# Patient Record
Sex: MALE | Race: OTHER | HISPANIC OR LATINO | Employment: FULL TIME | URBAN - METROPOLITAN AREA
[De-identification: names, ages, dates, MRNs, and addresses within clinical notes are randomized per-mention and may not be internally consistent; named-entity substitution may affect disease eponyms.]

---

## 2019-07-07 ENCOUNTER — HOSPITAL ENCOUNTER (EMERGENCY)
Facility: HOSPITAL | Age: 24
Discharge: HOME | End: 2019-07-07
Attending: EMERGENCY MEDICINE

## 2019-07-07 VITALS
WEIGHT: 154 LBS | DIASTOLIC BLOOD PRESSURE: 62 MMHG | BODY MASS INDEX: 24.75 KG/M2 | HEIGHT: 66 IN | OXYGEN SATURATION: 97 % | SYSTOLIC BLOOD PRESSURE: 118 MMHG | RESPIRATION RATE: 18 BRPM | TEMPERATURE: 99 F | HEART RATE: 60 BPM

## 2019-07-07 DIAGNOSIS — S81.812A: Primary | ICD-10-CM

## 2019-07-07 PROCEDURE — 25000003 PHARM REV CODE 250: Performed by: EMERGENCY MEDICINE

## 2019-07-07 PROCEDURE — 12002 RPR S/N/AX/GEN/TRNK2.6-7.5CM: CPT

## 2019-07-07 PROCEDURE — 90715 TDAP VACCINE 7 YRS/> IM: CPT | Performed by: EMERGENCY MEDICINE

## 2019-07-07 PROCEDURE — 90471 IMMUNIZATION ADMIN: CPT | Performed by: EMERGENCY MEDICINE

## 2019-07-07 PROCEDURE — 63600175 PHARM REV CODE 636 W HCPCS: Performed by: EMERGENCY MEDICINE

## 2019-07-07 PROCEDURE — 99284 EMERGENCY DEPT VISIT MOD MDM: CPT | Mod: 25

## 2019-07-07 RX ORDER — LIDOCAINE HYDROCHLORIDE 10 MG/ML
10 INJECTION INFILTRATION; PERINEURAL
Status: COMPLETED | OUTPATIENT
Start: 2019-07-07 | End: 2019-07-07

## 2019-07-07 RX ADMIN — CLOSTRIDIUM TETANI TOXOID ANTIGEN (FORMALDEHYDE INACTIVATED), CORYNEBACTERIUM DIPHTHERIAE TOXOID ANTIGEN (FORMALDEHYDE INACTIVATED), BORDETELLA PERTUSSIS TOXOID ANTIGEN (GLUTARALDEHYDE INACTIVATED), BORDETELLA PERTUSSIS FILAMENTOUS HEMAGGLUTININ ANTIGEN (FORMALDEHYDE INACTIVATED), BORDETELLA PERTUSSIS PERTACTIN ANTIGEN, AND BORDETELLA PERTUSSIS FIMBRIAE 2/3 ANTIGEN 0.5 ML: 5; 2; 2.5; 5; 3; 5 INJECTION, SUSPENSION INTRAMUSCULAR at 10:07

## 2019-07-07 RX ADMIN — LIDOCAINE HYDROCHLORIDE 10 ML: 10 INJECTION, SOLUTION INFILTRATION; PERINEURAL at 10:07

## 2019-07-08 NOTE — ED NOTES
Pt presents to ED from home with Laceration on L lower leg. Patient states that he hit his leg on a metal bed frame at home around 5 pm.     Patient identifiers for Tucker Roberto verified by spelling and stated name on armband along with .     APPEARANCE: Alert, oriented and in no acute distress.  CARDIAC: Normal rate and rhythm, no murmur heard.   PERIPHERAL VASCULAR: peripheral pulses present. Normal cap refill. No edema. Warm to touch.    RESPIRATORY:Normal rate and effort, breath sounds clear bilaterally throughout chest. Respirations are equal and unlabored no obvious signs of distress.  GASTRO: soft, bowel sounds normal, no tenderness, no abdominal distention.  MUSC: Full ROM. No bony tenderness or soft tissue tenderness. No obvious deformity.  SKIN: + Laceration to LLE  MENTAL STATUS: awake, alert and aware of environment.

## 2019-07-08 NOTE — ED PROVIDER NOTES
Encounter Date: 7/7/2019    SCRIBE #1 NOTE: I, Mini Marin, am scribing for, and in the presence of,  Dr. Colon. I have scribed the entire note.       History     Chief Complaint   Patient presents with    Laceration     laceration to left lower leg. States he hit leg on bed around 5 p.m.     24 y.o male who presents to the ED with complaint of left leg laceration. He reports onset of symptoms was just prior to arrival in the ED. The patient was walking in his bedroom when he struck his leg on his bed post. There was immediate bleeding from lower leg that was controlled with a bandage. He denies any other injury. The patient does not have any history of bleeding disorder or currently taking blood thinners. He is uncertain of his last Tetanus vaccination. Pt denies any numbness, tingling or other complaints at this time.     The history is provided by the patient.     Review of patient's allergies indicates:   Allergen Reactions    Aspirin      Rash and hives     Past Medical History:   Diagnosis Date    Asthma      No past surgical history on file.  No family history on file.  Social History     Tobacco Use    Smoking status: Current Some Day Smoker   Substance Use Topics    Alcohol use: No    Drug use: No     Review of Systems   Constitutional: Negative for chills and fever.   HENT: Negative for congestion, ear pain, rhinorrhea and sore throat.    Respiratory: Negative for cough, shortness of breath and wheezing.    Cardiovascular: Negative for chest pain and palpitations.   Gastrointestinal: Negative for abdominal pain, diarrhea, nausea and vomiting.   Genitourinary: Negative for dysuria and hematuria.   Musculoskeletal: Negative for back pain, myalgias and neck pain.   Skin: Positive for wound (left lower leg). Negative for rash.   Neurological: Negative for dizziness, weakness, light-headedness and headaches.   Psychiatric/Behavioral: Negative for confusion.       Physical Exam     Initial Vitals  [07/07/19 2148]   BP Pulse Resp Temp SpO2   (!) 114/57 67 18 98.9 °F (37.2 °C) 97 %      MAP       --         Physical Exam    Nursing note and vitals reviewed.  Constitutional: He appears well-developed and well-nourished. He is not diaphoretic. No distress.   HENT:   Head: Normocephalic and atraumatic.   Right Ear: External ear normal.   Left Ear: External ear normal.   Mouth/Throat: Oropharynx is clear and moist.   Eyes: Conjunctivae and EOM are normal. Pupils are equal, round, and reactive to light.   Neck: Normal range of motion. Neck supple.   Cardiovascular: Normal rate, regular rhythm, normal heart sounds and intact distal pulses.   Pulmonary/Chest: Breath sounds normal. No respiratory distress.   Abdominal: Soft. Bowel sounds are normal.   Musculoskeletal: Normal range of motion. He exhibits no edema or tenderness.   LLE neurovascularly in tact  Strength WNL to LLE   Pulse is 2+ distally    Lymphadenopathy:     He has no cervical adenopathy.   Neurological: He is alert and oriented to person, place, and time. He has normal strength.   Skin: Skin is warm and dry. No rash noted.   Approximately 3 cm obliquely-oriented laceration to left lower ankle, nicely approximated; no active bleeding         ED Course   Lac Repair  Date/Time: 7/7/2019 10:36 PM  Performed by: Sean Colon MD  Authorized by: Sean Colon MD   Consent Done: Emergent Situation  Body area: lower extremity  Location details: left ankle  Laceration length: 3 cm  Foreign bodies: no foreign bodies  Tendon involvement: none  Nerve involvement: none  Vascular damage: no  Anesthesia: local infiltration    Anesthesia:  Local Anesthetic: lidocaine 1% without epinephrine  Anesthetic total: 5 mL  Patient sedated: no  Preparation: Patient was prepped and draped in the usual sterile fashion.  Irrigation solution: saline  Irrigation method: syringe  Amount of cleaning: extensive  Debridement: none  Degree of undermining: none  Skin closure:  Ethilon (4-0)  Number of sutures: 3  Technique: simple (interrupted)  Approximation: close  Approximation difficulty: simple  Dressing: antibiotic ointment and non-stick sterile dressing  Patient tolerance: Patient tolerated the procedure well with no immediate complications        Labs Reviewed - No data to display       Imaging Results    None          Medical Decision Making:   ED Management:  - wound copiously irrigated in ED; no FB noted; small, approx 3cm linear laceration to LLE; bleeding controlled; LLE neurovascularly in tact; 2+ distal pulse  - wound closed in ED without untoward event (see laceration repair note for further details)  - pt administered tetanus as he is unsure of last tetanus vaccination  - will have pt have sutures removed in approximately 10-14 days  - pt given strict return precautions for any new or worsening symptoms  - No further intervention is indicated at this time after having taken into account the patient's history, physical exam findings, and empirical and objective data obtained during the patient's emergency department workup.   - The patient is at low risk for an emergent medical condition at this time, and I am of the belief that that it is safe to discharge the patient from the emergency department.   - The patient is instructed to follow up as outpatient as indicated on the discharge paperwork.    - I have discussed the specifics of the workup with the patient and the patient has verbalized understanding of the details of the workup, the diagnosis, the treatment plan, and the need for outpatient follow-up.    - Although the patient has no emergent etiology today this does not preclude the development of an emergent condition so, in addition, I have advised the patient that they can return to the ED and/or activate EMS at any time with worsening of their symptoms, change of their symptoms, or with any other medical complaint.    - The patient remained comfortable and  stable during their visit in the ED.    - Discharge and follow-up instructions discussed with the patient who expressed understanding and willingness to comply with my recommendations.  - Results of all emergency department tests  discussed thoroughly with patient; all patient questions answered; pt in agreement with plan  - Pt instructed to follow up with PCP in 2-3 days for recheck of today's complaints  - Pt given strict emergency department return precautions for any new or worsening of symptoms  - Pt discharged from the emergency department in stable condition, in no acute distress                        Clinical Impression:     1. Laceration of lower extremity, left, initial encounter        Disposition:   Disposition: Discharged  Condition: Stable    I, Sean Colon,  personally performed the services described in this documentation. All medical record entries made by the scribe were at my direction and in my presence.  I have reviewed the chart and agree that the record reflects my personal performance and is accurate and complete. Sean Colon M.D. 2:39 AM07/08/2019                   Sean Colon MD  07/08/19 0239

## 2019-08-26 ENCOUNTER — HOSPITAL ENCOUNTER (EMERGENCY)
Facility: HOSPITAL | Age: 24
Discharge: HOME | End: 2019-08-26
Attending: EMERGENCY MEDICINE

## 2019-08-26 VITALS
BODY MASS INDEX: 22.99 KG/M2 | RESPIRATION RATE: 18 BRPM | HEIGHT: 65 IN | WEIGHT: 138 LBS | TEMPERATURE: 98 F | DIASTOLIC BLOOD PRESSURE: 66 MMHG | OXYGEN SATURATION: 98 % | SYSTOLIC BLOOD PRESSURE: 115 MMHG | HEART RATE: 72 BPM

## 2019-08-26 DIAGNOSIS — S39.012A LUMBAR STRAIN, INITIAL ENCOUNTER: Primary | ICD-10-CM

## 2019-08-26 PROCEDURE — 96372 THER/PROPH/DIAG INJ SC/IM: CPT

## 2019-08-26 PROCEDURE — 63600175 PHARM REV CODE 636 W HCPCS: Performed by: EMERGENCY MEDICINE

## 2019-08-26 PROCEDURE — 99284 EMERGENCY DEPT VISIT MOD MDM: CPT | Mod: 25

## 2019-08-26 RX ORDER — MELOXICAM 15 MG/1
15 TABLET ORAL DAILY
Qty: 12 TABLET | Refills: 0 | Status: ON HOLD | OUTPATIENT
Start: 2019-08-26 | End: 2021-11-08 | Stop reason: HOSPADM

## 2019-08-26 RX ORDER — KETOROLAC TROMETHAMINE 30 MG/ML
30 INJECTION, SOLUTION INTRAMUSCULAR; INTRAVENOUS
Status: COMPLETED | OUTPATIENT
Start: 2019-08-26 | End: 2019-08-26

## 2019-08-26 RX ORDER — CYCLOBENZAPRINE HCL 10 MG
10 TABLET ORAL 3 TIMES DAILY PRN
Qty: 15 TABLET | Refills: 0 | Status: SHIPPED | OUTPATIENT
Start: 2019-08-26 | End: 2019-08-31

## 2019-08-26 RX ADMIN — KETOROLAC TROMETHAMINE 30 MG: 30 INJECTION, SOLUTION INTRAMUSCULAR at 07:08

## 2019-08-26 NOTE — ED PROVIDER NOTES
Encounter Date: 8/26/2019    SCRIBE #1 NOTE: I, Mini Marin, am scribing for, and in the presence of,  Dr. Horvath. I have scribed the entire note.       History     Chief Complaint   Patient presents with    Back Pain     24y M ambulatory to ED with c/o lower back pain after skating injury yesterday     Time seen by provider: 6:56 AM    This is a 24 y.o. male who presents with complaint of low back pain. He reports onset of symptoms was yesterday evening. The patient was skate boarding down a hill when he suddenly felt like his back gave out. He denies actually falling but had difficulty moving. The patient reports the pain worsens with movement. He denies any associated numbness, tingling or weakness in the legs, loss of bowel/bladder or genital numbness. The patient tried OTC medication yesterday with minimal relief. He admits to injuring his back 4 years ago but not being evaluated for the pain at that time.     The history is provided by the patient.     Review of patient's allergies indicates:   Allergen Reactions    Aspirin      Rash and hives     Past Medical History:   Diagnosis Date    Asthma      History reviewed. No pertinent surgical history.  History reviewed. No pertinent family history.  Social History     Tobacco Use    Smoking status: Current Some Day Smoker   Substance Use Topics    Alcohol use: No    Drug use: No     Review of Systems   Constitutional: Negative for fever.   HENT: Negative for sore throat.    Respiratory: Negative for shortness of breath.    Cardiovascular: Negative for chest pain.   Gastrointestinal: Negative for nausea.   Genitourinary: Negative for dysuria.   Musculoskeletal: Positive for back pain (low).   Skin: Negative for rash.   Neurological: Negative for weakness.   Hematological: Does not bruise/bleed easily.       Physical Exam     Initial Vitals [08/26/19 0634]   BP Pulse Resp Temp SpO2   121/64 61 16 98.2 °F (36.8 °C) 98 %      MAP       --         Physical  Exam    Nursing note and vitals reviewed.  Constitutional: He appears well-developed and well-nourished. He is not diaphoretic. No distress.   HENT:   Head: Normocephalic and atraumatic.   Eyes: Conjunctivae and EOM are normal.   Neck: Normal range of motion. Neck supple.   Cardiovascular: Intact distal pulses.   Pulmonary/Chest: No respiratory distress.   Musculoskeletal: Normal range of motion. He exhibits tenderness. He exhibits no edema.   Bilateral paraspinal muscle tenderness in lumbar region. Full ROM with pain with flexion and extension.    Neurological: He is alert and oriented to person, place, and time. He has normal strength.   Skin: Skin is warm and dry. Capillary refill takes less than 2 seconds. No rash noted.         ED Course   Procedures  Labs Reviewed - No data to display       Imaging Results    None                               Clinical Impression:     1. Lumbar strain, initial encounter         I, Tala Horvath, personally performed the services described in this documentation. All medical record entries made by the scribe were at my direction and in my presence.  I have reviewed the chart and agree that the record reflects my personal performance and is accurate and complete. Tala Horvath M.D. 7:05 AM08/26/2019                   Tala Horvath MD  08/26/19 0705

## 2019-08-26 NOTE — ED NOTES
"Pt presents to ED with c/o lower back pain. Pt states, "I was skating and I guess I turned wrong and hurt my back". Describes lower back pain as "real bad ache". Rates back pain 8 on 1/10 pain scale. Pt denies falling, numbness or tingling to lower extremities or dysuria. Yung pedal pulses +2 to BLE. Aaox4. Resp even and unlabored.  "

## 2021-01-08 ENCOUNTER — HOSPITAL ENCOUNTER (EMERGENCY)
Facility: HOSPITAL | Age: 26
Discharge: HOME | End: 2021-01-08
Attending: EMERGENCY MEDICINE
Payer: MEDICAID

## 2021-01-08 VITALS
HEART RATE: 78 BPM | BODY MASS INDEX: 25.61 KG/M2 | DIASTOLIC BLOOD PRESSURE: 65 MMHG | RESPIRATION RATE: 17 BRPM | OXYGEN SATURATION: 100 % | HEIGHT: 64 IN | TEMPERATURE: 98 F | WEIGHT: 150 LBS | SYSTOLIC BLOOD PRESSURE: 141 MMHG

## 2021-01-08 DIAGNOSIS — M54.50 ACUTE LOW BACK PAIN WITHOUT SCIATICA, UNSPECIFIED BACK PAIN LATERALITY: Primary | ICD-10-CM

## 2021-01-08 PROCEDURE — 63600175 PHARM REV CODE 636 W HCPCS: Performed by: EMERGENCY MEDICINE

## 2021-01-08 PROCEDURE — 99284 EMERGENCY DEPT VISIT MOD MDM: CPT | Mod: 25

## 2021-01-08 PROCEDURE — 96372 THER/PROPH/DIAG INJ SC/IM: CPT

## 2021-01-08 RX ORDER — IBUPROFEN 600 MG/1
600 TABLET ORAL EVERY 6 HOURS PRN
Qty: 28 TABLET | Refills: 0 | Status: ON HOLD | OUTPATIENT
Start: 2021-01-08 | End: 2021-11-08 | Stop reason: HOSPADM

## 2021-01-08 RX ORDER — KETOROLAC TROMETHAMINE 30 MG/ML
30 INJECTION, SOLUTION INTRAMUSCULAR; INTRAVENOUS
Status: COMPLETED | OUTPATIENT
Start: 2021-01-08 | End: 2021-01-08

## 2021-01-08 RX ORDER — CYCLOBENZAPRINE HCL 10 MG
10 TABLET ORAL 3 TIMES DAILY PRN
Qty: 9 TABLET | Refills: 0 | Status: SHIPPED | OUTPATIENT
Start: 2021-01-08 | End: 2021-01-13

## 2021-01-08 RX ORDER — ACETAMINOPHEN 500 MG
500 TABLET ORAL EVERY 6 HOURS PRN
Qty: 28 TABLET | Refills: 0 | Status: ON HOLD | OUTPATIENT
Start: 2021-01-08 | End: 2021-11-08 | Stop reason: HOSPADM

## 2021-01-08 RX ADMIN — KETOROLAC TROMETHAMINE 30 MG: 30 INJECTION, SOLUTION INTRAMUSCULAR at 05:01

## 2021-06-04 ENCOUNTER — HOSPITAL ENCOUNTER (EMERGENCY)
Facility: HOSPITAL | Age: 26
Discharge: HOME | End: 2021-06-04
Attending: EMERGENCY MEDICINE
Payer: MEDICAID

## 2021-06-04 VITALS
HEIGHT: 64 IN | TEMPERATURE: 98 F | BODY MASS INDEX: 24.75 KG/M2 | OXYGEN SATURATION: 100 % | RESPIRATION RATE: 18 BRPM | SYSTOLIC BLOOD PRESSURE: 132 MMHG | HEART RATE: 74 BPM | WEIGHT: 145 LBS | DIASTOLIC BLOOD PRESSURE: 80 MMHG

## 2021-06-04 DIAGNOSIS — J45.901 EXACERBATION OF ASTHMA, UNSPECIFIED ASTHMA SEVERITY, UNSPECIFIED WHETHER PERSISTENT: Primary | ICD-10-CM

## 2021-06-04 PROCEDURE — 99284 EMERGENCY DEPT VISIT MOD MDM: CPT | Mod: ,,, | Performed by: PHYSICIAN ASSISTANT

## 2021-06-04 PROCEDURE — 99284 PR EMERGENCY DEPT VISIT,LEVEL IV: ICD-10-PCS | Mod: ,,, | Performed by: PHYSICIAN ASSISTANT

## 2021-06-04 PROCEDURE — 99284 EMERGENCY DEPT VISIT MOD MDM: CPT

## 2021-06-04 PROCEDURE — 63600175 PHARM REV CODE 636 W HCPCS: Performed by: PHYSICIAN ASSISTANT

## 2021-06-04 RX ORDER — PREDNISONE 20 MG/1
60 TABLET ORAL
Status: COMPLETED | OUTPATIENT
Start: 2021-06-04 | End: 2021-06-04

## 2021-06-04 RX ORDER — ALBUTEROL SULFATE 90 UG/1
1-2 AEROSOL, METERED RESPIRATORY (INHALATION) EVERY 6 HOURS PRN
Qty: 8 G | Refills: 0 | OUTPATIENT
Start: 2021-06-04 | End: 2021-09-06

## 2021-06-04 RX ORDER — CETIRIZINE HYDROCHLORIDE 10 MG/1
10 TABLET ORAL DAILY
Qty: 30 TABLET | Refills: 0 | Status: CANCELLED | OUTPATIENT
Start: 2021-06-04 | End: 2022-06-04

## 2021-06-04 RX ORDER — PREDNISONE 20 MG/1
40 TABLET ORAL DAILY
Qty: 8 TABLET | Refills: 0 | Status: SHIPPED | OUTPATIENT
Start: 2021-06-04 | End: 2021-06-08

## 2021-06-04 RX ADMIN — PREDNISONE 60 MG: 20 TABLET ORAL at 07:06

## 2021-09-05 ENCOUNTER — HOSPITAL ENCOUNTER (EMERGENCY)
Facility: HOSPITAL | Age: 26
Discharge: HOME | End: 2021-09-06
Attending: EMERGENCY MEDICINE
Payer: MEDICAID

## 2021-09-05 DIAGNOSIS — J45.901 MILD ASTHMA WITH EXACERBATION, UNSPECIFIED WHETHER PERSISTENT: Primary | ICD-10-CM

## 2021-09-05 DIAGNOSIS — R06.02 SOB (SHORTNESS OF BREATH): ICD-10-CM

## 2021-09-05 DIAGNOSIS — R06.02 SHORTNESS OF BREATH: ICD-10-CM

## 2021-09-05 PROCEDURE — 99285 EMERGENCY DEPT VISIT HI MDM: CPT | Mod: 25

## 2021-09-05 PROCEDURE — 99284 PR EMERGENCY DEPT VISIT,LEVEL IV: ICD-10-PCS | Mod: CS,,, | Performed by: PHYSICIAN ASSISTANT

## 2021-09-05 PROCEDURE — 99284 EMERGENCY DEPT VISIT MOD MDM: CPT | Mod: CS,,, | Performed by: PHYSICIAN ASSISTANT

## 2021-09-06 VITALS
WEIGHT: 146 LBS | DIASTOLIC BLOOD PRESSURE: 71 MMHG | HEART RATE: 85 BPM | OXYGEN SATURATION: 97 % | BODY MASS INDEX: 25.06 KG/M2 | TEMPERATURE: 98 F | RESPIRATION RATE: 20 BRPM | SYSTOLIC BLOOD PRESSURE: 122 MMHG

## 2021-09-06 LAB
CTP QC/QA: YES
SARS-COV-2 RDRP RESP QL NAA+PROBE: NEGATIVE

## 2021-09-06 PROCEDURE — 86803 HEPATITIS C AB TEST: CPT | Performed by: EMERGENCY MEDICINE

## 2021-09-06 PROCEDURE — U0002 COVID-19 LAB TEST NON-CDC: HCPCS | Performed by: PHYSICIAN ASSISTANT

## 2021-09-06 PROCEDURE — 93010 EKG 12-LEAD: ICD-10-PCS | Mod: ,,, | Performed by: INTERNAL MEDICINE

## 2021-09-06 PROCEDURE — 87389 HIV-1 AG W/HIV-1&-2 AB AG IA: CPT | Performed by: EMERGENCY MEDICINE

## 2021-09-06 PROCEDURE — 93005 ELECTROCARDIOGRAM TRACING: CPT

## 2021-09-06 PROCEDURE — 63600175 PHARM REV CODE 636 W HCPCS: Performed by: PHYSICIAN ASSISTANT

## 2021-09-06 PROCEDURE — 25000242 PHARM REV CODE 250 ALT 637 W/ HCPCS: Performed by: PHYSICIAN ASSISTANT

## 2021-09-06 PROCEDURE — 93010 ELECTROCARDIOGRAM REPORT: CPT | Mod: ,,, | Performed by: INTERNAL MEDICINE

## 2021-09-06 PROCEDURE — 94640 AIRWAY INHALATION TREATMENT: CPT

## 2021-09-06 RX ORDER — IPRATROPIUM BROMIDE AND ALBUTEROL SULFATE 2.5; .5 MG/3ML; MG/3ML
3 SOLUTION RESPIRATORY (INHALATION)
Status: COMPLETED | OUTPATIENT
Start: 2021-09-06 | End: 2021-09-06

## 2021-09-06 RX ORDER — ALBUTEROL SULFATE 2.5 MG/.5ML
2.5 SOLUTION RESPIRATORY (INHALATION) EVERY 4 HOURS PRN
Qty: 1 EACH | Refills: 0 | Status: ON HOLD | OUTPATIENT
Start: 2021-09-06 | End: 2021-11-08 | Stop reason: HOSPADM

## 2021-09-06 RX ORDER — PREDNISONE 20 MG/1
40 TABLET ORAL DAILY
Qty: 10 TABLET | Refills: 0 | Status: SHIPPED | OUTPATIENT
Start: 2021-09-06 | End: 2021-09-11

## 2021-09-06 RX ORDER — ALBUTEROL SULFATE 90 UG/1
1-2 AEROSOL, METERED RESPIRATORY (INHALATION) EVERY 6 HOURS PRN
Qty: 8 G | Refills: 0 | Status: ON HOLD | OUTPATIENT
Start: 2021-09-06 | End: 2021-11-08 | Stop reason: HOSPADM

## 2021-09-06 RX ORDER — PREDNISONE 20 MG/1
40 TABLET ORAL
Status: COMPLETED | OUTPATIENT
Start: 2021-09-06 | End: 2021-09-06

## 2021-09-06 RX ADMIN — IPRATROPIUM BROMIDE AND ALBUTEROL SULFATE 3 ML: .5; 2.5 SOLUTION RESPIRATORY (INHALATION) at 12:09

## 2021-09-06 RX ADMIN — PREDNISONE 40 MG: 20 TABLET ORAL at 12:09

## 2021-09-07 LAB
HCV AB SERPL QL IA: NEGATIVE
HIV 1+2 AB+HIV1 P24 AG SERPL QL IA: NEGATIVE

## 2021-10-26 ENCOUNTER — HOSPITAL ENCOUNTER (INPATIENT)
Facility: HOSPITAL | Age: 26
LOS: 13 days | Discharge: HOME | DRG: 207 | End: 2021-11-08
Attending: EMERGENCY MEDICINE | Admitting: STUDENT IN AN ORGANIZED HEALTH CARE EDUCATION/TRAINING PROGRAM
Payer: MEDICAID

## 2021-10-26 DIAGNOSIS — J20.2: ICD-10-CM

## 2021-10-26 DIAGNOSIS — Z99.11 ON MECHANICALLY ASSISTED VENTILATION: ICD-10-CM

## 2021-10-26 DIAGNOSIS — F11.90 HEROIN USE: ICD-10-CM

## 2021-10-26 DIAGNOSIS — R00.0 TACHYCARDIA: ICD-10-CM

## 2021-10-26 DIAGNOSIS — R09.02 HYPOXIA: ICD-10-CM

## 2021-10-26 DIAGNOSIS — Z87.898 HISTORY OF SUBSTANCE USE: ICD-10-CM

## 2021-10-26 DIAGNOSIS — F11.20: ICD-10-CM

## 2021-10-26 DIAGNOSIS — J45.51 SEVERE PERSISTENT ASTHMA WITH ACUTE EXACERBATION: Primary | ICD-10-CM

## 2021-10-26 DIAGNOSIS — R06.02 SHORTNESS OF BREATH: ICD-10-CM

## 2021-10-26 DIAGNOSIS — J45.909 ASTHMA: ICD-10-CM

## 2021-10-26 DIAGNOSIS — E44.0 MODERATE MALNUTRITION: ICD-10-CM

## 2021-10-26 DIAGNOSIS — J45.52 SEVERE PERSISTENT ASTHMA WITH STATUS ASTHMATICUS: ICD-10-CM

## 2021-10-26 PROBLEM — R41.89 SEDATED: Status: ACTIVE | Noted: 2021-10-26

## 2021-10-26 PROBLEM — J95.859 PATIENT VENTILATOR DYSSYNCHRONY: Status: ACTIVE | Noted: 2021-10-26

## 2021-10-26 LAB
ALBUMIN SERPL BCP-MCNC: 3.9 G/DL (ref 3.5–5.2)
ALLENS TEST: ABNORMAL
ALP SERPL-CCNC: 65 U/L (ref 55–135)
ALT SERPL W/O P-5'-P-CCNC: 23 U/L (ref 10–44)
ANION GAP SERPL CALC-SCNC: 10 MMOL/L (ref 8–16)
AST SERPL-CCNC: 22 U/L (ref 10–40)
BASOPHILS # BLD AUTO: 0.03 K/UL (ref 0–0.2)
BASOPHILS NFR BLD: 0.3 % (ref 0–1.9)
BILIRUB SERPL-MCNC: 0.3 MG/DL (ref 0.1–1)
BUN SERPL-MCNC: 13 MG/DL (ref 6–20)
CALCIUM SERPL-MCNC: 10 MG/DL (ref 8.7–10.5)
CHLORIDE SERPL-SCNC: 102 MMOL/L (ref 95–110)
CO2 SERPL-SCNC: 27 MMOL/L (ref 23–29)
CREAT SERPL-MCNC: 0.9 MG/DL (ref 0.5–1.4)
CTP QC/QA: YES
CTP QC/QA: YES
D DIMER PPP IA.FEU-MCNC: 0.23 MG/L FEU
DELSYS: ABNORMAL
DIFFERENTIAL METHOD: ABNORMAL
EOSINOPHIL # BLD AUTO: 1.4 K/UL (ref 0–0.5)
EOSINOPHIL NFR BLD: 14.5 % (ref 0–8)
ERYTHROCYTE [DISTWIDTH] IN BLOOD BY AUTOMATED COUNT: 12.4 % (ref 11.5–14.5)
ERYTHROCYTE [SEDIMENTATION RATE] IN BLOOD BY WESTERGREN METHOD: 20 MM/H
ERYTHROCYTE [SEDIMENTATION RATE] IN BLOOD BY WESTERGREN METHOD: 22 MM/H
EST. GFR  (AFRICAN AMERICAN): >60 ML/MIN/1.73 M^2
EST. GFR  (NON AFRICAN AMERICAN): >60 ML/MIN/1.73 M^2
FIO2: 40
FIO2: 50
FLOW: 4
GLUCOSE SERPL-MCNC: 131 MG/DL (ref 70–110)
HCO3 UR-SCNC: 26.6 MMOL/L (ref 24–28)
HCO3 UR-SCNC: 29.2 MMOL/L (ref 24–28)
HCO3 UR-SCNC: 31 MMOL/L (ref 24–28)
HCT VFR BLD AUTO: 43 % (ref 40–54)
HGB BLD-MCNC: 14.4 G/DL (ref 14–18)
IMM GRANULOCYTES # BLD AUTO: 0.02 K/UL (ref 0–0.04)
IMM GRANULOCYTES NFR BLD AUTO: 0.2 % (ref 0–0.5)
LYMPHOCYTES # BLD AUTO: 2.3 K/UL (ref 1–4.8)
LYMPHOCYTES NFR BLD: 23.6 % (ref 18–48)
MCH RBC QN AUTO: 28 PG (ref 27–31)
MCHC RBC AUTO-ENTMCNC: 33.5 G/DL (ref 32–36)
MCV RBC AUTO: 84 FL (ref 82–98)
MODE: ABNORMAL
MONOCYTES # BLD AUTO: 0.7 K/UL (ref 0.3–1)
MONOCYTES NFR BLD: 7.3 % (ref 4–15)
NEUTROPHILS # BLD AUTO: 5.4 K/UL (ref 1.8–7.7)
NEUTROPHILS NFR BLD: 54.1 % (ref 38–73)
NRBC BLD-RTO: 0 /100 WBC
PCO2 BLDA: 54.6 MMHG (ref 35–45)
PCO2 BLDA: 56.2 MMHG (ref 35–45)
PCO2 BLDA: 83.3 MMHG (ref 35–45)
PEEP: 0
PEEP: 5
PH SMN: 7.15 [PH] (ref 7.35–7.45)
PH SMN: 7.29 [PH] (ref 7.35–7.45)
PH SMN: 7.35 [PH] (ref 7.35–7.45)
PLATELET # BLD AUTO: 315 K/UL (ref 150–450)
PMV BLD AUTO: 10.4 FL (ref 9.2–12.9)
PO2 BLDA: 111 MMHG (ref 80–100)
PO2 BLDA: 55 MMHG (ref 40–60)
PO2 BLDA: 81 MMHG (ref 80–100)
POC BE: 0 MMOL/L
POC BE: 0 MMOL/L
POC BE: 5 MMOL/L
POC MOLECULAR INFLUENZA A AGN: NEGATIVE
POC MOLECULAR INFLUENZA B AGN: NEGATIVE
POC SATURATED O2: 86 % (ref 95–100)
POC SATURATED O2: 94 % (ref 95–100)
POC SATURATED O2: 96 % (ref 95–100)
POC TCO2: 28 MMOL/L (ref 23–27)
POC TCO2: 32 MMOL/L (ref 23–27)
POC TCO2: 33 MMOL/L (ref 24–29)
POTASSIUM SERPL-SCNC: 3.7 MMOL/L (ref 3.5–5.1)
PROT SERPL-MCNC: 7.2 G/DL (ref 6–8.4)
RBC # BLD AUTO: 5.14 M/UL (ref 4.6–6.2)
SAMPLE: ABNORMAL
SARS-COV-2 RDRP RESP QL NAA+PROBE: NEGATIVE
SITE: ABNORMAL
SODIUM SERPL-SCNC: 139 MMOL/L (ref 136–145)
SP02: 95
VT: 400
VT: 400
WBC # BLD AUTO: 9.91 K/UL (ref 3.9–12.7)

## 2021-10-26 PROCEDURE — 25000242 PHARM REV CODE 250 ALT 637 W/ HCPCS: Performed by: STUDENT IN AN ORGANIZED HEALTH CARE EDUCATION/TRAINING PROGRAM

## 2021-10-26 PROCEDURE — 99291 PR CRITICAL CARE, E/M 30-74 MINUTES: ICD-10-PCS | Mod: ,,, | Performed by: STUDENT IN AN ORGANIZED HEALTH CARE EDUCATION/TRAINING PROGRAM

## 2021-10-26 PROCEDURE — 93010 EKG 12-LEAD: ICD-10-PCS | Mod: ,,, | Performed by: INTERNAL MEDICINE

## 2021-10-26 PROCEDURE — 93010 ELECTROCARDIOGRAM REPORT: CPT | Mod: ,,, | Performed by: INTERNAL MEDICINE

## 2021-10-26 PROCEDURE — 93010 ELECTROCARDIOGRAM REPORT: CPT | Mod: 59,,, | Performed by: INTERNAL MEDICINE

## 2021-10-26 PROCEDURE — 25000003 PHARM REV CODE 250

## 2021-10-26 PROCEDURE — 87205 SMEAR GRAM STAIN: CPT | Performed by: STUDENT IN AN ORGANIZED HEALTH CARE EDUCATION/TRAINING PROGRAM

## 2021-10-26 PROCEDURE — 80053 COMPREHEN METABOLIC PANEL: CPT | Performed by: EMERGENCY MEDICINE

## 2021-10-26 PROCEDURE — 87070 CULTURE OTHR SPECIMN AEROBIC: CPT | Performed by: STUDENT IN AN ORGANIZED HEALTH CARE EDUCATION/TRAINING PROGRAM

## 2021-10-26 PROCEDURE — 31500 INSERT EMERGENCY AIRWAY: CPT | Mod: ,,, | Performed by: EMERGENCY MEDICINE

## 2021-10-26 PROCEDURE — 25000003 PHARM REV CODE 250: Performed by: STUDENT IN AN ORGANIZED HEALTH CARE EDUCATION/TRAINING PROGRAM

## 2021-10-26 PROCEDURE — 94761 N-INVAS EAR/PLS OXIMETRY MLT: CPT

## 2021-10-26 PROCEDURE — 63600175 PHARM REV CODE 636 W HCPCS: Performed by: EMERGENCY MEDICINE

## 2021-10-26 PROCEDURE — 94640 AIRWAY INHALATION TREATMENT: CPT

## 2021-10-26 PROCEDURE — 99291 CRITICAL CARE FIRST HOUR: CPT | Mod: 25

## 2021-10-26 PROCEDURE — 93010 EKG 12-LEAD: ICD-10-PCS | Mod: 59,,, | Performed by: INTERNAL MEDICINE

## 2021-10-26 PROCEDURE — 99900035 HC TECH TIME PER 15 MIN (STAT)

## 2021-10-26 PROCEDURE — U0002 COVID-19 LAB TEST NON-CDC: HCPCS | Performed by: EMERGENCY MEDICINE

## 2021-10-26 PROCEDURE — 96375 TX/PRO/DX INJ NEW DRUG ADDON: CPT

## 2021-10-26 PROCEDURE — 25000242 PHARM REV CODE 250 ALT 637 W/ HCPCS: Performed by: EMERGENCY MEDICINE

## 2021-10-26 PROCEDURE — 96372 THER/PROPH/DIAG INJ SC/IM: CPT

## 2021-10-26 PROCEDURE — 63600175 PHARM REV CODE 636 W HCPCS: Performed by: STUDENT IN AN ORGANIZED HEALTH CARE EDUCATION/TRAINING PROGRAM

## 2021-10-26 PROCEDURE — 99900026 HC AIRWAY MAINTENANCE (STAT)

## 2021-10-26 PROCEDURE — 36600 WITHDRAWAL OF ARTERIAL BLOOD: CPT

## 2021-10-26 PROCEDURE — 93005 ELECTROCARDIOGRAM TRACING: CPT

## 2021-10-26 PROCEDURE — 99292 PR CRITICAL CARE, ADDL 30 MIN: ICD-10-PCS | Mod: ,,, | Performed by: INTERNAL MEDICINE

## 2021-10-26 PROCEDURE — 25000003 PHARM REV CODE 250: Performed by: INTERNAL MEDICINE

## 2021-10-26 PROCEDURE — 94003 VENT MGMT INPAT SUBQ DAY: CPT

## 2021-10-26 PROCEDURE — 31500 PR INSERT, EMERGENCY ENDOTRACH AIRWAY: ICD-10-PCS | Mod: ,,, | Performed by: EMERGENCY MEDICINE

## 2021-10-26 PROCEDURE — 25000003 PHARM REV CODE 250: Performed by: EMERGENCY MEDICINE

## 2021-10-26 PROCEDURE — 85025 COMPLETE CBC W/AUTO DIFF WBC: CPT | Performed by: EMERGENCY MEDICINE

## 2021-10-26 PROCEDURE — 99291 CRITICAL CARE FIRST HOUR: CPT | Mod: ,,, | Performed by: STUDENT IN AN ORGANIZED HEALTH CARE EDUCATION/TRAINING PROGRAM

## 2021-10-26 PROCEDURE — 99291 CRITICAL CARE FIRST HOUR: CPT | Mod: 25,CS,, | Performed by: EMERGENCY MEDICINE

## 2021-10-26 PROCEDURE — 96374 THER/PROPH/DIAG INJ IV PUSH: CPT | Mod: 59

## 2021-10-26 PROCEDURE — 27200966 HC CLOSED SUCTION SYSTEM

## 2021-10-26 PROCEDURE — 94002 VENT MGMT INPAT INIT DAY: CPT

## 2021-10-26 PROCEDURE — 63600175 PHARM REV CODE 636 W HCPCS

## 2021-10-26 PROCEDURE — 85379 FIBRIN DEGRADATION QUANT: CPT | Performed by: EMERGENCY MEDICINE

## 2021-10-26 PROCEDURE — 87186 SC STD MICRODIL/AGAR DIL: CPT | Performed by: STUDENT IN AN ORGANIZED HEALTH CARE EDUCATION/TRAINING PROGRAM

## 2021-10-26 PROCEDURE — 31500 INSERT EMERGENCY AIRWAY: CPT

## 2021-10-26 PROCEDURE — 20000000 HC ICU ROOM

## 2021-10-26 PROCEDURE — 99292 CRITICAL CARE ADDL 30 MIN: CPT | Mod: ,,, | Performed by: INTERNAL MEDICINE

## 2021-10-26 PROCEDURE — 87502 INFLUENZA DNA AMP PROBE: CPT

## 2021-10-26 PROCEDURE — 82803 BLOOD GASES ANY COMBINATION: CPT

## 2021-10-26 PROCEDURE — 27000221 HC OXYGEN, UP TO 24 HOURS

## 2021-10-26 PROCEDURE — 99291 PR CRITICAL CARE, E/M 30-74 MINUTES: ICD-10-PCS | Mod: 25,CS,, | Performed by: EMERGENCY MEDICINE

## 2021-10-26 RX ORDER — KETAMINE HCL IN 0.9 % NACL 50 MG/5 ML
SYRINGE (ML) INTRAVENOUS
Status: COMPLETED
Start: 2021-10-26 | End: 2021-10-26

## 2021-10-26 RX ORDER — METHYLPREDNISOLONE SOD SUCC 125 MG
125 VIAL (EA) INJECTION
Status: COMPLETED | OUTPATIENT
Start: 2021-10-26 | End: 2021-10-26

## 2021-10-26 RX ORDER — ETOMIDATE 2 MG/ML
INJECTION INTRAVENOUS
Status: COMPLETED
Start: 2021-10-26 | End: 2021-10-26

## 2021-10-26 RX ORDER — KETAMINE HCL IN 0.9 % NACL 50 MG/5 ML
SYRINGE (ML) INTRAVENOUS
Status: DISPENSED
Start: 2021-10-26 | End: 2021-10-26

## 2021-10-26 RX ORDER — SUCCINYLCHOLINE CHLORIDE 20 MG/ML
1.5 INJECTION INTRAMUSCULAR; INTRAVENOUS
Status: COMPLETED | OUTPATIENT
Start: 2021-10-26 | End: 2021-10-26

## 2021-10-26 RX ORDER — EPINEPHRINE 0.3 MG/.3ML
INJECTION SUBCUTANEOUS
Status: COMPLETED
Start: 2021-10-26 | End: 2021-10-26

## 2021-10-26 RX ORDER — ALBUTEROL SULFATE 2.5 MG/.5ML
5 SOLUTION RESPIRATORY (INHALATION) EVERY 4 HOURS PRN
Status: DISCONTINUED | OUTPATIENT
Start: 2021-10-26 | End: 2021-10-26

## 2021-10-26 RX ORDER — EPINEPHRINE 0.3 MG/.3ML
0.3 INJECTION SUBCUTANEOUS
Status: COMPLETED | OUTPATIENT
Start: 2021-10-26 | End: 2021-10-26

## 2021-10-26 RX ORDER — ALBUTEROL SULFATE 2.5 MG/.5ML
15 SOLUTION RESPIRATORY (INHALATION) EVERY 4 HOURS PRN
Status: DISCONTINUED | OUTPATIENT
Start: 2021-10-26 | End: 2021-11-02

## 2021-10-26 RX ORDER — MIDAZOLAM HYDROCHLORIDE 1 MG/ML
2 INJECTION INTRAMUSCULAR; INTRAVENOUS
Status: COMPLETED | OUTPATIENT
Start: 2021-10-26 | End: 2021-10-26

## 2021-10-26 RX ORDER — FENTANYL CITRATE-0.9 % NACL/PF 10 MCG/ML
0-250 PLASTIC BAG, INJECTION (ML) INTRAVENOUS CONTINUOUS
Status: DISCONTINUED | OUTPATIENT
Start: 2021-10-26 | End: 2021-10-26

## 2021-10-26 RX ORDER — FENTANYL CITRATE 50 UG/ML
50 INJECTION, SOLUTION INTRAMUSCULAR; INTRAVENOUS
Status: COMPLETED | OUTPATIENT
Start: 2021-10-26 | End: 2021-10-26

## 2021-10-26 RX ORDER — KETAMINE HYDROCHLORIDE 50 MG/ML
60 INJECTION, SOLUTION INTRAMUSCULAR; INTRAVENOUS
Status: COMPLETED | OUTPATIENT
Start: 2021-10-26 | End: 2021-10-26

## 2021-10-26 RX ORDER — SODIUM CHLORIDE 0.9 % (FLUSH) 0.9 %
10 SYRINGE (ML) INJECTION
Status: DISCONTINUED | OUTPATIENT
Start: 2021-10-26 | End: 2021-11-08 | Stop reason: HOSPADM

## 2021-10-26 RX ORDER — FENTANYL CITRATE 50 UG/ML
INJECTION, SOLUTION INTRAMUSCULAR; INTRAVENOUS
Status: COMPLETED
Start: 2021-10-26 | End: 2021-10-26

## 2021-10-26 RX ORDER — MAGNESIUM SULFATE HEPTAHYDRATE 40 MG/ML
2 INJECTION, SOLUTION INTRAVENOUS
Status: COMPLETED | OUTPATIENT
Start: 2021-10-26 | End: 2021-10-26

## 2021-10-26 RX ORDER — KETAMINE HYDROCHLORIDE 50 MG/ML
50 INJECTION, SOLUTION INTRAMUSCULAR; INTRAVENOUS
Status: COMPLETED | OUTPATIENT
Start: 2021-10-26 | End: 2021-10-26

## 2021-10-26 RX ORDER — PROPOFOL 10 MG/ML
INJECTION, EMULSION INTRAVENOUS
Status: COMPLETED
Start: 2021-10-26 | End: 2021-10-26

## 2021-10-26 RX ORDER — ROCURONIUM BROMIDE 10 MG/ML
INJECTION, SOLUTION INTRAVENOUS
Status: COMPLETED
Start: 2021-10-26 | End: 2021-10-26

## 2021-10-26 RX ORDER — IPRATROPIUM BROMIDE AND ALBUTEROL SULFATE 2.5; .5 MG/3ML; MG/3ML
3 SOLUTION RESPIRATORY (INHALATION)
Status: DISCONTINUED | OUTPATIENT
Start: 2021-10-26 | End: 2021-11-01

## 2021-10-26 RX ORDER — MUPIROCIN 20 MG/G
OINTMENT TOPICAL 2 TIMES DAILY
Status: COMPLETED | OUTPATIENT
Start: 2021-10-26 | End: 2021-10-30

## 2021-10-26 RX ORDER — FAMOTIDINE 10 MG/ML
20 INJECTION INTRAVENOUS 2 TIMES DAILY
Status: DISCONTINUED | OUTPATIENT
Start: 2021-10-26 | End: 2021-11-02

## 2021-10-26 RX ORDER — FENTANYL CITRATE-0.9 % NACL/PF 10 MCG/ML
0-300 PLASTIC BAG, INJECTION (ML) INTRAVENOUS CONTINUOUS
Status: DISCONTINUED | OUTPATIENT
Start: 2021-10-26 | End: 2021-11-02

## 2021-10-26 RX ORDER — ALBUTEROL SULFATE 2.5 MG/.5ML
10 SOLUTION RESPIRATORY (INHALATION) EVERY 4 HOURS PRN
Status: DISCONTINUED | OUTPATIENT
Start: 2021-10-26 | End: 2021-10-26

## 2021-10-26 RX ORDER — IPRATROPIUM BROMIDE AND ALBUTEROL SULFATE 2.5; .5 MG/3ML; MG/3ML
3 SOLUTION RESPIRATORY (INHALATION)
Status: COMPLETED | OUTPATIENT
Start: 2021-10-26 | End: 2021-10-26

## 2021-10-26 RX ORDER — ENOXAPARIN SODIUM 100 MG/ML
40 INJECTION SUBCUTANEOUS EVERY 24 HOURS
Status: DISCONTINUED | OUTPATIENT
Start: 2021-10-26 | End: 2021-11-08 | Stop reason: HOSPADM

## 2021-10-26 RX ORDER — SUCCINYLCHOLINE CHLORIDE 20 MG/ML
INJECTION INTRAMUSCULAR; INTRAVENOUS
Status: COMPLETED
Start: 2021-10-26 | End: 2021-10-26

## 2021-10-26 RX ORDER — PROPOFOL 10 MG/ML
0-50 INJECTION, EMULSION INTRAVENOUS CONTINUOUS
Status: DISCONTINUED | OUTPATIENT
Start: 2021-10-26 | End: 2021-11-02

## 2021-10-26 RX ORDER — KETAMINE HCL IN 0.9 % NACL 50 MG/5 ML
0.5 SYRINGE (ML) INTRAVENOUS ONCE
Status: DISCONTINUED | OUTPATIENT
Start: 2021-10-26 | End: 2021-10-26

## 2021-10-26 RX ORDER — MAGNESIUM SULFATE HEPTAHYDRATE 40 MG/ML
2 INJECTION, SOLUTION INTRAVENOUS ONCE
Status: COMPLETED | OUTPATIENT
Start: 2021-10-26 | End: 2021-10-26

## 2021-10-26 RX ORDER — PROPOFOL 10 MG/ML
0-50 INJECTION, EMULSION INTRAVENOUS CONTINUOUS
Status: DISCONTINUED | OUTPATIENT
Start: 2021-10-26 | End: 2021-10-26

## 2021-10-26 RX ADMIN — KETAMINE HYDROCHLORIDE 60 MG: 50 INJECTION, SOLUTION INTRAMUSCULAR; INTRAVENOUS at 05:10

## 2021-10-26 RX ADMIN — MIDAZOLAM 2 MG: 1 INJECTION INTRAMUSCULAR; INTRAVENOUS at 05:10

## 2021-10-26 RX ADMIN — MAGNESIUM SULFATE 2 G: 2 INJECTION INTRAVENOUS at 05:10

## 2021-10-26 RX ADMIN — SUCCINYLCHOLINE CHLORIDE 96 MG: 20 INJECTION INTRAMUSCULAR; INTRAVENOUS at 05:10

## 2021-10-26 RX ADMIN — FAMOTIDINE 20 MG: 10 INJECTION INTRAVENOUS at 09:10

## 2021-10-26 RX ADMIN — FENTANYL CITRATE 50 MCG: 50 INJECTION INTRAMUSCULAR; INTRAVENOUS at 05:10

## 2021-10-26 RX ADMIN — PROPOFOL 50 MCG/KG/MIN: 10 INJECTION, EMULSION INTRAVENOUS at 02:10

## 2021-10-26 RX ADMIN — IPRATROPIUM BROMIDE AND ALBUTEROL SULFATE 3 ML: 2.5; .5 SOLUTION RESPIRATORY (INHALATION) at 08:10

## 2021-10-26 RX ADMIN — Medication 300 MCG/HR: at 02:10

## 2021-10-26 RX ADMIN — KETAMINE HYDROCHLORIDE 2.5 MCG/KG/MIN: 100 INJECTION INTRAMUSCULAR; INTRAVENOUS at 09:10

## 2021-10-26 RX ADMIN — IPRATROPIUM BROMIDE AND ALBUTEROL SULFATE 3 ML: 2.5; .5 SOLUTION RESPIRATORY (INHALATION) at 09:10

## 2021-10-26 RX ADMIN — ALBUTEROL SULFATE 10 MG: 2.5 SOLUTION RESPIRATORY (INHALATION) at 06:10

## 2021-10-26 RX ADMIN — PROPOFOL 30 MCG/KG/MIN: 10 INJECTION, EMULSION INTRAVENOUS at 05:10

## 2021-10-26 RX ADMIN — Medication 50 MG: at 07:10

## 2021-10-26 RX ADMIN — EPINEPHRINE 0.3 MG: 0.3 INJECTION SUBCUTANEOUS at 05:10

## 2021-10-26 RX ADMIN — PROPOFOL 50 MCG/KG/MIN: 10 INJECTION, EMULSION INTRAVENOUS at 07:10

## 2021-10-26 RX ADMIN — IPRATROPIUM BROMIDE AND ALBUTEROL SULFATE 3 ML: 2.5; .5 SOLUTION RESPIRATORY (INHALATION) at 06:10

## 2021-10-26 RX ADMIN — EPINEPHRINE 0.3 MG: 0.3 INJECTION INTRAMUSCULAR at 05:10

## 2021-10-26 RX ADMIN — Medication 300 MCG/HR: at 10:10

## 2021-10-26 RX ADMIN — METHYLPREDNISOLONE SODIUM SUCCINATE 125 MG: 125 INJECTION, POWDER, LYOPHILIZED, FOR SOLUTION INTRAMUSCULAR; INTRAVENOUS at 05:10

## 2021-10-26 RX ADMIN — MUPIROCIN: 20 OINTMENT TOPICAL at 09:10

## 2021-10-26 RX ADMIN — KETAMINE HYDROCHLORIDE 20 MCG/KG/MIN: 100 INJECTION INTRAMUSCULAR; INTRAVENOUS at 08:10

## 2021-10-26 RX ADMIN — IPRATROPIUM BROMIDE AND ALBUTEROL SULFATE 3 ML: 2.5; .5 SOLUTION RESPIRATORY (INHALATION) at 02:10

## 2021-10-26 RX ADMIN — IPRATROPIUM BROMIDE AND ALBUTEROL SULFATE 3 ML: 2.5; .5 SOLUTION RESPIRATORY (INHALATION) at 05:10

## 2021-10-26 RX ADMIN — AZITHROMYCIN MONOHYDRATE 500 MG: 500 INJECTION, POWDER, LYOPHILIZED, FOR SOLUTION INTRAVENOUS at 09:10

## 2021-10-26 RX ADMIN — Medication 150 MCG/HR: at 06:10

## 2021-10-26 RX ADMIN — Medication 150 MCG/HR: at 08:10

## 2021-10-26 RX ADMIN — IPRATROPIUM BROMIDE AND ALBUTEROL SULFATE 3 ML: 2.5; .5 SOLUTION RESPIRATORY (INHALATION) at 11:10

## 2021-10-26 RX ADMIN — IPRATROPIUM BROMIDE AND ALBUTEROL SULFATE 3 ML: 2.5; .5 SOLUTION RESPIRATORY (INHALATION) at 04:10

## 2021-10-26 RX ADMIN — KETAMINE HYDROCHLORIDE 50 MG: 50 INJECTION, SOLUTION INTRAMUSCULAR; INTRAVENOUS at 05:10

## 2021-10-26 RX ADMIN — IPRATROPIUM BROMIDE AND ALBUTEROL SULFATE 3 ML: 2.5; .5 SOLUTION RESPIRATORY (INHALATION) at 07:10

## 2021-10-26 RX ADMIN — SUCCINYLCHOLINE CHLORIDE 96 MG: 20 INJECTION, SOLUTION INTRAMUSCULAR; INTRAVENOUS; PARENTERAL at 05:10

## 2021-10-26 RX ADMIN — MAGNESIUM SULFATE 2 G: 2 INJECTION INTRAVENOUS at 07:10

## 2021-10-26 RX ADMIN — ENOXAPARIN SODIUM 40 MG: 40 INJECTION SUBCUTANEOUS at 05:10

## 2021-10-26 RX ADMIN — CEFTRIAXONE 2 G: 2 INJECTION, SOLUTION INTRAVENOUS at 10:10

## 2021-10-26 RX ADMIN — FENTANYL CITRATE 100 MCG: 50 INJECTION INTRAMUSCULAR; INTRAVENOUS at 08:10

## 2021-10-26 RX ADMIN — Medication 25 MCG/HR: at 06:10

## 2021-10-26 RX ADMIN — PROPOFOL 50 MCG/KG/MIN: 10 INJECTION, EMULSION INTRAVENOUS at 09:10

## 2021-10-27 PROBLEM — J45.52 SEVERE PERSISTENT ASTHMA WITH STATUS ASTHMATICUS: Status: ACTIVE | Noted: 2021-10-26

## 2021-10-27 PROBLEM — Z87.898 HISTORY OF SUBSTANCE USE: Status: ACTIVE | Noted: 2021-10-27

## 2021-10-27 LAB
ALBUMIN SERPL BCP-MCNC: 3.5 G/DL (ref 3.5–5.2)
ALLENS TEST: ABNORMAL
ALP SERPL-CCNC: 53 U/L (ref 55–135)
ALT SERPL W/O P-5'-P-CCNC: 22 U/L (ref 10–44)
ANION GAP SERPL CALC-SCNC: 11 MMOL/L (ref 8–16)
AST SERPL-CCNC: 23 U/L (ref 10–40)
BASOPHILS # BLD AUTO: 0.01 K/UL (ref 0–0.2)
BASOPHILS NFR BLD: 0.1 % (ref 0–1.9)
BILIRUB SERPL-MCNC: 0.2 MG/DL (ref 0.1–1)
BUN SERPL-MCNC: 15 MG/DL (ref 6–20)
CALCIUM SERPL-MCNC: 9.5 MG/DL (ref 8.7–10.5)
CHLORIDE SERPL-SCNC: 104 MMOL/L (ref 95–110)
CO2 SERPL-SCNC: 23 MMOL/L (ref 23–29)
CREAT SERPL-MCNC: 1 MG/DL (ref 0.5–1.4)
DELSYS: ABNORMAL
DIFFERENTIAL METHOD: ABNORMAL
EOSINOPHIL # BLD AUTO: 0 K/UL (ref 0–0.5)
EOSINOPHIL NFR BLD: 0.2 % (ref 0–8)
ERYTHROCYTE [DISTWIDTH] IN BLOOD BY AUTOMATED COUNT: 12.5 % (ref 11.5–14.5)
ERYTHROCYTE [SEDIMENTATION RATE] IN BLOOD BY WESTERGREN METHOD: 18 MM/H
EST. GFR  (AFRICAN AMERICAN): >60 ML/MIN/1.73 M^2
EST. GFR  (NON AFRICAN AMERICAN): >60 ML/MIN/1.73 M^2
FIO2: 400
GLUCOSE SERPL-MCNC: 119 MG/DL (ref 70–110)
HCO3 UR-SCNC: 30.6 MMOL/L (ref 24–28)
HCT VFR BLD AUTO: 39 % (ref 40–54)
HGB BLD-MCNC: 12.6 G/DL (ref 14–18)
IMM GRANULOCYTES # BLD AUTO: 0.05 K/UL (ref 0–0.04)
IMM GRANULOCYTES NFR BLD AUTO: 0.4 % (ref 0–0.5)
LYMPHOCYTES # BLD AUTO: 1.1 K/UL (ref 1–4.8)
LYMPHOCYTES NFR BLD: 9.1 % (ref 18–48)
MAGNESIUM SERPL-MCNC: 2.4 MG/DL (ref 1.6–2.6)
MCH RBC QN AUTO: 27.9 PG (ref 27–31)
MCHC RBC AUTO-ENTMCNC: 32.3 G/DL (ref 32–36)
MCV RBC AUTO: 87 FL (ref 82–98)
MODE: ABNORMAL
MONOCYTES # BLD AUTO: 1.4 K/UL (ref 0.3–1)
MONOCYTES NFR BLD: 11.4 % (ref 4–15)
NEUTROPHILS # BLD AUTO: 9.3 K/UL (ref 1.8–7.7)
NEUTROPHILS NFR BLD: 78.8 % (ref 38–73)
NRBC BLD-RTO: 0 /100 WBC
PCO2 BLDA: 62.4 MMHG (ref 35–45)
PEEP: 5
PH SMN: 7.3 [PH] (ref 7.35–7.45)
PHOSPHATE SERPL-MCNC: 4.8 MG/DL (ref 2.7–4.5)
PLATELET # BLD AUTO: 269 K/UL (ref 150–450)
PMV BLD AUTO: 10.5 FL (ref 9.2–12.9)
PO2 BLDA: 113 MMHG (ref 80–100)
POC BE: 4 MMOL/L
POC SATURATED O2: 98 % (ref 95–100)
POC TCO2: 32 MMOL/L (ref 23–27)
POTASSIUM SERPL-SCNC: 5.2 MMOL/L (ref 3.5–5.1)
PROT SERPL-MCNC: 6.6 G/DL (ref 6–8.4)
RBC # BLD AUTO: 4.51 M/UL (ref 4.6–6.2)
SAMPLE: ABNORMAL
SITE: ABNORMAL
SODIUM SERPL-SCNC: 138 MMOL/L (ref 136–145)
SP02: 100
VT: 400
WBC # BLD AUTO: 11.84 K/UL (ref 3.9–12.7)

## 2021-10-27 PROCEDURE — 63600175 PHARM REV CODE 636 W HCPCS: Performed by: STUDENT IN AN ORGANIZED HEALTH CARE EDUCATION/TRAINING PROGRAM

## 2021-10-27 PROCEDURE — 25000003 PHARM REV CODE 250: Performed by: INTERNAL MEDICINE

## 2021-10-27 PROCEDURE — 25000242 PHARM REV CODE 250 ALT 637 W/ HCPCS: Performed by: STUDENT IN AN ORGANIZED HEALTH CARE EDUCATION/TRAINING PROGRAM

## 2021-10-27 PROCEDURE — 84100 ASSAY OF PHOSPHORUS: CPT | Performed by: STUDENT IN AN ORGANIZED HEALTH CARE EDUCATION/TRAINING PROGRAM

## 2021-10-27 PROCEDURE — 25000003 PHARM REV CODE 250: Performed by: STUDENT IN AN ORGANIZED HEALTH CARE EDUCATION/TRAINING PROGRAM

## 2021-10-27 PROCEDURE — 80053 COMPREHEN METABOLIC PANEL: CPT | Performed by: STUDENT IN AN ORGANIZED HEALTH CARE EDUCATION/TRAINING PROGRAM

## 2021-10-27 PROCEDURE — 94003 VENT MGMT INPAT SUBQ DAY: CPT

## 2021-10-27 PROCEDURE — 82803 BLOOD GASES ANY COMBINATION: CPT

## 2021-10-27 PROCEDURE — 94640 AIRWAY INHALATION TREATMENT: CPT

## 2021-10-27 PROCEDURE — 99291 CRITICAL CARE FIRST HOUR: CPT | Mod: ,,, | Performed by: INTERNAL MEDICINE

## 2021-10-27 PROCEDURE — 85025 COMPLETE CBC W/AUTO DIFF WBC: CPT | Performed by: STUDENT IN AN ORGANIZED HEALTH CARE EDUCATION/TRAINING PROGRAM

## 2021-10-27 PROCEDURE — 99900026 HC AIRWAY MAINTENANCE (STAT)

## 2021-10-27 PROCEDURE — 99900035 HC TECH TIME PER 15 MIN (STAT)

## 2021-10-27 PROCEDURE — 27000221 HC OXYGEN, UP TO 24 HOURS

## 2021-10-27 PROCEDURE — 36600 WITHDRAWAL OF ARTERIAL BLOOD: CPT

## 2021-10-27 PROCEDURE — 63600175 PHARM REV CODE 636 W HCPCS: Performed by: EMERGENCY MEDICINE

## 2021-10-27 PROCEDURE — 83735 ASSAY OF MAGNESIUM: CPT | Performed by: STUDENT IN AN ORGANIZED HEALTH CARE EDUCATION/TRAINING PROGRAM

## 2021-10-27 PROCEDURE — 20000000 HC ICU ROOM

## 2021-10-27 PROCEDURE — 94761 N-INVAS EAR/PLS OXIMETRY MLT: CPT

## 2021-10-27 PROCEDURE — 63600175 PHARM REV CODE 636 W HCPCS

## 2021-10-27 PROCEDURE — 99291 PR CRITICAL CARE, E/M 30-74 MINUTES: ICD-10-PCS | Mod: ,,, | Performed by: INTERNAL MEDICINE

## 2021-10-27 RX ORDER — ALBUTEROL SULFATE 2.5 MG/.5ML
15 SOLUTION RESPIRATORY (INHALATION) EVERY 4 HOURS PRN
Status: CANCELLED | OUTPATIENT
Start: 2021-10-27

## 2021-10-27 RX ORDER — MAGNESIUM SULFATE HEPTAHYDRATE 40 MG/ML
INJECTION, SOLUTION INTRAVENOUS
Status: DISPENSED
Start: 2021-10-27 | End: 2021-10-27

## 2021-10-27 RX ORDER — ROCURONIUM BROMIDE 10 MG/ML
INJECTION, SOLUTION INTRAVENOUS
Status: DISPENSED
Start: 2021-10-27 | End: 2021-10-27

## 2021-10-27 RX ORDER — ROCURONIUM BROMIDE 10 MG/ML
60 INJECTION, SOLUTION INTRAVENOUS ONCE
Status: DISCONTINUED | OUTPATIENT
Start: 2021-10-27 | End: 2021-10-27

## 2021-10-27 RX ORDER — MIDAZOLAM HYDROCHLORIDE 1 MG/ML
INJECTION INTRAMUSCULAR; INTRAVENOUS
Status: COMPLETED
Start: 2021-10-27 | End: 2021-10-27

## 2021-10-27 RX ORDER — ROCURONIUM BROMIDE 10 MG/ML
100 INJECTION, SOLUTION INTRAVENOUS ONCE
Status: COMPLETED | OUTPATIENT
Start: 2021-10-27 | End: 2021-10-27

## 2021-10-27 RX ORDER — MAGNESIUM SULFATE HEPTAHYDRATE 40 MG/ML
4 INJECTION, SOLUTION INTRAVENOUS ONCE
Status: DISCONTINUED | OUTPATIENT
Start: 2021-10-27 | End: 2021-10-27

## 2021-10-27 RX ORDER — MIDAZOLAM HYDROCHLORIDE 1 MG/ML
2 INJECTION INTRAMUSCULAR; INTRAVENOUS ONCE
Status: COMPLETED | OUTPATIENT
Start: 2021-10-27 | End: 2021-10-27

## 2021-10-27 RX ORDER — MAGNESIUM SULFATE HEPTAHYDRATE 40 MG/ML
4 INJECTION, SOLUTION INTRAVENOUS ONCE
Status: CANCELLED | OUTPATIENT
Start: 2021-10-27 | End: 2021-10-27

## 2021-10-27 RX ORDER — FENTANYL CITRATE 50 UG/ML
50 INJECTION, SOLUTION INTRAMUSCULAR; INTRAVENOUS ONCE
Status: COMPLETED | OUTPATIENT
Start: 2021-10-27 | End: 2021-10-27

## 2021-10-27 RX ADMIN — MUPIROCIN: 20 OINTMENT TOPICAL at 08:10

## 2021-10-27 RX ADMIN — KETAMINE HYDROCHLORIDE 20 MCG/KG/MIN: 100 INJECTION INTRAMUSCULAR; INTRAVENOUS at 02:10

## 2021-10-27 RX ADMIN — IPRATROPIUM BROMIDE AND ALBUTEROL SULFATE 3 ML: 2.5; .5 SOLUTION RESPIRATORY (INHALATION) at 05:10

## 2021-10-27 RX ADMIN — ROCURONIUM BROMIDE 100 MG: 10 INJECTION, SOLUTION INTRAVENOUS at 08:10

## 2021-10-27 RX ADMIN — KETAMINE HYDROCHLORIDE 20 MCG/KG/MIN: 100 INJECTION INTRAMUSCULAR; INTRAVENOUS at 05:10

## 2021-10-27 RX ADMIN — CISATRACURIUM BESYLATE 1 MCG/KG/MIN: 10 INJECTION, SOLUTION INTRAVENOUS at 09:10

## 2021-10-27 RX ADMIN — MINERAL OIL AND WHITE PETROLATUM: 30; 940 OINTMENT OPHTHALMIC at 11:10

## 2021-10-27 RX ADMIN — CEFTRIAXONE 2 G: 2 INJECTION, SOLUTION INTRAVENOUS at 08:10

## 2021-10-27 RX ADMIN — METHYLPREDNISOLONE SODIUM SUCCINATE 60 MG: 40 INJECTION, POWDER, FOR SOLUTION INTRAMUSCULAR; INTRAVENOUS at 05:10

## 2021-10-27 RX ADMIN — MIDAZOLAM 2 MG: 1 INJECTION INTRAMUSCULAR; INTRAVENOUS at 11:10

## 2021-10-27 RX ADMIN — FAMOTIDINE 20 MG: 10 INJECTION INTRAVENOUS at 08:10

## 2021-10-27 RX ADMIN — METHYLPREDNISOLONE SODIUM SUCCINATE 60 MG: 40 INJECTION, POWDER, FOR SOLUTION INTRAMUSCULAR; INTRAVENOUS at 08:10

## 2021-10-27 RX ADMIN — IPRATROPIUM BROMIDE AND ALBUTEROL SULFATE 3 ML: 2.5; .5 SOLUTION RESPIRATORY (INHALATION) at 06:10

## 2021-10-27 RX ADMIN — IPRATROPIUM BROMIDE AND ALBUTEROL SULFATE 3 ML: 2.5; .5 SOLUTION RESPIRATORY (INHALATION) at 04:10

## 2021-10-27 RX ADMIN — MIDAZOLAM HYDROCHLORIDE 2 MG: 1 INJECTION INTRAMUSCULAR; INTRAVENOUS at 11:10

## 2021-10-27 RX ADMIN — IPRATROPIUM BROMIDE AND ALBUTEROL SULFATE 3 ML: 2.5; .5 SOLUTION RESPIRATORY (INHALATION) at 12:10

## 2021-10-27 RX ADMIN — PROPOFOL 50 MCG/KG/MIN: 10 INJECTION, EMULSION INTRAVENOUS at 12:10

## 2021-10-27 RX ADMIN — KETAMINE HYDROCHLORIDE 20 MCG/KG/MIN: 100 INJECTION INTRAMUSCULAR; INTRAVENOUS at 09:10

## 2021-10-27 RX ADMIN — ENOXAPARIN SODIUM 40 MG: 40 INJECTION SUBCUTANEOUS at 04:10

## 2021-10-27 RX ADMIN — PROPOFOL 50 MCG/KG/MIN: 10 INJECTION, EMULSION INTRAVENOUS at 10:10

## 2021-10-27 RX ADMIN — IPRATROPIUM BROMIDE AND ALBUTEROL SULFATE 3 ML: 2.5; .5 SOLUTION RESPIRATORY (INHALATION) at 09:10

## 2021-10-27 RX ADMIN — MIDAZOLAM 1 MG/HR: 5 INJECTION INTRAMUSCULAR; INTRAVENOUS at 11:10

## 2021-10-27 RX ADMIN — FENTANYL CITRATE 50 MCG: 50 INJECTION INTRAMUSCULAR; INTRAVENOUS at 06:10

## 2021-10-27 RX ADMIN — IPRATROPIUM BROMIDE AND ALBUTEROL SULFATE 3 ML: 2.5; .5 SOLUTION RESPIRATORY (INHALATION) at 08:10

## 2021-10-27 RX ADMIN — PROPOFOL 50 MCG/KG/MIN: 10 INJECTION, EMULSION INTRAVENOUS at 07:10

## 2021-10-27 RX ADMIN — IPRATROPIUM BROMIDE AND ALBUTEROL SULFATE 3 ML: 2.5; .5 SOLUTION RESPIRATORY (INHALATION) at 10:10

## 2021-10-27 RX ADMIN — Medication 300 MCG/HR: at 04:10

## 2021-10-27 RX ADMIN — IPRATROPIUM BROMIDE AND ALBUTEROL SULFATE 3 ML: 2.5; .5 SOLUTION RESPIRATORY (INHALATION) at 03:10

## 2021-10-27 RX ADMIN — IPRATROPIUM BROMIDE AND ALBUTEROL SULFATE 3 ML: 2.5; .5 SOLUTION RESPIRATORY (INHALATION) at 01:10

## 2021-10-27 RX ADMIN — IPRATROPIUM BROMIDE AND ALBUTEROL SULFATE 3 ML: 2.5; .5 SOLUTION RESPIRATORY (INHALATION) at 02:10

## 2021-10-27 RX ADMIN — IPRATROPIUM BROMIDE AND ALBUTEROL SULFATE 3 ML: 2.5; .5 SOLUTION RESPIRATORY (INHALATION) at 11:10

## 2021-10-27 RX ADMIN — MINERAL OIL AND WHITE PETROLATUM: 30; 940 OINTMENT OPHTHALMIC at 03:10

## 2021-10-27 RX ADMIN — PROPOFOL 50 MCG/KG/MIN: 10 INJECTION, EMULSION INTRAVENOUS at 04:10

## 2021-10-27 RX ADMIN — Medication 300 MCG/HR: at 08:10

## 2021-10-27 RX ADMIN — PROPOFOL 50 MCG/KG/MIN: 10 INJECTION, EMULSION INTRAVENOUS at 03:10

## 2021-10-27 RX ADMIN — IPRATROPIUM BROMIDE AND ALBUTEROL SULFATE 3 ML: 2.5; .5 SOLUTION RESPIRATORY (INHALATION) at 07:10

## 2021-10-27 RX ADMIN — FENTANYL CITRATE 50 MCG: 50 INJECTION INTRAMUSCULAR; INTRAVENOUS at 04:10

## 2021-10-27 RX ADMIN — MINERAL OIL AND WHITE PETROLATUM: 30; 940 OINTMENT OPHTHALMIC at 08:10

## 2021-10-28 LAB
ALBUMIN SERPL BCP-MCNC: 3.2 G/DL (ref 3.5–5.2)
ALLENS TEST: ABNORMAL
ALP SERPL-CCNC: 50 U/L (ref 55–135)
ALT SERPL W/O P-5'-P-CCNC: 19 U/L (ref 10–44)
ANION GAP SERPL CALC-SCNC: 9 MMOL/L (ref 8–16)
AST SERPL-CCNC: 18 U/L (ref 10–40)
BASOPHILS # BLD AUTO: 0.01 K/UL (ref 0–0.2)
BASOPHILS NFR BLD: 0.1 % (ref 0–1.9)
BILIRUB SERPL-MCNC: 0.2 MG/DL (ref 0.1–1)
BUN SERPL-MCNC: 12 MG/DL (ref 6–20)
CALCIUM SERPL-MCNC: 9.2 MG/DL (ref 8.7–10.5)
CHLORIDE SERPL-SCNC: 107 MMOL/L (ref 95–110)
CO2 SERPL-SCNC: 26 MMOL/L (ref 23–29)
CREAT SERPL-MCNC: 0.9 MG/DL (ref 0.5–1.4)
DELSYS: ABNORMAL
DIFFERENTIAL METHOD: ABNORMAL
EOSINOPHIL # BLD AUTO: 0 K/UL (ref 0–0.5)
EOSINOPHIL NFR BLD: 0 % (ref 0–8)
ERYTHROCYTE [DISTWIDTH] IN BLOOD BY AUTOMATED COUNT: 13.2 % (ref 11.5–14.5)
ERYTHROCYTE [SEDIMENTATION RATE] IN BLOOD BY WESTERGREN METHOD: 18 MM/H
EST. GFR  (AFRICAN AMERICAN): >60 ML/MIN/1.73 M^2
EST. GFR  (NON AFRICAN AMERICAN): >60 ML/MIN/1.73 M^2
FIO2: 35
GLUCOSE SERPL-MCNC: 134 MG/DL (ref 70–110)
HCO3 UR-SCNC: 32.5 MMOL/L (ref 24–28)
HCT VFR BLD AUTO: 35.9 % (ref 40–54)
HGB BLD-MCNC: 11.3 G/DL (ref 14–18)
IMM GRANULOCYTES # BLD AUTO: 0.05 K/UL (ref 0–0.04)
IMM GRANULOCYTES NFR BLD AUTO: 0.4 % (ref 0–0.5)
LYMPHOCYTES # BLD AUTO: 0.4 K/UL (ref 1–4.8)
LYMPHOCYTES NFR BLD: 3 % (ref 18–48)
MAGNESIUM SERPL-MCNC: 2.1 MG/DL (ref 1.6–2.6)
MCH RBC QN AUTO: 27.6 PG (ref 27–31)
MCHC RBC AUTO-ENTMCNC: 31.5 G/DL (ref 32–36)
MCV RBC AUTO: 88 FL (ref 82–98)
MIN VOL: 7.57
MODE: ABNORMAL
MONOCYTES # BLD AUTO: 0.5 K/UL (ref 0.3–1)
MONOCYTES NFR BLD: 4.2 % (ref 4–15)
NEUTROPHILS # BLD AUTO: 10.9 K/UL (ref 1.8–7.7)
NEUTROPHILS NFR BLD: 92.3 % (ref 38–73)
NRBC BLD-RTO: 0 /100 WBC
PCO2 BLDA: 62.9 MMHG (ref 35–45)
PEEP: 5
PH SMN: 7.32 [PH] (ref 7.35–7.45)
PHOSPHATE SERPL-MCNC: 3.9 MG/DL (ref 2.7–4.5)
PLATELET # BLD AUTO: 247 K/UL (ref 150–450)
PMV BLD AUTO: 10.5 FL (ref 9.2–12.9)
PO2 BLDA: 87 MMHG (ref 80–100)
POC BE: 6 MMOL/L
POC SATURATED O2: 95 % (ref 95–100)
POC TCO2: 34 MMOL/L (ref 23–27)
POTASSIUM SERPL-SCNC: 5.1 MMOL/L (ref 3.5–5.1)
PROT SERPL-MCNC: 6.2 G/DL (ref 6–8.4)
RBC # BLD AUTO: 4.1 M/UL (ref 4.6–6.2)
SAMPLE: ABNORMAL
SITE: ABNORMAL
SODIUM SERPL-SCNC: 142 MMOL/L (ref 136–145)
SP02: 98
VT: 400
WBC # BLD AUTO: 11.82 K/UL (ref 3.9–12.7)

## 2021-10-28 PROCEDURE — 27000221 HC OXYGEN, UP TO 24 HOURS

## 2021-10-28 PROCEDURE — 94003 VENT MGMT INPAT SUBQ DAY: CPT

## 2021-10-28 PROCEDURE — 27100171 HC OXYGEN HIGH FLOW UP TO 24 HOURS

## 2021-10-28 PROCEDURE — 63600175 PHARM REV CODE 636 W HCPCS: Performed by: STUDENT IN AN ORGANIZED HEALTH CARE EDUCATION/TRAINING PROGRAM

## 2021-10-28 PROCEDURE — 99291 PR CRITICAL CARE, E/M 30-74 MINUTES: ICD-10-PCS | Mod: ,,, | Performed by: INTERNAL MEDICINE

## 2021-10-28 PROCEDURE — 99900035 HC TECH TIME PER 15 MIN (STAT)

## 2021-10-28 PROCEDURE — 94640 AIRWAY INHALATION TREATMENT: CPT

## 2021-10-28 PROCEDURE — 63600175 PHARM REV CODE 636 W HCPCS: Performed by: INTERNAL MEDICINE

## 2021-10-28 PROCEDURE — 94761 N-INVAS EAR/PLS OXIMETRY MLT: CPT

## 2021-10-28 PROCEDURE — 25000003 PHARM REV CODE 250: Performed by: STUDENT IN AN ORGANIZED HEALTH CARE EDUCATION/TRAINING PROGRAM

## 2021-10-28 PROCEDURE — 99900026 HC AIRWAY MAINTENANCE (STAT)

## 2021-10-28 PROCEDURE — 80053 COMPREHEN METABOLIC PANEL: CPT | Performed by: STUDENT IN AN ORGANIZED HEALTH CARE EDUCATION/TRAINING PROGRAM

## 2021-10-28 PROCEDURE — 99291 CRITICAL CARE FIRST HOUR: CPT | Mod: ,,, | Performed by: INTERNAL MEDICINE

## 2021-10-28 PROCEDURE — 85025 COMPLETE CBC W/AUTO DIFF WBC: CPT | Performed by: STUDENT IN AN ORGANIZED HEALTH CARE EDUCATION/TRAINING PROGRAM

## 2021-10-28 PROCEDURE — 63600175 PHARM REV CODE 636 W HCPCS: Performed by: EMERGENCY MEDICINE

## 2021-10-28 PROCEDURE — 84100 ASSAY OF PHOSPHORUS: CPT | Performed by: STUDENT IN AN ORGANIZED HEALTH CARE EDUCATION/TRAINING PROGRAM

## 2021-10-28 PROCEDURE — 20000000 HC ICU ROOM

## 2021-10-28 PROCEDURE — 83735 ASSAY OF MAGNESIUM: CPT | Performed by: STUDENT IN AN ORGANIZED HEALTH CARE EDUCATION/TRAINING PROGRAM

## 2021-10-28 PROCEDURE — 36600 WITHDRAWAL OF ARTERIAL BLOOD: CPT

## 2021-10-28 PROCEDURE — 25000242 PHARM REV CODE 250 ALT 637 W/ HCPCS: Performed by: STUDENT IN AN ORGANIZED HEALTH CARE EDUCATION/TRAINING PROGRAM

## 2021-10-28 RX ADMIN — MUPIROCIN: 20 OINTMENT TOPICAL at 09:10

## 2021-10-28 RX ADMIN — IPRATROPIUM BROMIDE AND ALBUTEROL SULFATE 3 ML: 2.5; .5 SOLUTION RESPIRATORY (INHALATION) at 03:10

## 2021-10-28 RX ADMIN — METHYLPREDNISOLONE SODIUM SUCCINATE 60 MG: 40 INJECTION, POWDER, FOR SOLUTION INTRAMUSCULAR; INTRAVENOUS at 12:10

## 2021-10-28 RX ADMIN — Medication 300 MCG/HR: at 01:10

## 2021-10-28 RX ADMIN — IPRATROPIUM BROMIDE AND ALBUTEROL SULFATE 3 ML: 2.5; .5 SOLUTION RESPIRATORY (INHALATION) at 12:10

## 2021-10-28 RX ADMIN — MUPIROCIN: 20 OINTMENT TOPICAL at 08:10

## 2021-10-28 RX ADMIN — PROPOFOL 50 MCG/KG/MIN: 10 INJECTION, EMULSION INTRAVENOUS at 01:10

## 2021-10-28 RX ADMIN — METHYLPREDNISOLONE SODIUM SUCCINATE 60 MG: 40 INJECTION, POWDER, FOR SOLUTION INTRAMUSCULAR; INTRAVENOUS at 11:10

## 2021-10-28 RX ADMIN — MINERAL OIL AND WHITE PETROLATUM: 30; 940 OINTMENT OPHTHALMIC at 09:10

## 2021-10-28 RX ADMIN — PROPOFOL 50 MCG/KG/MIN: 10 INJECTION, EMULSION INTRAVENOUS at 10:10

## 2021-10-28 RX ADMIN — MINERAL OIL AND WHITE PETROLATUM: 30; 940 OINTMENT OPHTHALMIC at 02:10

## 2021-10-28 RX ADMIN — Medication 300 MCG/HR: at 06:10

## 2021-10-28 RX ADMIN — KETAMINE HYDROCHLORIDE 20 MCG/KG/MIN: 100 INJECTION INTRAMUSCULAR; INTRAVENOUS at 01:10

## 2021-10-28 RX ADMIN — CISATRACURIUM BESYLATE 3 MCG/KG/MIN: 10 INJECTION, SOLUTION INTRAVENOUS at 06:10

## 2021-10-28 RX ADMIN — KETAMINE HYDROCHLORIDE 20 MCG/KG/MIN: 100 INJECTION INTRAMUSCULAR; INTRAVENOUS at 12:10

## 2021-10-28 RX ADMIN — METHYLPREDNISOLONE SODIUM SUCCINATE 60 MG: 40 INJECTION, POWDER, FOR SOLUTION INTRAMUSCULAR; INTRAVENOUS at 05:10

## 2021-10-28 RX ADMIN — ENOXAPARIN SODIUM 40 MG: 40 INJECTION SUBCUTANEOUS at 05:10

## 2021-10-28 RX ADMIN — KETAMINE HYDROCHLORIDE 20 MCG/KG/MIN: 100 INJECTION INTRAMUSCULAR; INTRAVENOUS at 06:10

## 2021-10-28 RX ADMIN — IPRATROPIUM BROMIDE AND ALBUTEROL SULFATE 3 ML: 2.5; .5 SOLUTION RESPIRATORY (INHALATION) at 09:10

## 2021-10-28 RX ADMIN — IPRATROPIUM BROMIDE AND ALBUTEROL SULFATE 3 ML: 2.5; .5 SOLUTION RESPIRATORY (INHALATION) at 10:10

## 2021-10-28 RX ADMIN — IPRATROPIUM BROMIDE AND ALBUTEROL SULFATE 3 ML: 2.5; .5 SOLUTION RESPIRATORY (INHALATION) at 01:10

## 2021-10-28 RX ADMIN — PROPOFOL 50 MCG/KG/MIN: 10 INJECTION, EMULSION INTRAVENOUS at 07:10

## 2021-10-28 RX ADMIN — IPRATROPIUM BROMIDE AND ALBUTEROL SULFATE 3 ML: 2.5; .5 SOLUTION RESPIRATORY (INHALATION) at 07:10

## 2021-10-28 RX ADMIN — PROPOFOL 50 MCG/KG/MIN: 10 INJECTION, EMULSION INTRAVENOUS at 05:10

## 2021-10-28 RX ADMIN — MINERAL OIL AND WHITE PETROLATUM: 30; 940 OINTMENT OPHTHALMIC at 08:10

## 2021-10-28 RX ADMIN — FAMOTIDINE 20 MG: 10 INJECTION INTRAVENOUS at 09:10

## 2021-10-28 RX ADMIN — SODIUM CHLORIDE, SODIUM LACTATE, POTASSIUM CHLORIDE, AND CALCIUM CHLORIDE 500 ML: .6; .31; .03; .02 INJECTION, SOLUTION INTRAVENOUS at 07:10

## 2021-10-28 RX ADMIN — Medication 300 MCG/HR: at 09:10

## 2021-10-28 RX ADMIN — PROPOFOL 50 MCG/KG/MIN: 10 INJECTION, EMULSION INTRAVENOUS at 04:10

## 2021-10-28 RX ADMIN — KETAMINE HYDROCHLORIDE 20 MCG/KG/MIN: 100 INJECTION INTRAMUSCULAR; INTRAVENOUS at 09:10

## 2021-10-28 RX ADMIN — IPRATROPIUM BROMIDE AND ALBUTEROL SULFATE 3 ML: 2.5; .5 SOLUTION RESPIRATORY (INHALATION) at 05:10

## 2021-10-28 RX ADMIN — FAMOTIDINE 20 MG: 10 INJECTION INTRAVENOUS at 08:10

## 2021-10-28 RX ADMIN — CISATRACURIUM BESYLATE 3 MCG/KG/MIN: 10 INJECTION, SOLUTION INTRAVENOUS at 12:10

## 2021-10-29 LAB
ALBUMIN SERPL BCP-MCNC: 3.1 G/DL (ref 3.5–5.2)
ALLENS TEST: ABNORMAL
ALLENS TEST: ABNORMAL
ALP SERPL-CCNC: 48 U/L (ref 55–135)
ALT SERPL W/O P-5'-P-CCNC: 17 U/L (ref 10–44)
ANION GAP SERPL CALC-SCNC: 7 MMOL/L (ref 8–16)
AST SERPL-CCNC: 19 U/L (ref 10–40)
BASOPHILS # BLD AUTO: 0.01 K/UL (ref 0–0.2)
BASOPHILS NFR BLD: 0.1 % (ref 0–1.9)
BILIRUB SERPL-MCNC: 0.1 MG/DL (ref 0.1–1)
BUN SERPL-MCNC: 16 MG/DL (ref 6–20)
CALCIUM SERPL-MCNC: 9.2 MG/DL (ref 8.7–10.5)
CHLORIDE SERPL-SCNC: 104 MMOL/L (ref 95–110)
CO2 SERPL-SCNC: 30 MMOL/L (ref 23–29)
CREAT SERPL-MCNC: 0.8 MG/DL (ref 0.5–1.4)
DELSYS: ABNORMAL
DELSYS: ABNORMAL
DIFFERENTIAL METHOD: ABNORMAL
EOSINOPHIL # BLD AUTO: 0 K/UL (ref 0–0.5)
EOSINOPHIL NFR BLD: 0 % (ref 0–8)
ERYTHROCYTE [DISTWIDTH] IN BLOOD BY AUTOMATED COUNT: 13.2 % (ref 11.5–14.5)
ERYTHROCYTE [SEDIMENTATION RATE] IN BLOOD BY WESTERGREN METHOD: 16 MM/H
ERYTHROCYTE [SEDIMENTATION RATE] IN BLOOD BY WESTERGREN METHOD: 18 MM/H
EST. GFR  (AFRICAN AMERICAN): >60 ML/MIN/1.73 M^2
EST. GFR  (NON AFRICAN AMERICAN): >60 ML/MIN/1.73 M^2
FIO2: 28
FIO2: 35
GLUCOSE SERPL-MCNC: 123 MG/DL (ref 70–110)
HCO3 UR-SCNC: 36 MMOL/L (ref 24–28)
HCO3 UR-SCNC: 36.1 MMOL/L (ref 24–28)
HCT VFR BLD AUTO: 34.4 % (ref 40–54)
HCT VFR BLD CALC: 34 %PCV (ref 36–54)
HGB BLD-MCNC: 11.1 G/DL (ref 14–18)
IGE SERPL-ACNC: 1709 IU/ML (ref 0–100)
IMM GRANULOCYTES # BLD AUTO: 0.09 K/UL (ref 0–0.04)
IMM GRANULOCYTES NFR BLD AUTO: 0.6 % (ref 0–0.5)
LYMPHOCYTES # BLD AUTO: 0.4 K/UL (ref 1–4.8)
LYMPHOCYTES NFR BLD: 2.4 % (ref 18–48)
MAGNESIUM SERPL-MCNC: 2 MG/DL (ref 1.6–2.6)
MCH RBC QN AUTO: 28.2 PG (ref 27–31)
MCHC RBC AUTO-ENTMCNC: 32.3 G/DL (ref 32–36)
MCV RBC AUTO: 87 FL (ref 82–98)
MODE: ABNORMAL
MODE: ABNORMAL
MONOCYTES # BLD AUTO: 0.7 K/UL (ref 0.3–1)
MONOCYTES NFR BLD: 4.9 % (ref 4–15)
NEUTROPHILS # BLD AUTO: 13.4 K/UL (ref 1.8–7.7)
NEUTROPHILS NFR BLD: 92 % (ref 38–73)
NRBC BLD-RTO: 0 /100 WBC
PCO2 BLDA: 58.5 MMHG (ref 35–45)
PCO2 BLDA: 63.5 MMHG (ref 35–45)
PEEP: 0
PEEP: 0
PH SMN: 7.36 [PH] (ref 7.35–7.45)
PH SMN: 7.4 [PH] (ref 7.35–7.45)
PHOSPHATE SERPL-MCNC: 3.4 MG/DL (ref 2.7–4.5)
PLATELET # BLD AUTO: 260 K/UL (ref 150–450)
PMV BLD AUTO: 10.4 FL (ref 9.2–12.9)
PO2 BLDA: 100 MMHG (ref 80–100)
PO2 BLDA: 77 MMHG (ref 80–100)
POC BE: 11 MMOL/L
POC BE: 11 MMOL/L
POC IONIZED CALCIUM: 1.25 MMOL/L (ref 1.06–1.42)
POC SATURATED O2: 94 % (ref 95–100)
POC SATURATED O2: 98 % (ref 95–100)
POC TCO2: 38 MMOL/L (ref 23–27)
POC TCO2: 38 MMOL/L (ref 23–27)
POTASSIUM BLD-SCNC: 4.4 MMOL/L (ref 3.5–5.1)
POTASSIUM SERPL-SCNC: 4.5 MMOL/L (ref 3.5–5.1)
PROT SERPL-MCNC: 6.1 G/DL (ref 6–8.4)
RBC # BLD AUTO: 3.94 M/UL (ref 4.6–6.2)
SAMPLE: ABNORMAL
SAMPLE: ABNORMAL
SITE: ABNORMAL
SITE: ABNORMAL
SODIUM BLD-SCNC: 142 MMOL/L (ref 136–145)
SODIUM SERPL-SCNC: 141 MMOL/L (ref 136–145)
SP02: 97
TRIGL SERPL-MCNC: 264 MG/DL (ref 30–150)
VT: 400
VT: 400
WBC # BLD AUTO: 14.56 K/UL (ref 3.9–12.7)

## 2021-10-29 PROCEDURE — 99223 PR INITIAL HOSPITAL CARE,LEVL III: ICD-10-PCS | Mod: ,,, | Performed by: ALLERGY & IMMUNOLOGY

## 2021-10-29 PROCEDURE — 85025 COMPLETE CBC W/AUTO DIFF WBC: CPT | Performed by: STUDENT IN AN ORGANIZED HEALTH CARE EDUCATION/TRAINING PROGRAM

## 2021-10-29 PROCEDURE — 25000003 PHARM REV CODE 250: Performed by: INTERNAL MEDICINE

## 2021-10-29 PROCEDURE — 63600175 PHARM REV CODE 636 W HCPCS

## 2021-10-29 PROCEDURE — 25000003 PHARM REV CODE 250: Performed by: STUDENT IN AN ORGANIZED HEALTH CARE EDUCATION/TRAINING PROGRAM

## 2021-10-29 PROCEDURE — 99291 PR CRITICAL CARE, E/M 30-74 MINUTES: ICD-10-PCS | Mod: ,,, | Performed by: INTERNAL MEDICINE

## 2021-10-29 PROCEDURE — 84478 ASSAY OF TRIGLYCERIDES: CPT | Performed by: INTERNAL MEDICINE

## 2021-10-29 PROCEDURE — 94640 AIRWAY INHALATION TREATMENT: CPT

## 2021-10-29 PROCEDURE — 83735 ASSAY OF MAGNESIUM: CPT | Performed by: STUDENT IN AN ORGANIZED HEALTH CARE EDUCATION/TRAINING PROGRAM

## 2021-10-29 PROCEDURE — 94761 N-INVAS EAR/PLS OXIMETRY MLT: CPT

## 2021-10-29 PROCEDURE — 25000242 PHARM REV CODE 250 ALT 637 W/ HCPCS: Performed by: STUDENT IN AN ORGANIZED HEALTH CARE EDUCATION/TRAINING PROGRAM

## 2021-10-29 PROCEDURE — 63600175 PHARM REV CODE 636 W HCPCS: Performed by: STUDENT IN AN ORGANIZED HEALTH CARE EDUCATION/TRAINING PROGRAM

## 2021-10-29 PROCEDURE — 27000221 HC OXYGEN, UP TO 24 HOURS

## 2021-10-29 PROCEDURE — 99291 CRITICAL CARE FIRST HOUR: CPT | Mod: ,,, | Performed by: INTERNAL MEDICINE

## 2021-10-29 PROCEDURE — 36600 WITHDRAWAL OF ARTERIAL BLOOD: CPT

## 2021-10-29 PROCEDURE — 25000003 PHARM REV CODE 250

## 2021-10-29 PROCEDURE — 80053 COMPREHEN METABOLIC PANEL: CPT | Performed by: STUDENT IN AN ORGANIZED HEALTH CARE EDUCATION/TRAINING PROGRAM

## 2021-10-29 PROCEDURE — 94003 VENT MGMT INPAT SUBQ DAY: CPT

## 2021-10-29 PROCEDURE — 99223 1ST HOSP IP/OBS HIGH 75: CPT | Mod: ,,, | Performed by: ALLERGY & IMMUNOLOGY

## 2021-10-29 PROCEDURE — 84100 ASSAY OF PHOSPHORUS: CPT | Performed by: STUDENT IN AN ORGANIZED HEALTH CARE EDUCATION/TRAINING PROGRAM

## 2021-10-29 PROCEDURE — 99900035 HC TECH TIME PER 15 MIN (STAT)

## 2021-10-29 PROCEDURE — 82800 BLOOD PH: CPT

## 2021-10-29 PROCEDURE — 82803 BLOOD GASES ANY COMBINATION: CPT

## 2021-10-29 PROCEDURE — 20000000 HC ICU ROOM

## 2021-10-29 PROCEDURE — 99900026 HC AIRWAY MAINTENANCE (STAT)

## 2021-10-29 PROCEDURE — 82785 ASSAY OF IGE: CPT

## 2021-10-29 PROCEDURE — 63600175 PHARM REV CODE 636 W HCPCS: Performed by: EMERGENCY MEDICINE

## 2021-10-29 RX ORDER — POLYETHYLENE GLYCOL 3350 17 G/17G
17 POWDER, FOR SOLUTION ORAL 2 TIMES DAILY
Status: DISCONTINUED | OUTPATIENT
Start: 2021-10-29 | End: 2021-11-04

## 2021-10-29 RX ADMIN — Medication 300 MCG/HR: at 06:10

## 2021-10-29 RX ADMIN — POLYETHYLENE GLYCOL 3350 17 G: 17 POWDER, FOR SOLUTION ORAL at 08:10

## 2021-10-29 RX ADMIN — IPRATROPIUM BROMIDE AND ALBUTEROL SULFATE 3 ML: 2.5; .5 SOLUTION RESPIRATORY (INHALATION) at 08:10

## 2021-10-29 RX ADMIN — IPRATROPIUM BROMIDE AND ALBUTEROL SULFATE 3 ML: 2.5; .5 SOLUTION RESPIRATORY (INHALATION) at 07:10

## 2021-10-29 RX ADMIN — PROPOFOL 50 MCG/KG/MIN: 10 INJECTION, EMULSION INTRAVENOUS at 07:10

## 2021-10-29 RX ADMIN — FAMOTIDINE 20 MG: 10 INJECTION INTRAVENOUS at 09:10

## 2021-10-29 RX ADMIN — METHYLPREDNISOLONE SODIUM SUCCINATE 60 MG: 40 INJECTION, POWDER, FOR SOLUTION INTRAMUSCULAR; INTRAVENOUS at 05:10

## 2021-10-29 RX ADMIN — IPRATROPIUM BROMIDE AND ALBUTEROL SULFATE 3 ML: 2.5; .5 SOLUTION RESPIRATORY (INHALATION) at 02:10

## 2021-10-29 RX ADMIN — MINERAL OIL AND WHITE PETROLATUM: 30; 940 OINTMENT OPHTHALMIC at 08:10

## 2021-10-29 RX ADMIN — IPRATROPIUM BROMIDE AND ALBUTEROL SULFATE 3 ML: 2.5; .5 SOLUTION RESPIRATORY (INHALATION) at 10:10

## 2021-10-29 RX ADMIN — CISATRACURIUM BESYLATE 3 MCG/KG/MIN: 10 INJECTION, SOLUTION INTRAVENOUS at 11:10

## 2021-10-29 RX ADMIN — IPRATROPIUM BROMIDE AND ALBUTEROL SULFATE 3 ML: 2.5; .5 SOLUTION RESPIRATORY (INHALATION) at 06:10

## 2021-10-29 RX ADMIN — MUPIROCIN: 20 OINTMENT TOPICAL at 08:10

## 2021-10-29 RX ADMIN — KETAMINE HYDROCHLORIDE 20 MCG/KG/MIN: 100 INJECTION INTRAMUSCULAR; INTRAVENOUS at 05:10

## 2021-10-29 RX ADMIN — ENOXAPARIN SODIUM 40 MG: 40 INJECTION SUBCUTANEOUS at 05:10

## 2021-10-29 RX ADMIN — PROPOFOL 50 MCG/KG/MIN: 10 INJECTION, EMULSION INTRAVENOUS at 09:10

## 2021-10-29 RX ADMIN — MINERAL OIL AND WHITE PETROLATUM: 30; 940 OINTMENT OPHTHALMIC at 09:10

## 2021-10-29 RX ADMIN — PROPOFOL 50 MCG/KG/MIN: 10 INJECTION, EMULSION INTRAVENOUS at 04:10

## 2021-10-29 RX ADMIN — CEFTRIAXONE 2 G: 2 INJECTION, SOLUTION INTRAVENOUS at 02:10

## 2021-10-29 RX ADMIN — IPRATROPIUM BROMIDE AND ALBUTEROL SULFATE 3 ML: 2.5; .5 SOLUTION RESPIRATORY (INHALATION) at 12:10

## 2021-10-29 RX ADMIN — PROPOFOL 50 MCG/KG/MIN: 10 INJECTION, EMULSION INTRAVENOUS at 12:10

## 2021-10-29 RX ADMIN — IPRATROPIUM BROMIDE AND ALBUTEROL SULFATE 3 ML: 2.5; .5 SOLUTION RESPIRATORY (INHALATION) at 04:10

## 2021-10-29 RX ADMIN — ALBUTEROL SULFATE 15 MG: 2.5 SOLUTION RESPIRATORY (INHALATION) at 09:10

## 2021-10-29 RX ADMIN — MUPIROCIN: 20 OINTMENT TOPICAL at 09:10

## 2021-10-29 RX ADMIN — Medication 300 MCG/HR: at 11:10

## 2021-10-29 RX ADMIN — METHYLPREDNISOLONE SODIUM SUCCINATE 60 MG: 40 INJECTION, POWDER, FOR SOLUTION INTRAMUSCULAR; INTRAVENOUS at 12:10

## 2021-10-29 RX ADMIN — MIDAZOLAM 3 MG/HR: 5 INJECTION INTRAMUSCULAR; INTRAVENOUS at 02:10

## 2021-10-29 RX ADMIN — KETAMINE HYDROCHLORIDE 20 MCG/KG/MIN: 100 INJECTION INTRAMUSCULAR; INTRAVENOUS at 10:10

## 2021-10-29 RX ADMIN — PROPOFOL 50 MCG/KG/MIN: 10 INJECTION, EMULSION INTRAVENOUS at 03:10

## 2021-10-29 RX ADMIN — MINERAL OIL AND WHITE PETROLATUM: 30; 940 OINTMENT OPHTHALMIC at 03:10

## 2021-10-29 RX ADMIN — FAMOTIDINE 20 MG: 10 INJECTION INTRAVENOUS at 08:10

## 2021-10-29 RX ADMIN — POLYETHYLENE GLYCOL 3350 17 G: 17 POWDER, FOR SOLUTION ORAL at 09:10

## 2021-10-29 RX ADMIN — Medication 300 MCG/HR: at 02:10

## 2021-10-30 LAB
ALBUMIN SERPL BCP-MCNC: 3 G/DL (ref 3.5–5.2)
ALLENS TEST: ABNORMAL
ALP SERPL-CCNC: 51 U/L (ref 55–135)
ALT SERPL W/O P-5'-P-CCNC: 43 U/L (ref 10–44)
ANION GAP SERPL CALC-SCNC: 7 MMOL/L (ref 8–16)
AST SERPL-CCNC: 28 U/L (ref 10–40)
BACTERIA SPEC AEROBE CULT: ABNORMAL
BACTERIA SPEC AEROBE CULT: ABNORMAL
BASOPHILS # BLD AUTO: 0.01 K/UL (ref 0–0.2)
BASOPHILS NFR BLD: 0.1 % (ref 0–1.9)
BILIRUB SERPL-MCNC: 0.1 MG/DL (ref 0.1–1)
BUN SERPL-MCNC: 19 MG/DL (ref 6–20)
CALCIUM SERPL-MCNC: 9 MG/DL (ref 8.7–10.5)
CHLORIDE SERPL-SCNC: 103 MMOL/L (ref 95–110)
CO2 SERPL-SCNC: 30 MMOL/L (ref 23–29)
CREAT SERPL-MCNC: 0.8 MG/DL (ref 0.5–1.4)
DIFFERENTIAL METHOD: ABNORMAL
EOSINOPHIL # BLD AUTO: 0 K/UL (ref 0–0.5)
EOSINOPHIL NFR BLD: 0 % (ref 0–8)
ERYTHROCYTE [DISTWIDTH] IN BLOOD BY AUTOMATED COUNT: 13.5 % (ref 11.5–14.5)
EST. GFR  (AFRICAN AMERICAN): >60 ML/MIN/1.73 M^2
EST. GFR  (NON AFRICAN AMERICAN): >60 ML/MIN/1.73 M^2
GLUCOSE SERPL-MCNC: 115 MG/DL (ref 70–110)
GRAM STN SPEC: ABNORMAL
HCO3 UR-SCNC: 37.3 MMOL/L (ref 24–28)
HCT VFR BLD AUTO: 35.7 % (ref 40–54)
HCT VFR BLD CALC: 33 %PCV (ref 36–54)
HGB BLD-MCNC: 11.4 G/DL (ref 14–18)
IMM GRANULOCYTES # BLD AUTO: 0.12 K/UL (ref 0–0.04)
IMM GRANULOCYTES NFR BLD AUTO: 1 % (ref 0–0.5)
LYMPHOCYTES # BLD AUTO: 0.4 K/UL (ref 1–4.8)
LYMPHOCYTES NFR BLD: 3.6 % (ref 18–48)
MAGNESIUM SERPL-MCNC: 2.3 MG/DL (ref 1.6–2.6)
MCH RBC QN AUTO: 28.1 PG (ref 27–31)
MCHC RBC AUTO-ENTMCNC: 31.9 G/DL (ref 32–36)
MCV RBC AUTO: 88 FL (ref 82–98)
MONOCYTES # BLD AUTO: 0.7 K/UL (ref 0.3–1)
MONOCYTES NFR BLD: 6.2 % (ref 4–15)
NEUTROPHILS # BLD AUTO: 10.5 K/UL (ref 1.8–7.7)
NEUTROPHILS NFR BLD: 89.1 % (ref 38–73)
NRBC BLD-RTO: 0 /100 WBC
PCO2 BLDA: 64.9 MMHG (ref 35–45)
PH SMN: 7.37 [PH] (ref 7.35–7.45)
PHOSPHATE SERPL-MCNC: 4 MG/DL (ref 2.7–4.5)
PLATELET # BLD AUTO: 260 K/UL (ref 150–450)
PMV BLD AUTO: 10.3 FL (ref 9.2–12.9)
PO2 BLDA: 84 MMHG (ref 80–100)
POC BE: 12 MMOL/L
POC IONIZED CALCIUM: 1.25 MMOL/L (ref 1.06–1.42)
POC SATURATED O2: 95 % (ref 95–100)
POC TCO2: 39 MMOL/L (ref 23–27)
POTASSIUM BLD-SCNC: 4.7 MMOL/L (ref 3.5–5.1)
POTASSIUM SERPL-SCNC: 4.7 MMOL/L (ref 3.5–5.1)
PROT SERPL-MCNC: 6 G/DL (ref 6–8.4)
RBC # BLD AUTO: 4.05 M/UL (ref 4.6–6.2)
SAMPLE: ABNORMAL
SITE: ABNORMAL
SODIUM BLD-SCNC: 141 MMOL/L (ref 136–145)
SODIUM SERPL-SCNC: 140 MMOL/L (ref 136–145)
WBC # BLD AUTO: 11.79 K/UL (ref 3.9–12.7)

## 2021-10-30 PROCEDURE — 99291 CRITICAL CARE FIRST HOUR: CPT | Mod: ,,, | Performed by: INTERNAL MEDICINE

## 2021-10-30 PROCEDURE — 25000003 PHARM REV CODE 250: Performed by: STUDENT IN AN ORGANIZED HEALTH CARE EDUCATION/TRAINING PROGRAM

## 2021-10-30 PROCEDURE — 80053 COMPREHEN METABOLIC PANEL: CPT | Performed by: STUDENT IN AN ORGANIZED HEALTH CARE EDUCATION/TRAINING PROGRAM

## 2021-10-30 PROCEDURE — 51702 INSERT TEMP BLADDER CATH: CPT

## 2021-10-30 PROCEDURE — 94003 VENT MGMT INPAT SUBQ DAY: CPT

## 2021-10-30 PROCEDURE — 83735 ASSAY OF MAGNESIUM: CPT | Performed by: STUDENT IN AN ORGANIZED HEALTH CARE EDUCATION/TRAINING PROGRAM

## 2021-10-30 PROCEDURE — 94761 N-INVAS EAR/PLS OXIMETRY MLT: CPT

## 2021-10-30 PROCEDURE — 25000242 PHARM REV CODE 250 ALT 637 W/ HCPCS: Performed by: STUDENT IN AN ORGANIZED HEALTH CARE EDUCATION/TRAINING PROGRAM

## 2021-10-30 PROCEDURE — 63600175 PHARM REV CODE 636 W HCPCS: Performed by: STUDENT IN AN ORGANIZED HEALTH CARE EDUCATION/TRAINING PROGRAM

## 2021-10-30 PROCEDURE — 25000003 PHARM REV CODE 250: Performed by: INTERNAL MEDICINE

## 2021-10-30 PROCEDURE — 99291 PR CRITICAL CARE, E/M 30-74 MINUTES: ICD-10-PCS | Mod: ,,, | Performed by: INTERNAL MEDICINE

## 2021-10-30 PROCEDURE — 20000000 HC ICU ROOM

## 2021-10-30 PROCEDURE — 84100 ASSAY OF PHOSPHORUS: CPT | Performed by: STUDENT IN AN ORGANIZED HEALTH CARE EDUCATION/TRAINING PROGRAM

## 2021-10-30 PROCEDURE — 63600175 PHARM REV CODE 636 W HCPCS: Performed by: EMERGENCY MEDICINE

## 2021-10-30 PROCEDURE — 27000221 HC OXYGEN, UP TO 24 HOURS

## 2021-10-30 PROCEDURE — 63600175 PHARM REV CODE 636 W HCPCS

## 2021-10-30 PROCEDURE — 36600 WITHDRAWAL OF ARTERIAL BLOOD: CPT

## 2021-10-30 PROCEDURE — 99900026 HC AIRWAY MAINTENANCE (STAT)

## 2021-10-30 PROCEDURE — 85025 COMPLETE CBC W/AUTO DIFF WBC: CPT | Performed by: STUDENT IN AN ORGANIZED HEALTH CARE EDUCATION/TRAINING PROGRAM

## 2021-10-30 PROCEDURE — 25000003 PHARM REV CODE 250

## 2021-10-30 PROCEDURE — 99900035 HC TECH TIME PER 15 MIN (STAT)

## 2021-10-30 PROCEDURE — 51701 INSERT BLADDER CATHETER: CPT

## 2021-10-30 PROCEDURE — 43752 NASAL/OROGASTRIC W/TUBE PLMT: CPT

## 2021-10-30 PROCEDURE — 94640 AIRWAY INHALATION TREATMENT: CPT

## 2021-10-30 RX ORDER — SODIUM CHLORIDE 9 MG/ML
INJECTION, SOLUTION INTRAVENOUS CONTINUOUS
Status: DISCONTINUED | OUTPATIENT
Start: 2021-10-30 | End: 2021-11-08 | Stop reason: HOSPADM

## 2021-10-30 RX ORDER — AMOXICILLIN 250 MG
1 CAPSULE ORAL DAILY
Status: DISCONTINUED | OUTPATIENT
Start: 2021-10-30 | End: 2021-11-04

## 2021-10-30 RX ADMIN — PROPOFOL 50 MCG/KG/MIN: 10 INJECTION, EMULSION INTRAVENOUS at 05:10

## 2021-10-30 RX ADMIN — PROPOFOL 50 MCG/KG/MIN: 10 INJECTION, EMULSION INTRAVENOUS at 08:10

## 2021-10-30 RX ADMIN — METHYLPREDNISOLONE SODIUM SUCCINATE 60 MG: 40 INJECTION, POWDER, FOR SOLUTION INTRAMUSCULAR; INTRAVENOUS at 12:10

## 2021-10-30 RX ADMIN — MUPIROCIN: 20 OINTMENT TOPICAL at 08:10

## 2021-10-30 RX ADMIN — Medication 300 MCG/HR: at 07:10

## 2021-10-30 RX ADMIN — IPRATROPIUM BROMIDE AND ALBUTEROL SULFATE 3 ML: 2.5; .5 SOLUTION RESPIRATORY (INHALATION) at 11:10

## 2021-10-30 RX ADMIN — CEFTRIAXONE 2 G: 2 INJECTION, SOLUTION INTRAVENOUS at 01:10

## 2021-10-30 RX ADMIN — PROPOFOL 50 MCG/KG/MIN: 10 INJECTION, EMULSION INTRAVENOUS at 12:10

## 2021-10-30 RX ADMIN — IPRATROPIUM BROMIDE AND ALBUTEROL SULFATE 3 ML: 2.5; .5 SOLUTION RESPIRATORY (INHALATION) at 10:10

## 2021-10-30 RX ADMIN — FAMOTIDINE 20 MG: 10 INJECTION INTRAVENOUS at 08:10

## 2021-10-30 RX ADMIN — POLYETHYLENE GLYCOL 3350 17 G: 17 POWDER, FOR SOLUTION ORAL at 08:10

## 2021-10-30 RX ADMIN — IPRATROPIUM BROMIDE AND ALBUTEROL SULFATE 3 ML: 2.5; .5 SOLUTION RESPIRATORY (INHALATION) at 12:10

## 2021-10-30 RX ADMIN — PROPOFOL 50 MCG/KG/MIN: 10 INJECTION, EMULSION INTRAVENOUS at 11:10

## 2021-10-30 RX ADMIN — MINERAL OIL AND WHITE PETROLATUM: 30; 940 OINTMENT OPHTHALMIC at 08:10

## 2021-10-30 RX ADMIN — IPRATROPIUM BROMIDE AND ALBUTEROL SULFATE 3 ML: 2.5; .5 SOLUTION RESPIRATORY (INHALATION) at 07:10

## 2021-10-30 RX ADMIN — CISATRACURIUM BESYLATE 3 MCG/KG/MIN: 10 INJECTION, SOLUTION INTRAVENOUS at 04:10

## 2021-10-30 RX ADMIN — Medication 300 MCG/HR: at 11:10

## 2021-10-30 RX ADMIN — MIDAZOLAM 4 MG/HR: 5 INJECTION INTRAMUSCULAR; INTRAVENOUS at 04:10

## 2021-10-30 RX ADMIN — IPRATROPIUM BROMIDE AND ALBUTEROL SULFATE 3 ML: 2.5; .5 SOLUTION RESPIRATORY (INHALATION) at 01:10

## 2021-10-30 RX ADMIN — Medication 300 MCG/HR: at 03:10

## 2021-10-30 RX ADMIN — ENOXAPARIN SODIUM 40 MG: 40 INJECTION SUBCUTANEOUS at 05:10

## 2021-10-30 RX ADMIN — METHYLPREDNISOLONE SODIUM SUCCINATE 60 MG: 40 INJECTION, POWDER, FOR SOLUTION INTRAMUSCULAR; INTRAVENOUS at 01:10

## 2021-10-30 RX ADMIN — METHYLPREDNISOLONE SODIUM SUCCINATE 60 MG: 40 INJECTION, POWDER, FOR SOLUTION INTRAMUSCULAR; INTRAVENOUS at 05:10

## 2021-10-30 RX ADMIN — DOCUSATE SODIUM 50MG AND SENNOSIDES 8.6MG 1 TABLET: 8.6; 5 TABLET, FILM COATED ORAL at 08:10

## 2021-10-30 RX ADMIN — CISATRACURIUM BESYLATE 3 MCG/KG/MIN: 10 INJECTION, SOLUTION INTRAVENOUS at 10:10

## 2021-10-30 RX ADMIN — PROPOFOL 50 MCG/KG/MIN: 10 INJECTION, EMULSION INTRAVENOUS at 03:10

## 2021-10-30 RX ADMIN — SODIUM CHLORIDE 5 ML/HR: 0.9 INJECTION, SOLUTION INTRAVENOUS at 04:10

## 2021-10-30 RX ADMIN — MINERAL OIL AND WHITE PETROLATUM: 30; 940 OINTMENT OPHTHALMIC at 03:10

## 2021-10-30 RX ADMIN — DOXYCYCLINE 100 MG: 100 INJECTION, POWDER, LYOPHILIZED, FOR SOLUTION INTRAVENOUS at 05:10

## 2021-10-30 RX ADMIN — IPRATROPIUM BROMIDE AND ALBUTEROL SULFATE 3 ML: 2.5; .5 SOLUTION RESPIRATORY (INHALATION) at 05:10

## 2021-10-30 RX ADMIN — KETAMINE HYDROCHLORIDE 20 MCG/KG/MIN: 100 INJECTION INTRAMUSCULAR; INTRAVENOUS at 11:10

## 2021-10-30 RX ADMIN — IPRATROPIUM BROMIDE AND ALBUTEROL SULFATE 3 ML: 2.5; .5 SOLUTION RESPIRATORY (INHALATION) at 09:10

## 2021-10-30 RX ADMIN — ALBUTEROL SULFATE 15 MG: 2.5 SOLUTION RESPIRATORY (INHALATION) at 07:10

## 2021-10-30 RX ADMIN — IPRATROPIUM BROMIDE AND ALBUTEROL SULFATE 3 ML: 2.5; .5 SOLUTION RESPIRATORY (INHALATION) at 04:10

## 2021-10-30 RX ADMIN — IPRATROPIUM BROMIDE AND ALBUTEROL SULFATE 3 ML: 2.5; .5 SOLUTION RESPIRATORY (INHALATION) at 02:10

## 2021-10-31 PROBLEM — J20.2: Status: ACTIVE | Noted: 2021-10-31

## 2021-10-31 LAB
ALBUMIN SERPL BCP-MCNC: 3 G/DL (ref 3.5–5.2)
ALP SERPL-CCNC: 52 U/L (ref 55–135)
ALT SERPL W/O P-5'-P-CCNC: 40 U/L (ref 10–44)
ANION GAP SERPL CALC-SCNC: 3 MMOL/L (ref 8–16)
AST SERPL-CCNC: 21 U/L (ref 10–40)
BASOPHILS # BLD AUTO: 0.02 K/UL (ref 0–0.2)
BASOPHILS NFR BLD: 0.2 % (ref 0–1.9)
BILIRUB SERPL-MCNC: 0.2 MG/DL (ref 0.1–1)
BUN SERPL-MCNC: 21 MG/DL (ref 6–20)
CALCIUM SERPL-MCNC: 9.6 MG/DL (ref 8.7–10.5)
CHLORIDE SERPL-SCNC: 100 MMOL/L (ref 95–110)
CO2 SERPL-SCNC: 34 MMOL/L (ref 23–29)
CREAT SERPL-MCNC: 0.8 MG/DL (ref 0.5–1.4)
DIFFERENTIAL METHOD: ABNORMAL
EOSINOPHIL # BLD AUTO: 0 K/UL (ref 0–0.5)
EOSINOPHIL NFR BLD: 0 % (ref 0–8)
ERYTHROCYTE [DISTWIDTH] IN BLOOD BY AUTOMATED COUNT: 13 % (ref 11.5–14.5)
EST. GFR  (AFRICAN AMERICAN): >60 ML/MIN/1.73 M^2
EST. GFR  (NON AFRICAN AMERICAN): >60 ML/MIN/1.73 M^2
GLUCOSE SERPL-MCNC: 130 MG/DL (ref 70–110)
HCT VFR BLD AUTO: 37.3 % (ref 40–54)
HGB BLD-MCNC: 11.7 G/DL (ref 14–18)
IMM GRANULOCYTES # BLD AUTO: 0.11 K/UL (ref 0–0.04)
IMM GRANULOCYTES NFR BLD AUTO: 1.1 % (ref 0–0.5)
LYMPHOCYTES # BLD AUTO: 0.6 K/UL (ref 1–4.8)
LYMPHOCYTES NFR BLD: 5.5 % (ref 18–48)
MAGNESIUM SERPL-MCNC: 2.1 MG/DL (ref 1.6–2.6)
MCH RBC QN AUTO: 28 PG (ref 27–31)
MCHC RBC AUTO-ENTMCNC: 31.4 G/DL (ref 32–36)
MCV RBC AUTO: 89 FL (ref 82–98)
MONOCYTES # BLD AUTO: 0.8 K/UL (ref 0.3–1)
MONOCYTES NFR BLD: 7.9 % (ref 4–15)
NEUTROPHILS # BLD AUTO: 8.8 K/UL (ref 1.8–7.7)
NEUTROPHILS NFR BLD: 85.3 % (ref 38–73)
NRBC BLD-RTO: 0 /100 WBC
PHOSPHATE SERPL-MCNC: 3.8 MG/DL (ref 2.7–4.5)
PLATELET # BLD AUTO: 266 K/UL (ref 150–450)
PMV BLD AUTO: 10.2 FL (ref 9.2–12.9)
POTASSIUM SERPL-SCNC: 4.6 MMOL/L (ref 3.5–5.1)
PROT SERPL-MCNC: 6.2 G/DL (ref 6–8.4)
RBC # BLD AUTO: 4.18 M/UL (ref 4.6–6.2)
SODIUM SERPL-SCNC: 137 MMOL/L (ref 136–145)
WBC # BLD AUTO: 10.26 K/UL (ref 3.9–12.7)

## 2021-10-31 PROCEDURE — 25000003 PHARM REV CODE 250: Performed by: STUDENT IN AN ORGANIZED HEALTH CARE EDUCATION/TRAINING PROGRAM

## 2021-10-31 PROCEDURE — 25000003 PHARM REV CODE 250: Performed by: INTERNAL MEDICINE

## 2021-10-31 PROCEDURE — 27000221 HC OXYGEN, UP TO 24 HOURS

## 2021-10-31 PROCEDURE — 80053 COMPREHEN METABOLIC PANEL: CPT | Performed by: STUDENT IN AN ORGANIZED HEALTH CARE EDUCATION/TRAINING PROGRAM

## 2021-10-31 PROCEDURE — 87070 CULTURE OTHR SPECIMN AEROBIC: CPT | Performed by: STUDENT IN AN ORGANIZED HEALTH CARE EDUCATION/TRAINING PROGRAM

## 2021-10-31 PROCEDURE — 25000242 PHARM REV CODE 250 ALT 637 W/ HCPCS: Performed by: STUDENT IN AN ORGANIZED HEALTH CARE EDUCATION/TRAINING PROGRAM

## 2021-10-31 PROCEDURE — 83735 ASSAY OF MAGNESIUM: CPT | Performed by: STUDENT IN AN ORGANIZED HEALTH CARE EDUCATION/TRAINING PROGRAM

## 2021-10-31 PROCEDURE — 94640 AIRWAY INHALATION TREATMENT: CPT

## 2021-10-31 PROCEDURE — 63600175 PHARM REV CODE 636 W HCPCS: Performed by: STUDENT IN AN ORGANIZED HEALTH CARE EDUCATION/TRAINING PROGRAM

## 2021-10-31 PROCEDURE — 87205 SMEAR GRAM STAIN: CPT | Performed by: STUDENT IN AN ORGANIZED HEALTH CARE EDUCATION/TRAINING PROGRAM

## 2021-10-31 PROCEDURE — 94761 N-INVAS EAR/PLS OXIMETRY MLT: CPT

## 2021-10-31 PROCEDURE — 99900035 HC TECH TIME PER 15 MIN (STAT)

## 2021-10-31 PROCEDURE — 99291 PR CRITICAL CARE, E/M 30-74 MINUTES: ICD-10-PCS | Mod: ,,, | Performed by: INTERNAL MEDICINE

## 2021-10-31 PROCEDURE — 99900026 HC AIRWAY MAINTENANCE (STAT)

## 2021-10-31 PROCEDURE — 20000000 HC ICU ROOM

## 2021-10-31 PROCEDURE — 63600175 PHARM REV CODE 636 W HCPCS: Performed by: EMERGENCY MEDICINE

## 2021-10-31 PROCEDURE — 25000003 PHARM REV CODE 250

## 2021-10-31 PROCEDURE — 94003 VENT MGMT INPAT SUBQ DAY: CPT

## 2021-10-31 PROCEDURE — 99291 CRITICAL CARE FIRST HOUR: CPT | Mod: ,,, | Performed by: INTERNAL MEDICINE

## 2021-10-31 PROCEDURE — 85025 COMPLETE CBC W/AUTO DIFF WBC: CPT | Performed by: STUDENT IN AN ORGANIZED HEALTH CARE EDUCATION/TRAINING PROGRAM

## 2021-10-31 PROCEDURE — 84100 ASSAY OF PHOSPHORUS: CPT | Performed by: STUDENT IN AN ORGANIZED HEALTH CARE EDUCATION/TRAINING PROGRAM

## 2021-10-31 RX ADMIN — MINERAL OIL AND WHITE PETROLATUM: 30; 940 OINTMENT OPHTHALMIC at 03:10

## 2021-10-31 RX ADMIN — METHYLPREDNISOLONE SODIUM SUCCINATE 60 MG: 40 INJECTION, POWDER, FOR SOLUTION INTRAMUSCULAR; INTRAVENOUS at 12:10

## 2021-10-31 RX ADMIN — IPRATROPIUM BROMIDE AND ALBUTEROL SULFATE 3 ML: 2.5; .5 SOLUTION RESPIRATORY (INHALATION) at 10:10

## 2021-10-31 RX ADMIN — METHYLPREDNISOLONE SODIUM SUCCINATE 60 MG: 40 INJECTION, POWDER, FOR SOLUTION INTRAMUSCULAR; INTRAVENOUS at 05:10

## 2021-10-31 RX ADMIN — KETAMINE HYDROCHLORIDE 20 MCG/KG/MIN: 100 INJECTION INTRAMUSCULAR; INTRAVENOUS at 05:10

## 2021-10-31 RX ADMIN — KETAMINE HYDROCHLORIDE 20 MCG/KG/MIN: 100 INJECTION INTRAMUSCULAR; INTRAVENOUS at 09:10

## 2021-10-31 RX ADMIN — FAMOTIDINE 20 MG: 10 INJECTION INTRAVENOUS at 09:10

## 2021-10-31 RX ADMIN — PROPOFOL 50 MCG/KG/MIN: 10 INJECTION, EMULSION INTRAVENOUS at 05:10

## 2021-10-31 RX ADMIN — Medication 300 MCG/HR: at 12:10

## 2021-10-31 RX ADMIN — ENOXAPARIN SODIUM 40 MG: 40 INJECTION SUBCUTANEOUS at 05:10

## 2021-10-31 RX ADMIN — IPRATROPIUM BROMIDE AND ALBUTEROL SULFATE 3 ML: 2.5; .5 SOLUTION RESPIRATORY (INHALATION) at 11:10

## 2021-10-31 RX ADMIN — MIDAZOLAM 10 MG/HR: 5 INJECTION INTRAMUSCULAR; INTRAVENOUS at 03:10

## 2021-10-31 RX ADMIN — MIDAZOLAM 5 MG/HR: 5 INJECTION INTRAMUSCULAR; INTRAVENOUS at 03:10

## 2021-10-31 RX ADMIN — IPRATROPIUM BROMIDE AND ALBUTEROL SULFATE 3 ML: 2.5; .5 SOLUTION RESPIRATORY (INHALATION) at 09:10

## 2021-10-31 RX ADMIN — IPRATROPIUM BROMIDE AND ALBUTEROL SULFATE 3 ML: 2.5; .5 SOLUTION RESPIRATORY (INHALATION) at 03:10

## 2021-10-31 RX ADMIN — PROPOFOL 50 MCG/KG/MIN: 10 INJECTION, EMULSION INTRAVENOUS at 10:10

## 2021-10-31 RX ADMIN — IPRATROPIUM BROMIDE AND ALBUTEROL SULFATE 3 ML: 2.5; .5 SOLUTION RESPIRATORY (INHALATION) at 05:10

## 2021-10-31 RX ADMIN — Medication 300 MCG/HR: at 04:10

## 2021-10-31 RX ADMIN — POLYETHYLENE GLYCOL 3350 17 G: 17 POWDER, FOR SOLUTION ORAL at 08:10

## 2021-10-31 RX ADMIN — IPRATROPIUM BROMIDE AND ALBUTEROL SULFATE 3 ML: 2.5; .5 SOLUTION RESPIRATORY (INHALATION) at 12:10

## 2021-10-31 RX ADMIN — IPRATROPIUM BROMIDE AND ALBUTEROL SULFATE 3 ML: 2.5; .5 SOLUTION RESPIRATORY (INHALATION) at 07:10

## 2021-10-31 RX ADMIN — FAMOTIDINE 20 MG: 10 INJECTION INTRAVENOUS at 08:10

## 2021-10-31 RX ADMIN — POLYETHYLENE GLYCOL 3350 17 G: 17 POWDER, FOR SOLUTION ORAL at 09:10

## 2021-10-31 RX ADMIN — PROPOFOL 50 MCG/KG/MIN: 10 INJECTION, EMULSION INTRAVENOUS at 04:10

## 2021-10-31 RX ADMIN — MINERAL OIL AND WHITE PETROLATUM: 30; 940 OINTMENT OPHTHALMIC at 09:10

## 2021-10-31 RX ADMIN — DOXYCYCLINE 100 MG: 100 INJECTION, POWDER, LYOPHILIZED, FOR SOLUTION INTRAVENOUS at 05:10

## 2021-10-31 RX ADMIN — PROPOFOL 50 MCG/KG/MIN: 10 INJECTION, EMULSION INTRAVENOUS at 07:10

## 2021-10-31 RX ADMIN — PROPOFOL 50 MCG/KG/MIN: 10 INJECTION, EMULSION INTRAVENOUS at 12:10

## 2021-10-31 RX ADMIN — IPRATROPIUM BROMIDE AND ALBUTEROL SULFATE 3 ML: 2.5; .5 SOLUTION RESPIRATORY (INHALATION) at 01:10

## 2021-10-31 RX ADMIN — DOXYCYCLINE 100 MG: 100 INJECTION, POWDER, LYOPHILIZED, FOR SOLUTION INTRAVENOUS at 03:10

## 2021-10-31 RX ADMIN — Medication 300 MCG/HR: at 08:10

## 2021-10-31 RX ADMIN — DOCUSATE SODIUM 50MG AND SENNOSIDES 8.6MG 1 TABLET: 8.6; 5 TABLET, FILM COATED ORAL at 08:10

## 2021-11-01 LAB
ALBUMIN SERPL BCP-MCNC: 3.1 G/DL (ref 3.5–5.2)
ALLENS TEST: ABNORMAL
ALP SERPL-CCNC: 48 U/L (ref 55–135)
ALT SERPL W/O P-5'-P-CCNC: 62 U/L (ref 10–44)
ANION GAP SERPL CALC-SCNC: 9 MMOL/L (ref 8–16)
AST SERPL-CCNC: 96 U/L (ref 10–40)
BACTERIA #/AREA URNS AUTO: ABNORMAL /HPF
BASOPHILS # BLD AUTO: 0.03 K/UL (ref 0–0.2)
BASOPHILS NFR BLD: 0.2 % (ref 0–1.9)
BILIRUB SERPL-MCNC: 0.2 MG/DL (ref 0.1–1)
BILIRUB UR QL STRIP: NEGATIVE
BUN SERPL-MCNC: 22 MG/DL (ref 6–20)
CALCIUM SERPL-MCNC: 9.1 MG/DL (ref 8.7–10.5)
CHLORIDE SERPL-SCNC: 101 MMOL/L (ref 95–110)
CLARITY UR REFRACT.AUTO: ABNORMAL
CO2 SERPL-SCNC: 29 MMOL/L (ref 23–29)
COLOR UR AUTO: YELLOW
CREAT SERPL-MCNC: 0.7 MG/DL (ref 0.5–1.4)
DELSYS: ABNORMAL
DIFFERENTIAL METHOD: ABNORMAL
EOSINOPHIL # BLD AUTO: 0 K/UL (ref 0–0.5)
EOSINOPHIL NFR BLD: 0 % (ref 0–8)
ERYTHROCYTE [DISTWIDTH] IN BLOOD BY AUTOMATED COUNT: 12.8 % (ref 11.5–14.5)
ERYTHROCYTE [SEDIMENTATION RATE] IN BLOOD BY WESTERGREN METHOD: 18 MM/H
EST. GFR  (AFRICAN AMERICAN): >60 ML/MIN/1.73 M^2
EST. GFR  (NON AFRICAN AMERICAN): >60 ML/MIN/1.73 M^2
FIO2: 40
GLUCOSE SERPL-MCNC: 117 MG/DL (ref 70–110)
GLUCOSE UR QL STRIP: NEGATIVE
HCO3 UR-SCNC: 33.4 MMOL/L (ref 24–28)
HCT VFR BLD AUTO: 40.3 % (ref 40–54)
HCT VFR BLD CALC: 40 %PCV (ref 36–54)
HGB BLD-MCNC: 12.9 G/DL (ref 14–18)
HGB UR QL STRIP: ABNORMAL
IMM GRANULOCYTES # BLD AUTO: 0.19 K/UL (ref 0–0.04)
IMM GRANULOCYTES NFR BLD AUTO: 1.5 % (ref 0–0.5)
KETONES UR QL STRIP: NEGATIVE
LEUKOCYTE ESTERASE UR QL STRIP: NEGATIVE
LYMPHOCYTES # BLD AUTO: 0.7 K/UL (ref 1–4.8)
LYMPHOCYTES NFR BLD: 5.1 % (ref 18–48)
MAGNESIUM SERPL-MCNC: 2 MG/DL (ref 1.6–2.6)
MCH RBC QN AUTO: 27.9 PG (ref 27–31)
MCHC RBC AUTO-ENTMCNC: 32 G/DL (ref 32–36)
MCV RBC AUTO: 87 FL (ref 82–98)
MICROSCOPIC COMMENT: ABNORMAL
MODE: ABNORMAL
MONOCYTES # BLD AUTO: 1.2 K/UL (ref 0.3–1)
MONOCYTES NFR BLD: 9.2 % (ref 4–15)
NEUTROPHILS # BLD AUTO: 10.6 K/UL (ref 1.8–7.7)
NEUTROPHILS NFR BLD: 84 % (ref 38–73)
NITRITE UR QL STRIP: NEGATIVE
NRBC BLD-RTO: 0 /100 WBC
PCO2 BLDA: 43.7 MMHG (ref 35–45)
PEEP: 5
PH SMN: 7.49 [PH] (ref 7.35–7.45)
PH UR STRIP: 7 [PH] (ref 5–8)
PHOSPHATE SERPL-MCNC: 3.4 MG/DL (ref 2.7–4.5)
PLATELET # BLD AUTO: 280 K/UL (ref 150–450)
PMV BLD AUTO: 10 FL (ref 9.2–12.9)
PO2 BLDA: 58 MMHG (ref 80–100)
POC BE: 10 MMOL/L
POC SATURATED O2: 92 % (ref 95–100)
POC TCO2: 35 MMOL/L (ref 23–27)
POTASSIUM SERPL-SCNC: 4.3 MMOL/L (ref 3.5–5.1)
PROT SERPL-MCNC: 6.7 G/DL (ref 6–8.4)
PROT UR QL STRIP: NEGATIVE
RBC # BLD AUTO: 4.62 M/UL (ref 4.6–6.2)
RBC #/AREA URNS AUTO: 54 /HPF (ref 0–4)
SAMPLE: ABNORMAL
SITE: ABNORMAL
SODIUM SERPL-SCNC: 139 MMOL/L (ref 136–145)
SP GR UR STRIP: 1.01 (ref 1–1.03)
URN SPEC COLLECT METH UR: ABNORMAL
VT: 450
WBC # BLD AUTO: 12.67 K/UL (ref 3.9–12.7)
WBC #/AREA URNS AUTO: 0 /HPF (ref 0–5)

## 2021-11-01 PROCEDURE — 82330 ASSAY OF CALCIUM: CPT

## 2021-11-01 PROCEDURE — 81001 URINALYSIS AUTO W/SCOPE: CPT | Performed by: STUDENT IN AN ORGANIZED HEALTH CARE EDUCATION/TRAINING PROGRAM

## 2021-11-01 PROCEDURE — 63600175 PHARM REV CODE 636 W HCPCS: Performed by: STUDENT IN AN ORGANIZED HEALTH CARE EDUCATION/TRAINING PROGRAM

## 2021-11-01 PROCEDURE — 99900035 HC TECH TIME PER 15 MIN (STAT)

## 2021-11-01 PROCEDURE — 25000003 PHARM REV CODE 250

## 2021-11-01 PROCEDURE — 94761 N-INVAS EAR/PLS OXIMETRY MLT: CPT

## 2021-11-01 PROCEDURE — 25000003 PHARM REV CODE 250: Performed by: STUDENT IN AN ORGANIZED HEALTH CARE EDUCATION/TRAINING PROGRAM

## 2021-11-01 PROCEDURE — 83735 ASSAY OF MAGNESIUM: CPT | Performed by: STUDENT IN AN ORGANIZED HEALTH CARE EDUCATION/TRAINING PROGRAM

## 2021-11-01 PROCEDURE — 25000242 PHARM REV CODE 250 ALT 637 W/ HCPCS: Performed by: INTERNAL MEDICINE

## 2021-11-01 PROCEDURE — 25000003 PHARM REV CODE 250: Performed by: INTERNAL MEDICINE

## 2021-11-01 PROCEDURE — 27000221 HC OXYGEN, UP TO 24 HOURS

## 2021-11-01 PROCEDURE — 85025 COMPLETE CBC W/AUTO DIFF WBC: CPT | Performed by: STUDENT IN AN ORGANIZED HEALTH CARE EDUCATION/TRAINING PROGRAM

## 2021-11-01 PROCEDURE — 27200966 HC CLOSED SUCTION SYSTEM

## 2021-11-01 PROCEDURE — 63600175 PHARM REV CODE 636 W HCPCS: Performed by: EMERGENCY MEDICINE

## 2021-11-01 PROCEDURE — 84100 ASSAY OF PHOSPHORUS: CPT | Performed by: STUDENT IN AN ORGANIZED HEALTH CARE EDUCATION/TRAINING PROGRAM

## 2021-11-01 PROCEDURE — 99291 PR CRITICAL CARE, E/M 30-74 MINUTES: ICD-10-PCS | Mod: ,,, | Performed by: INTERNAL MEDICINE

## 2021-11-01 PROCEDURE — 36600 WITHDRAWAL OF ARTERIAL BLOOD: CPT

## 2021-11-01 PROCEDURE — 25000242 PHARM REV CODE 250 ALT 637 W/ HCPCS: Performed by: STUDENT IN AN ORGANIZED HEALTH CARE EDUCATION/TRAINING PROGRAM

## 2021-11-01 PROCEDURE — 80053 COMPREHEN METABOLIC PANEL: CPT | Performed by: STUDENT IN AN ORGANIZED HEALTH CARE EDUCATION/TRAINING PROGRAM

## 2021-11-01 PROCEDURE — 99900026 HC AIRWAY MAINTENANCE (STAT)

## 2021-11-01 PROCEDURE — 99291 CRITICAL CARE FIRST HOUR: CPT | Mod: ,,, | Performed by: INTERNAL MEDICINE

## 2021-11-01 PROCEDURE — 63600175 PHARM REV CODE 636 W HCPCS: Performed by: INTERNAL MEDICINE

## 2021-11-01 PROCEDURE — 20000000 HC ICU ROOM

## 2021-11-01 PROCEDURE — 94640 AIRWAY INHALATION TREATMENT: CPT

## 2021-11-01 PROCEDURE — 94003 VENT MGMT INPAT SUBQ DAY: CPT

## 2021-11-01 RX ORDER — ACETAMINOPHEN 325 MG/1
650 TABLET ORAL EVERY 6 HOURS PRN
Status: DISCONTINUED | OUTPATIENT
Start: 2021-11-01 | End: 2021-11-08 | Stop reason: HOSPADM

## 2021-11-01 RX ORDER — IPRATROPIUM BROMIDE AND ALBUTEROL SULFATE 2.5; .5 MG/3ML; MG/3ML
3 SOLUTION RESPIRATORY (INHALATION) EVERY 4 HOURS
Status: DISCONTINUED | OUTPATIENT
Start: 2021-11-01 | End: 2021-11-02

## 2021-11-01 RX ADMIN — PROPOFOL 50 MCG/KG/MIN: 10 INJECTION, EMULSION INTRAVENOUS at 07:11

## 2021-11-01 RX ADMIN — DOXYCYCLINE 100 MG: 100 INJECTION, POWDER, LYOPHILIZED, FOR SOLUTION INTRAVENOUS at 03:11

## 2021-11-01 RX ADMIN — IPRATROPIUM BROMIDE AND ALBUTEROL SULFATE 3 ML: 2.5; .5 SOLUTION RESPIRATORY (INHALATION) at 07:11

## 2021-11-01 RX ADMIN — PROPOFOL 50 MCG/KG/MIN: 10 INJECTION, EMULSION INTRAVENOUS at 12:11

## 2021-11-01 RX ADMIN — PROPOFOL 50 MCG/KG/MIN: 10 INJECTION, EMULSION INTRAVENOUS at 05:11

## 2021-11-01 RX ADMIN — IPRATROPIUM BROMIDE AND ALBUTEROL SULFATE 3 ML: 2.5; .5 SOLUTION RESPIRATORY (INHALATION) at 11:11

## 2021-11-01 RX ADMIN — FAMOTIDINE 20 MG: 10 INJECTION INTRAVENOUS at 09:11

## 2021-11-01 RX ADMIN — PROPOFOL 50 MCG/KG/MIN: 10 INJECTION, EMULSION INTRAVENOUS at 04:11

## 2021-11-01 RX ADMIN — KETAMINE HYDROCHLORIDE 10 MCG/KG/MIN: 100 INJECTION INTRAMUSCULAR; INTRAVENOUS at 05:11

## 2021-11-01 RX ADMIN — IPRATROPIUM BROMIDE AND ALBUTEROL SULFATE 3 ML: 2.5; .5 SOLUTION RESPIRATORY (INHALATION) at 03:11

## 2021-11-01 RX ADMIN — MIDAZOLAM 10 MG/HR: 5 INJECTION INTRAMUSCULAR; INTRAVENOUS at 02:11

## 2021-11-01 RX ADMIN — METHYLPREDNISOLONE SODIUM SUCCINATE 60 MG: 40 INJECTION, POWDER, FOR SOLUTION INTRAMUSCULAR; INTRAVENOUS at 12:11

## 2021-11-01 RX ADMIN — METHYLPREDNISOLONE SODIUM SUCCINATE 60 MG: 40 INJECTION, POWDER, FOR SOLUTION INTRAMUSCULAR; INTRAVENOUS at 09:11

## 2021-11-01 RX ADMIN — IPRATROPIUM BROMIDE AND ALBUTEROL SULFATE 3 ML: 2.5; .5 SOLUTION RESPIRATORY (INHALATION) at 12:11

## 2021-11-01 RX ADMIN — ENOXAPARIN SODIUM 40 MG: 40 INJECTION SUBCUTANEOUS at 07:11

## 2021-11-01 RX ADMIN — DOXYCYCLINE 100 MG: 100 INJECTION, POWDER, LYOPHILIZED, FOR SOLUTION INTRAVENOUS at 05:11

## 2021-11-01 RX ADMIN — METHYLPREDNISOLONE SODIUM SUCCINATE 60 MG: 40 INJECTION, POWDER, FOR SOLUTION INTRAMUSCULAR; INTRAVENOUS at 06:11

## 2021-11-01 RX ADMIN — DOCUSATE SODIUM 50MG AND SENNOSIDES 8.6MG 1 TABLET: 8.6; 5 TABLET, FILM COATED ORAL at 09:11

## 2021-11-01 RX ADMIN — IPRATROPIUM BROMIDE AND ALBUTEROL SULFATE 3 ML: 2.5; .5 SOLUTION RESPIRATORY (INHALATION) at 09:11

## 2021-11-01 RX ADMIN — Medication 300 MCG/HR: at 11:11

## 2021-11-01 RX ADMIN — MIDAZOLAM 5 MG/HR: 5 INJECTION INTRAMUSCULAR; INTRAVENOUS at 05:11

## 2021-11-01 RX ADMIN — IPRATROPIUM BROMIDE AND ALBUTEROL SULFATE 3 ML: 2.5; .5 SOLUTION RESPIRATORY (INHALATION) at 05:11

## 2021-11-01 RX ADMIN — IPRATROPIUM BROMIDE AND ALBUTEROL SULFATE 3 ML: 2.5; .5 SOLUTION RESPIRATORY (INHALATION) at 01:11

## 2021-11-01 RX ADMIN — MINERAL OIL AND WHITE PETROLATUM: 30; 940 OINTMENT OPHTHALMIC at 09:11

## 2021-11-01 RX ADMIN — Medication 300 MCG/HR: at 04:11

## 2021-11-01 RX ADMIN — POLYETHYLENE GLYCOL 3350 17 G: 17 POWDER, FOR SOLUTION ORAL at 09:11

## 2021-11-02 PROBLEM — R41.89 SEDATED: Status: RESOLVED | Noted: 2021-10-26 | Resolved: 2021-11-02

## 2021-11-02 PROBLEM — J95.859 PATIENT VENTILATOR DYSSYNCHRONY: Status: RESOLVED | Noted: 2021-10-26 | Resolved: 2021-11-02

## 2021-11-02 PROBLEM — F11.20: Status: ACTIVE | Noted: 2021-11-02

## 2021-11-02 LAB
ALBUMIN SERPL BCP-MCNC: 3 G/DL (ref 3.5–5.2)
ALP SERPL-CCNC: 47 U/L (ref 55–135)
ALT SERPL W/O P-5'-P-CCNC: 86 U/L (ref 10–44)
ANION GAP SERPL CALC-SCNC: 11 MMOL/L (ref 8–16)
AST SERPL-CCNC: 97 U/L (ref 10–40)
BACTERIA SPEC AEROBE CULT: NO GROWTH
BASOPHILS # BLD AUTO: 0.03 K/UL (ref 0–0.2)
BASOPHILS NFR BLD: 0.2 % (ref 0–1.9)
BILIRUB SERPL-MCNC: 0.3 MG/DL (ref 0.1–1)
BUN SERPL-MCNC: 24 MG/DL (ref 6–20)
CALCIUM SERPL-MCNC: 8.1 MG/DL (ref 8.7–10.5)
CHLORIDE SERPL-SCNC: 99 MMOL/L (ref 95–110)
CO2 SERPL-SCNC: 21 MMOL/L (ref 23–29)
CREAT SERPL-MCNC: 0.7 MG/DL (ref 0.5–1.4)
DIFFERENTIAL METHOD: ABNORMAL
EOSINOPHIL # BLD AUTO: 0 K/UL (ref 0–0.5)
EOSINOPHIL NFR BLD: 0.1 % (ref 0–8)
ERYTHROCYTE [DISTWIDTH] IN BLOOD BY AUTOMATED COUNT: 13 % (ref 11.5–14.5)
EST. GFR  (AFRICAN AMERICAN): >60 ML/MIN/1.73 M^2
EST. GFR  (NON AFRICAN AMERICAN): >60 ML/MIN/1.73 M^2
GLUCOSE SERPL-MCNC: 115 MG/DL (ref 70–110)
GRAM STN SPEC: NORMAL
GRAM STN SPEC: NORMAL
HCT VFR BLD AUTO: 35.8 % (ref 40–54)
HGB BLD-MCNC: 12.7 G/DL (ref 14–18)
IMM GRANULOCYTES # BLD AUTO: 0.29 K/UL (ref 0–0.04)
IMM GRANULOCYTES NFR BLD AUTO: 2.1 % (ref 0–0.5)
LYMPHOCYTES # BLD AUTO: 0.9 K/UL (ref 1–4.8)
LYMPHOCYTES NFR BLD: 6.4 % (ref 18–48)
MAGNESIUM SERPL-MCNC: 1.9 MG/DL (ref 1.6–2.6)
MCH RBC QN AUTO: 29.3 PG (ref 27–31)
MCHC RBC AUTO-ENTMCNC: 35.5 G/DL (ref 32–36)
MCV RBC AUTO: 83 FL (ref 82–98)
MONOCYTES # BLD AUTO: 1.2 K/UL (ref 0.3–1)
MONOCYTES NFR BLD: 9 % (ref 4–15)
NEUTROPHILS # BLD AUTO: 11.2 K/UL (ref 1.8–7.7)
NEUTROPHILS NFR BLD: 82.2 % (ref 38–73)
NRBC BLD-RTO: 0 /100 WBC
PHOSPHATE SERPL-MCNC: 3 MG/DL (ref 2.7–4.5)
PLATELET # BLD AUTO: 258 K/UL (ref 150–450)
PLATELET BLD QL SMEAR: ABNORMAL
PMV BLD AUTO: 10.6 FL (ref 9.2–12.9)
POCT GLUCOSE: 120 MG/DL (ref 70–110)
POTASSIUM SERPL-SCNC: 4.2 MMOL/L (ref 3.5–5.1)
PROT SERPL-MCNC: 7.1 G/DL (ref 6–8.4)
RBC # BLD AUTO: 4.33 M/UL (ref 4.6–6.2)
SMUDGE CELLS BLD QL SMEAR: PRESENT
SODIUM SERPL-SCNC: 131 MMOL/L (ref 136–145)
WBC # BLD AUTO: 13.59 K/UL (ref 3.9–12.7)

## 2021-11-02 PROCEDURE — 99223 1ST HOSP IP/OBS HIGH 75: CPT | Mod: AF,HB,, | Performed by: PSYCHIATRY & NEUROLOGY

## 2021-11-02 PROCEDURE — 94003 VENT MGMT INPAT SUBQ DAY: CPT

## 2021-11-02 PROCEDURE — 99291 CRITICAL CARE FIRST HOUR: CPT | Mod: ,,, | Performed by: INTERNAL MEDICINE

## 2021-11-02 PROCEDURE — 94761 N-INVAS EAR/PLS OXIMETRY MLT: CPT

## 2021-11-02 PROCEDURE — 25000003 PHARM REV CODE 250: Performed by: STUDENT IN AN ORGANIZED HEALTH CARE EDUCATION/TRAINING PROGRAM

## 2021-11-02 PROCEDURE — 83735 ASSAY OF MAGNESIUM: CPT | Performed by: STUDENT IN AN ORGANIZED HEALTH CARE EDUCATION/TRAINING PROGRAM

## 2021-11-02 PROCEDURE — 94640 AIRWAY INHALATION TREATMENT: CPT

## 2021-11-02 PROCEDURE — 20000000 HC ICU ROOM

## 2021-11-02 PROCEDURE — 84100 ASSAY OF PHOSPHORUS: CPT | Performed by: STUDENT IN AN ORGANIZED HEALTH CARE EDUCATION/TRAINING PROGRAM

## 2021-11-02 PROCEDURE — 25000242 PHARM REV CODE 250 ALT 637 W/ HCPCS: Performed by: INTERNAL MEDICINE

## 2021-11-02 PROCEDURE — 99291 PR CRITICAL CARE, E/M 30-74 MINUTES: ICD-10-PCS | Mod: ,,, | Performed by: INTERNAL MEDICINE

## 2021-11-02 PROCEDURE — 27200966 HC CLOSED SUCTION SYSTEM

## 2021-11-02 PROCEDURE — 99900026 HC AIRWAY MAINTENANCE (STAT)

## 2021-11-02 PROCEDURE — 63600175 PHARM REV CODE 636 W HCPCS: Performed by: EMERGENCY MEDICINE

## 2021-11-02 PROCEDURE — 31720 CLEARANCE OF AIRWAYS: CPT

## 2021-11-02 PROCEDURE — 99223 PR INITIAL HOSPITAL CARE,LEVL III: ICD-10-PCS | Mod: AF,HB,, | Performed by: PSYCHIATRY & NEUROLOGY

## 2021-11-02 PROCEDURE — 85025 COMPLETE CBC W/AUTO DIFF WBC: CPT | Performed by: STUDENT IN AN ORGANIZED HEALTH CARE EDUCATION/TRAINING PROGRAM

## 2021-11-02 PROCEDURE — 27000221 HC OXYGEN, UP TO 24 HOURS

## 2021-11-02 PROCEDURE — 63600175 PHARM REV CODE 636 W HCPCS: Performed by: STUDENT IN AN ORGANIZED HEALTH CARE EDUCATION/TRAINING PROGRAM

## 2021-11-02 PROCEDURE — 25000003 PHARM REV CODE 250

## 2021-11-02 PROCEDURE — 99900035 HC TECH TIME PER 15 MIN (STAT)

## 2021-11-02 PROCEDURE — 80053 COMPREHEN METABOLIC PANEL: CPT | Performed by: STUDENT IN AN ORGANIZED HEALTH CARE EDUCATION/TRAINING PROGRAM

## 2021-11-02 RX ORDER — METHOCARBAMOL 500 MG/1
500 TABLET, FILM COATED ORAL EVERY 8 HOURS PRN
Status: DISCONTINUED | OUTPATIENT
Start: 2021-11-02 | End: 2021-11-08 | Stop reason: HOSPADM

## 2021-11-02 RX ORDER — NALOXONE HCL 0.4 MG/ML
0.4 VIAL (ML) INJECTION
Status: DISCONTINUED | OUTPATIENT
Start: 2021-11-02 | End: 2021-11-08 | Stop reason: HOSPADM

## 2021-11-02 RX ORDER — HYDROXYZINE PAMOATE 25 MG/1
50 CAPSULE ORAL EVERY 6 HOURS PRN
Status: DISCONTINUED | OUTPATIENT
Start: 2021-11-02 | End: 2021-11-08 | Stop reason: HOSPADM

## 2021-11-02 RX ORDER — CLONIDINE HYDROCHLORIDE 0.1 MG/1
0.1 TABLET ORAL EVERY 4 HOURS PRN
Status: DISCONTINUED | OUTPATIENT
Start: 2021-11-02 | End: 2021-11-08 | Stop reason: HOSPADM

## 2021-11-02 RX ORDER — IBUPROFEN 400 MG/1
400 TABLET ORAL EVERY 6 HOURS PRN
Status: DISCONTINUED | OUTPATIENT
Start: 2021-11-02 | End: 2021-11-08 | Stop reason: HOSPADM

## 2021-11-02 RX ORDER — ONDANSETRON 4 MG/1
4 TABLET, FILM COATED ORAL EVERY 6 HOURS PRN
Status: DISCONTINUED | OUTPATIENT
Start: 2021-11-02 | End: 2021-11-08 | Stop reason: HOSPADM

## 2021-11-02 RX ORDER — IPRATROPIUM BROMIDE AND ALBUTEROL SULFATE 2.5; .5 MG/3ML; MG/3ML
3 SOLUTION RESPIRATORY (INHALATION) EVERY 4 HOURS PRN
Status: DISCONTINUED | OUTPATIENT
Start: 2021-11-02 | End: 2021-11-08 | Stop reason: HOSPADM

## 2021-11-02 RX ORDER — DICYCLOMINE HYDROCHLORIDE 10 MG/1
10 CAPSULE ORAL EVERY 6 HOURS PRN
Status: DISCONTINUED | OUTPATIENT
Start: 2021-11-02 | End: 2021-11-08 | Stop reason: HOSPADM

## 2021-11-02 RX ORDER — LOPERAMIDE HYDROCHLORIDE 2 MG/1
2 CAPSULE ORAL 4 TIMES DAILY PRN
Status: DISCONTINUED | OUTPATIENT
Start: 2021-11-02 | End: 2021-11-08 | Stop reason: HOSPADM

## 2021-11-02 RX ADMIN — DOXYCYCLINE 100 MG: 100 INJECTION, POWDER, LYOPHILIZED, FOR SOLUTION INTRAVENOUS at 02:11

## 2021-11-02 RX ADMIN — METHYLPREDNISOLONE SODIUM SUCCINATE 60 MG: 40 INJECTION, POWDER, FOR SOLUTION INTRAMUSCULAR; INTRAVENOUS at 10:11

## 2021-11-02 RX ADMIN — METHYLPREDNISOLONE SODIUM SUCCINATE 60 MG: 40 INJECTION, POWDER, FOR SOLUTION INTRAMUSCULAR; INTRAVENOUS at 09:11

## 2021-11-02 RX ADMIN — PROPOFOL 50 MCG/KG/MIN: 10 INJECTION, EMULSION INTRAVENOUS at 05:11

## 2021-11-02 RX ADMIN — DOXYCYCLINE 100 MG: 100 INJECTION, POWDER, LYOPHILIZED, FOR SOLUTION INTRAVENOUS at 03:11

## 2021-11-02 RX ADMIN — IPRATROPIUM BROMIDE AND ALBUTEROL SULFATE 3 ML: 2.5; .5 SOLUTION RESPIRATORY (INHALATION) at 07:11

## 2021-11-02 RX ADMIN — Medication 300 MCG/HR: at 06:11

## 2021-11-02 RX ADMIN — ENOXAPARIN SODIUM 40 MG: 40 INJECTION SUBCUTANEOUS at 05:11

## 2021-11-02 RX ADMIN — IPRATROPIUM BROMIDE AND ALBUTEROL SULFATE 3 ML: 2.5; .5 SOLUTION RESPIRATORY (INHALATION) at 03:11

## 2021-11-02 RX ADMIN — FAMOTIDINE 20 MG: 10 INJECTION INTRAVENOUS at 10:11

## 2021-11-03 PROBLEM — E44.0 MODERATE MALNUTRITION: Status: ACTIVE | Noted: 2021-11-03

## 2021-11-03 LAB
ALBUMIN SERPL BCP-MCNC: 3.5 G/DL (ref 3.5–5.2)
ALP SERPL-CCNC: 62 U/L (ref 55–135)
ALT SERPL W/O P-5'-P-CCNC: 117 U/L (ref 10–44)
ANION GAP SERPL CALC-SCNC: 12 MMOL/L (ref 8–16)
AST SERPL-CCNC: 108 U/L (ref 10–40)
BASOPHILS # BLD AUTO: 0.05 K/UL (ref 0–0.2)
BASOPHILS NFR BLD: 0.4 % (ref 0–1.9)
BILIRUB SERPL-MCNC: 0.9 MG/DL (ref 0.1–1)
BUN SERPL-MCNC: 24 MG/DL (ref 6–20)
CALCIUM SERPL-MCNC: 9.8 MG/DL (ref 8.7–10.5)
CHLORIDE SERPL-SCNC: 105 MMOL/L (ref 95–110)
CO2 SERPL-SCNC: 25 MMOL/L (ref 23–29)
CREAT SERPL-MCNC: 0.8 MG/DL (ref 0.5–1.4)
DIFFERENTIAL METHOD: ABNORMAL
EOSINOPHIL # BLD AUTO: 0 K/UL (ref 0–0.5)
EOSINOPHIL NFR BLD: 0 % (ref 0–8)
ERYTHROCYTE [DISTWIDTH] IN BLOOD BY AUTOMATED COUNT: 12.5 % (ref 11.5–14.5)
EST. GFR  (AFRICAN AMERICAN): >60 ML/MIN/1.73 M^2
EST. GFR  (NON AFRICAN AMERICAN): >60 ML/MIN/1.73 M^2
GLUCOSE SERPL-MCNC: 102 MG/DL (ref 70–110)
HCT VFR BLD AUTO: 44.4 % (ref 40–54)
HGB BLD-MCNC: 14.8 G/DL (ref 14–18)
IMM GRANULOCYTES # BLD AUTO: 0.22 K/UL (ref 0–0.04)
IMM GRANULOCYTES NFR BLD AUTO: 1.9 % (ref 0–0.5)
LYMPHOCYTES # BLD AUTO: 1 K/UL (ref 1–4.8)
LYMPHOCYTES NFR BLD: 8.2 % (ref 18–48)
MAGNESIUM SERPL-MCNC: 2.3 MG/DL (ref 1.6–2.6)
MCH RBC QN AUTO: 27.3 PG (ref 27–31)
MCHC RBC AUTO-ENTMCNC: 33.3 G/DL (ref 32–36)
MCV RBC AUTO: 82 FL (ref 82–98)
MONOCYTES # BLD AUTO: 1.2 K/UL (ref 0.3–1)
MONOCYTES NFR BLD: 10.5 % (ref 4–15)
NEUTROPHILS # BLD AUTO: 9.1 K/UL (ref 1.8–7.7)
NEUTROPHILS NFR BLD: 79 % (ref 38–73)
NRBC BLD-RTO: 0 /100 WBC
PHOSPHATE SERPL-MCNC: 3.7 MG/DL (ref 2.7–4.5)
PLATELET # BLD AUTO: 331 K/UL (ref 150–450)
PMV BLD AUTO: 10.2 FL (ref 9.2–12.9)
POTASSIUM SERPL-SCNC: 3.6 MMOL/L (ref 3.5–5.1)
PROT SERPL-MCNC: 7.4 G/DL (ref 6–8.4)
RBC # BLD AUTO: 5.43 M/UL (ref 4.6–6.2)
SODIUM SERPL-SCNC: 142 MMOL/L (ref 136–145)
WBC # BLD AUTO: 11.58 K/UL (ref 3.9–12.7)

## 2021-11-03 PROCEDURE — 63600175 PHARM REV CODE 636 W HCPCS: Performed by: STUDENT IN AN ORGANIZED HEALTH CARE EDUCATION/TRAINING PROGRAM

## 2021-11-03 PROCEDURE — 99900035 HC TECH TIME PER 15 MIN (STAT)

## 2021-11-03 PROCEDURE — 99233 PR SUBSEQUENT HOSPITAL CARE,LEVL III: ICD-10-PCS | Mod: ,,, | Performed by: INTERNAL MEDICINE

## 2021-11-03 PROCEDURE — 97161 PT EVAL LOW COMPLEX 20 MIN: CPT

## 2021-11-03 PROCEDURE — 97535 SELF CARE MNGMENT TRAINING: CPT

## 2021-11-03 PROCEDURE — 80053 COMPREHEN METABOLIC PANEL: CPT | Performed by: STUDENT IN AN ORGANIZED HEALTH CARE EDUCATION/TRAINING PROGRAM

## 2021-11-03 PROCEDURE — 92610 EVALUATE SWALLOWING FUNCTION: CPT

## 2021-11-03 PROCEDURE — 25000003 PHARM REV CODE 250

## 2021-11-03 PROCEDURE — 25000003 PHARM REV CODE 250: Performed by: INTERNAL MEDICINE

## 2021-11-03 PROCEDURE — 25000003 PHARM REV CODE 250: Performed by: STUDENT IN AN ORGANIZED HEALTH CARE EDUCATION/TRAINING PROGRAM

## 2021-11-03 PROCEDURE — 85025 COMPLETE CBC W/AUTO DIFF WBC: CPT | Performed by: STUDENT IN AN ORGANIZED HEALTH CARE EDUCATION/TRAINING PROGRAM

## 2021-11-03 PROCEDURE — 99232 PR SUBSEQUENT HOSPITAL CARE,LEVL II: ICD-10-PCS | Mod: AF,HB,, | Performed by: PSYCHIATRY & NEUROLOGY

## 2021-11-03 PROCEDURE — 27000221 HC OXYGEN, UP TO 24 HOURS

## 2021-11-03 PROCEDURE — 99233 SBSQ HOSP IP/OBS HIGH 50: CPT | Mod: ,,, | Performed by: INTERNAL MEDICINE

## 2021-11-03 PROCEDURE — 99232 SBSQ HOSP IP/OBS MODERATE 35: CPT | Mod: AF,HB,, | Performed by: PSYCHIATRY & NEUROLOGY

## 2021-11-03 PROCEDURE — 94761 N-INVAS EAR/PLS OXIMETRY MLT: CPT

## 2021-11-03 PROCEDURE — 25000242 PHARM REV CODE 250 ALT 637 W/ HCPCS: Performed by: STUDENT IN AN ORGANIZED HEALTH CARE EDUCATION/TRAINING PROGRAM

## 2021-11-03 PROCEDURE — 94640 AIRWAY INHALATION TREATMENT: CPT

## 2021-11-03 PROCEDURE — 83735 ASSAY OF MAGNESIUM: CPT | Performed by: STUDENT IN AN ORGANIZED HEALTH CARE EDUCATION/TRAINING PROGRAM

## 2021-11-03 PROCEDURE — 97116 GAIT TRAINING THERAPY: CPT

## 2021-11-03 PROCEDURE — 84100 ASSAY OF PHOSPHORUS: CPT | Performed by: STUDENT IN AN ORGANIZED HEALTH CARE EDUCATION/TRAINING PROGRAM

## 2021-11-03 PROCEDURE — 20000000 HC ICU ROOM

## 2021-11-03 PROCEDURE — 97165 OT EVAL LOW COMPLEX 30 MIN: CPT

## 2021-11-03 RX ORDER — POTASSIUM CHLORIDE 7.45 MG/ML
10 INJECTION INTRAVENOUS
Status: COMPLETED | OUTPATIENT
Start: 2021-11-03 | End: 2021-11-03

## 2021-11-03 RX ADMIN — POTASSIUM CHLORIDE 10 MEQ: 7.46 INJECTION, SOLUTION INTRAVENOUS at 06:11

## 2021-11-03 RX ADMIN — DOXYCYCLINE 100 MG: 100 INJECTION, POWDER, LYOPHILIZED, FOR SOLUTION INTRAVENOUS at 03:11

## 2021-11-03 RX ADMIN — POTASSIUM CHLORIDE 10 MEQ: 7.46 INJECTION, SOLUTION INTRAVENOUS at 09:11

## 2021-11-03 RX ADMIN — METHYLPREDNISOLONE SODIUM SUCCINATE 60 MG: 40 INJECTION, POWDER, FOR SOLUTION INTRAMUSCULAR; INTRAVENOUS at 09:11

## 2021-11-03 RX ADMIN — LOPERAMIDE HYDROCHLORIDE 2 MG: 2 CAPSULE ORAL at 08:11

## 2021-11-03 RX ADMIN — HYDROXYZINE PAMOATE 50 MG: 25 CAPSULE ORAL at 09:11

## 2021-11-03 RX ADMIN — IPRATROPIUM BROMIDE AND ALBUTEROL SULFATE 3 ML: 2.5; .5 SOLUTION RESPIRATORY (INHALATION) at 03:11

## 2021-11-03 RX ADMIN — POTASSIUM CHLORIDE 10 MEQ: 7.46 INJECTION, SOLUTION INTRAVENOUS at 07:11

## 2021-11-03 RX ADMIN — POTASSIUM CHLORIDE 10 MEQ: 7.46 INJECTION, SOLUTION INTRAVENOUS at 11:11

## 2021-11-03 RX ADMIN — ENOXAPARIN SODIUM 40 MG: 40 INJECTION SUBCUTANEOUS at 05:11

## 2021-11-03 RX ADMIN — SODIUM CHLORIDE 5 ML/HR: 0.9 INJECTION, SOLUTION INTRAVENOUS at 03:11

## 2021-11-04 LAB
ALBUMIN SERPL BCP-MCNC: 3.4 G/DL (ref 3.5–5.2)
ALP SERPL-CCNC: 56 U/L (ref 55–135)
ALT SERPL W/O P-5'-P-CCNC: 113 U/L (ref 10–44)
ANION GAP SERPL CALC-SCNC: 11 MMOL/L (ref 8–16)
AST SERPL-CCNC: 57 U/L (ref 10–40)
BASOPHILS # BLD AUTO: 0.04 K/UL (ref 0–0.2)
BASOPHILS NFR BLD: 0.3 % (ref 0–1.9)
BILIRUB SERPL-MCNC: 0.9 MG/DL (ref 0.1–1)
BUN SERPL-MCNC: 22 MG/DL (ref 6–20)
C DIFF GDH STL QL: NEGATIVE
C DIFF TOX A+B STL QL IA: NEGATIVE
CALCIUM SERPL-MCNC: 9 MG/DL (ref 8.7–10.5)
CHLORIDE SERPL-SCNC: 110 MMOL/L (ref 95–110)
CO2 SERPL-SCNC: 19 MMOL/L (ref 23–29)
CREAT SERPL-MCNC: 0.7 MG/DL (ref 0.5–1.4)
DIFFERENTIAL METHOD: ABNORMAL
EOSINOPHIL # BLD AUTO: 0 K/UL (ref 0–0.5)
EOSINOPHIL NFR BLD: 0.1 % (ref 0–8)
ERYTHROCYTE [DISTWIDTH] IN BLOOD BY AUTOMATED COUNT: 12.8 % (ref 11.5–14.5)
EST. GFR  (AFRICAN AMERICAN): >60 ML/MIN/1.73 M^2
EST. GFR  (NON AFRICAN AMERICAN): >60 ML/MIN/1.73 M^2
GLUCOSE SERPL-MCNC: 99 MG/DL (ref 70–110)
HCT VFR BLD AUTO: 48.3 % (ref 40–54)
HGB BLD-MCNC: 16.4 G/DL (ref 14–18)
IMM GRANULOCYTES # BLD AUTO: 0.24 K/UL (ref 0–0.04)
IMM GRANULOCYTES NFR BLD AUTO: 1.8 % (ref 0–0.5)
LYMPHOCYTES # BLD AUTO: 1.2 K/UL (ref 1–4.8)
LYMPHOCYTES NFR BLD: 9 % (ref 18–48)
MAGNESIUM SERPL-MCNC: 2.1 MG/DL (ref 1.6–2.6)
MCH RBC QN AUTO: 27.3 PG (ref 27–31)
MCHC RBC AUTO-ENTMCNC: 34 G/DL (ref 32–36)
MCV RBC AUTO: 81 FL (ref 82–98)
MONOCYTES # BLD AUTO: 1.4 K/UL (ref 0.3–1)
MONOCYTES NFR BLD: 10.2 % (ref 4–15)
NEUTROPHILS # BLD AUTO: 10.8 K/UL (ref 1.8–7.7)
NEUTROPHILS NFR BLD: 78.6 % (ref 38–73)
NRBC BLD-RTO: 0 /100 WBC
PHOSPHATE SERPL-MCNC: 4.1 MG/DL (ref 2.7–4.5)
PLATELET # BLD AUTO: 293 K/UL (ref 150–450)
PLATELET BLD QL SMEAR: ABNORMAL
PMV BLD AUTO: 10.7 FL (ref 9.2–12.9)
POTASSIUM SERPL-SCNC: 3.9 MMOL/L (ref 3.5–5.1)
PROT SERPL-MCNC: 7.1 G/DL (ref 6–8.4)
RBC # BLD AUTO: 6 M/UL (ref 4.6–6.2)
SODIUM SERPL-SCNC: 140 MMOL/L (ref 136–145)
WBC # BLD AUTO: 13.69 K/UL (ref 3.9–12.7)

## 2021-11-04 PROCEDURE — 25000242 PHARM REV CODE 250 ALT 637 W/ HCPCS: Performed by: STUDENT IN AN ORGANIZED HEALTH CARE EDUCATION/TRAINING PROGRAM

## 2021-11-04 PROCEDURE — 99900035 HC TECH TIME PER 15 MIN (STAT)

## 2021-11-04 PROCEDURE — 85025 COMPLETE CBC W/AUTO DIFF WBC: CPT | Performed by: STUDENT IN AN ORGANIZED HEALTH CARE EDUCATION/TRAINING PROGRAM

## 2021-11-04 PROCEDURE — 80053 COMPREHEN METABOLIC PANEL: CPT | Performed by: STUDENT IN AN ORGANIZED HEALTH CARE EDUCATION/TRAINING PROGRAM

## 2021-11-04 PROCEDURE — 94761 N-INVAS EAR/PLS OXIMETRY MLT: CPT

## 2021-11-04 PROCEDURE — 99233 PR SUBSEQUENT HOSPITAL CARE,LEVL III: ICD-10-PCS | Mod: ,,, | Performed by: INTERNAL MEDICINE

## 2021-11-04 PROCEDURE — 99232 PR SUBSEQUENT HOSPITAL CARE,LEVL II: ICD-10-PCS | Mod: AF,HB,, | Performed by: PSYCHIATRY & NEUROLOGY

## 2021-11-04 PROCEDURE — 87324 CLOSTRIDIUM AG IA: CPT | Performed by: STUDENT IN AN ORGANIZED HEALTH CARE EDUCATION/TRAINING PROGRAM

## 2021-11-04 PROCEDURE — 25000003 PHARM REV CODE 250: Performed by: STUDENT IN AN ORGANIZED HEALTH CARE EDUCATION/TRAINING PROGRAM

## 2021-11-04 PROCEDURE — 63600175 PHARM REV CODE 636 W HCPCS: Performed by: STUDENT IN AN ORGANIZED HEALTH CARE EDUCATION/TRAINING PROGRAM

## 2021-11-04 PROCEDURE — 94640 AIRWAY INHALATION TREATMENT: CPT

## 2021-11-04 PROCEDURE — 99233 SBSQ HOSP IP/OBS HIGH 50: CPT | Mod: ,,, | Performed by: INTERNAL MEDICINE

## 2021-11-04 PROCEDURE — 20600001 HC STEP DOWN PRIVATE ROOM

## 2021-11-04 PROCEDURE — 25000003 PHARM REV CODE 250: Performed by: INTERNAL MEDICINE

## 2021-11-04 PROCEDURE — 92526 ORAL FUNCTION THERAPY: CPT

## 2021-11-04 PROCEDURE — 87449 NOS EACH ORGANISM AG IA: CPT | Performed by: STUDENT IN AN ORGANIZED HEALTH CARE EDUCATION/TRAINING PROGRAM

## 2021-11-04 PROCEDURE — 84100 ASSAY OF PHOSPHORUS: CPT | Performed by: STUDENT IN AN ORGANIZED HEALTH CARE EDUCATION/TRAINING PROGRAM

## 2021-11-04 PROCEDURE — 97535 SELF CARE MNGMENT TRAINING: CPT

## 2021-11-04 PROCEDURE — 99232 SBSQ HOSP IP/OBS MODERATE 35: CPT | Mod: AF,HB,, | Performed by: PSYCHIATRY & NEUROLOGY

## 2021-11-04 PROCEDURE — 27000207 HC ISOLATION

## 2021-11-04 PROCEDURE — 83735 ASSAY OF MAGNESIUM: CPT | Performed by: STUDENT IN AN ORGANIZED HEALTH CARE EDUCATION/TRAINING PROGRAM

## 2021-11-04 RX ORDER — DOXYCYCLINE HYCLATE 100 MG
100 TABLET ORAL EVERY 12 HOURS
Status: COMPLETED | OUTPATIENT
Start: 2021-11-04 | End: 2021-11-05

## 2021-11-04 RX ADMIN — HYDROXYZINE PAMOATE 50 MG: 25 CAPSULE ORAL at 10:11

## 2021-11-04 RX ADMIN — IPRATROPIUM BROMIDE AND ALBUTEROL SULFATE 3 ML: 2.5; .5 SOLUTION RESPIRATORY (INHALATION) at 03:11

## 2021-11-04 RX ADMIN — DOXYCYCLINE HYCLATE 100 MG: 100 TABLET, COATED ORAL at 01:11

## 2021-11-04 RX ADMIN — IPRATROPIUM BROMIDE AND ALBUTEROL SULFATE 3 ML: 2.5; .5 SOLUTION RESPIRATORY (INHALATION) at 11:11

## 2021-11-04 RX ADMIN — ACETAMINOPHEN 650 MG: 325 TABLET ORAL at 01:11

## 2021-11-04 RX ADMIN — DOXYCYCLINE 100 MG: 100 INJECTION, POWDER, LYOPHILIZED, FOR SOLUTION INTRAVENOUS at 03:11

## 2021-11-04 RX ADMIN — METHYLPREDNISOLONE SODIUM SUCCINATE 60 MG: 40 INJECTION, POWDER, FOR SOLUTION INTRAMUSCULAR; INTRAVENOUS at 09:11

## 2021-11-04 RX ADMIN — LOPERAMIDE HYDROCHLORIDE 2 MG: 2 CAPSULE ORAL at 09:11

## 2021-11-04 RX ADMIN — DOXYCYCLINE HYCLATE 100 MG: 100 TABLET, COATED ORAL at 09:11

## 2021-11-04 RX ADMIN — ENOXAPARIN SODIUM 40 MG: 40 INJECTION SUBCUTANEOUS at 05:11

## 2021-11-05 PROBLEM — J45.52 SEVERE PERSISTENT ASTHMA WITH STATUS ASTHMATICUS: Status: RESOLVED | Noted: 2021-10-26 | Resolved: 2021-11-05

## 2021-11-05 PROBLEM — Z99.11 ON MECHANICALLY ASSISTED VENTILATION: Status: RESOLVED | Noted: 2021-10-26 | Resolved: 2021-11-05

## 2021-11-05 LAB
ALBUMIN SERPL BCP-MCNC: 3.5 G/DL (ref 3.5–5.2)
ALP SERPL-CCNC: 62 U/L (ref 55–135)
ALT SERPL W/O P-5'-P-CCNC: 112 U/L (ref 10–44)
ANION GAP SERPL CALC-SCNC: 12 MMOL/L (ref 8–16)
AST SERPL-CCNC: 36 U/L (ref 10–40)
BASOPHILS # BLD AUTO: 0.02 K/UL (ref 0–0.2)
BASOPHILS NFR BLD: 0.2 % (ref 0–1.9)
BILIRUB SERPL-MCNC: 1 MG/DL (ref 0.1–1)
BUN SERPL-MCNC: 20 MG/DL (ref 6–20)
CALCIUM SERPL-MCNC: 9.6 MG/DL (ref 8.7–10.5)
CHLORIDE SERPL-SCNC: 104 MMOL/L (ref 95–110)
CO2 SERPL-SCNC: 23 MMOL/L (ref 23–29)
CREAT SERPL-MCNC: 0.8 MG/DL (ref 0.5–1.4)
DIFFERENTIAL METHOD: ABNORMAL
EOSINOPHIL # BLD AUTO: 0 K/UL (ref 0–0.5)
EOSINOPHIL NFR BLD: 0.1 % (ref 0–8)
ERYTHROCYTE [DISTWIDTH] IN BLOOD BY AUTOMATED COUNT: 12.6 % (ref 11.5–14.5)
EST. GFR  (AFRICAN AMERICAN): >60 ML/MIN/1.73 M^2
EST. GFR  (NON AFRICAN AMERICAN): >60 ML/MIN/1.73 M^2
GLUCOSE SERPL-MCNC: 100 MG/DL (ref 70–110)
HCT VFR BLD AUTO: 50.1 % (ref 40–54)
HGB BLD-MCNC: 16.6 G/DL (ref 14–18)
IMM GRANULOCYTES # BLD AUTO: 0.12 K/UL (ref 0–0.04)
IMM GRANULOCYTES NFR BLD AUTO: 1 % (ref 0–0.5)
LYMPHOCYTES # BLD AUTO: 2 K/UL (ref 1–4.8)
LYMPHOCYTES NFR BLD: 16.5 % (ref 18–48)
MAGNESIUM SERPL-MCNC: 2.1 MG/DL (ref 1.6–2.6)
MCH RBC QN AUTO: 27.4 PG (ref 27–31)
MCHC RBC AUTO-ENTMCNC: 33.1 G/DL (ref 32–36)
MCV RBC AUTO: 83 FL (ref 82–98)
MONOCYTES # BLD AUTO: 1.3 K/UL (ref 0.3–1)
MONOCYTES NFR BLD: 10.4 % (ref 4–15)
NEUTROPHILS # BLD AUTO: 8.9 K/UL (ref 1.8–7.7)
NEUTROPHILS NFR BLD: 71.8 % (ref 38–73)
NRBC BLD-RTO: 0 /100 WBC
PHOSPHATE SERPL-MCNC: 4.8 MG/DL (ref 2.7–4.5)
PLATELET # BLD AUTO: 309 K/UL (ref 150–450)
PMV BLD AUTO: 11.4 FL (ref 9.2–12.9)
POTASSIUM SERPL-SCNC: 4.7 MMOL/L (ref 3.5–5.1)
PROT SERPL-MCNC: 7 G/DL (ref 6–8.4)
RBC # BLD AUTO: 6.06 M/UL (ref 4.6–6.2)
SODIUM SERPL-SCNC: 139 MMOL/L (ref 136–145)
WBC # BLD AUTO: 12.33 K/UL (ref 3.9–12.7)

## 2021-11-05 PROCEDURE — 25000003 PHARM REV CODE 250: Performed by: STUDENT IN AN ORGANIZED HEALTH CARE EDUCATION/TRAINING PROGRAM

## 2021-11-05 PROCEDURE — 36415 COLL VENOUS BLD VENIPUNCTURE: CPT | Performed by: STUDENT IN AN ORGANIZED HEALTH CARE EDUCATION/TRAINING PROGRAM

## 2021-11-05 PROCEDURE — 20600001 HC STEP DOWN PRIVATE ROOM

## 2021-11-05 PROCEDURE — 83735 ASSAY OF MAGNESIUM: CPT | Performed by: STUDENT IN AN ORGANIZED HEALTH CARE EDUCATION/TRAINING PROGRAM

## 2021-11-05 PROCEDURE — 92526 ORAL FUNCTION THERAPY: CPT

## 2021-11-05 PROCEDURE — 97535 SELF CARE MNGMENT TRAINING: CPT

## 2021-11-05 PROCEDURE — 63600175 PHARM REV CODE 636 W HCPCS: Performed by: STUDENT IN AN ORGANIZED HEALTH CARE EDUCATION/TRAINING PROGRAM

## 2021-11-05 PROCEDURE — 99233 PR SUBSEQUENT HOSPITAL CARE,LEVL III: ICD-10-PCS | Mod: ,,, | Performed by: HOSPITALIST

## 2021-11-05 PROCEDURE — 80053 COMPREHEN METABOLIC PANEL: CPT | Performed by: STUDENT IN AN ORGANIZED HEALTH CARE EDUCATION/TRAINING PROGRAM

## 2021-11-05 PROCEDURE — 85025 COMPLETE CBC W/AUTO DIFF WBC: CPT | Performed by: STUDENT IN AN ORGANIZED HEALTH CARE EDUCATION/TRAINING PROGRAM

## 2021-11-05 PROCEDURE — 84100 ASSAY OF PHOSPHORUS: CPT | Performed by: STUDENT IN AN ORGANIZED HEALTH CARE EDUCATION/TRAINING PROGRAM

## 2021-11-05 PROCEDURE — 99233 SBSQ HOSP IP/OBS HIGH 50: CPT | Mod: ,,, | Performed by: HOSPITALIST

## 2021-11-05 RX ORDER — PREDNISONE 5 MG/1
10 TABLET ORAL DAILY
Status: DISCONTINUED | OUTPATIENT
Start: 2021-11-15 | End: 2021-11-08 | Stop reason: HOSPADM

## 2021-11-05 RX ORDER — PREDNISONE 5 MG/1
5 TABLET ORAL DAILY
Status: DISCONTINUED | OUTPATIENT
Start: 2021-11-18 | End: 2021-11-08 | Stop reason: HOSPADM

## 2021-11-05 RX ORDER — PREDNISONE 20 MG/1
60 TABLET ORAL DAILY
Status: DISCONTINUED | OUTPATIENT
Start: 2021-11-06 | End: 2021-11-08 | Stop reason: HOSPADM

## 2021-11-05 RX ORDER — PREDNISONE 20 MG/1
40 TABLET ORAL DAILY
Status: DISCONTINUED | OUTPATIENT
Start: 2021-11-09 | End: 2021-11-08 | Stop reason: HOSPADM

## 2021-11-05 RX ORDER — PREDNISONE 20 MG/1
20 TABLET ORAL DAILY
Status: DISCONTINUED | OUTPATIENT
Start: 2021-11-12 | End: 2021-11-08 | Stop reason: HOSPADM

## 2021-11-05 RX ORDER — PREDNISONE 20 MG/1
60 TABLET ORAL DAILY
Status: DISCONTINUED | OUTPATIENT
Start: 2021-11-06 | End: 2021-11-05

## 2021-11-05 RX ADMIN — HYDROXYZINE PAMOATE 50 MG: 25 CAPSULE ORAL at 08:11

## 2021-11-05 RX ADMIN — HYDROXYZINE PAMOATE 50 MG: 25 CAPSULE ORAL at 06:11

## 2021-11-05 RX ADMIN — METHYLPREDNISOLONE SODIUM SUCCINATE 60 MG: 40 INJECTION, POWDER, FOR SOLUTION INTRAMUSCULAR; INTRAVENOUS at 09:11

## 2021-11-05 RX ADMIN — DOXYCYCLINE HYCLATE 100 MG: 100 TABLET, COATED ORAL at 08:11

## 2021-11-05 RX ADMIN — DOXYCYCLINE HYCLATE 100 MG: 100 TABLET, COATED ORAL at 09:11

## 2021-11-05 RX ADMIN — ENOXAPARIN SODIUM 40 MG: 40 INJECTION SUBCUTANEOUS at 06:11

## 2021-11-06 LAB
ALBUMIN SERPL BCP-MCNC: 3.7 G/DL (ref 3.5–5.2)
ALP SERPL-CCNC: 64 U/L (ref 55–135)
ALT SERPL W/O P-5'-P-CCNC: 142 U/L (ref 10–44)
ANION GAP SERPL CALC-SCNC: 15 MMOL/L (ref 8–16)
AST SERPL-CCNC: 40 U/L (ref 10–40)
BASOPHILS # BLD AUTO: 0.05 K/UL (ref 0–0.2)
BASOPHILS NFR BLD: 0.4 % (ref 0–1.9)
BILIRUB SERPL-MCNC: 0.8 MG/DL (ref 0.1–1)
BUN SERPL-MCNC: 26 MG/DL (ref 6–20)
CALCIUM SERPL-MCNC: 9.8 MG/DL (ref 8.7–10.5)
CHLORIDE SERPL-SCNC: 105 MMOL/L (ref 95–110)
CO2 SERPL-SCNC: 23 MMOL/L (ref 23–29)
CREAT SERPL-MCNC: 0.8 MG/DL (ref 0.5–1.4)
DIFFERENTIAL METHOD: ABNORMAL
EOSINOPHIL # BLD AUTO: 0.1 K/UL (ref 0–0.5)
EOSINOPHIL NFR BLD: 0.7 % (ref 0–8)
ERYTHROCYTE [DISTWIDTH] IN BLOOD BY AUTOMATED COUNT: 12.7 % (ref 11.5–14.5)
EST. GFR  (AFRICAN AMERICAN): >60 ML/MIN/1.73 M^2
EST. GFR  (NON AFRICAN AMERICAN): >60 ML/MIN/1.73 M^2
GLUCOSE SERPL-MCNC: 73 MG/DL (ref 70–110)
HCT VFR BLD AUTO: 52.8 % (ref 40–54)
HGB BLD-MCNC: 17.3 G/DL (ref 14–18)
IMM GRANULOCYTES # BLD AUTO: 0.15 K/UL (ref 0–0.04)
IMM GRANULOCYTES NFR BLD AUTO: 1.2 % (ref 0–0.5)
LYMPHOCYTES # BLD AUTO: 3.2 K/UL (ref 1–4.8)
LYMPHOCYTES NFR BLD: 24.3 % (ref 18–48)
MAGNESIUM SERPL-MCNC: 2.3 MG/DL (ref 1.6–2.6)
MCH RBC QN AUTO: 27.2 PG (ref 27–31)
MCHC RBC AUTO-ENTMCNC: 32.8 G/DL (ref 32–36)
MCV RBC AUTO: 83 FL (ref 82–98)
MONOCYTES # BLD AUTO: 1.7 K/UL (ref 0.3–1)
MONOCYTES NFR BLD: 13.4 % (ref 4–15)
NEUTROPHILS # BLD AUTO: 7.8 K/UL (ref 1.8–7.7)
NEUTROPHILS NFR BLD: 60 % (ref 38–73)
NRBC BLD-RTO: 0 /100 WBC
PHOSPHATE SERPL-MCNC: 4.5 MG/DL (ref 2.7–4.5)
PLATELET # BLD AUTO: 223 K/UL (ref 150–450)
PMV BLD AUTO: 11.2 FL (ref 9.2–12.9)
POTASSIUM SERPL-SCNC: 3.9 MMOL/L (ref 3.5–5.1)
PROT SERPL-MCNC: 7.4 G/DL (ref 6–8.4)
RBC # BLD AUTO: 6.37 M/UL (ref 4.6–6.2)
SODIUM SERPL-SCNC: 143 MMOL/L (ref 136–145)
WBC # BLD AUTO: 12.99 K/UL (ref 3.9–12.7)

## 2021-11-06 PROCEDURE — 85025 COMPLETE CBC W/AUTO DIFF WBC: CPT | Performed by: STUDENT IN AN ORGANIZED HEALTH CARE EDUCATION/TRAINING PROGRAM

## 2021-11-06 PROCEDURE — 99232 PR SUBSEQUENT HOSPITAL CARE,LEVL II: ICD-10-PCS | Mod: ,,, | Performed by: HOSPITALIST

## 2021-11-06 PROCEDURE — 36415 COLL VENOUS BLD VENIPUNCTURE: CPT | Performed by: STUDENT IN AN ORGANIZED HEALTH CARE EDUCATION/TRAINING PROGRAM

## 2021-11-06 PROCEDURE — 83735 ASSAY OF MAGNESIUM: CPT | Performed by: STUDENT IN AN ORGANIZED HEALTH CARE EDUCATION/TRAINING PROGRAM

## 2021-11-06 PROCEDURE — 99232 SBSQ HOSP IP/OBS MODERATE 35: CPT | Mod: ,,, | Performed by: HOSPITALIST

## 2021-11-06 PROCEDURE — 25000242 PHARM REV CODE 250 ALT 637 W/ HCPCS: Performed by: STUDENT IN AN ORGANIZED HEALTH CARE EDUCATION/TRAINING PROGRAM

## 2021-11-06 PROCEDURE — 20600001 HC STEP DOWN PRIVATE ROOM

## 2021-11-06 PROCEDURE — 94640 AIRWAY INHALATION TREATMENT: CPT

## 2021-11-06 PROCEDURE — 84100 ASSAY OF PHOSPHORUS: CPT | Performed by: STUDENT IN AN ORGANIZED HEALTH CARE EDUCATION/TRAINING PROGRAM

## 2021-11-06 PROCEDURE — 63600175 PHARM REV CODE 636 W HCPCS: Performed by: HOSPITALIST

## 2021-11-06 PROCEDURE — 63600175 PHARM REV CODE 636 W HCPCS: Performed by: STUDENT IN AN ORGANIZED HEALTH CARE EDUCATION/TRAINING PROGRAM

## 2021-11-06 PROCEDURE — 80053 COMPREHEN METABOLIC PANEL: CPT | Performed by: STUDENT IN AN ORGANIZED HEALTH CARE EDUCATION/TRAINING PROGRAM

## 2021-11-06 RX ADMIN — PREDNISONE 60 MG: 20 TABLET ORAL at 09:11

## 2021-11-06 RX ADMIN — IPRATROPIUM BROMIDE AND ALBUTEROL SULFATE 3 ML: 2.5; .5 SOLUTION RESPIRATORY (INHALATION) at 02:11

## 2021-11-06 RX ADMIN — ENOXAPARIN SODIUM 40 MG: 40 INJECTION SUBCUTANEOUS at 05:11

## 2021-11-07 LAB
ALBUMIN SERPL BCP-MCNC: 3.2 G/DL (ref 3.5–5.2)
ALP SERPL-CCNC: 55 U/L (ref 55–135)
ALT SERPL W/O P-5'-P-CCNC: 118 U/L (ref 10–44)
ANION GAP SERPL CALC-SCNC: 11 MMOL/L (ref 8–16)
AST SERPL-CCNC: 32 U/L (ref 10–40)
BASOPHILS # BLD AUTO: 0.02 K/UL (ref 0–0.2)
BASOPHILS NFR BLD: 0.1 % (ref 0–1.9)
BILIRUB SERPL-MCNC: 1 MG/DL (ref 0.1–1)
BUN SERPL-MCNC: 21 MG/DL (ref 6–20)
CALCIUM SERPL-MCNC: 8.9 MG/DL (ref 8.7–10.5)
CHLORIDE SERPL-SCNC: 104 MMOL/L (ref 95–110)
CO2 SERPL-SCNC: 25 MMOL/L (ref 23–29)
CREAT SERPL-MCNC: 0.7 MG/DL (ref 0.5–1.4)
DIFFERENTIAL METHOD: ABNORMAL
EOSINOPHIL # BLD AUTO: 0.1 K/UL (ref 0–0.5)
EOSINOPHIL NFR BLD: 0.9 % (ref 0–8)
ERYTHROCYTE [DISTWIDTH] IN BLOOD BY AUTOMATED COUNT: 12.6 % (ref 11.5–14.5)
EST. GFR  (AFRICAN AMERICAN): >60 ML/MIN/1.73 M^2
EST. GFR  (NON AFRICAN AMERICAN): >60 ML/MIN/1.73 M^2
GLUCOSE SERPL-MCNC: 101 MG/DL (ref 70–110)
HCT VFR BLD AUTO: 43.1 % (ref 40–54)
HGB BLD-MCNC: 14.5 G/DL (ref 14–18)
IMM GRANULOCYTES # BLD AUTO: 0.11 K/UL (ref 0–0.04)
IMM GRANULOCYTES NFR BLD AUTO: 0.8 % (ref 0–0.5)
LYMPHOCYTES # BLD AUTO: 3.4 K/UL (ref 1–4.8)
LYMPHOCYTES NFR BLD: 24.7 % (ref 18–48)
MAGNESIUM SERPL-MCNC: 2.1 MG/DL (ref 1.6–2.6)
MCH RBC QN AUTO: 27.9 PG (ref 27–31)
MCHC RBC AUTO-ENTMCNC: 33.6 G/DL (ref 32–36)
MCV RBC AUTO: 83 FL (ref 82–98)
MONOCYTES # BLD AUTO: 1.4 K/UL (ref 0.3–1)
MONOCYTES NFR BLD: 10.3 % (ref 4–15)
NEUTROPHILS # BLD AUTO: 8.7 K/UL (ref 1.8–7.7)
NEUTROPHILS NFR BLD: 63.2 % (ref 38–73)
NRBC BLD-RTO: 0 /100 WBC
PHOSPHATE SERPL-MCNC: 4.5 MG/DL (ref 2.7–4.5)
PLATELET # BLD AUTO: 324 K/UL (ref 150–450)
PMV BLD AUTO: 10.8 FL (ref 9.2–12.9)
POTASSIUM SERPL-SCNC: 3.5 MMOL/L (ref 3.5–5.1)
PROT SERPL-MCNC: 6.3 G/DL (ref 6–8.4)
RBC # BLD AUTO: 5.2 M/UL (ref 4.6–6.2)
SODIUM SERPL-SCNC: 140 MMOL/L (ref 136–145)
WBC # BLD AUTO: 13.73 K/UL (ref 3.9–12.7)

## 2021-11-07 PROCEDURE — 83735 ASSAY OF MAGNESIUM: CPT | Performed by: STUDENT IN AN ORGANIZED HEALTH CARE EDUCATION/TRAINING PROGRAM

## 2021-11-07 PROCEDURE — 20600001 HC STEP DOWN PRIVATE ROOM

## 2021-11-07 PROCEDURE — 80053 COMPREHEN METABOLIC PANEL: CPT | Performed by: STUDENT IN AN ORGANIZED HEALTH CARE EDUCATION/TRAINING PROGRAM

## 2021-11-07 PROCEDURE — 84100 ASSAY OF PHOSPHORUS: CPT | Performed by: STUDENT IN AN ORGANIZED HEALTH CARE EDUCATION/TRAINING PROGRAM

## 2021-11-07 PROCEDURE — 85025 COMPLETE CBC W/AUTO DIFF WBC: CPT | Performed by: STUDENT IN AN ORGANIZED HEALTH CARE EDUCATION/TRAINING PROGRAM

## 2021-11-07 PROCEDURE — 25000003 PHARM REV CODE 250: Performed by: HOSPITALIST

## 2021-11-07 PROCEDURE — 63600175 PHARM REV CODE 636 W HCPCS: Performed by: STUDENT IN AN ORGANIZED HEALTH CARE EDUCATION/TRAINING PROGRAM

## 2021-11-07 PROCEDURE — 99232 PR SUBSEQUENT HOSPITAL CARE,LEVL II: ICD-10-PCS | Mod: ,,, | Performed by: HOSPITALIST

## 2021-11-07 PROCEDURE — 63600175 PHARM REV CODE 636 W HCPCS: Performed by: HOSPITALIST

## 2021-11-07 PROCEDURE — 36415 COLL VENOUS BLD VENIPUNCTURE: CPT | Performed by: STUDENT IN AN ORGANIZED HEALTH CARE EDUCATION/TRAINING PROGRAM

## 2021-11-07 PROCEDURE — 99232 SBSQ HOSP IP/OBS MODERATE 35: CPT | Mod: ,,, | Performed by: HOSPITALIST

## 2021-11-07 RX ORDER — POTASSIUM CHLORIDE 20 MEQ/1
20 TABLET, EXTENDED RELEASE ORAL ONCE
Status: COMPLETED | OUTPATIENT
Start: 2021-11-07 | End: 2021-11-07

## 2021-11-07 RX ADMIN — POTASSIUM CHLORIDE 20 MEQ: 1500 TABLET, EXTENDED RELEASE ORAL at 11:11

## 2021-11-07 RX ADMIN — ENOXAPARIN SODIUM 40 MG: 40 INJECTION SUBCUTANEOUS at 06:11

## 2021-11-07 RX ADMIN — PREDNISONE 60 MG: 20 TABLET ORAL at 08:11

## 2021-11-08 VITALS
DIASTOLIC BLOOD PRESSURE: 68 MMHG | RESPIRATION RATE: 20 BRPM | HEART RATE: 78 BPM | TEMPERATURE: 98 F | BODY MASS INDEX: 24.24 KG/M2 | WEIGHT: 142 LBS | SYSTOLIC BLOOD PRESSURE: 130 MMHG | HEIGHT: 64 IN | OXYGEN SATURATION: 97 %

## 2021-11-08 LAB
ALBUMIN SERPL BCP-MCNC: 3.2 G/DL (ref 3.5–5.2)
ALP SERPL-CCNC: 55 U/L (ref 55–135)
ALT SERPL W/O P-5'-P-CCNC: 101 U/L (ref 10–44)
ANION GAP SERPL CALC-SCNC: 9 MMOL/L (ref 8–16)
AST SERPL-CCNC: 25 U/L (ref 10–40)
BASOPHILS # BLD AUTO: 0.02 K/UL (ref 0–0.2)
BASOPHILS NFR BLD: 0.1 % (ref 0–1.9)
BILIRUB SERPL-MCNC: 0.3 MG/DL (ref 0.1–1)
BUN SERPL-MCNC: 20 MG/DL (ref 6–20)
CALCIUM SERPL-MCNC: 8.6 MG/DL (ref 8.7–10.5)
CHLORIDE SERPL-SCNC: 102 MMOL/L (ref 95–110)
CO2 SERPL-SCNC: 25 MMOL/L (ref 23–29)
CREAT SERPL-MCNC: 0.8 MG/DL (ref 0.5–1.4)
DIFFERENTIAL METHOD: ABNORMAL
EOSINOPHIL # BLD AUTO: 0.1 K/UL (ref 0–0.5)
EOSINOPHIL NFR BLD: 1 % (ref 0–8)
ERYTHROCYTE [DISTWIDTH] IN BLOOD BY AUTOMATED COUNT: 12.3 % (ref 11.5–14.5)
EST. GFR  (AFRICAN AMERICAN): >60 ML/MIN/1.73 M^2
EST. GFR  (NON AFRICAN AMERICAN): >60 ML/MIN/1.73 M^2
GLUCOSE SERPL-MCNC: 86 MG/DL (ref 70–110)
HCT VFR BLD AUTO: 42.1 % (ref 40–54)
HGB BLD-MCNC: 13.7 G/DL (ref 14–18)
IMM GRANULOCYTES # BLD AUTO: 0.08 K/UL (ref 0–0.04)
IMM GRANULOCYTES NFR BLD AUTO: 0.6 % (ref 0–0.5)
LYMPHOCYTES # BLD AUTO: 3.5 K/UL (ref 1–4.8)
LYMPHOCYTES NFR BLD: 26.3 % (ref 18–48)
MAGNESIUM SERPL-MCNC: 2.1 MG/DL (ref 1.6–2.6)
MCH RBC QN AUTO: 27.8 PG (ref 27–31)
MCHC RBC AUTO-ENTMCNC: 32.5 G/DL (ref 32–36)
MCV RBC AUTO: 85 FL (ref 82–98)
MONOCYTES # BLD AUTO: 1.4 K/UL (ref 0.3–1)
MONOCYTES NFR BLD: 10.4 % (ref 4–15)
NEUTROPHILS # BLD AUTO: 8.2 K/UL (ref 1.8–7.7)
NEUTROPHILS NFR BLD: 61.6 % (ref 38–73)
NRBC BLD-RTO: 0 /100 WBC
PHOSPHATE SERPL-MCNC: 4 MG/DL (ref 2.7–4.5)
PLATELET # BLD AUTO: 348 K/UL (ref 150–450)
PMV BLD AUTO: 11.4 FL (ref 9.2–12.9)
POTASSIUM SERPL-SCNC: 4.1 MMOL/L (ref 3.5–5.1)
PROT SERPL-MCNC: 5.7 G/DL (ref 6–8.4)
RBC # BLD AUTO: 4.93 M/UL (ref 4.6–6.2)
SODIUM SERPL-SCNC: 136 MMOL/L (ref 136–145)
WBC # BLD AUTO: 13.36 K/UL (ref 3.9–12.7)

## 2021-11-08 PROCEDURE — 92526 ORAL FUNCTION THERAPY: CPT

## 2021-11-08 PROCEDURE — 36415 COLL VENOUS BLD VENIPUNCTURE: CPT | Performed by: STUDENT IN AN ORGANIZED HEALTH CARE EDUCATION/TRAINING PROGRAM

## 2021-11-08 PROCEDURE — 99232 SBSQ HOSP IP/OBS MODERATE 35: CPT | Mod: AF,HB,, | Performed by: PSYCHIATRY & NEUROLOGY

## 2021-11-08 PROCEDURE — 83735 ASSAY OF MAGNESIUM: CPT | Performed by: STUDENT IN AN ORGANIZED HEALTH CARE EDUCATION/TRAINING PROGRAM

## 2021-11-08 PROCEDURE — 85025 COMPLETE CBC W/AUTO DIFF WBC: CPT | Performed by: STUDENT IN AN ORGANIZED HEALTH CARE EDUCATION/TRAINING PROGRAM

## 2021-11-08 PROCEDURE — 84100 ASSAY OF PHOSPHORUS: CPT | Performed by: STUDENT IN AN ORGANIZED HEALTH CARE EDUCATION/TRAINING PROGRAM

## 2021-11-08 PROCEDURE — 99232 PR SUBSEQUENT HOSPITAL CARE,LEVL II: ICD-10-PCS | Mod: AF,HB,, | Performed by: PSYCHIATRY & NEUROLOGY

## 2021-11-08 PROCEDURE — 99239 PR HOSPITAL DISCHARGE DAY,>30 MIN: ICD-10-PCS | Mod: ,,, | Performed by: HOSPITALIST

## 2021-11-08 PROCEDURE — 80053 COMPREHEN METABOLIC PANEL: CPT | Performed by: STUDENT IN AN ORGANIZED HEALTH CARE EDUCATION/TRAINING PROGRAM

## 2021-11-08 PROCEDURE — 99239 HOSP IP/OBS DSCHRG MGMT >30: CPT | Mod: ,,, | Performed by: HOSPITALIST

## 2021-11-08 RX ORDER — ALBUTEROL SULFATE 90 UG/1
2 AEROSOL, METERED RESPIRATORY (INHALATION) EVERY 6 HOURS PRN
Qty: 18 G | Refills: 3 | Status: SHIPPED | OUTPATIENT
Start: 2021-11-08

## 2021-11-08 RX ORDER — PREDNISONE 10 MG/1
10 TABLET ORAL DAILY
Qty: 23 TABLET | Refills: 0 | Status: SHIPPED | OUTPATIENT
Start: 2021-11-15 | End: 2021-11-20

## 2021-11-08 RX ORDER — PREDNISONE 5 MG/1
5 TABLET ORAL DAILY
Qty: 3 TABLET | Refills: 0 | Status: SHIPPED | OUTPATIENT
Start: 2021-11-18 | End: 2021-11-08 | Stop reason: SDUPTHER

## 2021-11-08 RX ORDER — PREDNISONE 20 MG/1
20 TABLET ORAL DAILY
Qty: 3 TABLET | Refills: 0 | Status: SHIPPED | OUTPATIENT
Start: 2021-11-12 | End: 2021-11-08 | Stop reason: SDUPTHER

## 2021-11-08 RX ORDER — IPRATROPIUM BROMIDE AND ALBUTEROL SULFATE 2.5; .5 MG/3ML; MG/3ML
3 SOLUTION RESPIRATORY (INHALATION) EVERY 4 HOURS PRN
Qty: 180 ML | Refills: 0 | Status: ON HOLD | OUTPATIENT
Start: 2021-11-08 | End: 2022-08-03

## 2021-11-08 RX ORDER — PREDNISONE 20 MG/1
40 TABLET ORAL DAILY
Qty: 9 TABLET | Refills: 0 | Status: SHIPPED | OUTPATIENT
Start: 2021-11-09 | End: 2021-11-08 | Stop reason: SDUPTHER

## 2021-11-08 RX ORDER — FLUTICASONE PROPIONATE AND SALMETEROL XINAFOATE 230; 21 UG/1; UG/1
2 AEROSOL, METERED RESPIRATORY (INHALATION) 2 TIMES DAILY
Qty: 12 G | Refills: 3 | Status: ON HOLD | OUTPATIENT
Start: 2021-11-08 | End: 2022-02-20 | Stop reason: HOSPADM

## 2022-02-19 ENCOUNTER — HOSPITAL ENCOUNTER (OUTPATIENT)
Facility: HOSPITAL | Age: 27
Discharge: HOME | End: 2022-02-20
Attending: HOSPITALIST | Admitting: HOSPITALIST
Payer: MEDICAID

## 2022-02-19 DIAGNOSIS — R07.9 CHEST PAIN: ICD-10-CM

## 2022-02-19 DIAGNOSIS — J45.52 SEVERE PERSISTENT ASTHMA WITH STATUS ASTHMATICUS: ICD-10-CM

## 2022-02-19 DIAGNOSIS — T50.901D ACCIDENTAL DRUG OVERDOSE, SUBSEQUENT ENCOUNTER: Primary | ICD-10-CM

## 2022-02-19 DIAGNOSIS — R06.02 SHORTNESS OF BREATH: ICD-10-CM

## 2022-02-19 PROBLEM — T40.1X1A ACCIDENTAL OVERDOSE OF HEROIN: Status: ACTIVE | Noted: 2022-02-19

## 2022-02-19 PROBLEM — J96.01 ACUTE RESPIRATORY FAILURE WITH HYPOXIA AND HYPERCARBIA: Status: ACTIVE | Noted: 2022-02-19

## 2022-02-19 PROBLEM — J45.51 SEVERE PERSISTENT ASTHMA WITH ACUTE EXACERBATION: Status: ACTIVE | Noted: 2021-10-26

## 2022-02-19 PROBLEM — N17.9 AKI (ACUTE KIDNEY INJURY): Status: ACTIVE | Noted: 2022-02-19

## 2022-02-19 PROBLEM — F19.90 SUBSTANCE USE DISORDER: Chronic | Status: ACTIVE | Noted: 2021-10-27

## 2022-02-19 PROBLEM — E44.0 MODERATE MALNUTRITION: Status: RESOLVED | Noted: 2021-11-03 | Resolved: 2022-02-19

## 2022-02-19 PROBLEM — J45.909 ASTHMA: Status: RESOLVED | Noted: 2021-10-26 | Resolved: 2022-02-19

## 2022-02-19 PROBLEM — J96.02 ACUTE RESPIRATORY FAILURE WITH HYPOXIA AND HYPERCARBIA: Status: ACTIVE | Noted: 2022-02-19

## 2022-02-19 PROBLEM — J20.2: Status: RESOLVED | Noted: 2021-10-31 | Resolved: 2022-02-19

## 2022-02-19 LAB
ALBUMIN SERPL BCP-MCNC: 3.8 G/DL (ref 3.5–5.2)
ALLENS TEST: ABNORMAL
ALLENS TEST: ABNORMAL
ALP SERPL-CCNC: 77 U/L (ref 55–135)
ALT SERPL W/O P-5'-P-CCNC: 28 U/L (ref 10–44)
AMPHET+METHAMPHET UR QL: NEGATIVE
ANION GAP SERPL CALC-SCNC: 12 MMOL/L (ref 8–16)
ANION GAP SERPL CALC-SCNC: 17 MMOL/L (ref 8–16)
AST SERPL-CCNC: 29 U/L (ref 10–40)
B-OH-BUTYR BLD STRIP-SCNC: 0.1 MMOL/L (ref 0–0.5)
BACTERIA #/AREA URNS AUTO: ABNORMAL /HPF
BARBITURATES UR QL SCN>200 NG/ML: NEGATIVE
BASOPHILS # BLD AUTO: 0.09 K/UL (ref 0–0.2)
BASOPHILS NFR BLD: 0.4 % (ref 0–1.9)
BENZODIAZ UR QL SCN>200 NG/ML: NEGATIVE
BILIRUB SERPL-MCNC: 0.4 MG/DL (ref 0.1–1)
BILIRUB UR QL STRIP: NEGATIVE
BNP SERPL-MCNC: <10 PG/ML (ref 0–99)
BUN SERPL-MCNC: 12 MG/DL (ref 6–20)
BUN SERPL-MCNC: 12 MG/DL (ref 6–20)
BZE UR QL SCN: NEGATIVE
CALCIUM SERPL-MCNC: 8.8 MG/DL (ref 8.7–10.5)
CALCIUM SERPL-MCNC: 9 MG/DL (ref 8.7–10.5)
CANNABINOIDS UR QL SCN: NEGATIVE
CHLORIDE SERPL-SCNC: 102 MMOL/L (ref 95–110)
CHLORIDE SERPL-SCNC: 99 MMOL/L (ref 95–110)
CLARITY UR REFRACT.AUTO: CLEAR
CO2 SERPL-SCNC: 20 MMOL/L (ref 23–29)
CO2 SERPL-SCNC: 25 MMOL/L (ref 23–29)
COLOR UR AUTO: YELLOW
CREAT SERPL-MCNC: 1.3 MG/DL (ref 0.5–1.4)
CREAT SERPL-MCNC: 1.7 MG/DL (ref 0.5–1.4)
CREAT UR-MCNC: 148 MG/DL (ref 23–375)
CTP QC/QA: YES
DELSYS: ABNORMAL
DELSYS: ABNORMAL
DIFFERENTIAL METHOD: ABNORMAL
EOSINOPHIL # BLD AUTO: 0.5 K/UL (ref 0–0.5)
EOSINOPHIL NFR BLD: 2 % (ref 0–8)
EP: 5
EP: 8
ERYTHROCYTE [DISTWIDTH] IN BLOOD BY AUTOMATED COUNT: 11.9 % (ref 11.5–14.5)
ERYTHROCYTE [SEDIMENTATION RATE] IN BLOOD BY WESTERGREN METHOD: 24 MM/H
ERYTHROCYTE [SEDIMENTATION RATE] IN BLOOD BY WESTERGREN METHOD: 24 MM/H
EST. GFR  (AFRICAN AMERICAN): >60 ML/MIN/1.73 M^2
EST. GFR  (AFRICAN AMERICAN): >60 ML/MIN/1.73 M^2
EST. GFR  (NON AFRICAN AMERICAN): 54.8 ML/MIN/1.73 M^2
EST. GFR  (NON AFRICAN AMERICAN): >60 ML/MIN/1.73 M^2
ESTIMATED AVG GLUCOSE: 103 MG/DL (ref 68–131)
ETHANOL UR-MCNC: <10 MG/DL
FIO2: 30
FIO2: 40
GLUCOSE SERPL-MCNC: 128 MG/DL (ref 70–110)
GLUCOSE SERPL-MCNC: 379 MG/DL (ref 70–110)
GLUCOSE UR QL STRIP: ABNORMAL
HBA1C MFR BLD: 5.2 % (ref 4–5.6)
HCO3 UR-SCNC: 26.2 MMOL/L (ref 24–28)
HCO3 UR-SCNC: 27.7 MMOL/L (ref 24–28)
HCT VFR BLD AUTO: 49.2 % (ref 40–54)
HCT VFR BLD CALC: 45 %PCV (ref 36–54)
HCT VFR BLD CALC: 46 %PCV (ref 36–54)
HGB BLD-MCNC: 15 G/DL (ref 14–18)
HGB UR QL STRIP: NEGATIVE
HYALINE CASTS UR QL AUTO: 5 /LPF
IMM GRANULOCYTES # BLD AUTO: 0.3 K/UL (ref 0–0.04)
IMM GRANULOCYTES NFR BLD AUTO: 1.3 % (ref 0–0.5)
IP: 10
IP: 15
KETONES UR QL STRIP: ABNORMAL
LACTATE SERPL-SCNC: 2.1 MMOL/L (ref 0.5–2.2)
LEUKOCYTE ESTERASE UR QL STRIP: NEGATIVE
LYMPHOCYTES # BLD AUTO: 1.3 K/UL (ref 1–4.8)
LYMPHOCYTES NFR BLD: 5.7 % (ref 18–48)
MAGNESIUM SERPL-MCNC: 2.3 MG/DL (ref 1.6–2.6)
MAGNESIUM SERPL-MCNC: 2.9 MG/DL (ref 1.6–2.6)
MCH RBC QN AUTO: 28.5 PG (ref 27–31)
MCHC RBC AUTO-ENTMCNC: 30.5 G/DL (ref 32–36)
MCV RBC AUTO: 93 FL (ref 82–98)
METHADONE UR QL SCN>300 NG/ML: NEGATIVE
MICROSCOPIC COMMENT: ABNORMAL
MODE: ABNORMAL
MODE: ABNORMAL
MONOCYTES # BLD AUTO: 0.6 K/UL (ref 0.3–1)
MONOCYTES NFR BLD: 2.5 % (ref 4–15)
NEUTROPHILS # BLD AUTO: 19.7 K/UL (ref 1.8–7.7)
NEUTROPHILS NFR BLD: 88.1 % (ref 38–73)
NITRITE UR QL STRIP: NEGATIVE
NRBC BLD-RTO: 0 /100 WBC
OPIATES UR QL SCN: NEGATIVE
PCO2 BLDA: 53.6 MMHG (ref 35–45)
PCO2 BLDA: 61.1 MMHG (ref 35–45)
PCP UR QL SCN>25 NG/ML: NEGATIVE
PH SMN: 7.24 [PH] (ref 7.35–7.45)
PH SMN: 7.32 [PH] (ref 7.35–7.45)
PH UR STRIP: 7 [PH] (ref 5–8)
PHOSPHATE SERPL-MCNC: 2.5 MG/DL (ref 2.7–4.5)
PLATELET # BLD AUTO: 265 K/UL (ref 150–450)
PMV BLD AUTO: 10.4 FL (ref 9.2–12.9)
PO2 BLDA: 71 MMHG (ref 80–100)
PO2 BLDA: 81 MMHG (ref 80–100)
POC BE: -1 MMOL/L
POC BE: 2 MMOL/L
POC IONIZED CALCIUM: 1.16 MMOL/L (ref 1.06–1.42)
POC IONIZED CALCIUM: 1.19 MMOL/L (ref 1.06–1.42)
POC SATURATED O2: 92 % (ref 95–100)
POC SATURATED O2: 93 % (ref 95–100)
POC TCO2: 28 MMOL/L (ref 23–27)
POC TCO2: 29 MMOL/L (ref 23–27)
POCT GLUCOSE: 121 MG/DL (ref 70–110)
POCT GLUCOSE: 150 MG/DL (ref 70–110)
POCT GLUCOSE: 98 MG/DL (ref 70–110)
POTASSIUM BLD-SCNC: 4.3 MMOL/L (ref 3.5–5.1)
POTASSIUM BLD-SCNC: 4.5 MMOL/L (ref 3.5–5.1)
POTASSIUM SERPL-SCNC: 4.2 MMOL/L (ref 3.5–5.1)
POTASSIUM SERPL-SCNC: 5 MMOL/L (ref 3.5–5.1)
PROCALCITONIN SERPL IA-MCNC: 1.38 NG/ML
PROT SERPL-MCNC: 6.9 G/DL (ref 6–8.4)
PROT UR QL STRIP: ABNORMAL
RBC # BLD AUTO: 5.27 M/UL (ref 4.6–6.2)
RBC #/AREA URNS AUTO: 0 /HPF (ref 0–4)
SAMPLE: ABNORMAL
SAMPLE: ABNORMAL
SARS-COV-2 RDRP RESP QL NAA+PROBE: NEGATIVE
SITE: ABNORMAL
SITE: ABNORMAL
SODIUM BLD-SCNC: 132 MMOL/L (ref 136–145)
SODIUM BLD-SCNC: 136 MMOL/L (ref 136–145)
SODIUM SERPL-SCNC: 136 MMOL/L (ref 136–145)
SODIUM SERPL-SCNC: 139 MMOL/L (ref 136–145)
SP GR UR STRIP: 1.02 (ref 1–1.03)
SQUAMOUS #/AREA URNS AUTO: 0 /HPF
TOXICOLOGY INFORMATION: NORMAL
TROPONIN I SERPL DL<=0.01 NG/ML-MCNC: 0.02 NG/ML (ref 0–0.03)
URN SPEC COLLECT METH UR: ABNORMAL
WBC # BLD AUTO: 22.39 K/UL (ref 3.9–12.7)
WBC #/AREA URNS AUTO: 2 /HPF (ref 0–5)
YEAST UR QL AUTO: ABNORMAL

## 2022-02-19 PROCEDURE — 83735 ASSAY OF MAGNESIUM: CPT | Performed by: STUDENT IN AN ORGANIZED HEALTH CARE EDUCATION/TRAINING PROGRAM

## 2022-02-19 PROCEDURE — 94760 N-INVAS EAR/PLS OXIMETRY 1: CPT

## 2022-02-19 PROCEDURE — 99900035 HC TECH TIME PER 15 MIN (STAT)

## 2022-02-19 PROCEDURE — 25000242 PHARM REV CODE 250 ALT 637 W/ HCPCS

## 2022-02-19 PROCEDURE — 80307 DRUG TEST PRSMV CHEM ANLYZR: CPT

## 2022-02-19 PROCEDURE — 25000003 PHARM REV CODE 250: Performed by: STUDENT IN AN ORGANIZED HEALTH CARE EDUCATION/TRAINING PROGRAM

## 2022-02-19 PROCEDURE — 87040 BLOOD CULTURE FOR BACTERIA: CPT | Mod: 59 | Performed by: STUDENT IN AN ORGANIZED HEALTH CARE EDUCATION/TRAINING PROGRAM

## 2022-02-19 PROCEDURE — 83735 ASSAY OF MAGNESIUM: CPT | Mod: 91

## 2022-02-19 PROCEDURE — 25000003 PHARM REV CODE 250

## 2022-02-19 PROCEDURE — 63600175 PHARM REV CODE 636 W HCPCS: Performed by: STUDENT IN AN ORGANIZED HEALTH CARE EDUCATION/TRAINING PROGRAM

## 2022-02-19 PROCEDURE — U0002 COVID-19 LAB TEST NON-CDC: HCPCS

## 2022-02-19 PROCEDURE — 80048 BASIC METABOLIC PNL TOTAL CA: CPT | Performed by: STUDENT IN AN ORGANIZED HEALTH CARE EDUCATION/TRAINING PROGRAM

## 2022-02-19 PROCEDURE — 90792 PSYCH DIAG EVAL W/MED SRVCS: CPT | Mod: AF,HB,, | Performed by: PSYCHIATRY & NEUROLOGY

## 2022-02-19 PROCEDURE — 99291 CRITICAL CARE FIRST HOUR: CPT | Mod: 25

## 2022-02-19 PROCEDURE — 82803 BLOOD GASES ANY COMBINATION: CPT

## 2022-02-19 PROCEDURE — 36600 WITHDRAWAL OF ARTERIAL BLOOD: CPT

## 2022-02-19 PROCEDURE — 94640 AIRWAY INHALATION TREATMENT: CPT

## 2022-02-19 PROCEDURE — 81001 URINALYSIS AUTO W/SCOPE: CPT

## 2022-02-19 PROCEDURE — 90792 PR PSYCHIATRIC DIAGNOSTIC EVALUATION W/MEDICAL SERVICES: ICD-10-PCS | Mod: AF,HB,, | Performed by: PSYCHIATRY & NEUROLOGY

## 2022-02-19 PROCEDURE — 99291 CRITICAL CARE FIRST HOUR: CPT | Mod: CS,,, | Performed by: EMERGENCY MEDICINE

## 2022-02-19 PROCEDURE — G0378 HOSPITAL OBSERVATION PER HR: HCPCS

## 2022-02-19 PROCEDURE — 93010 ELECTROCARDIOGRAM REPORT: CPT | Mod: ,,, | Performed by: INTERNAL MEDICINE

## 2022-02-19 PROCEDURE — 93010 EKG 12-LEAD: ICD-10-PCS | Mod: ,,, | Performed by: INTERNAL MEDICINE

## 2022-02-19 PROCEDURE — 80053 COMPREHEN METABOLIC PANEL: CPT

## 2022-02-19 PROCEDURE — 63700000 PHARM REV CODE 250 ALT 637 W/O HCPCS: Performed by: STUDENT IN AN ORGANIZED HEALTH CARE EDUCATION/TRAINING PROGRAM

## 2022-02-19 PROCEDURE — 27000190 HC CPAP FULL FACE MASK W/VALVE

## 2022-02-19 PROCEDURE — 85025 COMPLETE CBC W/AUTO DIFF WBC: CPT

## 2022-02-19 PROCEDURE — 96365 THER/PROPH/DIAG IV INF INIT: CPT

## 2022-02-19 PROCEDURE — 83605 ASSAY OF LACTIC ACID: CPT | Performed by: HOSPITALIST

## 2022-02-19 PROCEDURE — 83036 HEMOGLOBIN GLYCOSYLATED A1C: CPT | Performed by: STUDENT IN AN ORGANIZED HEALTH CARE EDUCATION/TRAINING PROGRAM

## 2022-02-19 PROCEDURE — 94660 CPAP INITIATION&MGMT: CPT

## 2022-02-19 PROCEDURE — 25000242 PHARM REV CODE 250 ALT 637 W/ HCPCS: Performed by: EMERGENCY MEDICINE

## 2022-02-19 PROCEDURE — 99285 EMERGENCY DEPT VISIT HI MDM: CPT | Mod: 25

## 2022-02-19 PROCEDURE — 94761 N-INVAS EAR/PLS OXIMETRY MLT: CPT

## 2022-02-19 PROCEDURE — 93005 ELECTROCARDIOGRAM TRACING: CPT

## 2022-02-19 PROCEDURE — 84145 PROCALCITONIN (PCT): CPT | Performed by: STUDENT IN AN ORGANIZED HEALTH CARE EDUCATION/TRAINING PROGRAM

## 2022-02-19 PROCEDURE — 99220 PR INITIAL OBSERVATION CARE,LEVL III: ICD-10-PCS | Mod: ,,, | Performed by: HOSPITALIST

## 2022-02-19 PROCEDURE — 99291 PR CRITICAL CARE, E/M 30-74 MINUTES: ICD-10-PCS | Mod: CS,,, | Performed by: EMERGENCY MEDICINE

## 2022-02-19 PROCEDURE — 83880 ASSAY OF NATRIURETIC PEPTIDE: CPT

## 2022-02-19 PROCEDURE — 84484 ASSAY OF TROPONIN QUANT: CPT

## 2022-02-19 PROCEDURE — 27000221 HC OXYGEN, UP TO 24 HOURS

## 2022-02-19 PROCEDURE — 99220 PR INITIAL OBSERVATION CARE,LEVL III: CPT | Mod: ,,, | Performed by: HOSPITALIST

## 2022-02-19 PROCEDURE — 96372 THER/PROPH/DIAG INJ SC/IM: CPT | Mod: 59 | Performed by: STUDENT IN AN ORGANIZED HEALTH CARE EDUCATION/TRAINING PROGRAM

## 2022-02-19 PROCEDURE — 84100 ASSAY OF PHOSPHORUS: CPT | Performed by: STUDENT IN AN ORGANIZED HEALTH CARE EDUCATION/TRAINING PROGRAM

## 2022-02-19 PROCEDURE — 82962 GLUCOSE BLOOD TEST: CPT

## 2022-02-19 PROCEDURE — 82010 KETONE BODYS QUAN: CPT

## 2022-02-19 RX ORDER — ALBUTEROL SULFATE 2.5 MG/.5ML
5 SOLUTION RESPIRATORY (INHALATION)
Status: DISCONTINUED | OUTPATIENT
Start: 2022-02-19 | End: 2022-02-20 | Stop reason: HOSPADM

## 2022-02-19 RX ORDER — SODIUM,POTASSIUM PHOSPHATES 280-250MG
2 POWDER IN PACKET (EA) ORAL
Status: COMPLETED | OUTPATIENT
Start: 2022-02-19 | End: 2022-02-20

## 2022-02-19 RX ORDER — IPRATROPIUM BROMIDE AND ALBUTEROL SULFATE 2.5; .5 MG/3ML; MG/3ML
3 SOLUTION RESPIRATORY (INHALATION)
Status: DISCONTINUED | OUTPATIENT
Start: 2022-02-19 | End: 2022-02-20 | Stop reason: HOSPADM

## 2022-02-19 RX ORDER — PROMETHAZINE HYDROCHLORIDE 25 MG/1
25 TABLET ORAL EVERY 6 HOURS PRN
Status: DISCONTINUED | OUTPATIENT
Start: 2022-02-19 | End: 2022-02-20 | Stop reason: HOSPADM

## 2022-02-19 RX ORDER — INSULIN ASPART 100 [IU]/ML
0-5 INJECTION, SOLUTION INTRAVENOUS; SUBCUTANEOUS
Status: DISCONTINUED | OUTPATIENT
Start: 2022-02-19 | End: 2022-02-20 | Stop reason: HOSPADM

## 2022-02-19 RX ORDER — GLUCAGON 1 MG
1 KIT INJECTION
Status: DISCONTINUED | OUTPATIENT
Start: 2022-02-19 | End: 2022-02-20 | Stop reason: HOSPADM

## 2022-02-19 RX ORDER — IBUPROFEN 200 MG
24 TABLET ORAL
Status: DISCONTINUED | OUTPATIENT
Start: 2022-02-19 | End: 2022-02-20 | Stop reason: HOSPADM

## 2022-02-19 RX ORDER — ALBUTEROL SULFATE 2.5 MG/.5ML
2.5 SOLUTION RESPIRATORY (INHALATION) EVERY 4 HOURS PRN
Status: DISCONTINUED | OUTPATIENT
Start: 2022-02-19 | End: 2022-02-20 | Stop reason: HOSPADM

## 2022-02-19 RX ORDER — MONTELUKAST SODIUM 10 MG/1
10 TABLET ORAL NIGHTLY
Status: DISCONTINUED | OUTPATIENT
Start: 2022-02-19 | End: 2022-02-20 | Stop reason: HOSPADM

## 2022-02-19 RX ORDER — FLUTICASONE FUROATE AND VILANTEROL 200; 25 UG/1; UG/1
1 POWDER RESPIRATORY (INHALATION) DAILY
Status: DISCONTINUED | OUTPATIENT
Start: 2022-02-19 | End: 2022-02-20 | Stop reason: HOSPADM

## 2022-02-19 RX ORDER — TALC
6 POWDER (GRAM) TOPICAL NIGHTLY PRN
Status: DISCONTINUED | OUTPATIENT
Start: 2022-02-19 | End: 2022-02-20 | Stop reason: HOSPADM

## 2022-02-19 RX ORDER — FLUTICASONE FUROATE AND VILANTEROL 200; 25 UG/1; UG/1
1 POWDER RESPIRATORY (INHALATION) 2 TIMES DAILY
Status: DISCONTINUED | OUTPATIENT
Start: 2022-02-19 | End: 2022-02-19

## 2022-02-19 RX ORDER — CYCLOBENZAPRINE HCL 5 MG
10 TABLET ORAL 3 TIMES DAILY PRN
Status: DISCONTINUED | OUTPATIENT
Start: 2022-02-19 | End: 2022-02-20 | Stop reason: HOSPADM

## 2022-02-19 RX ORDER — SODIUM,POTASSIUM PHOSPHATES 280-250MG
2 POWDER IN PACKET (EA) ORAL
Status: CANCELLED | OUTPATIENT
Start: 2022-02-19 | End: 2022-02-20

## 2022-02-19 RX ORDER — HYDROXYZINE HYDROCHLORIDE 25 MG/1
50 TABLET, FILM COATED ORAL NIGHTLY PRN
Status: DISCONTINUED | OUTPATIENT
Start: 2022-02-19 | End: 2022-02-20 | Stop reason: HOSPADM

## 2022-02-19 RX ORDER — IPRATROPIUM BROMIDE AND ALBUTEROL SULFATE 2.5; .5 MG/3ML; MG/3ML
3 SOLUTION RESPIRATORY (INHALATION)
Status: DISCONTINUED | OUTPATIENT
Start: 2022-02-19 | End: 2022-02-19

## 2022-02-19 RX ORDER — SODIUM,POTASSIUM PHOSPHATES 280-250MG
2 POWDER IN PACKET (EA) ORAL
Status: DISCONTINUED | OUTPATIENT
Start: 2022-02-19 | End: 2022-02-19

## 2022-02-19 RX ORDER — PREDNISONE 20 MG/1
40 TABLET ORAL DAILY
Status: DISCONTINUED | OUTPATIENT
Start: 2022-02-19 | End: 2022-02-20 | Stop reason: HOSPADM

## 2022-02-19 RX ORDER — DICYCLOMINE HYDROCHLORIDE 10 MG/1
10 CAPSULE ORAL EVERY 6 HOURS PRN
Status: DISCONTINUED | OUTPATIENT
Start: 2022-02-19 | End: 2022-02-20 | Stop reason: HOSPADM

## 2022-02-19 RX ORDER — SODIUM CHLORIDE 0.9 % (FLUSH) 0.9 %
10 SYRINGE (ML) INJECTION EVERY 12 HOURS PRN
Status: DISCONTINUED | OUTPATIENT
Start: 2022-02-19 | End: 2022-02-20 | Stop reason: HOSPADM

## 2022-02-19 RX ORDER — ENOXAPARIN SODIUM 100 MG/ML
40 INJECTION SUBCUTANEOUS EVERY 24 HOURS
Status: DISCONTINUED | OUTPATIENT
Start: 2022-02-19 | End: 2022-02-20 | Stop reason: HOSPADM

## 2022-02-19 RX ORDER — ONDANSETRON 2 MG/ML
4 INJECTION INTRAMUSCULAR; INTRAVENOUS EVERY 8 HOURS PRN
Status: DISCONTINUED | OUTPATIENT
Start: 2022-02-19 | End: 2022-02-20 | Stop reason: HOSPADM

## 2022-02-19 RX ORDER — SODIUM CHLORIDE 0.9 % (FLUSH) 0.9 %
10 SYRINGE (ML) INJECTION
Status: DISCONTINUED | OUTPATIENT
Start: 2022-02-19 | End: 2022-02-20 | Stop reason: HOSPADM

## 2022-02-19 RX ORDER — ACETAMINOPHEN 325 MG/1
650 TABLET ORAL EVERY 4 HOURS PRN
Status: DISCONTINUED | OUTPATIENT
Start: 2022-02-19 | End: 2022-02-20 | Stop reason: HOSPADM

## 2022-02-19 RX ORDER — AZITHROMYCIN 250 MG/1
500 TABLET, FILM COATED ORAL DAILY
Status: DISCONTINUED | OUTPATIENT
Start: 2022-02-19 | End: 2022-02-20 | Stop reason: HOSPADM

## 2022-02-19 RX ORDER — LOPERAMIDE HYDROCHLORIDE 2 MG/1
2 CAPSULE ORAL EVERY 6 HOURS PRN
Status: DISCONTINUED | OUTPATIENT
Start: 2022-02-19 | End: 2022-02-20 | Stop reason: HOSPADM

## 2022-02-19 RX ORDER — NALOXONE HCL 0.4 MG/ML
0.02 VIAL (ML) INJECTION
Status: DISCONTINUED | OUTPATIENT
Start: 2022-02-19 | End: 2022-02-20 | Stop reason: HOSPADM

## 2022-02-19 RX ORDER — ACETAMINOPHEN 500 MG
1000 TABLET ORAL EVERY 8 HOURS PRN
Status: DISCONTINUED | OUTPATIENT
Start: 2022-02-19 | End: 2022-02-20 | Stop reason: HOSPADM

## 2022-02-19 RX ORDER — IBUPROFEN 200 MG
16 TABLET ORAL
Status: DISCONTINUED | OUTPATIENT
Start: 2022-02-19 | End: 2022-02-20 | Stop reason: HOSPADM

## 2022-02-19 RX ADMIN — CEFTRIAXONE 1 G: 1 INJECTION, SOLUTION INTRAVENOUS at 04:02

## 2022-02-19 RX ADMIN — ONDANSETRON 4 MG: 2 INJECTION INTRAMUSCULAR; INTRAVENOUS at 08:02

## 2022-02-19 RX ADMIN — POTASSIUM & SODIUM PHOSPHATES POWDER PACK 280-160-250 MG 2 PACKET: 280-160-250 PACK at 11:02

## 2022-02-19 RX ADMIN — ALBUTEROL SULFATE 5 MG: 2.5 SOLUTION RESPIRATORY (INHALATION) at 12:02

## 2022-02-19 RX ADMIN — PREDNISONE 40 MG: 20 TABLET ORAL at 08:02

## 2022-02-19 RX ADMIN — IPRATROPIUM BROMIDE AND ALBUTEROL SULFATE 3 ML: 2.5; .5 SOLUTION RESPIRATORY (INHALATION) at 08:02

## 2022-02-19 RX ADMIN — MONTELUKAST 10 MG: 10 TABLET, FILM COATED ORAL at 08:02

## 2022-02-19 RX ADMIN — IPRATROPIUM BROMIDE AND ALBUTEROL SULFATE 3 ML: 2.5; .5 SOLUTION RESPIRATORY (INHALATION) at 01:02

## 2022-02-19 RX ADMIN — ENOXAPARIN SODIUM 40 MG: 100 INJECTION SUBCUTANEOUS at 06:02

## 2022-02-19 RX ADMIN — POTASSIUM & SODIUM PHOSPHATES POWDER PACK 280-160-250 MG 2 PACKET: 280-160-250 PACK at 06:02

## 2022-02-19 RX ADMIN — AZITHROMYCIN MONOHYDRATE 500 MG: 250 TABLET ORAL at 08:02

## 2022-02-19 NOTE — HPI
Mr. Roberto is a 25 yo M with GITA (heroin, denies IVDU) and asthma that presents with acute encephalopathy + SOB.    Patient was brought in by EMS after being found down. Upon EMS arrival, patient was apneic so he received naloxone with positive response. He was actively wheezing when he started breathing again so he received Mg, solumedrol and duo-nebs before being brought to [facility] [place] evaluation. Patient states that he smokes heroin. Denies IVDU. He last used yesterday evening and doesn't remember anything after that. He denies suicide ideation and that his presumed overdose was unintentional. He notes that his asthma has been acting up recently and he noticed wheezing yesterday. He also reports nausea/vomiting yesterday without abdo pain. He is unable to tell me his usual inhalers or frequency of use.    Tobacco use: vapes daily  EtOH use: denies  Illicit drug use: heroin, denies IVDU  Living situation: lives at home with mother    ED Course:  Afebrile, tachycardic (130s), tachypneic (RR 40s), requiring NIV on arrival. Initial labs notable for leukocytosis, JV. Trop and BNP wnl. ABG showed pH 7.239, pCO2 61. EKG showed sinus tachycardia, negative for ischemic changes. CXR negative for focal consolidation. Received albuterol nebs.

## 2022-02-19 NOTE — ASSESSMENT & PLAN NOTE
Presented with unintentional heroin OD  Previous includes Current heroin. Denies IVDU  UTox pending   reviewed, showed suboxone fill in 12/2021    Opioid Risk Score       Value Time User    Opioid Risk Score  5 2/19/2022  3:34 AM Steven Villalobos MD          Plan:  - Addiction Psych consulted, resc apprecaited  - UTox  - Monitor for signs of withdrawal  - PRN hydroxyzine for insomnia, itching, anxiety  - PRN dicyclomine for abdo cramping

## 2022-02-19 NOTE — ED NOTES
Telemetry Verification   Patient placed on Telemetry Box  Verified on ED monitor  Box [id number]   Monitor Tech War room     Rate 69   Rhythm NSR

## 2022-02-19 NOTE — CONSULTS
[facility] - Telemetry Stepdown  Psychiatry  Progress Note    Patient Name: Tucker Roberto  MRN: [id number]   Code Status: Full Code  Admission Date: 2/19/2022  Hospital Length of Stay: 0 days  Expected Discharge Date: 2/20/2022  Attending Physician: Homa Quiles*  Primary Care Provider: Karen Braxton MD    Current Legal Status: Uncontested    Patient information was obtained from patient, past medical records and ER records.       Subjective:     Patient is a 26 y.o., male, presents with:    Principal Problem:Acute respiratory failure with hypoxia and hypercarbia    Chief Complaint: accidental heroin overdose    HPI:   2/19/2022 12:40 PM  Tucker Roberto  1995  [id number]      ADDICTION CONSULT INITIAL EVALUATION     DEPARTMENT:  Psychiatry  SITE: [facility], [address]    DATE OF ADMISSION: 2/19/2022 12:15 AM  LENGTH OF STAY: 0 days    EXAMINING PRACTITIONER: Bee Gr    CONSULT REQUESTED BY: Homa Quiles*      SUBJECTIVE     CHIEF COMPLAINT  Tucker Roberto is a 26 y.o. male who is seen today for an initial psychiatric evaluation by the addiction psychiatry consult service.  Tucker Roberto presents with the chief complaint of: accidental heroin overdose      HISTORY OF PRESENT ILLNESS    Per Primary MD:   Mr. Roberto is a 25 yo M with GITA (heroin, denies IVDU) and asthma that presents with acute encephalopathy + SOB.     Patient was brought in by EMS after being found down. Upon EMS arrival, patient was apneic so he received naloxone with positive response. He was actively wheezing when he started breathing again so he received Mg, solumedrol and duo-nebs before being brought to [facility] for further evaluation. Patient states that he smokes heroin. Denies IVDU. He last used yesterday evening and doesn't remember anything after that. He denies suicide ideation and that his presumed overdose was unintentional. He notes that his asthma has been acting up  recently and he noticed wheezing yesterday. He also reports nausea/vomiting yesterday without abdo pain. He is unable to tell me his usual inhalers or frequency of use.       Per Addiction Psych MD:  Mr. Roberto is a 26 year old male with past psychiatric history of heroin use, presenting for accidental overdose. He states that he obtain heroin from a new person and had an accidental overdose. He denies suicidal ideation or attempts, current or past. Denies homicidal ideations, denies auditory or visual hallucinations, past of present. Denies prior psychiatric history. Denies feeling anxious or depressed. Denies change in sleep, appetite, energy levels, guilt, or hopelessness. He states that he started using heroin again about one month ago, denies triggering events at that time. States that he was sober for a brief period of time after participating in court ordered rehab. After completion or program, he starting using heroin, via smoking 1-2 times per week, using approx. 0.5 grams per use.  He states that he lives at home with his parents and is close with his sister. He states that he has a prescription for suboxone, that he is currently not using and was previously being seen at the [facility] in [place]. He declines rehab placement at this time and states that he plans to reestablish care at [facility]. He denies current withdrawal symptoms.    SUBSTANCE ABUSE HISTORY  Substance(s) of Choice: heroin, smoking 1-2 times per week, 0.5 grams at a time  Substances Used: heroin  History of IVDU?: denies  Use of Alcohol: denies  Average Consumption: 1-2 x week  Last Drink: denies  Use of Medications for Alcohol/Opioid Use Disorder: suboxone in past  History of Complicated Withdrawal: denies  History of Detox: yes  Rehab History: yes- court ordered  AA/NA involvement: not currently  Tobacco: denies; vapes nicotine   Spouse/Partner Consumption: n/a  Patient Aware of Biomedical Complications: yes    DSM-5  SUBSTANCE USE DISORDER CRITERIA   Mild (1-3), Moderate (4-5), Severe (=6)  Often take in larger amounts or over a longer period of time than was intended.  Persistent desire or unsuccessful efforts to cut down or control use.  Great deal of time spent in activities necessary to obtain substance, use, or recover from effects.  Craving/strong desire for substance or urge to use.  Use resulting in failure to fulfill major role obligations at home, work or school.  Social, occupational, recreational activities decreased because of use.  Continued use despite having persistent or recurrent social or interpersonal problems cause or exaserbated by the substance.  Recurrent use in situations in which it is physically hazardous.  Use despite physical or psychological problems that are likely to have been caused or exacerbated by the substance.  Tolerance, as defined by either of the following.   A. A need for markedly increased amounts of substance to achieve intoxication or desired effect. -OR-    B. A markedly diminished effect with continued use of the same amount of substance.  Withdrawal, as manifested by the following.   A. The characteristic withdrawal syndrome for substance. -AND-   B. Substance is taken to relieve or avoid withdrawal symptoms.    ARE THE CRITERIA MET FOR DSM-5 SUBSTANCE USE DISORDER: 5      PSYCHIATRIC HISTORY  Reported Diagnose(s): denies  Previous Medication Trials: suboxone  Previous Psychiatric Hospitalizations: denies  Outpatient Psychiatrist?: denies      SUICIDE/HOMICIDE RISK ASSESSMENT  Current/active suicidal ideation/plan/intent: denies  Previous suicide attempts: denies  Current/active homicidal ideation/plan/intent: denies      HISTORY OF TRAUMA, ABUSE & VIOLENCE  Trauma: denies  Physical Abuse: denies  Sexual Abuse: denies  Violent Conduct: not assessed    Access to Gun: denies       FAMILY PSYCHIATRIC HISTORY   denies       SOCIAL HISTORY  Recently laid off from job at plant, currently  looking for work  Lives with parented  No significant other  No children      PAST MEDICAL HISTORY   denies      PSYCHOSOCIAL FACTORS  Stressors (Psychosocial and Environmental): financial, occupational, and drug and alcohol.       PSYCHIATRIC ROS  Denies SI/HI/AVH    MEDICAL ROS    Complete review of systems performed covering Constitutional, Eyes, ENT/Mouth, Cardiovascular, Respiratory, Gastrointestinal, Genitourinary, Musculoskeletal, Skin, Neurologic, Endocrine, Heme/Lymph, and Allergy/Immune.     Complete review of systems was negative with the exception of the following positive symptoms: headache      ALLERGIES  Patient has no known allergies.      MEDICATIONS    Psychotropics:  none    Infusions:  none      Scheduled:   albuterol-ipratropium  3 mL Nebulization Q6H WAKE    azithromycin  500 mg Oral Daily    cefTRIAXone (ROCEPHIN) IVPB  1 g Intravenous Q24H    enoxaparin  40 mg Subcutaneous Daily    fluticasone furoate-vilanteroL  1 puff Inhalation Daily    montelukast  10 mg Oral QHS    potassium, sodium phosphates  2 packet Oral TID WM    predniSONE  40 mg Oral Daily       PRN:  acetaminophen, acetaminophen, albuterol sulfate, albuterol sulfate, dextrose 10%, dextrose 10%, dicyclomine, glucagon (human recombinant), glucose, glucose, hydrOXYzine HCL, insulin aspart U-100, melatonin, naloxone, ondansetron, promethazine, sodium chloride 0.9%, sodium chloride 0.9%    Home Medications:  Prior to Admission medications    Medication Sig Start Date End Date Taking? Authorizing Provider   albuterol (PROVENTIL/VENTOLIN HFA) 90 mcg/actuation inhaler Inhale 2 puffs into the lungs every 6 (six) hours as needed (for wheezing). 11/8/21  Yes Guru Robbins MD   albuterol-ipratropium (DUO-NEB) 2.5 mg-0.5 mg/3 mL nebulizer solution Take 3 mLs by nebulization every 4 (four) hours as needed for Wheezing. Rescue 11/8/21 11/8/22 Yes Guru Robbins MD   nicotine (NICODERM CQ) 14 mg/24 hr Place 1 patch onto the skin  "once daily. 12/12/16  Yes Margy Patterson MD   fluticasone-salmeterol 230-21 mcg/dose (ADVAIR HFA) 230-21 mcg/actuation HFAA inhaler Inhale 2 puffs into the lungs 2 (two) times daily. 11/8/21 11/8/22  Guru Robbins MD   montelukast (SINGULAIR) 10 mg tablet Take 1 tablet (10 mg total) by mouth every evening. 7/19/15   Robert Vanegas MD   albuterol, refill, 90 mcg/actuation Aero Inhale into the lungs.  11/8/21  Historical Provider         OBJECTIVE:     EXAMINATION    Vitals:    02/19/22 1009 02/19/22 1023 02/19/22 1124 02/19/22 1210   BP: 129/64  (!) 106/53    BP Location: Right arm      Patient Position: Lying  Lying    Pulse: 75  85 92   Resp: 20  18    Temp: 98.9 °F (37.2 °C)  98.2 °F (36.8 °C)    TempSrc: Oral  Oral    SpO2: (!) 92%  (!) 93%    Weight:  64 kg (141 lb)     Height:  5' 4" (1.626 m)         PAIN   0/10    CONSTITUTIONAL  General Appearance and Manner: awake, alert, oriented to person, place, time situation    MUSCULOSKELETAL  Abnormal Involuntary Movements: none  Muscle Strength and Tone:  normal  Gait and Station: not assessed    PSYCHIATRIC   Orientation: awake, alert oriented to person, place, time, situation  Speech: linear, organized  Language: fluet  Mood: "ok"  Affect: mood congruent  Thought Process: linear, organized, goal directed  Associations: intact in conversation  Thought Content: denies si/hi/avh  Memory: intact in conversation  Attention and Concentration: intact in conversation  Fund of Knowledge: appropriate  Insight: fair  Judgment: fair      ASSESSMENT:     DIAGNOSES & PROBLEMS    Heroin use disorder, moderate-severe      STRENGTHS AND LIABILITIES   Strength: Patient accepts guidance/feedback, Strength: Patient is motivated for change.      MOTIVATION TO PURSUE RECOVERY: moderate    ACCEPTANCE OF ADDICTION: fair    ABILITY TO ADHERE TO TREATMENT PLAN: moderate            LABS/IMAGING/STUDIES   Recent Results (from the past 24 hour(s))   CBC auto differential    " Collection Time: 02/19/22 12:33 AM   Result Value Ref Range    WBC 22.39 (H) 3.90 - 12.70 K/uL    RBC 5.27 4.60 - 6.20 M/uL    Hemoglobin 15.0 14.0 - 18.0 g/dL    Hematocrit 49.2 40.0 - 54.0 %    MCV 93 82 - 98 fL    MCH 28.5 27.0 - 31.0 pg    MCHC 30.5 (L) 32.0 - 36.0 g/dL    RDW 11.9 11.5 - 14.5 %    Platelets 265 150 - 450 K/uL    MPV 10.4 9.2 - 12.9 fL    Immature Granulocytes 1.3 (H) 0.0 - 0.5 %    Gran # (ANC) 19.7 (H) 1.8 - 7.7 K/uL    Immature Grans (Abs) 0.30 (H) 0.00 - 0.04 K/uL    Lymph # 1.3 1.0 - 4.8 K/uL    Mono # 0.6 0.3 - 1.0 K/uL    Eos # 0.5 0.0 - 0.5 K/uL    Baso # 0.09 0.00 - 0.20 K/uL    nRBC 0 0 /100 WBC    Gran % 88.1 (H) 38.0 - 73.0 %    Lymph % 5.7 (L) 18.0 - 48.0 %    Mono % 2.5 (L) 4.0 - 15.0 %    Eosinophil % 2.0 0.0 - 8.0 %    Basophil % 0.4 0.0 - 1.9 %    Differential Method Automated    Comprehensive metabolic panel    Collection Time: 02/19/22 12:33 AM   Result Value Ref Range    Sodium 136 136 - 145 mmol/L    Potassium 5.0 3.5 - 5.1 mmol/L    Chloride 99 95 - 110 mmol/L    CO2 20 (L) 23 - 29 mmol/L    Glucose 379 (H) 70 - 110 mg/dL    BUN 12 6 - 20 mg/dL    Creatinine 1.7 (H) 0.5 - 1.4 mg/dL    Calcium 8.8 8.7 - 10.5 mg/dL    Total Protein 6.9 6.0 - 8.4 g/dL    Albumin 3.8 3.5 - 5.2 g/dL    Total Bilirubin 0.4 0.1 - 1.0 mg/dL    Alkaline Phosphatase 77 55 - 135 U/L    AST 29 10 - 40 U/L    ALT 28 10 - 44 U/L    Anion Gap 17 (H) 8 - 16 mmol/L    eGFR if African American >60.0 >60 mL/min/1.73 m^2    eGFR if non  54.8 (A) >60 mL/min/1.73 m^2   Brain natriuretic peptide    Collection Time: 02/19/22 12:33 AM   Result Value Ref Range    BNP <10 0 - 99 pg/mL   Troponin I    Collection Time: 02/19/22 12:33 AM   Result Value Ref Range    Troponin I 0.024 0.000 - 0.026 ng/mL   Magnesium    Collection Time: 02/19/22 12:33 AM   Result Value Ref Range    Magnesium 2.9 (H) 1.6 - 2.6 mg/dL   ISTAT PROCEDURE    Collection Time: 02/19/22 12:38 AM   Result Value Ref Range    POC PH  7.239 (LL) 7.35 - 7.45    POC PCO2 61.1 (HH) 35 - 45 mmHg    POC PO2 81 80 - 100 mmHg    POC HCO3 26.2 24 - 28 mmol/L    POC BE -1 -2 to 2 mmol/L    POC SATURATED O2 93 (L) 95 - 100 %    POC Sodium 132 (L) 136 - 145 mmol/L    POC Potassium 4.5 3.5 - 5.1 mmol/L    POC TCO2 28 (H) 23 - 27 mmol/L    POC Ionized Calcium 1.16 1.06 - 1.42 mmol/L    POC Hematocrit 45 36 - 54 %PCV    Rate 24     Sample ARTERIAL     Site LR     Allens Test Pass     DelSys CPAP/BiPAP     Mode BiPAP     FiO2 30     IP 10     EP 5    ISTAT PROCEDURE    Collection Time: 02/19/22  1:28 AM   Result Value Ref Range    POC PH 7.322 (L) 7.35 - 7.45    POC PCO2 53.6 (H) 35 - 45 mmHg    POC PO2 71 (L) 80 - 100 mmHg    POC HCO3 27.7 24 - 28 mmol/L    POC BE 2 -2 to 2 mmol/L    POC SATURATED O2 92 (L) 95 - 100 %    POC Sodium 136 136 - 145 mmol/L    POC Potassium 4.3 3.5 - 5.1 mmol/L    POC TCO2 29 (H) 23 - 27 mmol/L    POC Ionized Calcium 1.19 1.06 - 1.42 mmol/L    POC Hematocrit 46 36 - 54 %PCV    Rate 24     Sample ARTERIAL     Site LR     Allens Test Pass     DelSys CPAP/BiPAP     Mode BiPAP     FiO2 40     IP 15     EP 8    POCT COVID-19 Rapid Screening    Collection Time: 02/19/22  2:27 AM   Result Value Ref Range    POC Rapid COVID Negative Negative     Acceptable Yes    Basic Metabolic Panel (BMP)    Collection Time: 02/19/22  3:05 AM   Result Value Ref Range    Sodium 139 136 - 145 mmol/L    Potassium 4.2 3.5 - 5.1 mmol/L    Chloride 102 95 - 110 mmol/L    CO2 25 23 - 29 mmol/L    Glucose 128 (H) 70 - 110 mg/dL    BUN 12 6 - 20 mg/dL    Creatinine 1.3 0.5 - 1.4 mg/dL    Calcium 9.0 8.7 - 10.5 mg/dL    Anion Gap 12 8 - 16 mmol/L    eGFR if African American >60.0 >60 mL/min/1.73 m^2    eGFR if non African American >60.0 >60 mL/min/1.73 m^2   Magnesium    Collection Time: 02/19/22  3:05 AM   Result Value Ref Range    Magnesium 2.3 1.6 - 2.6 mg/dL   Phosphorus    Collection Time: 02/19/22  3:05 AM   Result Value Ref Range     Phosphorus 2.5 (L) 2.7 - 4.5 mg/dL   Procalcitonin    Collection Time: 02/19/22  3:05 AM   Result Value Ref Range    Procalcitonin 1.38 (H) <0.25 ng/mL   Hemoglobin A1c    Collection Time: 02/19/22  3:05 AM   Result Value Ref Range    Hemoglobin A1C 5.2 4.0 - 5.6 %    Estimated Avg Glucose 103 68 - 131 mg/dL   Toxicology screen, urine    Collection Time: 02/19/22  9:04 AM   Result Value Ref Range    Alcohol, Urine <10 <10 mg/dL    Benzodiazepines Negative Negative    Methadone metabolites Negative Negative    Cocaine (Metab.) Negative Negative    Opiate Scrn, Ur Negative Negative    Barbiturate Screen, Ur Negative Negative    Amphetamine Screen, Ur Negative Negative    THC Negative Negative    Phencyclidine Negative Negative    Creatinine, Urine 148.0 23.0 - 375.0 mg/dL    Toxicology Information SEE COMMENT    Lactic acid, plasma    Collection Time: 02/19/22  9:10 AM   Result Value Ref Range    Lactate (Lactic Acid) 2.1 0.5 - 2.2 mmol/L   Beta - Hydroxybutyrate, Serum    Collection Time: 02/19/22  9:10 AM   Result Value Ref Range    Beta-Hydroxybutyrate 0.1 0.0 - 0.5 mmol/L   Urinalysis, Reflex to Urine Culture Urine, Clean Catch    Collection Time: 02/19/22  9:14 AM    Specimen: Urine   Result Value Ref Range    Specimen UA Urine, Clean Catch     Color, UA Yellow Yellow, Straw, Ariane    Appearance, UA Clear Clear    pH, UA 7.0 5.0 - 8.0    Specific Gravity, UA 1.020 1.005 - 1.030    Protein, UA 1+ (A) Negative    Glucose, UA 3+ (A) Negative    Ketones, UA Trace (A) Negative    Bilirubin (UA) Negative Negative    Occult Blood UA Negative Negative    Nitrite, UA Negative Negative    Leukocytes, UA Negative Negative   Urinalysis Microscopic    Collection Time: 02/19/22  9:14 AM   Result Value Ref Range    RBC, UA 0 0 - 4 /hpf    WBC, UA 2 0 - 5 /hpf    Bacteria Rare None-Occ /hpf    Yeast, UA None None    Squam Epithel, UA 0 /hpf    Hyaline Casts, UA 5 (A) 0-1/lpf /lpf    Microscopic Comment SEE COMMENT    POCT  glucose    Collection Time: 02/19/22  9:17 AM   Result Value Ref Range    POCT Glucose 121 (H) 70 - 110 mg/dL   POCT glucose    Collection Time: 02/19/22 11:25 AM   Result Value Ref Range    POCT Glucose 98 70 - 110 mg/dL      Imaging Results              X-Ray Chest AP Portable (Final result)  Result time 02/19/22 00:58:02      Final result by Robert Khan MD (02/19/22 00:58:02)                   Impression:      No obvious evidence of an acute pulmonary process.      Electronically signed by: Robert Khan  Date:    02/19/2022  Time:    00:58               Narrative:    EXAMINATION:  XR CHEST AP PORTABLE    CLINICAL HISTORY:  CHF;    TECHNIQUE:  Single frontal view of the chest was performed.    COMPARISON:  November 1, 2021    FINDINGS:  Single view of the chest reveals the lungs are well aerated.  No indication of a consolidative process or pleural effusion.  No pulmonary nodule.  The heart is normal in size and contour.  No evidence of free air under the diaphragm.                                          Family History    None       Tobacco Use    Smoking status: Current Some Day Smoker     Types: Vaping with nicotine    Smokeless tobacco: Not on file   Substance and Sexual Activity    Alcohol use: No    Drug use: Yes     Types: Marijuana, Heroin    Sexual activity: Not on file     Psychotherapeutics (From admission, onward)                None             Review of Systems   Constitutional:  Negative for activity change, chills and fever.   HENT:  Negative for congestion, sneezing and sore throat.    Eyes:  Negative for visual disturbance.   Respiratory:  Negative for chest tightness, shortness of breath and wheezing.    Cardiovascular:  Negative for chest pain.   Gastrointestinal:  Negative for abdominal pain.   Genitourinary:  Negative for dysuria.   Musculoskeletal:  Negative for neck pain.   Skin:  Negative for color change, pallor and rash.   Neurological:  Positive for headaches. Negative  "for dizziness, tremors, facial asymmetry and light-headedness.   Psychiatric/Behavioral:  Negative for agitation, behavioral problems, confusion, decreased concentration, hallucinations and suicidal ideas. The patient is not hyperactive.    Objective:     Vital Signs (Most Recent):  Temp: 98.2 °F (36.8 °C) (02/19/22 1124)  Pulse: 92 (02/19/22 1210)  Resp: 18 (02/19/22 1124)  BP: (!) 106/53 (02/19/22 1124)  SpO2: (!) 93 % (02/19/22 1124)   Vital Signs (24h Range):  Temp:  [97.5 °F (36.4 °C)-98.9 °F (37.2 °C)] 98.2 °F (36.8 °C)  Pulse:  [] 92  Resp:  [15-42] 18  SpO2:  [88 %-100 %] 93 %  BP: (106-145)/(53-90) 106/53     Height: 5' 4" (162.6 cm)  Weight: 64 kg (141 lb)  Body mass index is 24.2 kg/m².      Intake/Output Summary (Last 24 hours) at 2/19/2022 1258  Last data filed at 2/19/2022 0556  Gross per 24 hour   Intake 50 ml   Output --   Net 50 ml       Physical Exam  Constitutional:       Appearance: Normal appearance.   HENT:      Head: Normocephalic and atraumatic.      Nose: Nose normal.   Eyes:      Extraocular Movements: Extraocular movements intact.      Conjunctiva/sclera: Conjunctivae normal.   Pulmonary:      Effort: Pulmonary effort is normal.   Musculoskeletal:      Cervical back: Normal range of motion.   Skin:     General: Skin is warm and dry.   Neurological:      Mental Status: He is alert and oriented to person, place, and time.     NEUROLOGICAL EXAMINATION:     MENTAL STATUS   Oriented to person, place, and time.   Significant Labs: Last 24 Hours:   Recent Lab Results         02/19/22  1125   02/19/22  0917   02/19/22  0914   02/19/22  0910   02/19/22  0904        Alcohol, Urine         <10       Benzodiazepines         Negative       Methadone metabolites         Negative       Phencyclidine         Negative       Procalcitonin               Albumin               Alkaline Phosphatase               Allens Test               ALT               Amphetamine Screen, Ur         Negative       " Anion Gap               Appearance, UA     Clear           AST               Bacteria, UA     Rare           Barbiturate Screen, Ur         Negative       Baso #               Basophil %               Beta-Hydroxybutyrate       0.1         Bilirubin (UA)     Negative           BILIRUBIN TOTAL               BNP               Site               BUN               Calcium               Chloride               CO2               Cocaine (Metab.)         Negative       Color, UA     Yellow           Creatinine               Creatinine, Urine         148.0       DelSys               Differential Method               eGFR if                eGFR if non                Eos #               Eosinophil %               EP               Estimated Avg Glucose               FiO2               Glucose               Glucose, UA     3+           Gran # (ANC)               Gran %               Hematocrit               Hemoglobin               Hemoglobin A1C External               Hyaline Casts, UA     5           Immature Grans (Abs)               Immature Granulocytes               IP               Ketones, UA     Trace           Lactate, Sulaiman       2.1  Comment: Falsely low lactic acid results can be found in samples   containing >=13.0 mg/dL total bilirubin and/or >=3.5 mg/dL   direct bilirubin.           Leukocytes, UA     Negative           Lymph #               Lymph %               Magnesium               MCH               MCHC               MCV               Microscopic Comment     SEE COMMENT  Comment: Other formed elements not mentioned in the report are not   present in the microscopic examination.              Mode               Mono #               Mono %               MPV               NITRITE UA     Negative           nRBC               Occult Blood UA     Negative           Opiate Scrn, Ur         Negative       pH, UA     7.0           Phosphorus               Platelets               POC BE                POC HCO3               POC Hematocrit               POC Ionized Calcium               POC PCO2               POC PH               POC PO2               POC Potassium               POC SATURATED O2               POC Sodium               POC TCO2               POCT Glucose 98   121             Potassium               PROTEIN TOTAL               Protein, UA     1+  Comment: Recommend a 24 hour urine protein or a urine   protein/creatinine ratio if globulin induced proteinuria is  clinically suspected.              Acceptable               Rate               RBC               RBC, UA     0           RDW               Sample               SARS-CoV-2 RNA, Amplification, Qual               Sodium               Specific Gravity, UA     1.020           Specimen UA     Urine, Clean Catch           Squam Epithel, UA     0           Marijuana (THC) Metabolite         Negative       Toxicology Information         SEE COMMENT  Comment: This screen includes the following classes of drugs at the listed   cut-off:    Benzodiazepines 200 ng/ml  Methadone 300 ng/ml  Cocaine metabolite 300 ng/ml  Opiates 300 ng/ml  Barbiturates 200 ng/ml  Amphetamines 1000 ng/ml  Marijuana metabs (THC) 50 ng/ml  Phencyclidine (PCP) 25 ng/ml    This is a screening test. If results do not correlate with clinical   presentation, then a confirmatory send out test is advised.     This report is intended for use in clinical monitoring and management   of   patients. It is not intended for use in employment related drug   testing.         Troponin I               WBC, UA     2           WBC               Yeast, UA     None                              02/19/22  0305   02/19/22  0227   02/19/22  0128   02/19/22  0038   02/19/22  0033        Alcohol, Urine               Benzodiazepines               Methadone metabolites               Phencyclidine               Procalcitonin 1.38  Comment: A concentration < 0.25 ng/mL represents a  low risk of bacterial   infection.  Procalcitonin may not be accurate among patients with localized   infection, recent trauma or major surgery, immunosuppressed state,   invasive fungal infection, renal dysfunction. Decisions regarding   initiation or continuation of antibiotic therapy should not be based   solely on procalcitonin levels.                 Albumin         3.8       Alkaline Phosphatase         77       Allens Test     Pass   Pass         ALT         28       Amphetamine Screen, Ur               Anion Gap 12         17       Appearance, UA               AST         29       Bacteria, UA               Barbiturate Screen, Ur               Baso #         0.09       Basophil %         0.4       Beta-Hydroxybutyrate               Bilirubin (UA)               BILIRUBIN TOTAL         0.4  Comment: For infants and newborns, interpretation of results should be based  on gestational age, weight and in agreement with clinical  observations.    Premature Infant recommended reference ranges:  Up to 24 hours.............<8.0 mg/dL  Up to 48 hours............<12.0 mg/dL  3-5 days..................<15.0 mg/dL  6-29 days.................<15.0 mg/dL         BNP         <10  Comment: Values of less than 100 pg/ml are consistent with non-CHF populations.       Site     LR   LR         BUN 12         12       Calcium 9.0         8.8       Chloride 102         99       CO2 25         20       Cocaine (Metab.)               Color, UA               Creatinine 1.3         1.7       Creatinine, Urine               DelSys     CPAP/BiPAP   CPAP/BiPAP         Differential Method         Automated       eGFR if  >60.0         >60.0       eGFR if non  >60.0  Comment: Calculation used to obtain the estimated glomerular filtration  rate (eGFR) is the CKD-EPI equation.            54.8  Comment: Calculation used to obtain the estimated glomerular filtration  rate (eGFR) is the CKD-EPI equation.           Eos #         0.5       Eosinophil %         2.0       EP     8   5         Estimated Avg Glucose 103               FiO2     40   30         Glucose 128         379       Glucose, UA               Gran # (ANC)         19.7       Gran %         88.1       Hematocrit         49.2       Hemoglobin         15.0       Hemoglobin A1C External 5.2  Comment: ADA Screening Guidelines:  5.7-6.4%  Consistent with prediabetes  >or=6.5%  Consistent with diabetes    High levels of fetal hemoglobin interfere with the HbA1C  assay. Heterozygous hemoglobin variants (HbS, HgC, etc)do  not significantly interfere with this assay.   However, presence of multiple variants may affect accuracy.                 Hyaline Casts, UA               Immature Grans (Abs)         0.30  Comment: Mild elevation in immature granulocytes is non specific and   can be seen in a variety of conditions including stress response,   acute inflammation, trauma and pregnancy. Correlation with other   laboratory and clinical findings is essential.         Immature Granulocytes         1.3       IP     15   10         Ketones, UA               Lactate, [place]               Leukocytes, UA               Lymph #         1.3       Lymph %         5.7       Magnesium 2.3         2.9       MCH         28.5       MCHC         30.5       MCV         93       Microscopic Comment               Mode     BiPAP   BiPAP         Mono #         0.6       Mono %         2.5       MPV         10.4       NITRITE UA               nRBC         0       Occult Blood UA               Opiate Scrn, Ur               pH, UA               Phosphorus 2.5               Platelets         265       POC BE     2   -1         POC HCO3     27.7   26.2         POC Hematocrit     46   45         POC Ionized Calcium     1.19   1.16         POC PCO2     53.6   61.1         POC PH     7.322   7.239         POC PO2     71   81         POC Potassium     4.3   4.5         POC SATURATED O2     92   93          POC Sodium     136   132         POC TCO2     29   28         POCT Glucose               Potassium 4.2         5.0       PROTEIN TOTAL         6.9       Protein, UA                Acceptable   Yes             Rate     24   24         RBC         5.27       RBC, UA               RDW         11.9       Sample     ARTERIAL   ARTERIAL         SARS-CoV-2 RNA, Amplification, Qual   Negative             Sodium 139         136       Specific [place], UA               Specimen UA               Squam Epithel, UA               Marijuana (THC) Metabolite               Toxicology Information               Troponin I         0.024  Comment: The reference interval for Troponin I represents the 99th percentile   cutoff   for our facility and is consistent with 3rd generation assay   performance.         WBC, UA               WBC         22.39       Yeast, UA                                      Significant Imaging: I have reviewed all pertinent imaging results/findings within the past 24 hours.       Scheduled Medications:   albuterol-ipratropium  3 mL Nebulization Q6H WAKE    azithromycin  500 mg Oral Daily    cefTRIAXone (ROCEPHIN) IVPB  1 g Intravenous Q24H    enoxaparin  40 mg Subcutaneous Daily    fluticasone furoate-vilanteroL  1 puff Inhalation Daily    montelukast  10 mg Oral QHS    potassium, sodium phosphates  2 packet Oral TID WM    predniSONE  40 mg Oral Daily       PRN Medications:  acetaminophen, acetaminophen, albuterol sulfate, albuterol sulfate, dextrose 10%, dextrose 10%, dicyclomine, glucagon (human recombinant), glucose, glucose, hydrOXYzine HCL, insulin aspart U-100, melatonin, naloxone, ondansetron, promethazine, sodium chloride 0.9%, sodium chloride 0.9%    Review of patient's allergies indicates:  No Known Allergies    Assessment/Plan:     Accidental overdose of heroin  PLAN:       MANAGEMENT PLAN, TREATMENT GOALS, THERAPEUTIC TECHNIQUES/APPROACHES & CLINICAL REASONING    PRN  medications for opiate withdrawal symptoms:  -tylenol for headache/fever  -vistaril 25-50 mg for anxiety/sleep  -bentyl 20 mg q6hr for stomach cramps  -flexeril 10 mg q6 for muscle spasms  -Imodium 2 mg q6 hrs for diarrhea   -zofran 4 mg for nausea    Patient states that he plans to return to prior clinic: [facility]; additional resources placed under discharge instructions.    Patient counseled on abstinence from alcohol and substances of abuse (illicit and prescription).  Additional workup planned to address substance use disorder, in order to guide and refine ongoing management options, includes serial alcohol and drug laboratory testing (e.g. PETH, urine toxicology).  Relapse prevention and motivational interviewing provided.  Education provided on 12 step recovery programs.      PRESCRIPTION DRUG MANAGEMENT  - The risks and benefits of medication were discussed with this patient.  - Possible expectable adverse effects of any current or proposed individual psychotropic agents were discussed with this patient.  - Counseling was provided on the importance of full compliance with medication regimens.      In cases of emergency, daily coverage provided by Acute/ER Psych MD, NP, or SW, with contact numbers located in [facility] On Call Schedule    Case discussed with staff addiction psychiatrist: Dr. Steve MD         Need for Continued Hospitalization:  No need for inpatient psychiatric hospitalization. Continue medical care as per the primary team.    Anticipated Disposition:  Per primary team    Total time:  60 with greater than 50% of this time spent in counseling and/or coordination of care.       Bee Gr MD   Psychiatry  [facility] - Telemetry Stepdown

## 2022-02-19 NOTE — ED PROVIDER NOTES
Encounter Date: 2/19/2022       History     Chief Complaint   Patient presents with    Drug Overdose     26-year-old male with history of substance abuse, asthma requiring intubation (Nov. 2021) presents to the emergency department after being found down, apneic just prior to arrival.  He was at home with family who were aware of pt opiate abuse history, but thought he was sober. EMS gave 4 mg narcan and he began breathing again. They also treated for asthma with Mg, solumedrol and duonebs. He was started on CPAP. Pt unable to provide any history at this time.     The history is provided by the EMS personnel. The history is limited by the condition of the patient. No  was used.     Review of patient's allergies indicates:  No Known Allergies  Past Medical History:   Diagnosis Date    Asthma     Substance use disorder 10/27/2021     No past surgical history on file.  No family history on file.  Social History     Tobacco Use    Smoking status: Current Some Day Smoker     Types: Vaping with nicotine   Substance Use Topics    Alcohol use: No    Drug use: Yes     Types: Marijuana     Review of Systems   Unable to perform ROS: Acuity of condition       Physical Exam     Initial Vitals   BP Pulse Resp Temp SpO2   02/19/22 0024 02/19/22 0024 02/19/22 0024 02/19/22 0230 02/19/22 0024   (!) 145/90 (!) 130 (!) 42 97.5 °F (36.4 °C) (!) 94 %      MAP       --                Physical Exam    Nursing note and vitals reviewed.  Constitutional: He appears well-developed and well-nourished. He is not diaphoretic. No distress.   HENT:   Head: Normocephalic and atraumatic.   Eyes: Conjunctivae and EOM are normal.   Pupils dilated and reactive   Neck: Neck supple.   C-spine nontender.    Normal range of motion.  Cardiovascular: Regular rhythm, normal heart sounds and intact distal pulses.   Tachycardic   Pulmonary/Chest: No respiratory distress. He has wheezes.   In respiratory distress, diffuse retractions,  tachypnea, on BiPAP.  Diffuse wheezing in all lung fields.   Abdominal: Abdomen is soft. Bowel sounds are normal. He exhibits no distension. There is no abdominal tenderness. There is no rebound and no guarding.   Musculoskeletal:         General: No tenderness or edema. Normal range of motion.      Cervical back: Normal range of motion and neck supple.     Neurological: He is alert. He has normal strength.   Skin: Skin is warm and dry.   No track marks on arms.    Psychiatric: He has a normal mood and affect. Thought content normal.         ED Course   Procedures  Labs Reviewed   CBC W/ AUTO DIFFERENTIAL - Abnormal; Notable for the following components:       Result Value    WBC 22.39 (*)     MCHC 30.5 (*)     Immature Granulocytes 1.3 (*)     Gran # (ANC) 19.7 (*)     Immature Grans (Abs) 0.30 (*)     Gran % 88.1 (*)     Lymph % 5.7 (*)     Mono % 2.5 (*)     All other components within normal limits   COMPREHENSIVE METABOLIC PANEL - Abnormal; Notable for the following components:    CO2 20 (*)     Glucose 379 (*)     Creatinine 1.7 (*)     Anion Gap 17 (*)     eGFR if non  54.8 (*)     All other components within normal limits   MAGNESIUM - Abnormal; Notable for the following components:    Magnesium 2.9 (*)     All other components within normal limits   ISTAT PROCEDURE - Abnormal; Notable for the following components:    POC PH 7.239 (*)     POC PCO2 61.1 (*)     POC SATURATED O2 93 (*)     POC Sodium 132 (*)     POC TCO2 28 (*)     All other components within normal limits   ISTAT PROCEDURE - Abnormal; Notable for the following components:    POC PH 7.322 (*)     POC PCO2 53.6 (*)     POC PO2 71 (*)     POC SATURATED O2 92 (*)     POC TCO2 29 (*)     All other components within normal limits   CULTURE, BLOOD   CULTURE, BLOOD   CULTURE, RESPIRATORY   B-TYPE NATRIURETIC PEPTIDE   TROPONIN I   TOXICOLOGY SCREEN, URINE, RANDOM (COMPLIANCE)   BASIC METABOLIC PANEL   MAGNESIUM   PHOSPHORUS    PROCALCITONIN   HEMOGLOBIN A1C   SARS-COV-2 RDRP GENE    Narrative:     This test utilizes isothermal nucleic acid amplification   technology to detect the SARS-CoV-2 RdRp nucleic acid segment.   The analytical sensitivity (limit of detection) is 125 genome   equivalents/mL.   A POSITIVE result implies infection with the SARS-CoV-2 virus;   the patient is presumed to be contagious.     A NEGATIVE result means that SARS-CoV-2 nucleic acids are not   present above the limit of detection. A NEGATIVE result should be   treated as presumptive. It does not rule out the possibility of   COVID-19 and should not be the sole basis for treatment decisions.   If COVID-19 is strongly suspected based on clinical and exposure   history, re-testing using an alternate molecular assay should be   considered.   This test is only for use under the Food and Drug   Administration s Emergency Use Authorization (EUA).   Commercial kits are provided by [employer].   Performance characteristics of the EUA have been independently   verified by [facility] Department of   Pathology and Laboratory Medicine.   _________________________________________________________________   The authorized Fact Sheet for Healthcare Providers and the authorized Fact   Sheet for Patients of the ID NOW COVID-19 are available on the FDA   website:     https://www.fda.gov/media/526231/download  https://www.fda.gov/media/691996/download             EKG Readings: (Independently Interpreted)   Initial Reading: No STEMI. Previous EKG: Compared with most recent EKG Previous EKG Date: Oct. 26 2021. Rhythm: Sinus Tachycardia. Heart Rate: 132. ST Segments: Normal ST Segments. T Waves: Normal. Axis: Right Axis Deviation.       Imaging Results          X-Ray Chest AP Portable (Final result)  Result time 02/19/22 00:58:02    Final result by Robert Khan MD (02/19/22 00:58:02)                 Impression:      No obvious evidence of an acute pulmonary  process.      Electronically signed by: Robert Khan  Date:    02/19/2022  Time:    00:58             Narrative:    EXAMINATION:  XR CHEST AP PORTABLE    CLINICAL HISTORY:  CHF;    TECHNIQUE:  Single frontal view of the chest was performed.    COMPARISON:  November 1, 2021    FINDINGS:  Single view of the chest reveals the lungs are well aerated.  No indication of a consolidative process or pleural effusion.  No pulmonary nodule.  The heart is normal in size and contour.  No evidence of free air under the diaphragm.                                 Medications   albuterol sulfate nebulizer solution 5 mg (5 mg Nebulization Given 2/19/22 0058)   sodium chloride 0.9% flush 10 mL (has no administration in time range)   naloxone 0.4 mg/mL injection 0.02 mg (has no administration in time range)   glucose chewable tablet 16 g (has no administration in time range)   glucose chewable tablet 24 g (has no administration in time range)   glucagon (human recombinant) injection 1 mg (has no administration in time range)   enoxaparin injection 40 mg (has no administration in time range)   acetaminophen tablet 650 mg (has no administration in time range)   acetaminophen tablet 1,000 mg (has no administration in time range)   promethazine tablet 25 mg (has no administration in time range)   ondansetron injection 4 mg (has no administration in time range)   insulin aspart U-100 pen 0-5 Units (has no administration in time range)   melatonin tablet 6 mg (has no administration in time range)   dextrose 10% bolus 125 mL (has no administration in time range)   dextrose 10% bolus 250 mL (has no administration in time range)     Medical Decision Making:   History:   Old Medical Records: I decided to obtain old medical records.  Old Records Summarized: records from previous admission(s).       <> Summary of Records: Admission to ICU in Nov. 2021 for status asthmaticus  Initial Assessment:   26-year-old male presents to the emergency  department status post presumed opiate overdose.  Responded after 4 mg Narcan.  On BiPAP in respiratory distress on arrival.  Given magnesium, Solu-Medrol and breathing treatments in the field.  Differential Diagnosis:   Drug overdose, ACS, head injury, asthma  Clinical Tests:   Lab Tests: Ordered and Reviewed  Radiological Study: Ordered and Reviewed  Medical Tests: Ordered and Reviewed  ED Management:  EKG without acute ischemic changes.  Continued albuterol treatments with improvement of air movement. Lab significant for leukocytosis suspect 2/2 stress response.  Initially patient was hypercarbic and acidotic.  This improved after some time on BIPAP. Weaned off BiPAP and on to 3 L nasal cannula with oxygen saturation in the mid 90s.  Evaluated by Critical Care and deemed appropriate for the floor.  He will be admitted for continued monitoring and treatment.             ED Course as of 02/19/22 0326   Sat Feb 19, 2022   0147 Pt evaluated by critical care. Weaned off bipap. They felt he was appropriate for the floor.  [KL]   0223 Admitted to hospital medicine. Satting 94% on 3L nasal cannula.  [KL]      ED Course User Index  [KL] Padmaja Alejandro MD             Clinical Impression:   Final diagnoses:  [R06.02] Shortness of breath  [T50.901D] Accidental drug overdose, subsequent encounter (Primary)  [J45.52] Severe persistent asthma with status asthmaticus          ED Disposition Condition    Observation               Padmaja Alejandro MD  Resident  02/19/22 0327

## 2022-02-19 NOTE — HPI
2/19/2022 12:40 PM  Tucker Roberto  1995  [id number]      ADDICTION CONSULT INITIAL EVALUATION     DEPARTMENT:  Psychiatry  SITE: [facility], [address]    DATE OF ADMISSION: 2/19/2022 12:15 AM  LENGTH OF STAY: 0 days    EXAMINING PRACTITIONER: Bee Gr    CONSULT REQUESTED BY: Homa Quiles*      SUBJECTIVE     CHIEF COMPLAINT  Tucker Roberto is a 26 y.o. male who is seen today for an initial psychiatric evaluation by the addiction psychiatry consult service.  Tucker Roberto presents with the chief complaint of: accidental heroin overdose      HISTORY OF PRESENT ILLNESS    Per Primary MD:   Mr. Roberto is a 27 yo M with GITA (heroin, denies IVDU) and asthma that presents with acute encephalopathy + SOB.     Patient was brought in by EMS after being found down. Upon EMS arrival, patient was apneic so he received naloxone with positive response. He was actively wheezing when he started breathing again so he received Mg, solumedrol and duo-nebs before being brought to [facility] for further evaluation. Patient states that he smokes heroin. Denies IVDU. He last used yesterday evening and doesn't remember anything after that. He denies suicide ideation and that his presumed overdose was unintentional. He notes that his asthma has been acting up recently and he noticed wheezing yesterday. He also reports nausea/vomiting yesterday without abdo pain. He is unable to tell me his usual inhalers or frequency of use.       Per Addiction Psych MD:  Mr. Roberto is a 26 year old male with past psychiatric history of heroin use, presenting for accidental overdose. He states that he obtain heroin from a new person and had an accidental overdose. He denies suicidal ideation or attempts, current or past. Denies homicidal ideations, denies auditory or visual hallucinations, past of present. Denies prior psychiatric history. Denies feeling anxious or depressed. Denies change in sleep,  appetite, energy levels, guilt, or hopelessness. He states that he started using heroin again about one month ago, denies triggering events at that time. States that he was sober for a brief period of time after participating in court ordered rehab. After completion or program, he starting using heroin, via smoking 1-2 times per week, using approx. 0.5 grams per use.  He states that he lives at home with his parents and is close with his sister. He states that he has a prescription for suboxone, that he is currently not using and was previously being seen at the [facility] in [place]. He declines rehab placement at this time and states that he plans to reestablish care at [facility]. He denies current withdrawal symptoms.    SUBSTANCE ABUSE HISTORY  Substance(s) of Choice: heroin, smoking 1-2 times per week, 0.5 grams at a time  Substances Used: heroin  History of IVDU?: denies  Use of Alcohol: denies  Average Consumption: 1-2 x week  Last Drink: denies  Use of Medications for Alcohol/Opioid Use Disorder: suboxone in past  History of Complicated Withdrawal: denies  History of Detox: yes  Rehab History: yes- court ordered  AA/NA involvement: not currently  Tobacco: denies; vapes nicotine   Spouse/Partner Consumption: n/a  Patient Aware of Biomedical Complications: yes    DSM-5 SUBSTANCE USE DISORDER CRITERIA   Mild (1-3), Moderate (4-5), Severe (?6)  Often take in larger amounts or over a longer period of time than was intended.  Persistent desire or unsuccessful efforts to cut down or control use.  Great deal of time spent in activities necessary to obtain substance, use, or recover from effects.  Craving/strong desire for substance or urge to use.  Use resulting in failure to fulfill major role obligations at home, work or school.  Social, occupational, recreational activities decreased because of use.  Continued use despite having persistent or recurrent social or interpersonal problems cause or  exaserbated by the substance.  Recurrent use in situations in which it is physically hazardous.  Use despite physical or psychological problems that are likely to have been caused or exacerbated by the substance.  Tolerance, as defined by either of the following.   A. A need for markedly increased amounts of substance to achieve intoxication or desired effect. -OR-    B. A markedly diminished effect with continued use of the same amount of substance.  Withdrawal, as manifested by the following.   A. The characteristic withdrawal syndrome for substance. -AND-   B. Substance is taken to relieve or avoid withdrawal symptoms.    ARE THE CRITERIA MET FOR DSM-5 SUBSTANCE USE DISORDER: 5      PSYCHIATRIC HISTORY  Reported Diagnose(s): denies  Previous Medication Trials: suboxone  Previous Psychiatric Hospitalizations: denies  Outpatient Psychiatrist?: denies      SUICIDE/HOMICIDE RISK ASSESSMENT  Current/active suicidal ideation/plan/intent: denies  Previous suicide attempts: denies  Current/active homicidal ideation/plan/intent: denies      HISTORY OF TRAUMA, ABUSE & VIOLENCE  Trauma: denies  Physical Abuse: denies  Sexual Abuse: denies  Violent Conduct: not assessed    Access to Gun: denies       FAMILY PSYCHIATRIC HISTORY   denies       SOCIAL HISTORY  Recently laid off from job at plant, currently looking for work  Lives with parented  No significant other  No children      PAST MEDICAL HISTORY   denies      PSYCHOSOCIAL FACTORS  Stressors (Psychosocial and Environmental): financial, occupational, and drug and alcohol.       PSYCHIATRIC ROS  Denies SI/HI/AVH    MEDICAL ROS    Complete review of systems performed covering Constitutional, Eyes, ENT/Mouth, Cardiovascular, Respiratory, Gastrointestinal, Genitourinary, Musculoskeletal, Skin, Neurologic, Endocrine, Heme/Lymph, and Allergy/Immune.     Complete review of systems was negative with the exception of the following positive symptoms:  headache      ALLERGIES  Patient has no known allergies.      MEDICATIONS    Psychotropics:  none    Infusions:  none      Scheduled:   albuterol-ipratropium  3 mL Nebulization Q6H WAKE    azithromycin  500 mg Oral Daily    cefTRIAXone (ROCEPHIN) IVPB  1 g Intravenous Q24H    enoxaparin  40 mg Subcutaneous Daily    fluticasone furoate-vilanteroL  1 puff Inhalation Daily    montelukast  10 mg Oral QHS    potassium, sodium phosphates  2 packet Oral TID WM    predniSONE  40 mg Oral Daily       PRN:  acetaminophen, acetaminophen, albuterol sulfate, albuterol sulfate, dextrose 10%, dextrose 10%, dicyclomine, glucagon (human recombinant), glucose, glucose, hydrOXYzine HCL, insulin aspart U-100, melatonin, naloxone, ondansetron, promethazine, sodium chloride 0.9%, sodium chloride 0.9%    Home Medications:  Prior to Admission medications    Medication Sig Start Date End Date Taking? Authorizing Provider   albuterol (PROVENTIL/VENTOLIN HFA) 90 mcg/actuation inhaler Inhale 2 puffs into the lungs every 6 (six) hours as needed (for wheezing). 11/8/21  Yes Guru Robbins MD   albuterol-ipratropium (DUO-NEB) 2.5 mg-0.5 mg/3 mL nebulizer solution Take 3 mLs by nebulization every 4 (four) hours as needed for Wheezing. Rescue 11/8/21 11/8/22 Yes Guru Robbins MD   nicotine (NICODERM CQ) 14 mg/24 hr Place 1 patch onto the skin once daily. 12/12/16  Yes Margy Patterson MD   fluticasone-salmeterol 230-21 mcg/dose (ADVAIR HFA) 230-21 mcg/actuation HFAA inhaler Inhale 2 puffs into the lungs 2 (two) times daily. 11/8/21 11/8/22  Guru Robbins MD   montelukast (SINGULAIR) 10 mg tablet Take 1 tablet (10 mg total) by mouth every evening. 7/19/15   Robert Vanegas MD   albuterol, refill, 90 mcg/actuation Aero Inhale into the lungs.  11/8/21  Historical Provider         OBJECTIVE:     EXAMINATION    Vitals:    02/19/22 1009 02/19/22 1023 02/19/22 1124 02/19/22 1210   BP: 129/64  (!) 106/53    BP Location: Right arm     "  Patient Position: Lying  Lying    Pulse: 75  85 92   Resp: 20  18    Temp: 98.9 °F (37.2 °C)  98.2 °F (36.8 °C)    TempSrc: Oral  Oral    SpO2: (!) 92%  (!) 93%    Weight:  64 kg (141 lb)     Height:  5' 4" (1.626 m)         PAIN   0/10    CONSTITUTIONAL  General Appearance and Manner: awake, alert, oriented to person, place, time situation    MUSCULOSKELETAL  Abnormal Involuntary Movements: none  Muscle Strength and Tone:  normal  Gait and Station: not assessed    PSYCHIATRIC   Orientation: awake, alert oriented to person, place, time, situation  Speech: linear, organized  Language: fluet  Mood: "ok"  Affect: mood congruent  Thought Process: linear, organized, goal directed  Associations: intact in conversation  Thought Content: denies si/hi/avh  Memory: intact in conversation  Attention and Concentration: intact in conversation  Fund of Knowledge: appropriate  Insight: fair  Judgment: fair      ASSESSMENT:     DIAGNOSES & PROBLEMS    Heroin use disorder, moderate-severe      STRENGTHS AND LIABILITIES   Strength: Patient accepts guidance/feedback, Strength: Patient is motivated for change.      MOTIVATION TO PURSUE RECOVERY: moderate    ACCEPTANCE OF ADDICTION: fair    ABILITY TO ADHERE TO TREATMENT PLAN: moderate            LABS/IMAGING/STUDIES   Recent Results (from the past 24 hour(s))   CBC auto differential    Collection Time: 02/19/22 12:33 AM   Result Value Ref Range    WBC 22.39 (H) 3.90 - 12.70 K/uL    RBC 5.27 4.60 - 6.20 M/uL    Hemoglobin 15.0 14.0 - 18.0 g/dL    Hematocrit 49.2 40.0 - 54.0 %    MCV 93 82 - 98 fL    MCH 28.5 27.0 - 31.0 pg    MCHC 30.5 (L) 32.0 - 36.0 g/dL    RDW 11.9 11.5 - 14.5 %    Platelets 265 150 - 450 K/uL    MPV 10.4 9.2 - 12.9 fL    Immature Granulocytes 1.3 (H) 0.0 - 0.5 %    Gran # (ANC) 19.7 (H) 1.8 - 7.7 K/uL    Immature Grans (Abs) 0.30 (H) 0.00 - 0.04 K/uL    Lymph # 1.3 1.0 - 4.8 K/uL    Mono # 0.6 0.3 - 1.0 K/uL    Eos # 0.5 0.0 - 0.5 K/uL    Baso # 0.09 0.00 - 0.20 " K/uL    nRBC 0 0 /100 WBC    Gran % 88.1 (H) 38.0 - 73.0 %    Lymph % 5.7 (L) 18.0 - 48.0 %    Mono % 2.5 (L) 4.0 - 15.0 %    Eosinophil % 2.0 0.0 - 8.0 %    Basophil % 0.4 0.0 - 1.9 %    Differential Method Automated    Comprehensive metabolic panel    Collection Time: 02/19/22 12:33 AM   Result Value Ref Range    Sodium 136 136 - 145 mmol/L    Potassium 5.0 3.5 - 5.1 mmol/L    Chloride 99 95 - 110 mmol/L    CO2 20 (L) 23 - 29 mmol/L    Glucose 379 (H) 70 - 110 mg/dL    BUN 12 6 - 20 mg/dL    Creatinine 1.7 (H) 0.5 - 1.4 mg/dL    Calcium 8.8 8.7 - 10.5 mg/dL    Total Protein 6.9 6.0 - 8.4 g/dL    Albumin 3.8 3.5 - 5.2 g/dL    Total Bilirubin 0.4 0.1 - 1.0 mg/dL    Alkaline Phosphatase 77 55 - 135 U/L    AST 29 10 - 40 U/L    ALT 28 10 - 44 U/L    Anion Gap 17 (H) 8 - 16 mmol/L    eGFR if African American >60.0 >60 mL/min/1.73 m^2    eGFR if non  54.8 (A) >60 mL/min/1.73 m^2   Brain natriuretic peptide    Collection Time: 02/19/22 12:33 AM   Result Value Ref Range    BNP <10 0 - 99 pg/mL   Troponin I    Collection Time: 02/19/22 12:33 AM   Result Value Ref Range    Troponin I 0.024 0.000 - 0.026 ng/mL   Magnesium    Collection Time: 02/19/22 12:33 AM   Result Value Ref Range    Magnesium 2.9 (H) 1.6 - 2.6 mg/dL   ISTAT PROCEDURE    Collection Time: 02/19/22 12:38 AM   Result Value Ref Range    POC PH 7.239 (LL) 7.35 - 7.45    POC PCO2 61.1 (HH) 35 - 45 mmHg    POC PO2 81 80 - 100 mmHg    POC HCO3 26.2 24 - 28 mmol/L    POC BE -1 -2 to 2 mmol/L    POC SATURATED O2 93 (L) 95 - 100 %    POC Sodium 132 (L) 136 - 145 mmol/L    POC Potassium 4.5 3.5 - 5.1 mmol/L    POC TCO2 28 (H) 23 - 27 mmol/L    POC Ionized Calcium 1.16 1.06 - 1.42 mmol/L    POC Hematocrit 45 36 - 54 %PCV    Rate 24     Sample ARTERIAL     Site LR     Allens Test Pass     DelSys CPAP/BiPAP     Mode BiPAP     FiO2 30     IP 10     EP 5    ISTAT PROCEDURE    Collection Time: 02/19/22  1:28 AM   Result Value Ref Range    POC PH 7.322 (L)  7.35 - 7.45    POC PCO2 53.6 (H) 35 - 45 mmHg    POC PO2 71 (L) 80 - 100 mmHg    POC HCO3 27.7 24 - 28 mmol/L    POC BE 2 -2 to 2 mmol/L    POC SATURATED O2 92 (L) 95 - 100 %    POC Sodium 136 136 - 145 mmol/L    POC Potassium 4.3 3.5 - 5.1 mmol/L    POC TCO2 29 (H) 23 - 27 mmol/L    POC Ionized Calcium 1.19 1.06 - 1.42 mmol/L    POC Hematocrit 46 36 - 54 %PCV    Rate 24     Sample ARTERIAL     Site LR     Allens Test Pass     DelSys CPAP/BiPAP     Mode BiPAP     FiO2 40     IP 15     EP 8    POCT COVID-19 Rapid Screening    Collection Time: 02/19/22  2:27 AM   Result Value Ref Range    POC Rapid COVID Negative Negative     Acceptable Yes    Basic Metabolic Panel (BMP)    Collection Time: 02/19/22  3:05 AM   Result Value Ref Range    Sodium 139 136 - 145 mmol/L    Potassium 4.2 3.5 - 5.1 mmol/L    Chloride 102 95 - 110 mmol/L    CO2 25 23 - 29 mmol/L    Glucose 128 (H) 70 - 110 mg/dL    BUN 12 6 - 20 mg/dL    Creatinine 1.3 0.5 - 1.4 mg/dL    Calcium 9.0 8.7 - 10.5 mg/dL    Anion Gap 12 8 - 16 mmol/L    eGFR if African American >60.0 >60 mL/min/1.73 m^2    eGFR if non African American >60.0 >60 mL/min/1.73 m^2   Magnesium    Collection Time: 02/19/22  3:05 AM   Result Value Ref Range    Magnesium 2.3 1.6 - 2.6 mg/dL   Phosphorus    Collection Time: 02/19/22  3:05 AM   Result Value Ref Range    Phosphorus 2.5 (L) 2.7 - 4.5 mg/dL   Procalcitonin    Collection Time: 02/19/22  3:05 AM   Result Value Ref Range    Procalcitonin 1.38 (H) <0.25 ng/mL   Hemoglobin A1c    Collection Time: 02/19/22  3:05 AM   Result Value Ref Range    Hemoglobin A1C 5.2 4.0 - 5.6 %    Estimated Avg Glucose 103 68 - 131 mg/dL   Toxicology screen, urine    Collection Time: 02/19/22  9:04 AM   Result Value Ref Range    Alcohol, Urine <10 <10 mg/dL    Benzodiazepines Negative Negative    Methadone metabolites Negative Negative    Cocaine (Metab.) Negative Negative    Opiate Scrn, Ur Negative Negative    Barbiturate Screen, Ur  Negative Negative    Amphetamine Screen, Ur Negative Negative    THC Negative Negative    Phencyclidine Negative Negative    Creatinine, Urine 148.0 23.0 - 375.0 mg/dL    Toxicology Information SEE COMMENT    Lactic acid, plasma    Collection Time: 02/19/22  9:10 AM   Result Value Ref Range    Lactate (Lactic Acid) 2.1 0.5 - 2.2 mmol/L   Beta - Hydroxybutyrate, Serum    Collection Time: 02/19/22  9:10 AM   Result Value Ref Range    Beta-Hydroxybutyrate 0.1 0.0 - 0.5 mmol/L   Urinalysis, Reflex to Urine Culture Urine, Clean Catch    Collection Time: 02/19/22  9:14 AM    Specimen: Urine   Result Value Ref Range    Specimen UA Urine, Clean Catch     Color, UA Yellow Yellow, Straw, Ariane    Appearance, UA Clear Clear    pH, UA 7.0 5.0 - 8.0    Specific Gravity, UA 1.020 1.005 - 1.030    Protein, UA 1+ (A) Negative    Glucose, UA 3+ (A) Negative    Ketones, UA Trace (A) Negative    Bilirubin (UA) Negative Negative    Occult Blood UA Negative Negative    Nitrite, UA Negative Negative    Leukocytes, UA Negative Negative   Urinalysis Microscopic    Collection Time: 02/19/22  9:14 AM   Result Value Ref Range    RBC, UA 0 0 - 4 /hpf    WBC, UA 2 0 - 5 /hpf    Bacteria Rare None-Occ /hpf    Yeast, UA None None    Squam Epithel, UA 0 /hpf    Hyaline Casts, UA 5 (A) 0-1/lpf /lpf    Microscopic Comment SEE COMMENT    POCT glucose    Collection Time: 02/19/22  9:17 AM   Result Value Ref Range    POCT Glucose 121 (H) 70 - 110 mg/dL   POCT glucose    Collection Time: 02/19/22 11:25 AM   Result Value Ref Range    POCT Glucose 98 70 - 110 mg/dL      Imaging Results              X-Ray Chest AP Portable (Final result)  Result time 02/19/22 00:58:02      Final result by Robert Khan MD (02/19/22 00:58:02)                   Impression:      No obvious evidence of an acute pulmonary process.      Electronically signed by: Robert Khan  Date:    02/19/2022  Time:    00:58               Narrative:    EXAMINATION:  XR CHEST AP  PORTABLE    CLINICAL HISTORY:  CHF;    TECHNIQUE:  Single frontal view of the chest was performed.    COMPARISON:  November 1, 2021    FINDINGS:  Single view of the chest reveals the lungs are well aerated.  No indication of a consolidative process or pleural effusion.  No pulmonary nodule.  The heart is normal in size and contour.  No evidence of free air under the diaphragm.

## 2022-02-19 NOTE — DISCHARGE INSTRUCTIONS
MENTAL HEALTH/ADDICTIVE DISORDERS  REFERRAL RECOMMENDATIONS    I. AA ([phone]) [email or web address] or NA ([phone])    II. [facility] Int[facility] [phone], option 2    III. Other Places for Outpatient Addictive Disorders and Mental Health Treatment in [facility]:    [facility] ([place], [place], [place]; accepts Medicaid, commercial insurance) [phone]    [facility] ([place]) [phone], [address] [facility] [phone]; Crisis [phone] - Call for options A-E:    ? [facility], [address] [place] [address]    ? Critical access hospital/[facility], [address] [place] [address]    ? [facility], [address] [place] [address],    ? [facility], 2nd floor [address] [place] [address]    ? [facility], [address], [place] [address]    ? [facility] [address], [place] [address]    [facility], [address] [place] [address], [phone]    [facility] [place]/[facility] [phone]    [facility] (Mental & General), [facility], [phone]    [facility] (Mental & General Health, not only HIV+, 3 [facility] locations) [phone]    [facility], [address] [facility], [address], [phone]     [facility], [facility].Guy, [phone], [phone]    [facility] (Methadone Maintenance)    ? [address] [place] [address], [phone]    ? [place] [address] [phone]    IV. Addiction and Mental Health Treatment in Other [facility]:    [facility], [address] [place], [phone]    [facility], [phone], [address], [phone]    [facility]. [address] [facility], [phone]    [facility], [address] [facility], [phone]    [facility]    ? [facility], [address] [facility], [phone] ([facility])    ? [facility], [address] [facility], [phone] ([facility])    ? [facility], [address] [facility], [address], [place], [phone] ([place], [place], and [facility])    ? [facility], [address] [place], [phone] ([facility])    [facility] Hotline [phone], [phone]    ? [facility], [address] [facility], [place]    ? [facility], [address] [facility], [place]    ? [facility], [address] [facility], [place]    ? [facility], [address] [facility] [address] [place]    ? [facility], [address], [place]    [facility], [address], [place] [phone]    [facility], [address] [facility] [phone]    [facility], [address] [facility], [phone]    [facility], [address] [facility], [phone]    V. Residential Addictive Disorders Treatment (call every day until you get in):    [facility] [address] [facility], [phone]    [facility] (men only) [address] [facility], [phone]    [facility], [facility], mens program [phone], womens program [phone]    [facility], [address] [facility], [phone]    [facility] (Female only) [address] [facility], [phone]    [facility] (IOP, residential, low cost, MCaid) [address], [place],  [phone]    [facility] (Men only, [phone]), [address] [facility] Johann Delgado    [facility] (Pregnant/women with or without children, [phone])    [facility]), [address] [facility], [phone]    [facility] (men only), [place] [phone]    VI. Inpatient Rehabs (out of area)    [facility], [place], [phone]     [facility], [phone]    [facility], [phone]    [place], [place] ([phone])    [facility] ([place]) [phone]    VII. In case of suicidal thinking, call the COPE LINE [phone] / [phone]

## 2022-02-19 NOTE — SUBJECTIVE & OBJECTIVE
"    Family History    None       Tobacco Use    Smoking status: Current Some Day Smoker     Types: Vaping with nicotine    Smokeless tobacco: Not on file   Substance and Sexual Activity    Alcohol use: No    Drug use: Yes     Types: Marijuana, Heroin    Sexual activity: Not on file     Psychotherapeutics (From admission, onward)                None             Review of Systems   Constitutional:  Negative for activity change, chills and fever.   HENT:  Negative for congestion, sneezing and sore throat.    Eyes:  Negative for visual disturbance.   Respiratory:  Negative for chest tightness, shortness of breath and wheezing.    Cardiovascular:  Negative for chest pain.   Gastrointestinal:  Negative for abdominal pain.   Genitourinary:  Negative for dysuria.   Musculoskeletal:  Negative for neck pain.   Skin:  Negative for color change, pallor and rash.   Neurological:  Positive for headaches. Negative for dizziness, tremors, facial asymmetry and light-headedness.   Psychiatric/Behavioral:  Negative for agitation, behavioral problems, confusion, decreased concentration, hallucinations and suicidal ideas. The patient is not hyperactive.    Objective:     Vital Signs (Most Recent):  Temp: 98.2 °F (36.8 °C) (02/19/22 1124)  Pulse: 92 (02/19/22 1210)  Resp: 18 (02/19/22 1124)  BP: (!) 106/53 (02/19/22 1124)  SpO2: (!) 93 % (02/19/22 1124)   Vital Signs (24h Range):  Temp:  [97.5 °F (36.4 °C)-98.9 °F (37.2 °C)] 98.2 °F (36.8 °C)  Pulse:  [] 92  Resp:  [15-42] 18  SpO2:  [88 %-100 %] 93 %  BP: (106-145)/(53-90) 106/53     Height: 5' 4" (162.6 cm)  Weight: 64 kg (141 lb)  Body mass index is 24.2 kg/m².      Intake/Output Summary (Last 24 hours) at 2/19/2022 1258  Last data filed at 2/19/2022 0556  Gross per 24 hour   Intake 50 ml   Output --   Net 50 ml       Physical Exam  Constitutional:       Appearance: Normal appearance.   HENT:      Head: Normocephalic and atraumatic.      Nose: Nose normal.   Eyes:      " Extraocular Movements: Extraocular movements intact.      Conjunctiva/sclera: Conjunctivae normal.   Pulmonary:      Effort: Pulmonary effort is normal.   Musculoskeletal:      Cervical back: Normal range of motion.   Skin:     General: Skin is warm and dry.   Neurological:      Mental Status: He is alert and oriented to person, place, and time.     NEUROLOGICAL EXAMINATION:     MENTAL STATUS   Oriented to person, place, and time.   Significant Labs: Last 24 Hours:   Recent Lab Results         02/19/22  1125   02/19/22  0917   02/19/22  0914   02/19/22  0910   02/19/22  0904        Alcohol, Urine         <10       Benzodiazepines         Negative       Methadone metabolites         Negative       Phencyclidine         Negative       Procalcitonin               Albumin               Alkaline Phosphatase               Allens Test               ALT               Amphetamine Screen, Ur         Negative       Anion Gap               Appearance, UA     Clear           AST               Bacteria, UA     Rare           Barbiturate Screen, Ur         Negative       Baso #               Basophil %               Beta-Hydroxybutyrate       0.1         Bilirubin (UA)     Negative           BILIRUBIN TOTAL               BNP               Site               BUN               Calcium               Chloride               CO2               Cocaine (Metab.)         Negative       Color, UA     Yellow           Creatinine               Creatinine, Urine         148.0       DelSys               Differential Method               eGFR if                eGFR if non                Eos #               Eosinophil %               EP               Estimated Avg Glucose               FiO2               Glucose               Glucose, UA     3+           Gran # (ANC)               Gran %               Hematocrit               Hemoglobin               Hemoglobin A1C External               Hyaline Casts, UA     5            Immature Grans (Abs)               Immature Granulocytes               IP               Ketones, UA     Trace           Lactate, Sulaiman       2.1  Comment: Falsely low lactic acid results can be found in samples   containing >=13.0 mg/dL total bilirubin and/or >=3.5 mg/dL   direct bilirubin.           Leukocytes, UA     Negative           Lymph #               Lymph %               Magnesium               MCH               MCHC               MCV               Microscopic Comment     SEE COMMENT  Comment: Other formed elements not mentioned in the report are not   present in the microscopic examination.              Mode               Mono #               Mono %               MPV               NITRITE UA     Negative           nRBC               Occult Blood UA     Negative           Opiate Scrn, Ur         Negative       pH, UA     7.0           Phosphorus               Platelets               POC BE               POC HCO3               POC Hematocrit               POC Ionized Calcium               POC PCO2               POC PH               POC PO2               POC Potassium               POC SATURATED O2               POC Sodium               POC TCO2               POCT Glucose 98   121             Potassium               PROTEIN TOTAL               Protein, UA     1+  Comment: Recommend a 24 hour urine protein or a urine   protein/creatinine ratio if globulin induced proteinuria is  clinically suspected.              Acceptable               Rate               RBC               RBC, UA     0           RDW               Sample               SARS-CoV-2 RNA, Amplification, Qual               Sodium               Specific Gravity, UA     1.020           Specimen UA     Urine, Clean Catch           Squam Epithel, UA     0           Marijuana (THC) Metabolite         Negative       Toxicology Information         SEE COMMENT  Comment: This screen includes the following classes of drugs at the listed    cut-off:    Benzodiazepines 200 ng/ml  Methadone 300 ng/ml  Cocaine metabolite 300 ng/ml  Opiates 300 ng/ml  Barbiturates 200 ng/ml  Amphetamines 1000 ng/ml  Marijuana metabs (THC) 50 ng/ml  Phencyclidine (PCP) 25 ng/ml    This is a screening test. If results do not correlate with clinical   presentation, then a confirmatory send out test is advised.     This report is intended for use in clinical monitoring and management   of   patients. It is not intended for use in employment related drug   testing.         Troponin I               WBC, UA     2           WBC               Yeast, UA     None                              02/19/22  0305   02/19/22  0227   02/19/22  0128   02/19/22  0038   02/19/22  0033        Alcohol, Urine               Benzodiazepines               Methadone metabolites               Phencyclidine               Procalcitonin 1.38  Comment: A concentration < 0.25 ng/mL represents a low risk of bacterial   infection.  Procalcitonin may not be accurate among patients with localized   infection, recent trauma or major surgery, immunosuppressed state,   invasive fungal infection, renal dysfunction. Decisions regarding   initiation or continuation of antibiotic therapy should not be based   solely on procalcitonin levels.                 Albumin         3.8       Alkaline Phosphatase         77       Allens Test     Pass   Pass         ALT         28       Amphetamine Screen, Ur               Anion Gap 12         17       Appearance, UA               AST         29       Bacteria, UA               Barbiturate Screen, Ur               Baso #         0.09       Basophil %         0.4       Beta-Hydroxybutyrate               Bilirubin (UA)               BILIRUBIN TOTAL         0.4  Comment: For infants and newborns, interpretation of results should be based  on gestational age, weight and in agreement with clinical  observations.    Premature Infant recommended reference ranges:  Up to 24  hours.............<8.0 mg/dL  Up to 48 hours............<12.0 mg/dL  3-5 days..................<15.0 mg/dL  6-29 days.................<15.0 mg/dL         BNP         <10  Comment: Values of less than 100 pg/ml are consistent with non-CHF populations.       Site     LR   LR         BUN 12         12       Calcium 9.0         8.8       Chloride 102         99       CO2 25         20       Cocaine (Metab.)               Color, UA               Creatinine 1.3         1.7       Creatinine, Urine               DelSys     CPAP/BiPAP   CPAP/BiPAP         Differential Method         Automated       eGFR if  >60.0         >60.0       eGFR if non  >60.0  Comment: Calculation used to obtain the estimated glomerular filtration  rate (eGFR) is the CKD-EPI equation.            54.8  Comment: Calculation used to obtain the estimated glomerular filtration  rate (eGFR) is the CKD-EPI equation.          Eos #         0.5       Eosinophil %         2.0       EP     8   5         Estimated Avg Glucose 103               FiO2     40   30         Glucose 128         379       Glucose, UA               Gran # (ANC)         19.7       Gran %         88.1       Hematocrit         49.2       Hemoglobin         15.0       Hemoglobin A1C External 5.2  Comment: ADA Screening Guidelines:  5.7-6.4%  Consistent with prediabetes  >or=6.5%  Consistent with diabetes    High levels of fetal hemoglobin interfere with the HbA1C  assay. Heterozygous hemoglobin variants (HbS, HgC, etc)do  not significantly interfere with this assay.   However, presence of multiple variants may affect accuracy.                 Hyaline Casts, UA               Immature Grans (Abs)         0.30  Comment: Mild elevation in immature granulocytes is non specific and   can be seen in a variety of conditions including stress response,   acute inflammation, trauma and pregnancy. Correlation with other   laboratory and clinical findings is  essential.         Immature Granulocytes         1.3       IP     15   10         Ketones, UA               Lactate, Sulaiman               Leukocytes, UA               Lymph #         1.3       Lymph %         5.7       Magnesium 2.3         2.9       MCH         28.5       MCHC         30.5       MCV         93       Microscopic Comment               Mode     BiPAP   BiPAP         Mono #         0.6       Mono %         2.5       MPV         10.4       NITRITE UA               nRBC         0       Occult Blood UA               Opiate Scrn, Ur               pH, UA               Phosphorus 2.5               Platelets         265       POC BE     2   -1         POC HCO3     27.7   26.2         POC Hematocrit     46   45         POC Ionized Calcium     1.19   1.16         POC PCO2     53.6   61.1         POC PH     7.322   7.239         POC PO2     71   81         POC Potassium     4.3   4.5         POC SATURATED O2     92   93         POC Sodium     136   132         POC TCO2     29   28         POCT Glucose               Potassium 4.2         5.0       PROTEIN TOTAL         6.9       Protein, UA                Acceptable   Yes             Rate     24   24         RBC         5.27       RBC, UA               RDW         11.9       Sample     ARTERIAL   ARTERIAL         SARS-CoV-2 RNA, Amplification, Qual   Negative             Sodium 139         136       Specific [place], UA               Specimen UA               Squam Epithel, UA               Marijuana (THC) Metabolite               Toxicology Information               Troponin I         0.024  Comment: The reference interval for Troponin I represents the 99th percentile   cutoff   for our facility and is consistent with 3rd generation assay   performance.         WBC, UA               WBC         22.39       Yeast, UA                                      Significant Imaging: I have reviewed all pertinent imaging results/findings within the past 24  hours.

## 2022-02-19 NOTE — MEDICAL/APP STUDENT
..  HISTORY & PHYSICAL  Hospital Medicine    Team: [facility] 3    PRESENTING HISTORY     Chief Complaint/Reason for Admission:  Dyspnea    History of Present Illness:  Mr. Tucker Roberto is a 26 y.o. male with PMHx of asthma and substance use disorder, presented to the ED for dyspnea and acute encephalopathy s/p heroin ingestion. Patient has no recollection of the event. The last thing he remembers before being in the hospital was smoking heroin. Per note, patient was brought to the ED after being found down and apneic by family. Patient stated he smokes heroine 1-2 times a week, he also does vaping almost daily. He had started smoking heroin again starting last month after being cleared for 3-4 months. Prior to this admission, patient had been coughing with clear sputum for the past 2 days, and was prescribed some antibiotics by his PCP. His last admission to the hospital was Nov 2021 for asthma requiring intubation. Patient states he has good adherence to his asthma medication. He denies having fever, chills, chest pain, abdominal pain, nausea or vomiting.   In the ED, patient was afebrile, elevated BP- 145/90, tachypneic- 42, tachycardic- 130, Sp 02- 94%. Respiratory acidosis- pH 7.23, PCO2- 61.1. Patient was put on 40% O2 Bipap initially, then weaned down to 3L nasal canula. He also has leukocytosis- 22, procalcitonin elevated- 1.38, lactate wnl. Troponin and BNP wnl. Chest X ray- no acute pulmomary process. EKG- sinus tachycardia. Patient was given naloxone to prevent acute opioid withdrawal. He is admitted to 3 for further management of acute hypoxemic failure s/p heroin overdose.   Review of Systems:  ..Review of Systems   Constitutional: Negative for chills and fever.   HENT: Negative for congestion and sore throat.    Eyes: Negative for blurred vision.   Respiratory: Positive for cough, sputum production (clear sputum) and shortness of breath.    Cardiovascular: Negative for chest pain,  palpitations and leg swelling.   Gastrointestinal: Negative for abdominal pain, nausea and vomiting.   Genitourinary: Negative for dysuria and hematuria.   Neurological: Positive for headaches. Negative for speech change.   Psychiatric/Behavioral: Positive for substance abuse. Negative for hallucinations.       PAST HISTORY:     Past Medical History:   Diagnosis Date    Asthma     Substance use disorder 10/27/2021       No past surgical history on file.    No family history on file.    Social History     Socioeconomic History    Marital status: Single   Tobacco Use    Smoking status: Current Some Day Smoker     Types: Vaping with nicotine   Substance and Sexual Activity    Alcohol use: No    Drug use: Yes     Types: Marijuana, Heroin       MEDICATIONS & ALLERGIES:     No current facility-administered medications on file prior to encounter.     Current Outpatient Medications on File Prior to Encounter   Medication Sig Dispense Refill    albuterol (PROVENTIL/VENTOLIN HFA) 90 mcg/actuation inhaler Inhale 2 puffs into the lungs every 6 (six) hours as needed (for wheezing). 18 g 3    albuterol-ipratropium (DUO-NEB) 2.5 mg-0.5 mg/3 mL nebulizer solution Take 3 mLs by nebulization every 4 (four) hours as needed for Wheezing. Rescue 180 mL 0    fluticasone-salmeterol 230-21 mcg/dose (ADVAIR HFA) 230-21 mcg/actuation HFAA inhaler Inhale 2 puffs into the lungs 2 (two) times daily. 12 g 3    montelukast (SINGULAIR) 10 mg tablet Take 1 tablet (10 mg total) by mouth every evening. 30 tablet 0    nicotine (NICODERM CQ) 14 mg/24 hr Place 1 patch onto the skin once daily. 21 patch 0    [DISCONTINUED] albuterol, refill, 90 mcg/actuation Aero Inhale into the lungs.          Review of patient's allergies indicates:  No Known Allergies    OBJECTIVE:     Vital Signs:  Temp:  [97.5 °F (36.4 °C)] 97.5 °F (36.4 °C)  Pulse:  [] 77  Resp:  [15-42] 15  SpO2:  [88 %-100 %] 97 %  BP: (108-145)/(59-90) 113/62  Body mass index  is 24.2 kg/m².     Physical Exam:  ..Physical Exam  Constitutional:       General: He is not in acute distress.     Appearance: Normal appearance.   Cardiovascular:      Rate and Rhythm: Normal rate and regular rhythm.      Heart sounds: Normal heart sounds.   Pulmonary:      Breath sounds: Wheezing present.      Comments: Bilateral wheezes, on 3L nasal canula    Abdominal:      General: Bowel sounds are normal. There is no distension.      Palpations: Abdomen is soft.      Tenderness: There is no abdominal tenderness.   Musculoskeletal:         General: No swelling.      Right lower leg: No edema.      Left lower leg: No edema.   Neurological:      Mental Status: He is alert and oriented to person, place, and time. Mental status is at baseline.      Motor: No weakness.         Laboratory  Lab Results   Component Value Date    WBC 22.39 (H) 02/19/2022    HGB 15.0 02/19/2022    HCT 46 02/19/2022    MCV 93 02/19/2022     02/19/2022     Recent Labs   Lab 02/19/22  0305   *      K 4.2      CO2 25   BUN 12   CREATININE 1.3   CALCIUM 9.0   MG 2.3     No results found for: INR, PROTIME  No results found for: HGBA1C  No results for input(s): POCTGLUCOSE in the last 72 hours.    Diagnostic Results:  X ray Chest- No obvious evidence of an acute pulmonary process.   Utox negative  UA- 3+glucose    ASSESSMENT & PLAN:   Mr. Tucker Roberto is a 26 y.o. male with PMHx of asthma and substance use disorder, presented to the ED for dyspnea and acute encephalopathy s/p heroine ingestion, likely respiratory hypoxemic failure due to opioid overdose. DDx: asthma exacerbation, pneumonia.  Patient is hemodynamically stable.      Respiratory hypoxemic failure  Leukocytosis- 22.3, Procal- 1.38. Chest X-ray is negative for acute pulmonary process. Lactate- 2.1 wnl  -Empiric treatment with azithromycin 500 mg qd and ceftriaxone 1g for possible pneumonia  -Start Prednisone 40 mg x5 days  -Continue home Duonebs    -Trending WBC    Asthma   -Continue home Duonebs  -Continue home Fluticasone furoate- vilanterol   -Continue montelukast 10 mg qpm    Heroin overdose  UDS is negative   -Naloxone given in ED  - Addiction psych consulted   PRN medications for opiate withdrawal symptoms:  -tylenol for headache/fever  -vistaril 25-50 mg for anxiety/sleep  -bentyl 20 mg q6hr for stomach cramps  -flexeril 10 mg q6 for muscle spasms  -Imodium 2 mg q6 hrs for diarrhea       -zofran 4 mg for nausea    Hyperglycemia   BG- 121, HgA1C- 5.2%  -SSI        Bella Amaya

## 2022-02-19 NOTE — ASSESSMENT & PLAN NOTE
25 yo M admitted with acute hypoxemic + hypercapnic respiratory failure. Initially required NIV but was successfully weaned to LFNC prior to HM evaluation .Initial labs notable for leukocytosis, JV. Trop and BNP wnl. ABG showed pH 7.239, pCO2 61.COVID-negative. EKG showed sinus tachycardia, negative for ischemic changes. CXR negative for focal consolidation. Hypercapnic respiratory failure likely due to unintentional heroin overdose. Hypoxemic respiratory failure likely 2/2 asthma exacerbation. Of note, patient has h/o status asthmaticus requiring intubation. Resp Cx grew Strep pneumo. Nausea/vomiting + depressed mentation predisposes patient to aspiration as well.    DDx: asthma exacerbation, aspiration, viral PNA, bacterial PNA.     O2 requirements: 3L NC    Plan:  - Empiric azithromycin + CTX for possible bacterial PNA  - Procal to assess need for ABx  - Prednisone 40mg x 5 days  - Scheduled ANGE duo-nebs  - Resp Cx, BCx  - Continue home montelukast 10mg  - Smoking cessation  - Pulm referral on discharge with outpatient PFTs  - Wean supplemental O2 with goal SpO2 > 92%  - Continuous pulse ox

## 2022-02-19 NOTE — SUBJECTIVE & OBJECTIVE
Past Medical History:   Diagnosis Date    Asthma     Substance use disorder 10/27/2021       No past surgical history on file.    Review of patient's allergies indicates:  No Known Allergies    No current facility-administered medications on file prior to encounter.     Current Outpatient Medications on File Prior to Encounter   Medication Sig    albuterol (PROVENTIL/VENTOLIN HFA) 90 mcg/actuation inhaler Inhale 2 puffs into the lungs every 6 (six) hours as needed (for wheezing).    albuterol-ipratropium (DUO-NEB) 2.5 mg-0.5 mg/3 mL nebulizer solution Take 3 mLs by nebulization every 4 (four) hours as needed for Wheezing. Rescue    fluticasone-salmeterol 230-21 mcg/dose (ADVAIR HFA) 230-21 mcg/actuation HFAA inhaler Inhale 2 puffs into the lungs 2 (two) times daily.    montelukast (SINGULAIR) 10 mg tablet Take 1 tablet (10 mg total) by mouth every evening.    nicotine (NICODERM CQ) 14 mg/24 hr Place 1 patch onto the skin once daily.    [DISCONTINUED] albuterol, refill, 90 mcg/actuation Aero Inhale into the lungs.     Family History    None       Tobacco Use    Smoking status: Current Some Day Smoker     Types: Vaping with nicotine    Smokeless tobacco: Not on file   Substance and Sexual Activity    Alcohol use: No    Drug use: Yes     Types: Marijuana, Heroin    Sexual activity: Not on file     Review of Systems   Constitutional:  Negative for chills and fever.   HENT:  Negative for congestion, sore throat and trouble swallowing.    Eyes:  Negative for visual disturbance.   Respiratory:  Positive for shortness of breath and wheezing. Negative for cough.    Cardiovascular:  Negative for chest pain, palpitations and leg swelling.   Gastrointestinal:  Positive for constipation, nausea and vomiting. Negative for abdominal pain, blood in stool and diarrhea.   Genitourinary:  Negative for dysuria and hematuria.   Musculoskeletal:  Negative for arthralgias and myalgias.   Skin:  Negative for rash.   Neurological:  Negative  for dizziness, light-headedness, numbness and headaches.   Psychiatric/Behavioral:  Negative for agitation, confusion and suicidal ideas.    Objective:     Vital Signs (Most Recent):  Temp: 97.5 °F (36.4 °C) (02/19/22 0230)  Pulse: 95 (02/19/22 0230)  Resp: 20 (02/19/22 0230)  BP: (!) 128/59 (02/19/22 0230)  SpO2: 95 % (02/19/22 0230)   Vital Signs (24h Range):  Temp:  [97.5 °F (36.4 °C)] 97.5 °F (36.4 °C)  Pulse:  [] 95  Resp:  [19-42] 20  SpO2:  [88 %-100 %] 95 %  BP: (128-145)/(59-90) 128/59     Weight: 64 kg (141 lb)  Body mass index is 24.2 kg/m².    Physical Exam  Constitutional:       General: He is not in acute distress.     Appearance: He is well-developed.   HENT:      Head: Normocephalic and atraumatic.      Mouth/Throat:      Pharynx: No oropharyngeal exudate.   Eyes:      Conjunctiva/sclera: Conjunctivae normal.      Pupils: Pupils are equal, round, and reactive to light.   Cardiovascular:      Rate and Rhythm: Regular rhythm. Tachycardia present.      Heart sounds: Normal heart sounds. No murmur heard.  Pulmonary:      Effort: Pulmonary effort is normal. No respiratory distress.      Breath sounds: Wheezing (mild, diffuse, bilateral) present. No rales.   Abdominal:      General: Bowel sounds are normal. There is no distension.      Palpations: Abdomen is soft.      Tenderness: There is no abdominal tenderness. There is no guarding.   Musculoskeletal:         General: No tenderness.      Cervical back: Normal range of motion and neck supple.   Skin:     General: Skin is warm and dry.      Findings: No rash.   Neurological:      Mental Status: He is oriented to person, place, and time.      Cranial Nerves: No cranial nerve deficit.      Motor: No abnormal muscle tone.      Comments: Lethargic but arousable   Psychiatric:         Behavior: Behavior normal.       Significant Labs: All pertinent labs within the past 24 hours have been reviewed.  CBC:   Recent Labs   Lab 02/19/22  0033 02/19/22  0038  02/19/22  0128   WBC 22.39*  --   --    HGB 15.0  --   --    HCT 49.2 45 46     --   --      CMP:   Recent Labs   Lab 02/19/22  0033      K 5.0   CL 99   CO2 20*   *   BUN 12   CREATININE 1.7*   CALCIUM 8.8   PROT 6.9   ALBUMIN 3.8   BILITOT 0.4   ALKPHOS 77   AST 29   ALT 28   ANIONGAP 17*   EGFRNONAA 54.8*       Significant Imaging: I have reviewed and interpreted all pertinent imaging results/findings within the past 24 hours.    XR Chest (2/19/22):  FINDINGS:  Single view of the chest reveals the lungs are well aerated.  No indication of a consolidative process or pleural effusion.  No pulmonary nodule.  The heart is normal in size and contour.  No evidence of free air under the diaphragm.     Impression:     No obvious evidence of an acute pulmonary process.

## 2022-02-19 NOTE — ED NOTES
Patient given urinal, patient verbalized understanding on how to provide sample. Patient instructed to let nurse know when he provides the urine sample.

## 2022-02-19 NOTE — ASSESSMENT & PLAN NOTE
Baseline Cr 0.8, Cr 1.7 on admission  Hx of CKD: no  No indications for HD at this time    Plan:  - Reassess renal function after treatment of above  - Trend Cr  - Strict I&Os  - Avoid nephrotoxic agents  - Renally adjust medications

## 2022-02-19 NOTE — ASSESSMENT & PLAN NOTE
PLAN:       MANAGEMENT PLAN, TREATMENT GOALS, THERAPEUTIC TECHNIQUES/APPROACHES & CLINICAL REASONING    PRN medications for opiate withdrawal symptoms:  -tylenol for headache/fever  -vistaril 25-50 mg for anxiety/sleep  -bentyl 20 mg q6hr for stomach cramps  -flexeril 10 mg q6 for muscle spasms  -Imodium 2 mg q6 hrs for diarrhea   -zofran 4 mg for nausea    Patient states that he plans to return to prior clinic: [facility]; additional resources placed under discharge instructions.    · Patient counseled on abstinence from alcohol and substances of abuse (illicit and prescription).  · Additional workup planned to address substance use disorder, in order to guide and refine ongoing management options, includes serial alcohol and drug laboratory testing (e.g. PETH, urine toxicology).  · Relapse prevention and motivational interviewing provided.  · Education provided on 12 step recovery programs.      PRESCRIPTION DRUG MANAGEMENT  - The risks and benefits of medication were discussed with this patient.  - Possible expectable adverse effects of any current or proposed individual psychotropic agents were discussed with this patient.  - Counseling was provided on the importance of full compliance with medication regimens.      In cases of emergency, daily coverage provided by Acute/ER Psych MD, NP, or SW, with contact numbers located in [facility] On Call Schedule    Case discussed with staff addiction psychiatrist: Dr. Steve MD

## 2022-02-19 NOTE — H&P
[facility] - Emergency Dept  [facility] Medicine  History & Physical    Patient Name: Tucker Roberto  MRN: [id number]  Patient Class: OP- Observation  Admission Date: 2/19/2022  Attending Physician: Homa Quiles*   Primary Care Provider: Karen Braxton MD         Patient information was obtained from patient, past medical records and ER records.     Subjective:     Principal Problem:Acute respiratory failure with hypoxia and hypercarbia    Chief Complaint:   Chief Complaint   Patient presents with    Drug Overdose        HPI: Mr. Roberto is a 25 yo M with GITA (heroin, denies IVDU) and asthma that presents with acute encephalopathy + SOB.    Patient was brought in by EMS after being found down. Upon EMS arrival, patient was apneic so he received naloxone with positive response. He was actively wheezing when he started breathing again so he received Mg, solumedrol and duo-nebs before being brought to [facility] for further evaluation. Patient states that he smokes heroin. Denies IVDU. He last used yesterday evening and doesn't remember anything after that. He denies suicide ideation and that his presumed overdose was unintentional. He notes that his asthma has been acting up recently and he noticed wheezing yesterday. He also reports nausea/vomiting yesterday without abdo pain. He is unable to tell me his usual inhalers or frequency of use.    Tobacco use: vapes daily  EtOH use: denies  Illicit drug use: heroin, denies IVDU  Living situation: lives at home with mother    ED Course:  Afebrile, tachycardic (130s), tachypneic (RR 40s), requiring NIV on arrival. Initial labs notable for leukocytosis, JV. Trop and BNP wnl. ABG showed pH 7.239, pCO2 61. EKG showed sinus tachycardia, negative for ischemic changes. CXR negative for focal consolidation. Received albuterol nebs.      Past Medical History:   Diagnosis Date    Asthma     Substance use disorder 10/27/2021       No past surgical history on  file.    Review of patient's allergies indicates:  No Known Allergies    No current facility-administered medications on file prior to encounter.     Current Outpatient Medications on File Prior to Encounter   Medication Sig    albuterol (PROVENTIL/VENTOLIN HFA) 90 mcg/actuation inhaler Inhale 2 puffs into the lungs every 6 (six) hours as needed (for wheezing).    albuterol-ipratropium (DUO-NEB) 2.5 mg-0.5 mg/3 mL nebulizer solution Take 3 mLs by nebulization every 4 (four) hours as needed for Wheezing. Rescue    fluticasone-salmeterol 230-21 mcg/dose (ADVAIR HFA) 230-21 mcg/actuation HFAA inhaler Inhale 2 puffs into the lungs 2 (two) times daily.    montelukast (SINGULAIR) 10 mg tablet Take 1 tablet (10 mg total) by mouth every evening.    nicotine (NICODERM CQ) 14 mg/24 hr Place 1 patch onto the skin once daily.    [DISCONTINUED] albuterol, refill, 90 mcg/actuation Aero Inhale into the lungs.     Family History    None       Tobacco Use    Smoking status: Current Some Day Smoker     Types: Vaping with nicotine    Smokeless tobacco: Not on file   Substance and Sexual Activity    Alcohol use: No    Drug use: Yes     Types: Marijuana, Heroin    Sexual activity: Not on file     Review of Systems   Constitutional:  Negative for chills and fever.   HENT:  Negative for congestion, sore throat and trouble swallowing.    Eyes:  Negative for visual disturbance.   Respiratory:  Positive for shortness of breath and wheezing. Negative for cough.    Cardiovascular:  Negative for chest pain, palpitations and leg swelling.   Gastrointestinal:  Positive for constipation, nausea and vomiting. Negative for abdominal pain, blood in stool and diarrhea.   Genitourinary:  Negative for dysuria and hematuria.   Musculoskeletal:  Negative for arthralgias and myalgias.   Skin:  Negative for rash.   Neurological:  Negative for dizziness, light-headedness, numbness and headaches.   Psychiatric/Behavioral:  Negative for agitation,  confusion and suicidal ideas.    Objective:     Vital Signs (Most Recent):  Temp: 97.5 °F (36.4 °C) (02/19/22 0230)  Pulse: 95 (02/19/22 0230)  Resp: 20 (02/19/22 0230)  BP: (!) 128/59 (02/19/22 0230)  SpO2: 95 % (02/19/22 0230)   Vital Signs (24h Range):  Temp:  [97.5 °F (36.4 °C)] 97.5 °F (36.4 °C)  Pulse:  [] 95  Resp:  [19-42] 20  SpO2:  [88 %-100 %] 95 %  BP: (128-145)/(59-90) 128/59     Weight: 64 kg (141 lb)  Body mass index is 24.2 kg/m².    Physical Exam  Constitutional:       General: He is not in acute distress.     Appearance: He is well-developed.   HENT:      Head: Normocephalic and atraumatic.      Mouth/Throat:      Pharynx: No oropharyngeal exudate.   Eyes:      Conjunctiva/sclera: Conjunctivae normal.      Pupils: Pupils are equal, round, and reactive to light.   Cardiovascular:      Rate and Rhythm: Regular rhythm. Tachycardia present.      Heart sounds: Normal heart sounds. No murmur heard.  Pulmonary:      Effort: Pulmonary effort is normal. No respiratory distress.      Breath sounds: Wheezing (mild, diffuse, bilateral) present. No rales.   Abdominal:      General: Bowel sounds are normal. There is no distension.      Palpations: Abdomen is soft.      Tenderness: There is no abdominal tenderness. There is no guarding.   Musculoskeletal:         General: No tenderness.      Cervical back: Normal range of motion and neck supple.   Skin:     General: Skin is warm and dry.      Findings: No rash.   Neurological:      Mental Status: He is oriented to person, place, and time.      Cranial Nerves: No cranial nerve deficit.      Motor: No abnormal muscle tone.      Comments: Lethargic but arousable   Psychiatric:         Behavior: Behavior normal.       Significant Labs: All pertinent labs within the past 24 hours have been reviewed.  CBC:   Recent Labs   Lab 02/19/22  0033 02/19/22  0038 02/19/22  0128   WBC 22.39*  --   --    HGB 15.0  --   --    HCT 49.2 45 46     --   --      CMP:    Recent Labs   Lab 02/19/22  0033      K 5.0   CL 99   CO2 20*   *   BUN 12   CREATININE 1.7*   CALCIUM 8.8   PROT 6.9   ALBUMIN 3.8   BILITOT 0.4   ALKPHOS 77   AST 29   ALT 28   ANIONGAP 17*   EGFRNONAA 54.8*       Significant Imaging: I have reviewed and interpreted all pertinent imaging results/findings within the past 24 hours.    XR Chest (2/19/22):  FINDINGS:  Single view of the chest reveals the lungs are well aerated.  No indication of a consolidative process or pleural effusion.  No pulmonary nodule.  The heart is normal in size and contour.  No evidence of free air under the diaphragm.     Impression:     No obvious evidence of an acute pulmonary process.    Assessment/Plan:     * Acute respiratory failure with hypoxia and hypercarbia  Severe persistent asthma with acute exacerbation  25 yo M admitted with acute hypoxemic + hypercapnic respiratory failure. Initially required NIV but was successfully weaned to LFNC prior to HM evaluation .Initial labs notable for leukocytosis, JV. Trop and BNP wnl. ABG showed pH 7.239, pCO2 61.COVID-negative. EKG showed sinus tachycardia, negative for ischemic changes. CXR negative for focal consolidation. Hypercapnic respiratory failure likely due to unintentional heroin overdose. Hypoxemic respiratory failure likely 2/2 asthma exacerbation. Of note, patient has h/o status asthmaticus requiring intubation. Resp Cx grew Strep pneumo. Nausea/vomiting + depressed mentation predisposes patient to aspiration as well.    DDx: asthma exacerbation, aspiration, viral PNA, bacterial PNA.     O2 requirements: 3L NC    Plan:  - Empiric azithromycin + CTX for possible bacterial PNA  - Procal to assess need for ABx  - Prednisone 40mg x 5 days  - Scheduled ANGE duo-nebs  - Resp Cx, BCx  - Continue home montelukast 10mg  - Smoking cessation  - Pulm referral on discharge with outpatient PFTs  - Wean supplemental O2 with goal SpO2 > 92%  - Continuous pulse ox    Accidental  overdose of heroin  Substance use disorder  Presented with unintentional heroin OD  Previous includes Current heroin. Denies IVDU  UTox pending   reviewed, showed suboxone fill in 12/2021    Opioid Risk Score       Value Time User    Opioid Risk Score  5 2/19/2022  3:34 AM Steven Villalobos MD          Plan:  - Addiction Psych consulted, resc apprecaited  - UTox  - Monitor for signs of withdrawal  - PRN hydroxyzine for insomnia, itching, anxiety  - PRN dicyclomine for abdo cramping    Acute kidney injury (JV)  Baseline Cr 0.8, Cr 1.7 on admission  Hx of CKD: no  No indications for HD at this time    Plan:  - Reassess renal function after treatment of above  - Trend Cr  - Strict I&Os  - Avoid nephrotoxic agents  - Renally adjust medications        VTE Risk Mitigation (From admission, onward)         Ordered     enoxaparin injection 40 mg  Daily         02/19/22 0241     IP VTE HIGH RISK PATIENT  Once         02/19/22 0241     Place sequential compression device  Until discontinued         02/19/22 0241                   Steven Villalobos MD  Department of Hospital Medicine   [facility] - Emergency Dept

## 2022-02-20 VITALS
BODY MASS INDEX: 24.07 KG/M2 | HEART RATE: 83 BPM | WEIGHT: 141 LBS | DIASTOLIC BLOOD PRESSURE: 58 MMHG | SYSTOLIC BLOOD PRESSURE: 117 MMHG | HEIGHT: 64 IN | OXYGEN SATURATION: 95 % | RESPIRATION RATE: 18 BRPM | TEMPERATURE: 98 F

## 2022-02-20 LAB
ANION GAP SERPL CALC-SCNC: 11 MMOL/L (ref 8–16)
BUN SERPL-MCNC: 12 MG/DL (ref 6–20)
CALCIUM SERPL-MCNC: 9.2 MG/DL (ref 8.7–10.5)
CHLORIDE SERPL-SCNC: 100 MMOL/L (ref 95–110)
CO2 SERPL-SCNC: 27 MMOL/L (ref 23–29)
CREAT SERPL-MCNC: 0.9 MG/DL (ref 0.5–1.4)
EST. GFR  (AFRICAN AMERICAN): >60 ML/MIN/1.73 M^2
EST. GFR  (NON AFRICAN AMERICAN): >60 ML/MIN/1.73 M^2
GLUCOSE SERPL-MCNC: 92 MG/DL (ref 70–110)
MAGNESIUM SERPL-MCNC: 2 MG/DL (ref 1.6–2.6)
PHOSPHATE SERPL-MCNC: 3.6 MG/DL (ref 2.7–4.5)
POCT GLUCOSE: 111 MG/DL (ref 70–110)
POTASSIUM SERPL-SCNC: 3.6 MMOL/L (ref 3.5–5.1)
SODIUM SERPL-SCNC: 138 MMOL/L (ref 136–145)

## 2022-02-20 PROCEDURE — 83735 ASSAY OF MAGNESIUM: CPT | Performed by: STUDENT IN AN ORGANIZED HEALTH CARE EDUCATION/TRAINING PROGRAM

## 2022-02-20 PROCEDURE — 25000242 PHARM REV CODE 250 ALT 637 W/ HCPCS: Performed by: HOSPITALIST

## 2022-02-20 PROCEDURE — 99225 PR SUBSEQUENT OBSERVATION CARE,LEVEL II: CPT | Mod: ,,, | Performed by: HOSPITALIST

## 2022-02-20 PROCEDURE — 80048 BASIC METABOLIC PNL TOTAL CA: CPT | Performed by: STUDENT IN AN ORGANIZED HEALTH CARE EDUCATION/TRAINING PROGRAM

## 2022-02-20 PROCEDURE — G0378 HOSPITAL OBSERVATION PER HR: HCPCS

## 2022-02-20 PROCEDURE — 25000242 PHARM REV CODE 250 ALT 637 W/ HCPCS

## 2022-02-20 PROCEDURE — 36415 COLL VENOUS BLD VENIPUNCTURE: CPT | Performed by: STUDENT IN AN ORGANIZED HEALTH CARE EDUCATION/TRAINING PROGRAM

## 2022-02-20 PROCEDURE — 63700000 PHARM REV CODE 250 ALT 637 W/O HCPCS: Performed by: STUDENT IN AN ORGANIZED HEALTH CARE EDUCATION/TRAINING PROGRAM

## 2022-02-20 PROCEDURE — 96366 THER/PROPH/DIAG IV INF ADDON: CPT

## 2022-02-20 PROCEDURE — 94640 AIRWAY INHALATION TREATMENT: CPT

## 2022-02-20 PROCEDURE — 84100 ASSAY OF PHOSPHORUS: CPT | Performed by: STUDENT IN AN ORGANIZED HEALTH CARE EDUCATION/TRAINING PROGRAM

## 2022-02-20 PROCEDURE — 25000003 PHARM REV CODE 250

## 2022-02-20 PROCEDURE — 99225 PR SUBSEQUENT OBSERVATION CARE,LEVEL II: ICD-10-PCS | Mod: ,,, | Performed by: HOSPITALIST

## 2022-02-20 PROCEDURE — 63600175 PHARM REV CODE 636 W HCPCS: Performed by: STUDENT IN AN ORGANIZED HEALTH CARE EDUCATION/TRAINING PROGRAM

## 2022-02-20 RX ORDER — PREDNISONE 20 MG/1
40 TABLET ORAL DAILY
Qty: 6 TABLET | Refills: 0 | Status: ON HOLD | OUTPATIENT
Start: 2022-02-20 | End: 2022-08-03 | Stop reason: ALTCHOICE

## 2022-02-20 RX ORDER — FLUTICASONE FUROATE AND VILANTEROL 200; 25 UG/1; UG/1
1 POWDER RESPIRATORY (INHALATION) DAILY
Qty: 60 EACH | Refills: 2 | Status: ON HOLD | OUTPATIENT
Start: 2022-02-20 | End: 2022-08-03

## 2022-02-20 RX ORDER — CEPHALEXIN 250 MG/1
1 CAPSULE ORAL DAILY
Qty: 60 EACH | Refills: 2 | Status: ON HOLD | OUTPATIENT
Start: 2022-02-20 | End: 2022-08-03

## 2022-02-20 RX ORDER — NALOXONE HYDROCHLORIDE 4 MG/.1ML
1 SPRAY NASAL ONCE
Qty: 2 EACH | Refills: 0 | Status: SHIPPED | OUTPATIENT
Start: 2022-02-20 | End: 2022-02-21

## 2022-02-20 RX ADMIN — FLUTICASONE FUROATE AND VILANTEROL TRIFENATATE 1 PUFF: 200; 25 POWDER RESPIRATORY (INHALATION) at 09:02

## 2022-02-20 RX ADMIN — CEFTRIAXONE 1 G: 1 INJECTION, SOLUTION INTRAVENOUS at 04:02

## 2022-02-20 RX ADMIN — POTASSIUM & SODIUM PHOSPHATES POWDER PACK 280-160-250 MG 2 PACKET: 280-160-250 PACK at 09:02

## 2022-02-20 RX ADMIN — PREDNISONE 40 MG: 20 TABLET ORAL at 09:02

## 2022-02-20 RX ADMIN — IPRATROPIUM BROMIDE AND ALBUTEROL SULFATE 3 ML: 2.5; .5 SOLUTION RESPIRATORY (INHALATION) at 08:02

## 2022-02-20 RX ADMIN — AZITHROMYCIN MONOHYDRATE 500 MG: 250 TABLET ORAL at 09:02

## 2022-02-20 NOTE — PLAN OF CARE
POC reviewed with pt. A&Ox4. VSS. Room air. No acute changes. BG monitored. Pt came to floor with no belongings. Safety checks performed. Bed in lowest position. Wheels locked. Call light in reach. Will continue to monitor.

## 2022-02-20 NOTE — HOSPITAL COURSE
Patient admitted to hospital medicine for hypoxic and hypercapnic respiratory failure 2/2 concomitant respiratory depression in setting of opiate use and asthma exacerbation. Patient initially required NIV on arrival but was eventually weaned to room air. He was administered scheduled duo nebs, steroids, and started on CAP coverage. Addiction psych saw patient, offered counseling and resources.  Primary team also provided counseling on risks and complications of continued heroin use. Patient expressed interest in re-establishing care at [facility]/outpatient rehab. He was stable on discharge with intranasal narcan, breo inhaler and prednisone to complete 5 day course.

## 2022-02-20 NOTE — NURSING
AVS given to and reviewed with pt. Medication delivered to pt at bedside by pharmacy. Pt has all belongings.

## 2022-02-20 NOTE — DISCHARGE SUMMARY
[facility] - Telemetry [facility] Medicine  Discharge Summary      Patient Name: Tucker Roberto  MRN: [id number]  Patient Class: OP- Observation  Admission Date: 2/19/2022  Hospital Length of Stay: 0 days  Discharge Date and Time:  02/20/2022 1:26 PM  Attending Physician: Homa Quiles*   Discharging Provider: Judy Brewer MD  Primary Care Provider: Karen Braxton MD  Hospital Medicine Team: [facility] 3 Judy Brewer MD    HPI:   Mr. Roberto is a 25 yo M with GITA (heroin, denies IVDU) and asthma that presents with acute encephalopathy + SOB.    Patient was brought in by EMS after being found down. Upon EMS arrival, patient was apneic so he received naloxone with positive response. He was actively wheezing when he started breathing again so he received Mg, solumedrol and duo-nebs before being brought to [facility] [place] evaluation. Patient states that he smokes heroin. Denies IVDU. He last used yesterday evening and doesn't remember anything after that. He denies suicide ideation and that his presumed overdose was unintentional. He notes that his asthma has been acting up recently and he noticed wheezing yesterday. He also reports nausea/vomiting yesterday without abdo pain. He is unable to tell me his usual inhalers or frequency of use.    Tobacco use: vapes daily  EtOH use: denies  Illicit drug use: heroin, denies IVDU  Living situation: lives at home with mother    ED Course:  Afebrile, tachycardic (130s), tachypneic (RR 40s), requiring NIV on arrival. Initial labs notable for leukocytosis, JV. Trop and BNP wnl. ABG showed pH 7.239, pCO2 61. EKG showed sinus tachycardia, negative for ischemic changes. CXR negative for focal consolidation. Received albuterol nebs.      Hospital Course:   Patient admitted to hospital medicine for hypoxic and hypercapnic respiratory failure 2/2 concomitant respiratory depression in setting of opiate use and asthma exacerbation. Patient initially required  NIV on arrival but was eventually weaned to room air. He was administered scheduled duo nebs, steroids, and started on CAP coverage. Addiction psych saw patient, offered counseling and resources.  Primary team also provided counseling on risks and complications of continued heroin use. Patient expressed interest in re-establishing care at [facility]/outpatient rehab. No signs of withdrawal seen during stay. He was stable on discharge with intranasal narcan, breo inhaler and prednisone to complete 5 day course.        Physical Exam  Constitutional:       General: He is not in acute distress.     Appearance: He is well-developed.   HENT:      Head: Normocephalic and atraumatic.      Mouth/Throat:      Pharynx: No oropharyngeal exudate.   Eyes:      Conjunctiva/sclera: Conjunctivae normal.      Pupils: Pupils are equal, round, and reactive to light.   Cardiovascular:      Rate and Rhythm: Regular rate and rhythm     Heart sounds: Normal heart sounds. No murmur heard.  Pulmonary:      Effort: Pulmonary effort is normal. No respiratory distress.      Breath sounds: Clear to auscultation bilaterally.  No rales.   Abdominal:      General: Bowel sounds are normal. There is no distension.      Palpations: Abdomen is soft.      Tenderness: There is no abdominal tenderness. There is no guarding.   Musculoskeletal:         General: No tenderness.      Cervical back: Normal range of motion and neck supple.   Skin:     General: Skin is warm and dry.      Findings: No rash.   Neurological:      Mental Status: He is oriented to person, place, and time.      Cranial Nerves: No cranial nerve deficit.      Motor: No abnormal muscle tone.      Comments: Mentation at baseline.   Psychiatric:         Behavior: Behavior normal.             Goals of Care Treatment Preferences:  Code Status: Full Code      Consults:   Consults (From admission, onward)        Status Ordering Provider     Inpatient consult to Psychiatry  Once         Provider:  (Not yet assigned)    CHAKA Moses          Final Active Diagnoses:    Diagnosis Date Noted POA    PRINCIPAL PROBLEM:  Acute respiratory failure with hypoxia and hypercarbia [J96.01, J96.02] 02/19/2022 Yes    Accidental overdose of heroin [T40.1X1A] 02/19/2022 Yes    JV (acute kidney injury) [N17.9] 02/19/2022 Yes    Substance use disorder [F19.90] 10/27/2021 Yes     Chronic    Severe persistent asthma with acute exacerbation [J45.51] 10/26/2021 Yes      Problems Resolved During this Admission:       Discharged Condition: stable    Disposition: Home or Self Care    Follow Up:    Patient Instructions:   No discharge procedures on file.    Significant Diagnostic Studies: Labs:   CMP   Recent Labs   Lab 02/19/22  0033 02/19/22  0305 02/20/22  0313    139 138   K 5.0 4.2 3.6   CL 99 102 100   CO2 20* 25 27   * 128* 92   BUN 12 12 12   CREATININE 1.7* 1.3 0.9   CALCIUM 8.8 9.0 9.2   PROT 6.9  --   --    ALBUMIN 3.8  --   --    BILITOT 0.4  --   --    ALKPHOS 77  --   --    AST 29  --   --    ALT 28  --   --    ANIONGAP 17* 12 11   ESTGFRAFRICA >60.0 >60.0 >60.0   EGFRNONAA 54.8* >60.0 >60.0    and CBC   Recent Labs   Lab 02/19/22  0033 02/19/22  0038 02/19/22  0128   WBC 22.39*  --   --    HGB 15.0  --   --    HCT 49.2   < > 46     --   --     < > = values in this interval not displayed.       Pending Diagnostic Studies:     None         Medications:  Reconciled Home Medications:      Medication List      START taking these medications    fluticasone furoate-vilanteroL 200-25 mcg/dose Dsdv diskus inhaler  Commonly known as: BREO  Inhale 1 puff into the lungs once daily. Controller     naloxone 4 mg/actuation Spry  Commonly known as: NARCAN  1 spray (4 mg total) by Nasal route once. for 1 dose     predniSONE 20 MG tablet  Commonly known as: DELTASONE  Take 2 tablets (40 mg total) by mouth once daily.        CONTINUE taking these medications    albuterol 90 mcg/actuation  inhaler  Commonly known as: PROVENTIL/VENTOLIN HFA  Inhale 2 puffs into the lungs every 6 (six) hours as needed (for wheezing).     albuterol-ipratropium 2.5 mg-0.5 mg/3 mL nebulizer solution  Commonly known as: DUO-NEB  Take 3 mLs by nebulization every 4 (four) hours as needed for Wheezing. Rescue     montelukast 10 mg tablet  Commonly known as: SINGULAIR  Take 1 tablet (10 mg total) by mouth every evening.     nicotine 14 mg/24 hr  Commonly known as: NICODERM CQ  Place 1 patch onto the skin once daily.        STOP taking these medications    fluticasone-salmeterol 230-21 mcg/dose 230-21 mcg/actuation Hfaa inhaler  Commonly known as: ADVAIR HFA            Indwelling Lines/Drains at time of discharge:   Lines/Drains/Airways     None                 Time spent on the discharge of patient: 35 minutes         Judy Brewer MD  Department of Hospital Medicine  [facility] - Telemetry Stepdown

## 2022-02-24 LAB
BACTERIA BLD CULT: NORMAL
BACTERIA BLD CULT: NORMAL

## 2022-08-03 ENCOUNTER — HOSPITAL ENCOUNTER (INPATIENT)
Facility: HOSPITAL | Age: 27
LOS: 6 days | Discharge: HOME | DRG: 917 | End: 2022-08-09
Attending: EMERGENCY MEDICINE | Admitting: STUDENT IN AN ORGANIZED HEALTH CARE EDUCATION/TRAINING PROGRAM
Payer: MEDICAID

## 2022-08-03 DIAGNOSIS — Z91.89 AT RISK FOR PROLONGED QT INTERVAL SYNDROME: ICD-10-CM

## 2022-08-03 DIAGNOSIS — G93.40 ACUTE ENCEPHALOPATHY: ICD-10-CM

## 2022-08-03 DIAGNOSIS — I49.9 ARRHYTHMIA: ICD-10-CM

## 2022-08-03 DIAGNOSIS — T40.1X1A ACCIDENTAL OVERDOSE OF HEROIN, INITIAL ENCOUNTER: ICD-10-CM

## 2022-08-03 DIAGNOSIS — F19.94 SUBSTANCE INDUCED MOOD DISORDER: ICD-10-CM

## 2022-08-03 DIAGNOSIS — J45.52 SEVERE PERSISTENT ASTHMA WITH STATUS ASTHMATICUS: Primary | ICD-10-CM

## 2022-08-03 DIAGNOSIS — I21.4 NSTEMI (NON-ST ELEVATED MYOCARDIAL INFARCTION): ICD-10-CM

## 2022-08-03 DIAGNOSIS — J96.02 ACUTE RESPIRATORY FAILURE WITH HYPOXIA AND HYPERCARBIA: ICD-10-CM

## 2022-08-03 DIAGNOSIS — Z01.818 ENCOUNTER FOR INTUBATION: ICD-10-CM

## 2022-08-03 DIAGNOSIS — F19.90 SUBSTANCE USE DISORDER: Chronic | ICD-10-CM

## 2022-08-03 DIAGNOSIS — J45.51 SEVERE PERSISTENT ASTHMA WITH ACUTE EXACERBATION: ICD-10-CM

## 2022-08-03 DIAGNOSIS — J96.01 ACUTE RESPIRATORY FAILURE WITH HYPOXIA AND HYPERCARBIA: ICD-10-CM

## 2022-08-03 DIAGNOSIS — I42.9 CARDIOMYOPATHY: ICD-10-CM

## 2022-08-03 PROBLEM — R73.9 HYPERGLYCEMIA: Status: ACTIVE | Noted: 2022-08-03

## 2022-08-03 PROBLEM — R73.9 STEROID-INDUCED HYPERGLYCEMIA: Status: ACTIVE | Noted: 2022-08-03

## 2022-08-03 PROBLEM — T38.0X5A STEROID-INDUCED HYPERGLYCEMIA: Status: ACTIVE | Noted: 2022-08-03

## 2022-08-03 LAB
ALBUMIN SERPL BCP-MCNC: 3.3 G/DL (ref 3.5–5.2)
ALBUMIN SERPL BCP-MCNC: 4.1 G/DL (ref 3.5–5.2)
ALLENS TEST: ABNORMAL
ALP SERPL-CCNC: 47 U/L (ref 55–135)
ALP SERPL-CCNC: 73 U/L (ref 55–135)
ALT SERPL W/O P-5'-P-CCNC: 29 U/L (ref 10–44)
ALT SERPL W/O P-5'-P-CCNC: 33 U/L (ref 10–44)
AMPHET+METHAMPHET UR QL: NEGATIVE
AMPHET+METHAMPHET UR QL: NEGATIVE
ANION GAP SERPL CALC-SCNC: 12 MMOL/L (ref 8–16)
ANION GAP SERPL CALC-SCNC: 16 MMOL/L (ref 8–16)
ANION GAP SERPL CALC-SCNC: 9 MMOL/L (ref 8–16)
ANION GAP SERPL CALC-SCNC: 9 MMOL/L (ref 8–16)
AST SERPL-CCNC: 22 U/L (ref 10–40)
AST SERPL-CCNC: 25 U/L (ref 10–40)
BACTERIA #/AREA URNS AUTO: ABNORMAL /HPF
BARBITURATES UR QL SCN>200 NG/ML: NEGATIVE
BARBITURATES UR QL SCN>200 NG/ML: NEGATIVE
BASOPHILS # BLD AUTO: 0.06 K/UL (ref 0–0.2)
BASOPHILS NFR BLD: 0.4 % (ref 0–1.9)
BENZODIAZ UR QL SCN>200 NG/ML: NEGATIVE
BENZODIAZ UR QL SCN>200 NG/ML: NEGATIVE
BILIRUB SERPL-MCNC: 0.3 MG/DL (ref 0.1–1)
BILIRUB SERPL-MCNC: 0.3 MG/DL (ref 0.1–1)
BILIRUB UR QL STRIP: NEGATIVE
BUN SERPL-MCNC: 11 MG/DL (ref 6–20)
BUN SERPL-MCNC: 8 MG/DL (ref 6–20)
BUN SERPL-MCNC: 9 MG/DL (ref 6–20)
BUN SERPL-MCNC: 9 MG/DL (ref 6–20)
BZE UR QL SCN: NEGATIVE
BZE UR QL SCN: NEGATIVE
CALCIUM SERPL-MCNC: 8.4 MG/DL (ref 8.7–10.5)
CALCIUM SERPL-MCNC: 8.6 MG/DL (ref 8.7–10.5)
CALCIUM SERPL-MCNC: 8.6 MG/DL (ref 8.7–10.5)
CALCIUM SERPL-MCNC: 9.3 MG/DL (ref 8.7–10.5)
CANNABINOIDS UR QL SCN: NEGATIVE
CANNABINOIDS UR QL SCN: NEGATIVE
CHLORIDE SERPL-SCNC: 102 MMOL/L (ref 95–110)
CHLORIDE SERPL-SCNC: 113 MMOL/L (ref 95–110)
CHLORIDE SERPL-SCNC: 121 MMOL/L (ref 95–110)
CHLORIDE SERPL-SCNC: 121 MMOL/L (ref 95–110)
CK SERPL-CCNC: 443 U/L (ref 20–200)
CK SERPL-CCNC: 487 U/L (ref 20–200)
CLARITY UR REFRACT.AUTO: ABNORMAL
CO2 SERPL-SCNC: 15 MMOL/L (ref 23–29)
CO2 SERPL-SCNC: 24 MMOL/L (ref 23–29)
COLOR UR AUTO: YELLOW
CREAT SERPL-MCNC: 1.2 MG/DL (ref 0.5–1.4)
CREAT SERPL-MCNC: 1.3 MG/DL (ref 0.5–1.4)
CREAT UR-MCNC: 61 MG/DL (ref 23–375)
CREAT UR-MCNC: 61 MG/DL (ref 23–375)
CTP QC/QA: YES
DELSYS: ABNORMAL
DIFFERENTIAL METHOD: ABNORMAL
EOSINOPHIL # BLD AUTO: 1.2 K/UL (ref 0–0.5)
EOSINOPHIL NFR BLD: 6.9 % (ref 0–8)
ERYTHROCYTE [DISTWIDTH] IN BLOOD BY AUTOMATED COUNT: 13 % (ref 11.5–14.5)
ERYTHROCYTE [SEDIMENTATION RATE] IN BLOOD BY WESTERGREN METHOD: 18 MM/H
ERYTHROCYTE [SEDIMENTATION RATE] IN BLOOD BY WESTERGREN METHOD: 20 MM/H
ERYTHROCYTE [SEDIMENTATION RATE] IN BLOOD BY WESTERGREN METHOD: 24 MM/H
EST. GFR  (NO RACE VARIABLE): >60 ML/MIN/1.73 M^2
ETHANOL SERPL-MCNC: <10 MG/DL
ETHANOL UR-MCNC: <10 MG/DL
FIO2: 28
FIO2: 28
FIO2: 40
GLUCOSE SERPL-MCNC: 223 MG/DL (ref 70–110)
GLUCOSE SERPL-MCNC: 223 MG/DL (ref 70–110)
GLUCOSE SERPL-MCNC: 237 MG/DL (ref 70–110)
GLUCOSE SERPL-MCNC: 285 MG/DL (ref 70–110)
GLUCOSE UR QL STRIP: ABNORMAL
HCO3 UR-SCNC: 14.1 MMOL/L (ref 24–28)
HCO3 UR-SCNC: 14.7 MMOL/L (ref 24–28)
HCO3 UR-SCNC: 17.3 MMOL/L (ref 24–28)
HCO3 UR-SCNC: 24 MMOL/L (ref 24–28)
HCO3 UR-SCNC: 27.6 MMOL/L (ref 24–28)
HCO3 UR-SCNC: 31.7 MMOL/L (ref 24–28)
HCT VFR BLD AUTO: 41.3 % (ref 40–54)
HCV AB SERPL QL IA: NEGATIVE
HGB BLD-MCNC: 13.1 G/DL (ref 14–18)
HGB UR QL STRIP: ABNORMAL
HIV 1+2 AB+HIV1 P24 AG SERPL QL IA: NEGATIVE
HYALINE CASTS UR QL AUTO: 3 /LPF
IMM GRANULOCYTES # BLD AUTO: 0.13 K/UL (ref 0–0.04)
IMM GRANULOCYTES NFR BLD AUTO: 0.8 % (ref 0–0.5)
KETONES UR QL STRIP: NEGATIVE
LACTATE SERPL-SCNC: 1.2 MMOL/L (ref 0.5–2.2)
LACTATE SERPL-SCNC: 10.7 MMOL/L (ref 0.5–2.2)
LEUKOCYTE ESTERASE UR QL STRIP: NEGATIVE
LYMPHOCYTES # BLD AUTO: 7.3 K/UL (ref 1–4.8)
LYMPHOCYTES NFR BLD: 43.7 % (ref 18–48)
MAGNESIUM SERPL-MCNC: 2.5 MG/DL (ref 1.6–2.6)
MCH RBC QN AUTO: 28.6 PG (ref 27–31)
MCHC RBC AUTO-ENTMCNC: 31.7 G/DL (ref 32–36)
MCV RBC AUTO: 90 FL (ref 82–98)
METHADONE UR QL SCN>300 NG/ML: NEGATIVE
METHADONE UR QL SCN>300 NG/ML: NEGATIVE
MICROSCOPIC COMMENT: ABNORMAL
MODE: ABNORMAL
MONOCYTES # BLD AUTO: 1.4 K/UL (ref 0.3–1)
MONOCYTES NFR BLD: 8.6 % (ref 4–15)
NEUTROPHILS # BLD AUTO: 6.6 K/UL (ref 1.8–7.7)
NEUTROPHILS NFR BLD: 39.6 % (ref 38–73)
NITRITE UR QL STRIP: NEGATIVE
NRBC BLD-RTO: 0 /100 WBC
OPIATES UR QL SCN: NEGATIVE
OPIATES UR QL SCN: NEGATIVE
PCO2 BLDA: 40.7 MMHG (ref 35–45)
PCO2 BLDA: 41.7 MMHG (ref 35–45)
PCO2 BLDA: 42.6 MMHG (ref 35–45)
PCO2 BLDA: 47.5 MMHG (ref 35–45)
PCO2 BLDA: 86.2 MMHG (ref 35–45)
PCO2 BLDA: 87.6 MMHG (ref 35–45)
PCP UR QL SCN>25 NG/ML: NEGATIVE
PCP UR QL SCN>25 NG/ML: NEGATIVE
PEEP: 5
PH SMN: 7.11 [PH] (ref 7.35–7.45)
PH SMN: 7.15 [PH] (ref 7.35–7.45)
PH SMN: 7.15 [PH] (ref 7.35–7.45)
PH SMN: 7.17 [PH] (ref 7.35–7.45)
PH SMN: 7.23 [PH] (ref 7.35–7.45)
PH SMN: 7.31 [PH] (ref 7.35–7.45)
PH UR STRIP: 5 [PH] (ref 5–8)
PLATELET # BLD AUTO: 342 K/UL (ref 150–450)
PMV BLD AUTO: 11.1 FL (ref 9.2–12.9)
PO2 BLDA: 144 MMHG (ref 40–60)
PO2 BLDA: 50 MMHG (ref 40–60)
PO2 BLDA: 59 MMHG (ref 40–60)
PO2 BLDA: 62 MMHG (ref 40–60)
PO2 BLDA: 67 MMHG (ref 40–60)
PO2 BLDA: 72 MMHG (ref 40–60)
POC BE: -10 MMOL/L
POC BE: -14 MMOL/L
POC BE: -15 MMOL/L
POC BE: -2 MMOL/L
POC BE: -2 MMOL/L
POC BE: 3 MMOL/L
POC SATURATED O2: 81 % (ref 95–100)
POC SATURATED O2: 84 % (ref 95–100)
POC SATURATED O2: 85 % (ref 95–100)
POC SATURATED O2: 87 % (ref 95–100)
POC SATURATED O2: 88 % (ref 95–100)
POC SATURATED O2: 98 % (ref 95–100)
POC TCO2: 15 MMOL/L (ref 24–29)
POC TCO2: 16 MMOL/L (ref 24–29)
POC TCO2: 19 MMOL/L (ref 24–29)
POC TCO2: 25 MMOL/L (ref 24–29)
POC TCO2: 30 MMOL/L (ref 24–29)
POC TCO2: 34 MMOL/L (ref 24–29)
POCT GLUCOSE: 216 MG/DL (ref 70–110)
POCT GLUCOSE: 220 MG/DL (ref 70–110)
POCT GLUCOSE: 231 MG/DL (ref 70–110)
POTASSIUM SERPL-SCNC: 3.1 MMOL/L (ref 3.5–5.1)
POTASSIUM SERPL-SCNC: 3.5 MMOL/L (ref 3.5–5.1)
POTASSIUM SERPL-SCNC: 3.5 MMOL/L (ref 3.5–5.1)
POTASSIUM SERPL-SCNC: 4.4 MMOL/L (ref 3.5–5.1)
PROT SERPL-MCNC: 5.6 G/DL (ref 6–8.4)
PROT SERPL-MCNC: 7 G/DL (ref 6–8.4)
PROT UR QL STRIP: ABNORMAL
RBC # BLD AUTO: 4.58 M/UL (ref 4.6–6.2)
RBC #/AREA URNS AUTO: 3 /HPF (ref 0–4)
SAMPLE: ABNORMAL
SARS-COV-2 RDRP RESP QL NAA+PROBE: NEGATIVE
SITE: ABNORMAL
SODIUM SERPL-SCNC: 138 MMOL/L (ref 136–145)
SODIUM SERPL-SCNC: 142 MMOL/L (ref 136–145)
SODIUM SERPL-SCNC: 143 MMOL/L (ref 136–145)
SODIUM SERPL-SCNC: 144 MMOL/L (ref 136–145)
SODIUM SERPL-SCNC: 145 MMOL/L (ref 136–145)
SODIUM SERPL-SCNC: 146 MMOL/L (ref 136–145)
SP GR UR STRIP: 1.01 (ref 1–1.03)
SQUAMOUS #/AREA URNS AUTO: 0 /HPF
TOXICOLOGY INFORMATION: NORMAL
TOXICOLOGY INFORMATION: NORMAL
TSH SERPL DL<=0.005 MIU/L-ACNC: 3.89 UIU/ML (ref 0.4–4)
URN SPEC COLLECT METH UR: ABNORMAL
VT: 420
VT: 490
WBC # BLD AUTO: 16.64 K/UL (ref 3.9–12.7)
WBC #/AREA URNS AUTO: 5 /HPF (ref 0–5)
YEAST UR QL AUTO: ABNORMAL

## 2022-08-03 PROCEDURE — 63600175 PHARM REV CODE 636 W HCPCS: Performed by: STUDENT IN AN ORGANIZED HEALTH CARE EDUCATION/TRAINING PROGRAM

## 2022-08-03 PROCEDURE — 99900026 HC AIRWAY MAINTENANCE (STAT)

## 2022-08-03 PROCEDURE — 80048 BASIC METABOLIC PNL TOTAL CA: CPT | Mod: XB

## 2022-08-03 PROCEDURE — 84295 ASSAY OF SERUM SODIUM: CPT | Mod: 91

## 2022-08-03 PROCEDURE — 84443 ASSAY THYROID STIM HORMONE: CPT | Performed by: STUDENT IN AN ORGANIZED HEALTH CARE EDUCATION/TRAINING PROGRAM

## 2022-08-03 PROCEDURE — 31500 INSERT EMERGENCY AIRWAY: CPT

## 2022-08-03 PROCEDURE — 99285 EMERGENCY DEPT VISIT HI MDM: CPT | Mod: 25

## 2022-08-03 PROCEDURE — 87389 HIV-1 AG W/HIV-1&-2 AB AG IA: CPT | Performed by: EMERGENCY MEDICINE

## 2022-08-03 PROCEDURE — 80053 COMPREHEN METABOLIC PANEL: CPT | Performed by: STUDENT IN AN ORGANIZED HEALTH CARE EDUCATION/TRAINING PROGRAM

## 2022-08-03 PROCEDURE — 94640 AIRWAY INHALATION TREATMENT: CPT

## 2022-08-03 PROCEDURE — 25000242 PHARM REV CODE 250 ALT 637 W/ HCPCS

## 2022-08-03 PROCEDURE — 99900035 HC TECH TIME PER 15 MIN (STAT)

## 2022-08-03 PROCEDURE — 99291 PR CRITICAL CARE, E/M 30-74 MINUTES: ICD-10-PCS | Mod: 25,,, | Performed by: EMERGENCY MEDICINE

## 2022-08-03 PROCEDURE — 85025 COMPLETE CBC W/AUTO DIFF WBC: CPT | Performed by: STUDENT IN AN ORGANIZED HEALTH CARE EDUCATION/TRAINING PROGRAM

## 2022-08-03 PROCEDURE — 82550 ASSAY OF CK (CPK): CPT | Mod: 91 | Performed by: EMERGENCY MEDICINE

## 2022-08-03 PROCEDURE — 36556 INSERT NON-TUNNEL CV CATH: CPT | Mod: ,,, | Performed by: EMERGENCY MEDICINE

## 2022-08-03 PROCEDURE — U0002 COVID-19 LAB TEST NON-CDC: HCPCS | Performed by: EMERGENCY MEDICINE

## 2022-08-03 PROCEDURE — 95700 EEG CONT REC W/VID EEG TECH: CPT

## 2022-08-03 PROCEDURE — 83605 ASSAY OF LACTIC ACID: CPT | Mod: 91 | Performed by: STUDENT IN AN ORGANIZED HEALTH CARE EDUCATION/TRAINING PROGRAM

## 2022-08-03 PROCEDURE — 95714 VEEG EA 12-26 HR UNMNTR: CPT

## 2022-08-03 PROCEDURE — 96374 THER/PROPH/DIAG INJ IV PUSH: CPT

## 2022-08-03 PROCEDURE — 63600175 PHARM REV CODE 636 W HCPCS

## 2022-08-03 PROCEDURE — 82803 BLOOD GASES ANY COMBINATION: CPT

## 2022-08-03 PROCEDURE — C1751 CATH, INF, PER/CENT/MIDLINE: HCPCS

## 2022-08-03 PROCEDURE — 87186 SC STD MICRODIL/AGAR DIL: CPT | Performed by: STUDENT IN AN ORGANIZED HEALTH CARE EDUCATION/TRAINING PROGRAM

## 2022-08-03 PROCEDURE — 83605 ASSAY OF LACTIC ACID: CPT

## 2022-08-03 PROCEDURE — 87205 SMEAR GRAM STAIN: CPT | Performed by: STUDENT IN AN ORGANIZED HEALTH CARE EDUCATION/TRAINING PROGRAM

## 2022-08-03 PROCEDURE — 83735 ASSAY OF MAGNESIUM: CPT | Performed by: STUDENT IN AN ORGANIZED HEALTH CARE EDUCATION/TRAINING PROGRAM

## 2022-08-03 PROCEDURE — 87077 CULTURE AEROBIC IDENTIFY: CPT | Performed by: STUDENT IN AN ORGANIZED HEALTH CARE EDUCATION/TRAINING PROGRAM

## 2022-08-03 PROCEDURE — 31500 INSERT EMERGENCY AIRWAY: CPT | Mod: ,,, | Performed by: EMERGENCY MEDICINE

## 2022-08-03 PROCEDURE — 87040 BLOOD CULTURE FOR BACTERIA: CPT | Mod: 59 | Performed by: STUDENT IN AN ORGANIZED HEALTH CARE EDUCATION/TRAINING PROGRAM

## 2022-08-03 PROCEDURE — 25000003 PHARM REV CODE 250

## 2022-08-03 PROCEDURE — 25000003 PHARM REV CODE 250: Performed by: EMERGENCY MEDICINE

## 2022-08-03 PROCEDURE — 94002 VENT MGMT INPAT INIT DAY: CPT

## 2022-08-03 PROCEDURE — 36556 INSERT NON-TUNNEL CV CATH: CPT

## 2022-08-03 PROCEDURE — 80307 DRUG TEST PRSMV CHEM ANLYZR: CPT | Performed by: STUDENT IN AN ORGANIZED HEALTH CARE EDUCATION/TRAINING PROGRAM

## 2022-08-03 PROCEDURE — 83605 ASSAY OF LACTIC ACID: CPT | Mod: 91

## 2022-08-03 PROCEDURE — 95720 EEG PHY/QHP EA INCR W/VEEG: CPT | Mod: ,,, | Performed by: PSYCHIATRY & NEUROLOGY

## 2022-08-03 PROCEDURE — 99291 CRITICAL CARE FIRST HOUR: CPT | Mod: 25,,, | Performed by: EMERGENCY MEDICINE

## 2022-08-03 PROCEDURE — 94645 CONT INHLJ TX EACH ADDL HOUR: CPT

## 2022-08-03 PROCEDURE — 25000003 PHARM REV CODE 250: Performed by: STUDENT IN AN ORGANIZED HEALTH CARE EDUCATION/TRAINING PROGRAM

## 2022-08-03 PROCEDURE — 63600175 PHARM REV CODE 636 W HCPCS: Performed by: EMERGENCY MEDICINE

## 2022-08-03 PROCEDURE — 94761 N-INVAS EAR/PLS OXIMETRY MLT: CPT

## 2022-08-03 PROCEDURE — 20000000 HC ICU ROOM

## 2022-08-03 PROCEDURE — 81001 URINALYSIS AUTO W/SCOPE: CPT | Performed by: STUDENT IN AN ORGANIZED HEALTH CARE EDUCATION/TRAINING PROGRAM

## 2022-08-03 PROCEDURE — 36556 PR INSERT NON-TUNNEL CV CATH 5+ YRS OLD: ICD-10-PCS | Mod: ,,, | Performed by: EMERGENCY MEDICINE

## 2022-08-03 PROCEDURE — 27000221 HC OXYGEN, UP TO 24 HOURS

## 2022-08-03 PROCEDURE — 82077 ASSAY SPEC XCP UR&BREATH IA: CPT | Performed by: STUDENT IN AN ORGANIZED HEALTH CARE EDUCATION/TRAINING PROGRAM

## 2022-08-03 PROCEDURE — 95720 PR EEG, W/VIDEO, CONT RECORD, I&R, >12<26 HRS: ICD-10-PCS | Mod: ,,, | Performed by: PSYCHIATRY & NEUROLOGY

## 2022-08-03 PROCEDURE — 25000242 PHARM REV CODE 250 ALT 637 W/ HCPCS: Performed by: STUDENT IN AN ORGANIZED HEALTH CARE EDUCATION/TRAINING PROGRAM

## 2022-08-03 PROCEDURE — 82550 ASSAY OF CK (CPK): CPT

## 2022-08-03 PROCEDURE — 87070 CULTURE OTHR SPECIMN AEROBIC: CPT | Performed by: STUDENT IN AN ORGANIZED HEALTH CARE EDUCATION/TRAINING PROGRAM

## 2022-08-03 PROCEDURE — 86803 HEPATITIS C AB TEST: CPT | Performed by: EMERGENCY MEDICINE

## 2022-08-03 PROCEDURE — 31500 PR INSERT, EMERGENCY ENDOTRACH AIRWAY: ICD-10-PCS | Mod: ,,, | Performed by: EMERGENCY MEDICINE

## 2022-08-03 PROCEDURE — 99291 PR CRITICAL CARE, E/M 30-74 MINUTES: ICD-10-PCS | Mod: 25,CS,, | Performed by: EMERGENCY MEDICINE

## 2022-08-03 PROCEDURE — 27200966 HC CLOSED SUCTION SYSTEM

## 2022-08-03 PROCEDURE — 80053 COMPREHEN METABOLIC PANEL: CPT | Mod: 91 | Performed by: NURSE PRACTITIONER

## 2022-08-03 PROCEDURE — 99291 CRITICAL CARE FIRST HOUR: CPT | Mod: 25,CS,, | Performed by: EMERGENCY MEDICINE

## 2022-08-03 RX ORDER — ROCURONIUM BROMIDE 10 MG/ML
50 INJECTION, SOLUTION INTRAVENOUS ONCE
Status: COMPLETED | OUTPATIENT
Start: 2022-08-03 | End: 2022-08-03

## 2022-08-03 RX ORDER — METHYLPREDNISOLONE SOD SUCC 125 MG
125 VIAL (EA) INJECTION ONCE
Status: COMPLETED | OUTPATIENT
Start: 2022-08-03 | End: 2022-08-03

## 2022-08-03 RX ORDER — NOREPINEPHRINE BITARTRATE/D5W 4MG/250ML
0-3 PLASTIC BAG, INJECTION (ML) INTRAVENOUS CONTINUOUS
Status: DISCONTINUED | OUTPATIENT
Start: 2022-08-03 | End: 2022-08-05

## 2022-08-03 RX ORDER — GLUCAGON 1 MG
1 KIT INJECTION
Status: DISCONTINUED | OUTPATIENT
Start: 2022-08-03 | End: 2022-08-09 | Stop reason: HOSPADM

## 2022-08-03 RX ORDER — FAMOTIDINE 20 MG/1
20 TABLET, FILM COATED ORAL 2 TIMES DAILY
Status: DISCONTINUED | OUTPATIENT
Start: 2022-08-03 | End: 2022-08-06

## 2022-08-03 RX ORDER — MIDAZOLAM HYDROCHLORIDE 1 MG/ML
0-5 INJECTION, SOLUTION INTRAVENOUS CONTINUOUS
Status: DISCONTINUED | OUTPATIENT
Start: 2022-08-03 | End: 2022-08-06

## 2022-08-03 RX ORDER — INSULIN ASPART 100 [IU]/ML
0-5 INJECTION, SOLUTION INTRAVENOUS; SUBCUTANEOUS EVERY 6 HOURS PRN
Status: DISCONTINUED | OUTPATIENT
Start: 2022-08-03 | End: 2022-08-03

## 2022-08-03 RX ORDER — PROPOFOL 10 MG/ML
0-50 INJECTION, EMULSION INTRAVENOUS CONTINUOUS
Status: DISCONTINUED | OUTPATIENT
Start: 2022-08-03 | End: 2022-08-06

## 2022-08-03 RX ORDER — FENTANYL CITRATE-0.9 % NACL/PF 10 MCG/ML
0-200 PLASTIC BAG, INJECTION (ML) INTRAVENOUS CONTINUOUS
Status: DISCONTINUED | OUTPATIENT
Start: 2022-08-03 | End: 2022-08-03

## 2022-08-03 RX ORDER — FAMOTIDINE 10 MG/ML
20 INJECTION INTRAVENOUS 2 TIMES DAILY
Status: DISCONTINUED | OUTPATIENT
Start: 2022-08-03 | End: 2022-08-03

## 2022-08-03 RX ORDER — CHLORHEXIDINE GLUCONATE ORAL RINSE 1.2 MG/ML
15 SOLUTION DENTAL 2 TIMES DAILY
Status: DISCONTINUED | OUTPATIENT
Start: 2022-08-03 | End: 2022-08-03

## 2022-08-03 RX ORDER — IPRATROPIUM BROMIDE AND ALBUTEROL SULFATE 2.5; .5 MG/3ML; MG/3ML
3 SOLUTION RESPIRATORY (INHALATION)
Status: DISCONTINUED | OUTPATIENT
Start: 2022-08-03 | End: 2022-08-03

## 2022-08-03 RX ORDER — GLUCAGON 1 MG
1 KIT INJECTION
Status: DISCONTINUED | OUTPATIENT
Start: 2022-08-03 | End: 2022-08-03

## 2022-08-03 RX ORDER — ALBUTEROL SULFATE 2.5 MG/.5ML
10 SOLUTION RESPIRATORY (INHALATION)
Status: COMPLETED | OUTPATIENT
Start: 2022-08-03 | End: 2022-08-03

## 2022-08-03 RX ORDER — MIDAZOLAM HYDROCHLORIDE 1 MG/ML
INJECTION INTRAMUSCULAR; INTRAVENOUS
Status: COMPLETED
Start: 2022-08-03 | End: 2022-08-03

## 2022-08-03 RX ORDER — ALBUTEROL SULFATE 2.5 MG/.5ML
SOLUTION RESPIRATORY (INHALATION)
Status: COMPLETED
Start: 2022-08-03 | End: 2022-08-03

## 2022-08-03 RX ORDER — 3% SODIUM CHLORIDE 3 G/100ML
200 INJECTION, SOLUTION INTRAVENOUS ONCE
Status: COMPLETED | OUTPATIENT
Start: 2022-08-03 | End: 2022-08-03

## 2022-08-03 RX ORDER — MIDAZOLAM HYDROCHLORIDE 1 MG/ML
4 INJECTION INTRAMUSCULAR; INTRAVENOUS
Status: DISCONTINUED | OUTPATIENT
Start: 2022-08-03 | End: 2022-08-06

## 2022-08-03 RX ORDER — ALBUTEROL SULFATE 0.83 MG/ML
3 SOLUTION RESPIRATORY (INHALATION) EVERY 4 HOURS PRN
COMMUNITY
Start: 2022-05-29

## 2022-08-03 RX ORDER — ROCURONIUM BROMIDE 10 MG/ML
1 INJECTION, SOLUTION INTRAVENOUS
Status: COMPLETED | OUTPATIENT
Start: 2022-08-03 | End: 2022-08-03

## 2022-08-03 RX ORDER — BUDESONIDE AND FORMOTEROL FUMARATE DIHYDRATE 80; 4.5 UG/1; UG/1
2 AEROSOL RESPIRATORY (INHALATION) 2 TIMES DAILY
COMMUNITY
Start: 2022-08-02

## 2022-08-03 RX ORDER — 3% SODIUM CHLORIDE 3 G/100ML
800 INJECTION, SOLUTION INTRAVENOUS ONCE
Status: DISCONTINUED | OUTPATIENT
Start: 2022-08-03 | End: 2022-08-03

## 2022-08-03 RX ORDER — ALBUTEROL SULFATE 2.5 MG/.5ML
20 SOLUTION RESPIRATORY (INHALATION) ONCE
Status: COMPLETED | OUTPATIENT
Start: 2022-08-03 | End: 2022-08-03

## 2022-08-03 RX ORDER — 3% SODIUM CHLORIDE 3 G/100ML
40 INJECTION, SOLUTION INTRAVENOUS CONTINUOUS
Status: DISCONTINUED | OUTPATIENT
Start: 2022-08-03 | End: 2022-08-05

## 2022-08-03 RX ORDER — ALBUTEROL SULFATE 2.5 MG/.5ML
20 SOLUTION RESPIRATORY (INHALATION)
Status: COMPLETED | OUTPATIENT
Start: 2022-08-03 | End: 2022-08-03

## 2022-08-03 RX ORDER — LIDOCAINE HYDROCHLORIDE 10 MG/ML
INJECTION, SOLUTION EPIDURAL; INFILTRATION; INTRACAUDAL; PERINEURAL
Status: COMPLETED
Start: 2022-08-03 | End: 2022-08-03

## 2022-08-03 RX ORDER — ENOXAPARIN SODIUM 100 MG/ML
40 INJECTION SUBCUTANEOUS EVERY 24 HOURS
Status: DISCONTINUED | OUTPATIENT
Start: 2022-08-03 | End: 2022-08-09 | Stop reason: HOSPADM

## 2022-08-03 RX ORDER — IPRATROPIUM BROMIDE AND ALBUTEROL SULFATE 2.5; .5 MG/3ML; MG/3ML
3 SOLUTION RESPIRATORY (INHALATION) EVERY 4 HOURS
Status: DISCONTINUED | OUTPATIENT
Start: 2022-08-04 | End: 2022-08-04

## 2022-08-03 RX ORDER — INSULIN ASPART 100 [IU]/ML
1-10 INJECTION, SOLUTION INTRAVENOUS; SUBCUTANEOUS EVERY 6 HOURS PRN
Status: DISCONTINUED | OUTPATIENT
Start: 2022-08-03 | End: 2022-08-07

## 2022-08-03 RX ORDER — SODIUM BICARBONATE 650 MG/1
650 TABLET ORAL 2 TIMES DAILY
Status: DISCONTINUED | OUTPATIENT
Start: 2022-08-03 | End: 2022-08-05

## 2022-08-03 RX ORDER — KETAMINE HCL IN 0.9 % NACL 50 MG/5 ML
1 SYRINGE (ML) INTRAVENOUS ONCE
Status: COMPLETED | OUTPATIENT
Start: 2022-08-03 | End: 2022-08-03

## 2022-08-03 RX ORDER — FENTANYL CITRATE-0.9 % NACL/PF 10 MCG/ML
0-300 PLASTIC BAG, INJECTION (ML) INTRAVENOUS CONTINUOUS
Status: DISCONTINUED | OUTPATIENT
Start: 2022-08-03 | End: 2022-08-06

## 2022-08-03 RX ADMIN — PIPERACILLIN SODIUM AND TAZOBACTAM SODIUM 4.5 G: 4; .5 INJECTION, POWDER, LYOPHILIZED, FOR SOLUTION INTRAVENOUS at 09:08

## 2022-08-03 RX ADMIN — ALBUTEROL SULFATE 20 MG: 2.5 SOLUTION RESPIRATORY (INHALATION) at 06:08

## 2022-08-03 RX ADMIN — IPRATROPIUM BROMIDE AND ALBUTEROL SULFATE 3 ML: .5; 3 SOLUTION RESPIRATORY (INHALATION) at 11:08

## 2022-08-03 RX ADMIN — IPRATROPIUM BROMIDE AND ALBUTEROL SULFATE 3 ML: .5; 3 SOLUTION RESPIRATORY (INHALATION) at 01:08

## 2022-08-03 RX ADMIN — ALBUTEROL SULFATE 20 MG: 2.5 SOLUTION RESPIRATORY (INHALATION) at 07:08

## 2022-08-03 RX ADMIN — Medication 100 MCG/HR: at 06:08

## 2022-08-03 RX ADMIN — PIPERACILLIN SODIUM AND TAZOBACTAM SODIUM 4.5 G: 4; .5 INJECTION, POWDER, LYOPHILIZED, FOR SOLUTION INTRAVENOUS at 01:08

## 2022-08-03 RX ADMIN — IPRATROPIUM BROMIDE AND ALBUTEROL SULFATE 3 ML: .5; 3 SOLUTION RESPIRATORY (INHALATION) at 07:08

## 2022-08-03 RX ADMIN — SODIUM CHLORIDE 30 ML/HR: 3 INJECTION, SOLUTION INTRAVENOUS at 12:08

## 2022-08-03 RX ADMIN — KETAMINE HYDROCHLORIDE 12.5 MCG/KG/MIN: 50 INJECTION INTRAMUSCULAR; INTRAVENOUS at 08:08

## 2022-08-03 RX ADMIN — Medication 72.6 MG: at 05:08

## 2022-08-03 RX ADMIN — SODIUM CHLORIDE 30 ML/HR: 3 INJECTION, SOLUTION INTRAVENOUS at 03:08

## 2022-08-03 RX ADMIN — FAMOTIDINE 20 MG: 10 INJECTION INTRAVENOUS at 10:08

## 2022-08-03 RX ADMIN — Medication 275 MCG/HR: at 02:08

## 2022-08-03 RX ADMIN — ROCURONIUM BROMIDE 50 MG: 10 INJECTION INTRAVENOUS at 08:08

## 2022-08-03 RX ADMIN — MINERAL OIL, PETROLATUM: 425; 573 OINTMENT OPHTHALMIC at 09:08

## 2022-08-03 RX ADMIN — METHYLPREDNISOLONE SODIUM SUCCINATE 60 MG: 40 INJECTION, POWDER, FOR SOLUTION INTRAMUSCULAR; INTRAVENOUS at 05:08

## 2022-08-03 RX ADMIN — PROPOFOL 5 MCG/KG/MIN: 10 INJECTION, EMULSION INTRAVENOUS at 07:08

## 2022-08-03 RX ADMIN — SODIUM CHLORIDE 30 ML/HR: 3 INJECTION, SOLUTION INTRAVENOUS at 09:08

## 2022-08-03 RX ADMIN — VANCOMYCIN HYDROCHLORIDE 750 MG: 750 INJECTION, POWDER, LYOPHILIZED, FOR SOLUTION INTRAVENOUS at 07:08

## 2022-08-03 RX ADMIN — FAMOTIDINE 20 MG: 20 TABLET ORAL at 09:08

## 2022-08-03 RX ADMIN — SODIUM BICARBONATE 650 MG TABLET 650 MG: at 04:08

## 2022-08-03 RX ADMIN — SODIUM CHLORIDE 1000 ML: 0.9 INJECTION, SOLUTION INTRAVENOUS at 09:08

## 2022-08-03 RX ADMIN — MIDAZOLAM HYDROCHLORIDE 1 MG/HR: 1 INJECTION, SOLUTION INTRAVENOUS at 04:08

## 2022-08-03 RX ADMIN — CISATRACURIUM BESYLATE 1 MCG/KG/MIN: 200 INJECTION INTRAVENOUS at 04:08

## 2022-08-03 RX ADMIN — IPRATROPIUM BROMIDE AND ALBUTEROL SULFATE 3 ML: .5; 3 SOLUTION RESPIRATORY (INHALATION) at 08:08

## 2022-08-03 RX ADMIN — KETAMINE HYDROCHLORIDE 0.5 MCG/KG/MIN: 50 INJECTION INTRAMUSCULAR; INTRAVENOUS at 07:08

## 2022-08-03 RX ADMIN — INSULIN ASPART 4 UNITS: 100 INJECTION, SOLUTION INTRAVENOUS; SUBCUTANEOUS at 06:08

## 2022-08-03 RX ADMIN — IPRATROPIUM BROMIDE AND ALBUTEROL SULFATE 3 ML: .5; 3 SOLUTION RESPIRATORY (INHALATION) at 09:08

## 2022-08-03 RX ADMIN — Medication 300 MCG/HR: at 11:08

## 2022-08-03 RX ADMIN — PROPOFOL 50 MCG/KG/MIN: 10 INJECTION, EMULSION INTRAVENOUS at 07:08

## 2022-08-03 RX ADMIN — ENOXAPARIN SODIUM 40 MG: 100 INJECTION SUBCUTANEOUS at 07:08

## 2022-08-03 RX ADMIN — IPRATROPIUM BROMIDE AND ALBUTEROL SULFATE 3 ML: .5; 3 SOLUTION RESPIRATORY (INHALATION) at 05:08

## 2022-08-03 RX ADMIN — MIDAZOLAM 4 MG: 1 INJECTION INTRAMUSCULAR; INTRAVENOUS at 10:08

## 2022-08-03 RX ADMIN — ALBUTEROL SULFATE 10 MG: 2.5 SOLUTION RESPIRATORY (INHALATION) at 06:08

## 2022-08-03 RX ADMIN — NOREPINEPHRINE BITARTRATE 0.02 MCG/KG/MIN: 4 INJECTION, SOLUTION INTRAVENOUS at 10:08

## 2022-08-03 RX ADMIN — SODIUM CHLORIDE 200 ML: 3 INJECTION, SOLUTION INTRAVENOUS at 08:08

## 2022-08-03 RX ADMIN — PROPOFOL 50 MCG/KG/MIN: 10 INJECTION, EMULSION INTRAVENOUS at 03:08

## 2022-08-03 RX ADMIN — INSULIN ASPART 4 UNITS: 100 INJECTION, SOLUTION INTRAVENOUS; SUBCUTANEOUS at 02:08

## 2022-08-03 RX ADMIN — METHYLPREDNISOLONE SODIUM SUCCINATE 60 MG: 40 INJECTION, POWDER, FOR SOLUTION INTRAMUSCULAR; INTRAVENOUS at 11:08

## 2022-08-03 RX ADMIN — MIDAZOLAM 4 MG: 1 INJECTION INTRAMUSCULAR; INTRAVENOUS at 03:08

## 2022-08-03 RX ADMIN — METHYLPREDNISOLONE SODIUM SUCCINATE 125 MG: 125 INJECTION, POWDER, FOR SOLUTION INTRAMUSCULAR; INTRAVENOUS at 07:08

## 2022-08-03 RX ADMIN — VANCOMYCIN HYDROCHLORIDE 1250 MG: 1.25 INJECTION, POWDER, LYOPHILIZED, FOR SOLUTION INTRAVENOUS at 11:08

## 2022-08-03 RX ADMIN — IPRATROPIUM BROMIDE AND ALBUTEROL SULFATE 3 ML: .5; 3 SOLUTION RESPIRATORY (INHALATION) at 03:08

## 2022-08-03 RX ADMIN — ROCURONIUM BROMIDE 73 MG: 50 INJECTION, SOLUTION INTRAVENOUS at 05:08

## 2022-08-03 NOTE — H&P
[facility] - Emergency Dept  Critical Care Medicine  History & Physical    Patient Name: Tucker Roberto  MRN: [id number]  Admission Date: 8/3/2022  Hospital Length of Stay: 0 days  Code Status: Prior  Attending Physician: Krunal Chung MD   Primary Care Provider: Karen Braxton MD   Principal Problem: Acute respiratory failure with hypoxia and hypercarbia    Subjective:     HPI:  Mr. Roberto is a 27 yo M with GITA (heroin, denies IVDU) and asthma that presents with acute encephalopathy. Previously has had repeated admissions for status asthmaticus and heroin overdose. He was brought in unresponsive by EMS, who administered narcan and magnesium in the field. GCS was 3 at time of arrival in ED. In the ED, he received albuterol and was intubated. History is limited 2/2 to acuity of situation. Initial labs significant for WBC of 16, pH 7.1, pCO2 86.2. CT head negative for acute abnormalities     Critical care medicine consulted for hypercapnic respiratory failure likely 2/2 to heroin overdose and asthma exacerbation.            Hospital/ICU Course:  No notes on file     Past Medical History:   Diagnosis Date    Asthma     Substance use disorder 10/27/2021       No past surgical history on file.    Review of patient's allergies indicates:  No Known Allergies    Family History    None       Tobacco Use    Smoking status: Current Some Day Smoker     Types: Vaping with nicotine    Smokeless tobacco: Not on file   Substance and Sexual Activity    Alcohol use: No    Drug use: Yes     Types: Marijuana, Heroin    Sexual activity: Not on file      Review of Systems   Unable to perform ROS: Intubated   Objective:     Vital Signs (Most Recent):  Temp: 97.7 °F (36.5 °C) (08/03/22 0622)  Pulse: (!) 156 (08/03/22 0622)  Resp: 14 (08/03/22 0622)  BP: (!) 161/103 (08/03/22 0622)  SpO2: 98 % (08/03/22 0613)   Vital Signs (24h Range):  Temp:  [97.3 °F (36.3 °C)-97.7 °F (36.5 °C)] 97.7 °F (36.5 °C)  Pulse:  [130-162]  156  Resp:  [14-28] 14  SpO2:  [98 %] 98 %  BP: (161-180)/(103-110) 161/103   Weight: 72.6 kg (160 lb)  Body mass index is 27.46 kg/m².    No intake or output data in the 24 hours ending 08/03/22 0632    Physical Exam  Constitutional:       Appearance: He is well-developed.      Interventions: He is sedated and intubated.   HENT:      Head: Normocephalic and atraumatic.      Nose: Nose normal.      Mouth/Throat:      Mouth: Mucous membranes are moist.   Eyes:      General: No scleral icterus.     Extraocular Movements: Extraocular movements intact.      Pupils: Pupils are equal, round, and reactive to light.   Cardiovascular:      Rate and Rhythm: Regular rhythm. Tachycardia present.      Pulses: Normal pulses.      Heart sounds: Normal heart sounds. No murmur heard.  Pulmonary:      Effort: He is intubated.      Comments: intubated  Abdominal:      General: There is no distension.      Palpations: Abdomen is soft. There is no mass.      Tenderness: There is no abdominal tenderness. There is no guarding.   Musculoskeletal:      Cervical back: Rigidity present.      Right lower leg: No edema.      Left lower leg: No edema.   Skin:     General: Skin is warm and dry.      Coloration: Skin is not jaundiced.      Findings: No bruising.      Comments: Multiple tattoos present       Vents:     Lines/Drains/Airways       Drain  Duration                  NG/OG Tube 08/03/22 0610 [place] sump 18 Fr. Center mouth <1 day         Urethral Catheter 08/03/22 0610 Temperature probe 16 Fr. <1 day              Airway  Duration                  Airway - Non-Surgical 08/03/22 0605 Endotracheal Tube <1 day              Peripheral Intravenous Line  Duration                  Peripheral IV - Single Lumen 08/03/22 0550 18 G Right Antecubital <1 day         Peripheral IV - Single Lumen 08/03/22 0558 20 G Left Hand <1 day                  Significant Labs:    CBC/Anemia Profile:  Recent Labs   Lab 08/03/22  0611   WBC 16.64*   HGB 13.1*   HCT  41.3      MCV 90   RDW 13.0        Chemistries:  No results for input(s): NA, K, CL, CO2, BUN, CREATININE, CALCIUM, ALBUMIN, PROT, BILITOT, ALKPHOS, ALT, AST, GLUCOSE, MG, PHOS in the last 48 hours.    All pertinent labs within the past 24 hours have been reviewed.    Significant Imaging: I have reviewed all pertinent imaging results/findings within the past 24 hours.    Assessment/Plan:     Neuro  Acute encephalopathy  Patient initially presented with GCS 3, given narcan and magnesium by EMS and intubated in the ED. Found to be in respiratory failure with hypercarbia and initial pCO2 of 86. AMS likely 2/2 to opioid overdose, respiratory depression and hypercapnia.     - f/u CTH, UDS, and TSH  - F/u repeat VBG  - daily SBT/SAT  - treat asthma exacerbation with steroids/duonebs. Consider more magnesium for bronchospasm    Psychiatric  Opioid use disorder, severe, in controlled environment, dependence  Per chart review, patient has history of snorting heroin (no IVDU).     - monitor for opioid withdrawal and treat accordingly   - f/u urine drug screen    Substance use disorder  Hx heroin use, previously has denied IVDU. Not responsive to naloxone with EMS.    - fu drug screen + ethanol  - F/u formal CT head results  - consider addiction psych consult once no longer critically ill    Pulmonary  * Acute respiratory failure with hypoxia and hypercarbia  Hx status asthmaticus found unresponsive. Suspect respiratory failure from status asthmaticus with concomitant respiratory depression in setting of opiate use. Intubated 8/3 with initial blood gas with 7.113/86.2.     - continue lung protective ventilation  - maintain adequate sedation with propofol and fentanyl   - Follow up repeat VBG  - F/u covid test result    Severe persistent asthma with acute exacerbation  Patient has history of severe asthma and status asthmaticus requiring intubation in the past. Home meds include albuterol inhaler, duonebs, breo, Advair,  montelukast    - methylprednisone 125 IV followed by methylprednisone 60 mg q6h  - albuterol 20 mg neb, duonebs q2h      Critical Care Daily Checklist:    A: Awake: RASS Goal/Actual Goal:    Actual:     B: Spontaneous Breathing Trial Performed?     C: SAT & SBT Coordinated?  No                      D: Delirium: CAM-ICU     E: Early Mobility Performed? No   F: Feeding Goal:    Status:     Current Diet Order   Procedures    Diet NPO      AS: Analgesia/Sedation Propofol/fent/ketamine   T: Thromboembolic Prophylaxis lovenox   H: HOB > 300 Yes   U: Stress Ulcer Prophylaxis (if needed) famotidine   G: Glucose Control In 280s, worsened by steroids, sliding scale insulin ordered   B: Bowel Function     I: Indwelling Catheter (Lines & Sheppard) Necessity PIVx3, NGT, sheppard, ETT   D: De-escalation of Antimicrobials/Pharmacotherapies none    Plan for the day/ETD Admit to ICU    Code Status:  Family/Goals of Care: Prior         Critical secondary to Patient has a condition that poses threat to life and bodily function: Severe Respiratory Distress and encephalopathy     Critical care was time spent personally by me on the following activities: development of treatment plan with patient or surrogate and bedside caregivers, discussions with consultants, evaluation of patient's response to treatment, examination of patient, ordering and performing treatments and interventions, ordering and review of laboratory studies, ordering and review of radiographic studies, pulse oximetry, re-evaluation of patient's condition. This critical care time did not overlap with that of any other provider or involve time for any procedures.     Magdaleno Luna MD  Critical Care Medicine  [facility] - Emergency Dept

## 2022-08-03 NOTE — HPI
Mr. Roberto is a 25 yo M with GITA (heroin, denies IVDU) and asthma that presents with acute encephalopathy. Previously has had repeated admissions for status asthmaticus and heroin overdose. He was brought in unresponsive by EMS, who administered narcan and magnesium in the field. GCS was 3 at time of arrival in ED. In the ED, he received albuterol and was intubated. History is limited 2/2 to acuity of situation. Initial labs significant for WBC of 16, pH 7.1, pCO2 86.2. CT head negative for acute abnormalities     Critical care medicine consulted for hypercapnic respiratory failure likely 2/2 to heroin overdose and asthma exacerbation.

## 2022-08-03 NOTE — PLAN OF CARE
CMICU DAILY GOALS       A: Awake    RASS: Goal -    Actual - RASS ([place] Agitation-Sedation Scale): -5-->unarousable   Restraint necessity: Clinical Justification: Climbing out of bed, Removing medical devices  B: Breathe   SBT: Not attempted   C: Coordinate A & B, analgesics/sedatives   Pain: managed    SAT: Not attempted  D: Delirium   CAM-ICU: Overall CAM-ICU: Positive  E: Early(intubated/ Progressive (non-intubated) Mobility   MOVE Screen: Fail   Activity: Activity Management: Patient unable to perform activities  FAS: Feeding/Nutrition   Diet order: Diet/Nutrition Received: NPO,    T: Thrombus   DVT prophylaxis: VTE Required Core Measure: Pharmacological prophylaxis initiated/maintained  H: HOB Elevation   Head of Bed (HOB) Positioning: HOB at 30 degrees  U: Ulcer Prophylaxis   GI: yes  G: Glucose control   managed    S: Skin   Bathing/Skin Care: electrode patches/site rotation  Device Skin Pressure Protection: absorbent pad utilized/changed  Pressure Reduction Devices: foam padding utilized, heel offloading device utilized, specialty bed utilized  Pressure Reduction Techniques: weight shift assistance provided  Skin Protection: adhesive use limited, tubing/devices free from skin contact  B: Bowel Function   constipation   I: Indwelling Catheters   [place] necessity:      Urethral Catheter 08/03/22 0610 Temperature probe 16 Fr.-Reason for Continuing Urinary Catheterization: Critically ill in ICU and requiring hourly monitoring of intake/output   CVC necessity: Yes  D: De-escalation Antibiotics   No    Family/Goals of care/Code Status   Code Status: Full Code    24H Vital Sign Range  Temp:  [96.26 °F (35.7 °C)-100.22 °F (37.9 °C)]   Pulse:  [102-162]   Resp:  [14-54]   BP: ()/()   SpO2:  [96 %-100 %]      Shift Events   Pt currently sedated and paralyzed with fentanyl, propofol, versed, ketamine, and nimbex gtt. ToF 4/4 at 6, noted in summary note. Bis monitoring in place and showing adequate  sedation levels. Serial sodiums sent q1h as 3% NaCl gtt is on. EEG applied. Seizure-like activity noted 2x during shift, 4mg versed push given and behavior ceased. Mother and sister at bedside and updated by RN and CC team throughout shift.     VS and assessment per flow sheet, patient progressing towards goals as tolerated, plan of care reviewed with family, all concerns addressed, will continue to monitor.    [place]

## 2022-08-03 NOTE — ED PROVIDER NOTES
Encounter Date: 8/3/2022       History     Chief Complaint   Patient presents with    Unresponsive     GCS 3     27-year-old male with past medical history of heroin abuse, status asthmaticus requiring intubation, brought in unresponsive by EMS after asthma exacerbation.  Per EMS report patient used his inhaler without relief, when they arrived patient was in status asthmaticus with GCS 3.        Review of patient's allergies indicates:  No Known Allergies  Past Medical History:   Diagnosis Date    Asthma     Substance use disorder 10/27/2021     No past surgical history on file.  No family history on file.  Social History     Tobacco Use    Smoking status: Current Some Day Smoker     Types: Vaping with nicotine   Substance Use Topics    Alcohol use: No    Drug use: Yes     Types: Marijuana, Heroin     Review of Systems   Unable to perform ROS: Acuity of condition       Physical Exam     Initial Vitals   BP Pulse Resp Temp SpO2   08/03/22 0548 08/03/22 0548 08/03/22 0548 08/03/22 0615 08/03/22 0548   (!) 180/110 (!) 130 (!) 28 97.3 °F (36.3 °C) 98 %      MAP       --                Physical Exam    Constitutional: He appears well-developed and well-nourished. He is diaphoretic. He appears distressed.   HENT:   Head: Normocephalic and atraumatic.   Eyes: Pupils are equal, round, and reactive to light.   Neck: Neck supple.   Normal range of motion.  Cardiovascular: Regular rhythm, S1 normal, S2 normal, intact distal pulses and normal pulses. Tachycardia present.    Pulmonary/Chest: Accessory muscle usage present. Tachypnea noted. He is in respiratory distress. He has wheezes.   Abdominal: There is rigidity.   Musculoskeletal:      Cervical back: Normal range of motion and neck supple.     Neurological:   Unable to assess, pt emergently intubated   Skin: Skin is warm. Capillary refill takes less than 2 seconds. No rash noted. No erythema. No pallor.   Psychiatric:   Unable to assess         ED Course    Intubation    Date/Time: 8/3/2022 6:41 AM  Location procedure was performed: [facility] EMERGENCY DEPARTMENT  Performed by: Anderson Pham MD  Authorized by: Serena Tabares MD   Consent Done: Emergent Situation  Indications: respiratory failure  Intubation method: video-assisted  Patient status: paralyzed (RSI)  Preoxygenation: mask and nonrebreather mask  Sedatives: ketmine  Paralytic: rocuronium  Laryngoscope size: Glide 3  Tube size: 8.0 mm  Tube type: cuffed  Number of attempts: 1  Ventilation between attempts: BVM  Cricoid pressure: no  Cords visualized: yes  Post-procedure assessment: ETCO2 monitor  Breath sounds: wheezing and equal and absent over the epigastrium  Cuff inflated: yes  ETT to lip: 25 cm  Tube secured with: ETT rosas  Chest x-ray interpreted by me, other physician and radiologist.  Chest x-ray findings: endotracheal tube in appropriate position  Patient tolerance: Patient tolerated the procedure well with no immediate complications    Critical Care    Date/Time: 8/3/2022 6:30 AM  Performed by: Serena Tabares MD  Authorized by: Serena Tabares MD   Direct patient critical care time: 15 minutes  Additional history critical care time: 10 minutes  Ordering / reviewing critical care time: 5 minutes  Documentation critical care time: 5 minutes  Consulting other physicians critical care time: 5 minutes  Total critical care time (exclusive of procedural time) : 40 minutes  Critical care time was exclusive of separately billable procedures and treating other patients and teaching time.  Critical care was necessary to treat or prevent imminent or life-threatening deterioration of the following conditions: respiratory failure.  Critical care was time spent personally by me on the following activities: development of treatment plan with patient or surrogate, discussions with consultants, interpretation of cardiac output measurements, examination of patient, evaluation of patient's response to  treatment, obtaining history from patient or surrogate, ordering and performing treatments and interventions, ordering and review of laboratory studies, ordering and review of radiographic studies, re-evaluation of patient's condition, pulse oximetry and review of old charts.        Labs Reviewed   CBC W/ AUTO DIFFERENTIAL - Abnormal; Notable for the following components:       Result Value    WBC 16.64 (*)     RBC 4.58 (*)     Hemoglobin 13.1 (*)     MCHC 31.7 (*)     Immature Granulocytes 0.8 (*)     Immature Grans (Abs) 0.13 (*)     Lymph # 7.3 (*)     Mono # 1.4 (*)     Eos # 1.2 (*)     All other components within normal limits    Narrative:     Release to patient->Immediate   COMPREHENSIVE METABOLIC PANEL - Abnormal; Notable for the following components:    Glucose 285 (*)     All other components within normal limits    Narrative:     Release to patient->Immediate   URINALYSIS, REFLEX TO URINE CULTURE - Abnormal; Notable for the following components:    Appearance, UA Hazy (*)     Protein, UA 1+ (*)     Glucose, UA 3+ (*)     Occult Blood UA 1+ (*)     All other components within normal limits    Narrative:     Specimen Source->Urine   URINALYSIS MICROSCOPIC - Abnormal; Notable for the following components:    Hyaline Casts, UA 3 (*)     All other components within normal limits    Narrative:     Specimen Source->Urine   ISTAT PROCEDURE - Abnormal; Notable for the following components:    POC PH 7.113 (*)     POC PCO2 86.2 (*)     POC PO2 144 (*)     POC TCO2 30 (*)     All other components within normal limits   ISTAT PROCEDURE - Abnormal; Notable for the following components:    POC PH 7.167 (*)     POC PCO2 87.6 (*)     POC PO2 72 (*)     POC HCO3 31.7 (*)     POC SATURATED O2 88 (*)     POC TCO2 34 (*)     All other components within normal limits   MAGNESIUM    Narrative:     Release to patient->Immediate   ALCOHOL,MEDICAL (ETHANOL)   TOXICOLOGY SCREEN, URINE, RANDOM (COMPLIANCE)    Narrative:      Specimen Source->Urine   DRUG SCREEN PANEL, URINE EMERGENCY    Narrative:     Specimen Source->Urine   LACTIC ACID, PLASMA   TSH   TSH    Narrative:     Release to patient->Immediate    add on tsh per Serena Tabares MD order# [id number]  08/03/2022 @   06:55    SARS-COV-2 RDRP GENE        ECG Results    None       Imaging Results          X-Ray Chest AP Portable (Final result)  Result time 08/03/22 07:16:21    Final result by Harish Quesada MD (08/03/22 07:16:21)                 Impression:      Allowing for a slightly poorer inspiratory depth level on the current examination, there has been no significant detrimental interval change in the appearance of the chest since 08/03/2022 at 06:19.      Electronically signed by: Harish Quesada MD  Date:    08/03/2022  Time:    07:16             Narrative:    EXAMINATION:  XR CHEST AP PORTABLE    CLINICAL HISTORY:  ET tube placement;    COMPARISON:  Comparison is made to 08/03/2022 at 06:19.    FINDINGS:  Endotracheal tube tip lies at the level of the medial ends of the clavicles, approximately 4.3 cm above the froilan.  Distal aspect of the enteric tube is again noted to be coiled upon itself within the stomach, with both the tube tip and the distal side port in the gastric fundus.  Heart size remains normal.  Lung zones continue essentially clear, free of significant superimposed airspace consolidation or volume loss.  No pleural fluid.  No pneumothorax or pneumomediastinum.  Gastric distention is again observed.                                CT Head Without Contrast (Final result)  Result time 08/03/22 07:00:53    Final result by Mo Gonzalez MD (08/03/22 07:00:53)                 Impression:      CT findings suggestive of generalized cerebral edema and potentially hypoxic-ischemic encephalopathy in the appropriate clinical setting.  Further evaluation/follow-up as clinically appropriate.    This report was flagged in Epic as abnormal.      Electronically signed  by: Mo Gonzalez MD  Date:    08/03/2022  Time:    07:00             Narrative:    EXAMINATION:  CT HEAD WITHOUT CONTRAST    CLINICAL HISTORY:  unresponsive;    TECHNIQUE:  Low dose axial images were obtained through the head.  Coronal and sagittal reformations were also performed. Contrast was not administered.    COMPARISON:  None.    FINDINGS:  Exam quality is limited by slight motion and streak artifact secondary to the presence of metallic earrings.  Support devices partially visualized on the  radiograph.    No evidence of acute territorial infarct, hemorrhage, mass effect, or midline shift.    There is loss of normal sulcation near the vertex and decreased gray-white matter differentiation.  Scattered minimal hyperdensity may be related to pseudosubarachnoid hemorrhage.  Slight like appearance of the ventricles with crowding of the basal cisterns and posterior fossa.    There is mild scalp edema.  No displaced calvarial fracture.    Mucosal thickening in the ethmoid air cells.  Otherwise, the visualized paranasal sinuses and mastoid air cells are essentially clear.                               X-Ray Chest AP Portable (Final result)  Result time 08/03/22 06:30:23    Final result by Harish Quesada MD (08/03/22 06:30:23)                 Impression:      No significant detrimental interval change in cardiopulmonary status since 02/19/2022 is appreciated.      Electronically signed by: Harish Quesada MD  Date:    08/03/2022  Time:    06:30             Narrative:    EXAMINATION:  XR CHEST AP PORTABLE    CLINICAL HISTORY:  SOB;    COMPARISON:  Comparison is made to 02/19/2022.    FINDINGS:  Endotracheal tube tip lies just superior to the apex of the aortic arch, well above the froilan.  Distal aspect of the enteric tube is coiled upon itself within the stomach, with both the tube tip and distal side port in the gastric fundus, the tube tip immediately beneath the left hemidiaphragm.  Heart size is normal.  Lung  zones demonstrate no significant detrimental change since the previous examination referenced above, with no significant airspace consolidation or volume loss evident.  No pleural fluid.  No abnormal mediastinal widening.  No pneumothorax or pneumomediastinum.  Some gastric distention is observed.                                 Medications   etomidate (AMIDATE) 2 mg/mL injection (  Not Given 8/6/22 0700)   rocuronium 10 mg/mL injection (  Not Given 8/6/22 0700)   succinylcholine (ANECTINE) 20 mg/mL injection (  Not Given 8/6/22 0700)   ketamine in 0.9 % sod chloride 50 mg/5 mL (10 mg/mL) injection 72.6 mg (72.6 mg Intravenous Back Association 8/3/22 0700)   rocuronium injection 73 mg (73 mg Intravenous Given 8/3/22 0557)   albuterol sulfate nebulizer solution 20 mg (20 mg Nebulization Given 8/3/22 0613)   methylPREDNISolone sodium succinate injection 125 mg (125 mg Intravenous Given 8/3/22 0708)     Followed by   methylPREDNISolone sodium succinate injection 60 mg (60 mg Intravenous Given 8/3/22 1118)   albuterol sulfate nebulizer solution 20 mg (20 mg Nebulization Given 8/3/22 0746)   albuterol sulfate nebulizer solution 10 mg (10 mg Nebulization Given 8/3/22 0646)   LIDOcaine (PF) 10 mg/ml (1%) 10 mg/mL (1 %) injection (  Return to Cabinet 8/3/22 0900)   rocuronium injection 50 mg (50 mg Intravenous Given 8/3/22 0845)   sodium chloride 0.9% bolus 1,000 mL (1,000 mLs Intravenous Back Association 8/3/22 1030)   vancomycin 1.25 g in dextrose 5% 250 mL IVPB (ready to mix) (0 mg Intravenous Stopped 8/3/22 1245)   sodium chloride 3% HYPERTONIC solution 200 mL (0 mLs Intravenous Stopped 8/3/22 2110)   mupirocin 2 % ointment ( Nasal Given 8/8/22 0815)   potassium, sodium phosphates 280-160-250 mg packet 2 packet (2 packets Oral Given 8/4/22 0526)   sodium phosphate 39.99 mmol in sodium chloride 0.9% 250 mL IVPB (0 mmol Intravenous Stopped 8/4/22 0931)   potassium bicarbonate disintegrating tablet 25 mEq (25 mEq Oral  Given 8/4/22 0526)   methylPREDNISolone sodium succinate injection 60 mg (60 mg Intravenous Given 8/7/22 0838)   haloperidol lactate injection 5 mg (5 mg Intravenous Given 8/6/22 0400)   potassium bicarbonate disintegrating tablet 25 mEq (25 mEq Oral Given 8/6/22 0645)   potassium, sodium phosphates 280-160-250 mg packet 1 packet (1 packet Oral Given 8/6/22 0630)   ondansetron injection 4 mg (4 mg Intravenous Given 8/6/22 0730)   methylPREDNISolone sodium succinate injection 125 mg (125 mg Intravenous Given 8/6/22 0745)   albuterol sulfate nebulizer solution 10 mg (10 mg Nebulization Given 8/6/22 0656)   furosemide injection 40 mg (40 mg Intravenous Given 8/6/22 1029)   potassium chloride CR capsule 30 mEq (30 mEq Oral Given 8/6/22 1606)   potassium, sodium phosphates 280-160-250 mg packet 1 packet (1 packet Oral Given 8/7/22 0421)   potassium chloride SA CR tablet 40 mEq ( Oral Canceled Entry 8/7/22 0945)   QUEtiapine tablet 100 mg (100 mg Oral Given 8/7/22 2215)   haloperidol lactate injection 5 mg (5 mg Intravenous Given 8/8/22 0230)     Medical Decision Making:   History:   I obtained history from: EMS provider.       <> Summary of History: See HPI  Old Records Summarized: records from clinic visits and records from previous admission(s).  Initial Assessment:   Pt is in extremis, tachypneic, with retractions and accessory muscle use, prepped for emergent intubation  Differential Diagnosis:   Asthma exacerbation - known history    Less likely pneumothorax, anaphyllaxis  Clinical Tests:   Lab Tests: Ordered  Radiological Study: Ordered and Reviewed  ED Management:  Patient intubated, initial vent settings AC, , peep 5.0, O2 50%, Ketamine gtt ordered for sedation, OGT placed    Nebs given, ICU consulted for admission, signout given to oncoming team.            Attending Attestation:   Physician Attestation Statement for Resident:  As the supervising MD   Physician Attestation Statement: I have personally seen  and examined this patient.   I agree with the above history. -:   As the supervising MD I agree with the above PE.    As the supervising MD I agree with the above treatment, course, plan, and disposition.   -: Pt presented to ED in severe status asthmaticus, obtunded and not following commands but groaning in pain, poor air movement bilaterally, no evidence of trauma. Given mag and nebs and steroids in field. Pt emergently intubated for status asthmaticus per procedure note, RSI with ketamine and rocuronium, given continuous nebs, agitated after marsha metabolized and sedated with ketamine and fentanyl with propofol pending. D/w MICU who requests head CT prior to admission to CT given poor neuro status. S/o oncoming team pending lab results and MICU evaluation.   I have reviewed and agree with the residents interpretation of the following: lab data and x-rays.  I have reviewed the following: old records at this facility and old x-rays.                         Clinical Impression:   Final diagnoses:  [Z01.818] Encounter for intubation  [J45.52] Severe persistent asthma with status asthmaticus (Primary)          ED Disposition Condition    Admit               Edwar Queen MD  Resident  08/03/22 0902       Serena Tabares MD  08/16/22 1005

## 2022-08-03 NOTE — EICU
09:00  Called into room vis elert for an emergent central line insertion.  Physician verification done for Krunal Steiner w/ Dept of IM.  09:04  Time-out completed w/ bedside RN.  09:15  Right IJ TLC inserted per Dr. Chung using u/s guidance.  Blood return noted to all ports.  Secured & sutured.  CXR ordered.  Pt tolerated procedure well.

## 2022-08-03 NOTE — ASSESSMENT & PLAN NOTE
Hx status asthmaticus found unresponsive. Suspect respiratory failure from status asthmaticus with concomitant respiratory depression in setting of opiate use. Intubated 8/3 with initial blood gas with 7.113/86.2.     - continue lung protective ventilation  - maintain adequate sedation with propofol and fentanyl   - Follow up repeat VBG  - F/u covid test result

## 2022-08-03 NOTE — ASSESSMENT & PLAN NOTE
Patient has history of severe asthma and status asthmaticus requiring intubation in the past. Home meds include albuterol inhaler, duonebs, breo, Advair, montelukast    - methylprednisone 125 IV followed by methylprednisone 60 mg q6h  - albuterol 20 mg neb, duonebs q2h

## 2022-08-03 NOTE — ASSESSMENT & PLAN NOTE
Patient initially presented with GCS 3, given narcan and magnesium by EMS and intubated in the ED. Found to be in respiratory failure with hypercarbia and initial pCO2 of 86. AMS likely 2/2 to opioid overdose, respiratory depression and hypercapnia.     - f/u CTH, UDS, and TSH  - F/u repeat VBG  - daily SBT/SAT  - treat asthma exacerbation with steroids/duonebs. Consider more magnesium for bronchospasm

## 2022-08-03 NOTE — SUBJECTIVE & OBJECTIVE
Past Medical History:   Diagnosis Date    Asthma     Substance use disorder 10/27/2021       No past surgical history on file.    Review of patient's allergies indicates:  No Known Allergies    Family History    None       Tobacco Use    Smoking status: Current Some Day Smoker     Types: Vaping with nicotine    Smokeless tobacco: Not on file   Substance and Sexual Activity    Alcohol use: No    Drug use: Yes     Types: Marijuana, Heroin    Sexual activity: Not on file      Review of Systems   Unable to perform ROS: Intubated   Objective:     Vital Signs (Most Recent):  Temp: 97.7 °F (36.5 °C) (08/03/22 0622)  Pulse: (!) 156 (08/03/22 0622)  Resp: 14 (08/03/22 0622)  BP: (!) 161/103 (08/03/22 0622)  SpO2: 98 % (08/03/22 0613)   Vital Signs (24h Range):  Temp:  [97.3 °F (36.3 °C)-97.7 °F (36.5 °C)] 97.7 °F (36.5 °C)  Pulse:  [130-162] 156  Resp:  [14-28] 14  SpO2:  [98 %] 98 %  BP: (161-180)/(103-110) 161/103   Weight: 72.6 kg (160 lb)  Body mass index is 27.46 kg/m².    No intake or output data in the 24 hours ending 08/03/22 0632    Physical Exam  Constitutional:       Appearance: He is well-developed.      Interventions: He is sedated and intubated.   HENT:      Head: Normocephalic and atraumatic.      Nose: Nose normal.      Mouth/Throat:      Mouth: Mucous membranes are moist.   Eyes:      General: No scleral icterus.     Extraocular Movements: Extraocular movements intact.      Pupils: Pupils are equal, round, and reactive to light.   Cardiovascular:      Rate and Rhythm: Regular rhythm. Tachycardia present.      Pulses: Normal pulses.      Heart sounds: Normal heart sounds. No murmur heard.  Pulmonary:      Effort: He is intubated.      Comments: intubated  Abdominal:      General: There is no distension.      Palpations: Abdomen is soft. There is no mass.      Tenderness: There is no abdominal tenderness. There is no guarding.   Musculoskeletal:      Cervical back: Rigidity present.      Right lower leg: No  edema.      Left lower leg: No edema.   Skin:     General: Skin is warm and dry.      Coloration: Skin is not jaundiced.      Findings: No bruising.      Comments: Multiple tattoos present       Vents:     Lines/Drains/Airways       Drain  Duration                  NG/OG Tube 08/03/22 0610 [place] sump 18 Fr. Center mouth <1 day         Urethral Catheter 08/03/22 0610 Temperature probe 16 Fr. <1 day              Airway  Duration                  Airway - Non-Surgical 08/03/22 0605 Endotracheal Tube <1 day              Peripheral Intravenous Line  Duration                  Peripheral IV - Single Lumen 08/03/22 0550 18 G Right Antecubital <1 day         Peripheral IV - Single Lumen 08/03/22 0558 20 G Left Hand <1 day                  Significant Labs:    CBC/Anemia Profile:  Recent Labs   Lab 08/03/22  0611   WBC 16.64*   HGB 13.1*   HCT 41.3      MCV 90   RDW 13.0        Chemistries:  No results for input(s): NA, K, CL, CO2, BUN, CREATININE, CALCIUM, ALBUMIN, PROT, BILITOT, ALKPHOS, ALT, AST, GLUCOSE, MG, PHOS in the last 48 hours.    All pertinent labs within the past 24 hours have been reviewed.    Significant Imaging: I have reviewed all pertinent imaging results/findings within the past 24 hours.

## 2022-08-03 NOTE — ED TRIAGE NOTES
27 year old male with history of asthma and heroin abuse presents to the ED for acute shortness of breath. Per EMS patient was snorting heroin prior to presentation. Became acutely short of breath, minimally responsive upon arrival.

## 2022-08-03 NOTE — PROGRESS NOTES
Pharmacokinetic Initial Assessment: IV Vancomycin    Assessment/Plan:    Initiate intravenous vancomycin with loading dose of 1250 mg once followed by a maintenance dose of vancomycin 750mg IV every 8 hours  Desired empiric serum trough concentration is 15 to 20 mcg/mL  Draw vancomycin trough level 60 min prior to fourth dose on 8/4 at approximately 1000  Pharmacy will continue to follow and monitor vancomycin.      Please contact pharmacy at extension [phone] with any questions regarding this assessment.     Thank you for the consult,   Carole Amaya       Patient brief summary:  Tucker Roberto is a 27 y.o. male initiated on antimicrobial therapy with IV Vancomycin for treatment of suspected Pneumonia  Drug Allergies:   Review of patient's allergies indicates:  No Known Allergies    Actual Body Weight:   62 kg    Renal Function:   Estimated Creatinine Clearance: 75.5 mL/min (based on SCr of 1.3 mg/dL).,     Dialysis Method (if applicable):  N/A    CBC (last 72 hours):  Recent Labs   Lab Result Units 08/03/22  0611   WBC K/uL 16.64*   Hemoglobin g/dL 13.1*   Hematocrit % 41.3   Platelets K/uL 342   Gran % % 39.6   Lymph % % 43.7   Mono % % 8.6   Eosinophil % % 6.9   Basophil % % 0.4   Differential Method  Automated       Metabolic Panel (last 72 hours):  Recent Labs   Lab Result Units 08/03/22  0611 08/03/22  0635 08/03/22  0638   Sodium mmol/L 138  --   --    Potassium mmol/L 4.4  --   --    Chloride mmol/L 102  --   --    CO2 mmol/L 24  --   --    Glucose mg/dL 285*  --   --    Glucose, UA   --   --  3+*   BUN mg/dL 8  --   --    Creatinine mg/dL 1.3  --   --    Creatinine, Urine mg/dL  --  61.0  61.0  --    Albumin g/dL 4.1  --   --    Total Bilirubin mg/dL 0.3  --   --    Alkaline Phosphatase U/L 73  --   --    AST U/L 25  --   --    ALT U/L 33  --   --    Magnesium mg/dL 2.5  --   --        Drug levels (last 3 results):  No results for input(s): VANCOMYCINRA, VANCORANDOM, VANCOMYCINPE, VANCOPEAK,  VANCOMYCINTR, VANCOTROUGH in the last 72 hours.    Microbiologic Results:  Microbiology Results (last 7 days)     Procedure Component Value Units Date/Time    Culture, Respiratory with Gram Stain [[id number]] Collected: 08/03/22 0929    Order Status: Sent Specimen: Respiratory from Tracheal Aspirate Updated: 08/03/22 0929    Blood culture [[id number]] Collected: 08/03/22 0652    Order Status: Sent Specimen: Blood from Peripheral, Hand, Right Updated: 08/03/22 0658    Blood culture [[id number]] Collected: 08/03/22 0652    Order Status: Sent Specimen: Blood from Peripheral, Hand, Left Updated: 08/03/22 0658

## 2022-08-03 NOTE — PLAN OF CARE
[facility]  Initial Discharge Assessment       Primary Care Provider: Karen Braxton MD    Admission Diagnosis: Encounter for intubation [Z01.818]  Severe persistent asthma with status asthmaticus [J45.52]    Admission Date: 8/3/2022  Expected Discharge Date: 8/10/2022    Discharge Barriers Identified: Underinsured    Payor: MEDICAID / Plan: [facility] COMMUNITY PLAN [facility] ([place]) / Product Type: Managed Medicaid /     Extended Emergency Contact Information  Primary Emergency Contact: Kim Robledo  Address: [address] [place] [address] [place] [address] [facility]  Home Phone: [phone]  Mobile Phone: [phone]  Relation: Mother    Discharge Plan A: Home with family  Discharge Plan B: Home, Other (substance abuse facility)      [facility]  [address]  [facility] [address]  Phone: [phone] Fax: [phone]      Transferred from:     Past Medical History:   Diagnosis Date    Asthma     Substance use disorder 10/27/2021         CM spoke with Aaliyah Trent (s/o) [phone] via phone for Discharge Planning Assessment.  Patient was unable to answer questions due being intubated on ventilator.  Per Aaliyah, patient lives with her in a 1st floor apartment with 0 step(s) to enter.   Per Aaliyah, patient was independent with ADLS and used no DME for ambulation. Per Aaliyah, patient is not on dialysis and does not take Coumadin.  Patient will have help from Aaliyah Trent (s/o) [phone], Kim Robledo (mother) [phone] upon discharge.   Discharge Planning discussed with Aaliyah (s/o) [phone] via phone.  All questions addressed.  CM will follow for needs.      Initial Assessment (most recent)     Adult Discharge Assessment - 08/03/22 1054        Discharge Assessment    Assessment Type Discharge Planning Assessment     Confirmed/corrected address, phone number and insurance Yes     Confirmed Demographics Correct on Facesheet     Source of  Information family     When was your last doctors appointment? --   April 2022    Communicated KARIE with patient/caregiver Date not available/Unable to determine     Reason For Admission Acute respiratory failure with hypoxia and hypercarbia     Lives With significant other     Facility Arrived From: Home     Do you expect to return to your current living situation? Yes     Do you have help at home or someone to help you manage your care at home? Yes     Who are your caregiver(s) and their phone number(s)? Aaliyah (s/o) [phone]     Prior to hospitilization cognitive status: Unable to Assess   due to patient's acuity    Current cognitive status: Unable to Assess;Coma/Sedated/Intubated     Walking or Climbing Stairs Difficulty none     Dressing/Bathing Difficulty none     Equipment Currently Used at Home none     Readmission within 30 days? No     Patient currently being followed by outpatient case management? No     Do you currently have service(s) that help you manage your care at home? No     Do you take prescription medications? Yes     Do you have prescription coverage? Yes     Coverage MEDICAID - [facility] COMMUNITY PLAN [facility] ([place] MED[place])     Do you have any problems affording any of your prescribed medications? No     Is the patient taking medications as prescribed? yes     Who is going to help you get home at discharge? Aaliyah (s/o) [phone], Kim Robledo (mother) [phone]     How do you get to doctors appointments? car, drives self     Are you on dialysis? No     Do you take coumadin? No     Discharge Plan A Home with family     Discharge Plan B Home;Other   substance abuse facility    DME Needed Upon Discharge  other (see comments)   TBD    Discharge Plan discussed with: Spouse/sig other     Name(s) and Number(s) Aaliyah (s/o) [phone]     Discharge Barriers Identified Underinsured        Relationship/Environment    Name(s) of Who Lives With Patient Aaliyah (s/o) [phone]                         PCP:  Karen Braxton MD  [phone]        Pharmacy:    [facility]  [address] [place] [address]  Phone: [phone] Fax: [phone]        Emergency Contacts:  Extended Emergency Contact Information  Primary Emergency Contact: Renu Robledoa  Address: [address] [place] [address] [place] [address] [facility]  Home Phone: [phone]  Mobile Phone: [phone]  Relation: Mother      Insurance:    Payor: MEDICAID / Plan: [facility] COMMUNITY PLAN [facility] ([place]) / Product Type: Managed Medicaid /     Purvi Yousif RN     [phone]      08/03/2022  12:02 PM

## 2022-08-03 NOTE — ASSESSMENT & PLAN NOTE
Per chart review, patient has history of snorting heroin (no IVDU).     - monitor for opioid withdrawal and treat accordingly   - f/u urine drug screen

## 2022-08-03 NOTE — ASSESSMENT & PLAN NOTE
Hx heroin use, previously has denied IVDU. Not responsive to naloxone with EMS.    - fu drug screen + ethanol  - F/u formal CT head results  - consider addiction psych consult once no longer critically ill

## 2022-08-03 NOTE — PROCEDURES
"Tucker Roberto is a 27 y.o. male patient.    Temp: 96.26 °F (35.7 °C) (08/03/22 1704)  Pulse: 103 (08/03/22 1704)  Resp: (!) 24 (08/03/22 1704)  BP: (!) 104/54 (08/03/22 1704)  SpO2: 100 % (08/03/22 1704)  Weight: 62 kg (136 lb 11 oz) (08/03/22 0830)  Height: 5' 6" (167.6 cm) (08/03/22 0830)       Central Line    Date/Time: 8/3/2022 6:05 PM  Performed by: Krunal Chung MD  Authorized by: Krunal Chung MD     Location procedure was performed:  [facility] CARDIAC MEDICAL ICU ([facility])  Pre-operative diagnosis:  Respiratory failure   Post-operative diagnosis:  Respiratory failure   Consent Done ?:  Emergent Situation  Time out complete?: Verified correct patient, procedure, equipment, staff, and site/side    Indications:  Med administration, vascular access and hemodynamic monitoring  Anesthesia:  Local infiltration  Local anesthetic:  Lidocaine 1% without epinephrine  Anesthetic total (ml):  5  Preparation:  Skin prepped with ChloraPrep  Skin prep agent dried: Skin prep agent completely dried prior to procedure    Sterile barriers: All five maximal sterile barriers used - gloves, gown, cap, mask and large sterile sheet    Hand hygiene: Hand hygiene performed immediately prior to central venous catheter insertion    Location:  Right internal jugular  Catheter type:  Triple lumen  Catheter size:  7 Fr  Inserted Catheter Length (cm):  15  Ultrasound guidance: Yes    Vessel Caliber:  Medium   patent  Comprressibility:  Normal  Needle advanced into vessel with real time ultrasound guidance.    Guidewire confirmed in vessel.    Steril sheath on probe.    Sterile gel used.  Manometry: Yes    Number of attempts:  1  Securement:  Line sutured, chlorhexidine patch, sterile dressing applied and blood return through all ports  Complications: No    Estimated blood loss (mL):  0  Specimens: No    Implants: No    XRay:  Placement verified by x-ray, no pneumothorax on x-ray and successful placement  Adverse Events:  " NoneTermination Site: superior vena cava        8/3/2022

## 2022-08-04 PROBLEM — R79.89 ELEVATED TROPONIN: Status: ACTIVE | Noted: 2022-08-04

## 2022-08-04 LAB
ALBUMIN SERPL BCP-MCNC: 3.2 G/DL (ref 3.5–5.2)
ALLENS TEST: ABNORMAL
ALLENS TEST: ABNORMAL
ALP SERPL-CCNC: 50 U/L (ref 55–135)
ALT SERPL W/O P-5'-P-CCNC: 26 U/L (ref 10–44)
ANION GAP SERPL CALC-SCNC: 5 MMOL/L (ref 8–16)
ANION GAP SERPL CALC-SCNC: 6 MMOL/L (ref 8–16)
ANION GAP SERPL CALC-SCNC: 6 MMOL/L (ref 8–16)
ANION GAP SERPL CALC-SCNC: 8 MMOL/L (ref 8–16)
ASCENDING AORTA: 2.36 CM
AST SERPL-CCNC: 22 U/L (ref 10–40)
AV INDEX (PROSTH): 0.78
AV MEAN GRADIENT: 6 MMHG
AV PEAK GRADIENT: 12 MMHG
AV VALVE AREA: 2.64 CM2
AV VELOCITY RATIO: 0.77
BASOPHILS # BLD AUTO: 0.01 K/UL (ref 0–0.2)
BASOPHILS NFR BLD: 0.1 % (ref 0–1.9)
BILIRUB SERPL-MCNC: 0.4 MG/DL (ref 0.1–1)
BSA FOR ECHO PROCEDURE: 1.69 M2
BUN SERPL-MCNC: 6 MG/DL (ref 6–20)
BUN SERPL-MCNC: 6 MG/DL (ref 6–20)
BUN SERPL-MCNC: 7 MG/DL (ref 6–20)
BUN SERPL-MCNC: 8 MG/DL (ref 6–20)
CALCIUM SERPL-MCNC: 7.7 MG/DL (ref 8.7–10.5)
CALCIUM SERPL-MCNC: 8.2 MG/DL (ref 8.7–10.5)
CALCIUM SERPL-MCNC: 8.5 MG/DL (ref 8.7–10.5)
CALCIUM SERPL-MCNC: 8.9 MG/DL (ref 8.7–10.5)
CHLORIDE SERPL-SCNC: 114 MMOL/L (ref 95–110)
CHLORIDE SERPL-SCNC: 122 MMOL/L (ref 95–110)
CHLORIDE SERPL-SCNC: 122 MMOL/L (ref 95–110)
CHLORIDE SERPL-SCNC: 124 MMOL/L (ref 95–110)
CO2 SERPL-SCNC: 15 MMOL/L (ref 23–29)
CO2 SERPL-SCNC: 18 MMOL/L (ref 23–29)
CO2 SERPL-SCNC: 18 MMOL/L (ref 23–29)
CO2 SERPL-SCNC: 20 MMOL/L (ref 23–29)
CREAT SERPL-MCNC: 0.9 MG/DL (ref 0.5–1.4)
CREAT SERPL-MCNC: 0.9 MG/DL (ref 0.5–1.4)
CREAT SERPL-MCNC: 1 MG/DL (ref 0.5–1.4)
CREAT SERPL-MCNC: 1 MG/DL (ref 0.5–1.4)
CV ECHO LV RWT: 0.27 CM
DELSYS: ABNORMAL
DELSYS: ABNORMAL
DIFFERENTIAL METHOD: ABNORMAL
DOP CALC AO PEAK VEL: 1.7 M/S
DOP CALC AO VTI: 29.47 CM
DOP CALC LVOT AREA: 3.4 CM2
DOP CALC LVOT DIAMETER: 2.07 CM
DOP CALC LVOT PEAK VEL: 1.31 M/S
DOP CALC LVOT STROKE VOLUME: 77.73 CM3
DOP CALCLVOT PEAK VEL VTI: 23.11 CM
E WAVE DECELERATION TIME: 146.52 MSEC
E/A RATIO: 2.13
E/E' RATIO: 5.55 M/S
ECHO LV POSTERIOR WALL: 0.68 CM (ref 0.6–1.1)
EJECTION FRACTION: 65 %
EOSINOPHIL # BLD AUTO: 0 K/UL (ref 0–0.5)
EOSINOPHIL NFR BLD: 0 % (ref 0–8)
ERYTHROCYTE [DISTWIDTH] IN BLOOD BY AUTOMATED COUNT: 13.6 % (ref 11.5–14.5)
ERYTHROCYTE [SEDIMENTATION RATE] IN BLOOD BY WESTERGREN METHOD: 28 MM/H
ERYTHROCYTE [SEDIMENTATION RATE] IN BLOOD BY WESTERGREN METHOD: 28 MM/H
EST. GFR  (NO RACE VARIABLE): >60 ML/MIN/1.73 M^2
FIO2: 28
FIO2: 28
FRACTIONAL SHORTENING: 36 % (ref 28–44)
GLUCOSE SERPL-MCNC: 139 MG/DL (ref 70–110)
GLUCOSE SERPL-MCNC: 220 MG/DL (ref 70–110)
GLUCOSE SERPL-MCNC: 485 MG/DL (ref 70–110)
GLUCOSE SERPL-MCNC: 98 MG/DL (ref 70–110)
HCO3 UR-SCNC: 18.3 MMOL/L (ref 24–28)
HCO3 UR-SCNC: 19.3 MMOL/L (ref 24–28)
HCT VFR BLD AUTO: 31.2 % (ref 40–54)
HGB BLD-MCNC: 10.8 G/DL (ref 14–18)
IMM GRANULOCYTES # BLD AUTO: 0.06 K/UL (ref 0–0.04)
IMM GRANULOCYTES NFR BLD AUTO: 0.4 % (ref 0–0.5)
INR PPP: 1.1 (ref 0.8–1.2)
INTERVENTRICULAR SEPTUM: 0.73 CM (ref 0.6–1.1)
LA MAJOR: 4.24 CM
LA MINOR: 4.37 CM
LA WIDTH: 4.22 CM
LACTATE SERPL-SCNC: 1.5 MMOL/L (ref 0.5–2.2)
LACTATE SERPL-SCNC: 6.3 MMOL/L (ref 0.5–2.2)
LEFT ATRIUM SIZE: 3.26 CM
LEFT ATRIUM VOLUME INDEX MOD: 25.1 ML/M2
LEFT ATRIUM VOLUME INDEX: 29.6 ML/M2
LEFT ATRIUM VOLUME MOD: 42.6 CM3
LEFT ATRIUM VOLUME: 50.33 CM3
LEFT INTERNAL DIMENSION IN SYSTOLE: 3.23 CM (ref 2.1–4)
LEFT VENTRICLE DIASTOLIC VOLUME INDEX: 70.89 ML/M2
LEFT VENTRICLE DIASTOLIC VOLUME: 120.51 ML
LEFT VENTRICLE MASS INDEX: 69 G/M2
LEFT VENTRICLE SYSTOLIC VOLUME INDEX: 24.7 ML/M2
LEFT VENTRICLE SYSTOLIC VOLUME: 42.01 ML
LEFT VENTRICULAR INTERNAL DIMENSION IN DIASTOLE: 5.04 CM (ref 3.5–6)
LEFT VENTRICULAR MASS: 117.34 G
LV LATERAL E/E' RATIO: 5.05 M/S
LV SEPTAL E/E' RATIO: 6.17 M/S
LYMPHOCYTES # BLD AUTO: 0.8 K/UL (ref 1–4.8)
LYMPHOCYTES NFR BLD: 5.1 % (ref 18–48)
MAGNESIUM SERPL-MCNC: 2.1 MG/DL (ref 1.6–2.6)
MCH RBC QN AUTO: 28.3 PG (ref 27–31)
MCHC RBC AUTO-ENTMCNC: 34.6 G/DL (ref 32–36)
MCV RBC AUTO: 82 FL (ref 82–98)
MODE: ABNORMAL
MODE: ABNORMAL
MONOCYTES # BLD AUTO: 0.6 K/UL (ref 0.3–1)
MONOCYTES NFR BLD: 3.9 % (ref 4–15)
MV A" WAVE DURATION": 9.51 MSEC
MV PEAK A VEL: 0.52 M/S
MV PEAK E VEL: 1.11 M/S
MV STENOSIS PRESSURE HALF TIME: 42.49 MS
MV VALVE AREA P 1/2 METHOD: 5.18 CM2
NEUTROPHILS # BLD AUTO: 13.4 K/UL (ref 1.8–7.7)
NEUTROPHILS NFR BLD: 90.5 % (ref 38–73)
NRBC BLD-RTO: 0 /100 WBC
PCO2 BLDA: 31.1 MMHG (ref 35–45)
PCO2 BLDA: 34.8 MMHG (ref 35–45)
PEEP: 5
PEEP: 5
PH SMN: 7.35 [PH] (ref 7.35–7.45)
PH SMN: 7.38 [PH] (ref 7.35–7.45)
PHOSPHATE SERPL-MCNC: 3.6 MG/DL (ref 2.7–4.5)
PHOSPHATE SERPL-MCNC: <1 MG/DL (ref 2.7–4.5)
PISA TR MAX VEL: 2.47 M/S
PLATELET # BLD AUTO: 246 K/UL (ref 150–450)
PMV BLD AUTO: 10.1 FL (ref 9.2–12.9)
PO2 BLDA: 70 MMHG (ref 40–60)
PO2 BLDA: 76 MMHG (ref 40–60)
POC BE: -6 MMOL/L
POC BE: -7 MMOL/L
POC SATURATED O2: 93 % (ref 95–100)
POC SATURATED O2: 95 % (ref 95–100)
POC TCO2: 19 MMOL/L (ref 24–29)
POC TCO2: 20 MMOL/L (ref 24–29)
POCT GLUCOSE: 107 MG/DL (ref 70–110)
POCT GLUCOSE: 121 MG/DL (ref 70–110)
POCT GLUCOSE: 130 MG/DL (ref 70–110)
POCT GLUCOSE: 168 MG/DL (ref 70–110)
POCT GLUCOSE: 182 MG/DL (ref 70–110)
POCT GLUCOSE: 462 MG/DL (ref 70–110)
POCT GLUCOSE: 89 MG/DL (ref 70–110)
POCT GLUCOSE: 96 MG/DL (ref 70–110)
POTASSIUM SERPL-SCNC: 3.3 MMOL/L (ref 3.5–5.1)
POTASSIUM SERPL-SCNC: 3.7 MMOL/L (ref 3.5–5.1)
POTASSIUM SERPL-SCNC: 3.7 MMOL/L (ref 3.5–5.1)
POTASSIUM SERPL-SCNC: 3.8 MMOL/L (ref 3.5–5.1)
PROT SERPL-MCNC: 5.6 G/DL (ref 6–8.4)
PROTHROMBIN TIME: 11.1 SEC (ref 9–12.5)
PULM VEIN S/D RATIO: 1.39
PV PEAK D VEL: 0.57 M/S
PV PEAK S VEL: 0.79 M/S
RA MAJOR: 3.78 CM
RA PRESSURE: 15 MMHG
RA WIDTH: 3.42 CM
RBC # BLD AUTO: 3.82 M/UL (ref 4.6–6.2)
RIGHT VENTRICULAR END-DIASTOLIC DIMENSION: 3.24 CM
SAMPLE: ABNORMAL
SAMPLE: ABNORMAL
SARS-COV-2 RNA RESP QL NAA+PROBE: NOT DETECTED
SINUS: 2.77 CM
SITE: ABNORMAL
SITE: ABNORMAL
SODIUM SERPL-SCNC: 138 MMOL/L (ref 136–145)
SODIUM SERPL-SCNC: 145 MMOL/L (ref 136–145)
SODIUM SERPL-SCNC: 147 MMOL/L (ref 136–145)
SODIUM SERPL-SCNC: 147 MMOL/L (ref 136–145)
SODIUM SERPL-SCNC: 148 MMOL/L (ref 136–145)
STJ: 2.08 CM
TDI LATERAL: 0.22 M/S
TDI SEPTAL: 0.18 M/S
TDI: 0.2 M/S
TR MAX PG: 24 MMHG
TRICUSPID ANNULAR PLANE SYSTOLIC EXCURSION: 2.08 CM
TROPONIN I SERPL DL<=0.01 NG/ML-MCNC: 0.11 NG/ML (ref 0–0.03)
TROPONIN I SERPL DL<=0.01 NG/ML-MCNC: 0.11 NG/ML (ref 0–0.03)
TV REST PULMONARY ARTERY PRESSURE: 39 MMHG
VANCOMYCIN TROUGH SERPL-MCNC: 10.7 UG/ML (ref 10–22)
VT: 490
VT: 490
WBC # BLD AUTO: 14.76 K/UL (ref 3.9–12.7)

## 2022-08-04 PROCEDURE — 80048 BASIC METABOLIC PNL TOTAL CA: CPT | Mod: XB

## 2022-08-04 PROCEDURE — 93010 ELECTROCARDIOGRAM REPORT: CPT | Mod: ,,, | Performed by: INTERNAL MEDICINE

## 2022-08-04 PROCEDURE — 20000000 HC ICU ROOM

## 2022-08-04 PROCEDURE — 84484 ASSAY OF TROPONIN QUANT: CPT | Performed by: EMERGENCY MEDICINE

## 2022-08-04 PROCEDURE — 25000003 PHARM REV CODE 250: Performed by: STUDENT IN AN ORGANIZED HEALTH CARE EDUCATION/TRAINING PROGRAM

## 2022-08-04 PROCEDURE — 83735 ASSAY OF MAGNESIUM: CPT | Performed by: STUDENT IN AN ORGANIZED HEALTH CARE EDUCATION/TRAINING PROGRAM

## 2022-08-04 PROCEDURE — 95714 VEEG EA 12-26 HR UNMNTR: CPT

## 2022-08-04 PROCEDURE — 63600175 PHARM REV CODE 636 W HCPCS: Performed by: STUDENT IN AN ORGANIZED HEALTH CARE EDUCATION/TRAINING PROGRAM

## 2022-08-04 PROCEDURE — 25000003 PHARM REV CODE 250

## 2022-08-04 PROCEDURE — 94761 N-INVAS EAR/PLS OXIMETRY MLT: CPT

## 2022-08-04 PROCEDURE — 94640 AIRWAY INHALATION TREATMENT: CPT

## 2022-08-04 PROCEDURE — 84484 ASSAY OF TROPONIN QUANT: CPT | Mod: 91

## 2022-08-04 PROCEDURE — 93010 EKG 12-LEAD: ICD-10-PCS | Mod: ,,, | Performed by: INTERNAL MEDICINE

## 2022-08-04 PROCEDURE — 51702 INSERT TEMP BLADDER CATH: CPT

## 2022-08-04 PROCEDURE — 99900026 HC AIRWAY MAINTENANCE (STAT)

## 2022-08-04 PROCEDURE — 84100 ASSAY OF PHOSPHORUS: CPT | Performed by: STUDENT IN AN ORGANIZED HEALTH CARE EDUCATION/TRAINING PROGRAM

## 2022-08-04 PROCEDURE — 99291 PR CRITICAL CARE, E/M 30-74 MINUTES: ICD-10-PCS | Mod: ,,, | Performed by: EMERGENCY MEDICINE

## 2022-08-04 PROCEDURE — 25000003 PHARM REV CODE 250: Performed by: EMERGENCY MEDICINE

## 2022-08-04 PROCEDURE — 84100 ASSAY OF PHOSPHORUS: CPT | Mod: 91

## 2022-08-04 PROCEDURE — U0005 INFEC AGEN DETEC AMPLI PROBE: HCPCS | Performed by: NURSE PRACTITIONER

## 2022-08-04 PROCEDURE — 99291 CRITICAL CARE FIRST HOUR: CPT | Mod: ,,, | Performed by: EMERGENCY MEDICINE

## 2022-08-04 PROCEDURE — 83605 ASSAY OF LACTIC ACID: CPT

## 2022-08-04 PROCEDURE — 94003 VENT MGMT INPAT SUBQ DAY: CPT

## 2022-08-04 PROCEDURE — 84295 ASSAY OF SERUM SODIUM: CPT

## 2022-08-04 PROCEDURE — U0003 INFECTIOUS AGENT DETECTION BY NUCLEIC ACID (DNA OR RNA); SEVERE ACUTE RESPIRATORY SYNDROME CORONAVIRUS 2 (SARS-COV-2) (CORONAVIRUS DISEASE [COVID-19]), AMPLIFIED PROBE TECHNIQUE, MAKING USE OF HIGH THROUGHPUT TECHNOLOGIES AS DESCRIBED BY CMS-2020-01-R: HCPCS | Performed by: NURSE PRACTITIONER

## 2022-08-04 PROCEDURE — 80202 ASSAY OF VANCOMYCIN: CPT | Performed by: EMERGENCY MEDICINE

## 2022-08-04 PROCEDURE — 27000221 HC OXYGEN, UP TO 24 HOURS

## 2022-08-04 PROCEDURE — 99900035 HC TECH TIME PER 15 MIN (STAT)

## 2022-08-04 PROCEDURE — 85025 COMPLETE CBC W/AUTO DIFF WBC: CPT | Performed by: STUDENT IN AN ORGANIZED HEALTH CARE EDUCATION/TRAINING PROGRAM

## 2022-08-04 PROCEDURE — 25000242 PHARM REV CODE 250 ALT 637 W/ HCPCS

## 2022-08-04 PROCEDURE — 93005 ELECTROCARDIOGRAM TRACING: CPT

## 2022-08-04 PROCEDURE — 63600175 PHARM REV CODE 636 W HCPCS

## 2022-08-04 PROCEDURE — 80053 COMPREHEN METABOLIC PANEL: CPT | Performed by: STUDENT IN AN ORGANIZED HEALTH CARE EDUCATION/TRAINING PROGRAM

## 2022-08-04 PROCEDURE — 63600175 PHARM REV CODE 636 W HCPCS: Performed by: EMERGENCY MEDICINE

## 2022-08-04 PROCEDURE — 27200966 HC CLOSED SUCTION SYSTEM

## 2022-08-04 PROCEDURE — 85610 PROTHROMBIN TIME: CPT

## 2022-08-04 RX ORDER — VANCOMYCIN HCL IN 5 % DEXTROSE 1G/250ML
1000 PLASTIC BAG, INJECTION (ML) INTRAVENOUS
Status: DISCONTINUED | OUTPATIENT
Start: 2022-08-04 | End: 2022-08-06

## 2022-08-04 RX ORDER — SODIUM,POTASSIUM PHOSPHATES 280-250MG
2 POWDER IN PACKET (EA) ORAL ONCE
Status: COMPLETED | OUTPATIENT
Start: 2022-08-04 | End: 2022-08-04

## 2022-08-04 RX ORDER — IPRATROPIUM BROMIDE AND ALBUTEROL SULFATE 2.5; .5 MG/3ML; MG/3ML
3 SOLUTION RESPIRATORY (INHALATION) EVERY 6 HOURS
Status: DISCONTINUED | OUTPATIENT
Start: 2022-08-04 | End: 2022-08-09

## 2022-08-04 RX ORDER — THIAMINE HCL 100 MG
100 TABLET ORAL DAILY
Status: DISCONTINUED | OUTPATIENT
Start: 2022-08-04 | End: 2022-08-06

## 2022-08-04 RX ORDER — MUPIROCIN 20 MG/G
OINTMENT TOPICAL 2 TIMES DAILY
Status: COMPLETED | OUTPATIENT
Start: 2022-08-04 | End: 2022-08-08

## 2022-08-04 RX ADMIN — PROPOFOL 50 MCG/KG/MIN: 10 INJECTION, EMULSION INTRAVENOUS at 09:08

## 2022-08-04 RX ADMIN — MINERAL OIL, PETROLATUM: 425; 573 OINTMENT OPHTHALMIC at 02:08

## 2022-08-04 RX ADMIN — IPRATROPIUM BROMIDE AND ALBUTEROL SULFATE 3 ML: .5; 3 SOLUTION RESPIRATORY (INHALATION) at 03:08

## 2022-08-04 RX ADMIN — SODIUM PHOSPHATE, MONOBASIC, MONOHYDRATE 39.99 MMOL: 276; 142 INJECTION, SOLUTION INTRAVENOUS at 05:08

## 2022-08-04 RX ADMIN — PIPERACILLIN SODIUM AND TAZOBACTAM SODIUM 4.5 G: 4; .5 INJECTION, POWDER, LYOPHILIZED, FOR SOLUTION INTRAVENOUS at 05:08

## 2022-08-04 RX ADMIN — Medication 300 MCG/HR: at 07:08

## 2022-08-04 RX ADMIN — PIPERACILLIN SODIUM AND TAZOBACTAM SODIUM 4.5 G: 4; .5 INJECTION, POWDER, LYOPHILIZED, FOR SOLUTION INTRAVENOUS at 12:08

## 2022-08-04 RX ADMIN — INSULIN ASPART 10 UNITS: 100 INJECTION, SOLUTION INTRAVENOUS; SUBCUTANEOUS at 11:08

## 2022-08-04 RX ADMIN — SODIUM BICARBONATE 650 MG TABLET 650 MG: at 08:08

## 2022-08-04 RX ADMIN — PROPOFOL 50 MCG/KG/MIN: 10 INJECTION, EMULSION INTRAVENOUS at 12:08

## 2022-08-04 RX ADMIN — THIAMINE HYDROCHLORIDE 500 MG: 100 INJECTION, SOLUTION INTRAMUSCULAR; INTRAVENOUS at 01:08

## 2022-08-04 RX ADMIN — POTASSIUM BICARBONATE 25 MEQ: 978 TABLET, EFFERVESCENT ORAL at 05:08

## 2022-08-04 RX ADMIN — FAMOTIDINE 20 MG: 20 TABLET ORAL at 08:08

## 2022-08-04 RX ADMIN — PROPOFOL 50 MCG/KG/MIN: 10 INJECTION, EMULSION INTRAVENOUS at 08:08

## 2022-08-04 RX ADMIN — VANCOMYCIN HYDROCHLORIDE 750 MG: 750 INJECTION, POWDER, LYOPHILIZED, FOR SOLUTION INTRAVENOUS at 02:08

## 2022-08-04 RX ADMIN — MUPIROCIN: 20 OINTMENT TOPICAL at 09:08

## 2022-08-04 RX ADMIN — FAMOTIDINE 20 MG: 20 TABLET ORAL at 09:08

## 2022-08-04 RX ADMIN — METHYLPREDNISOLONE SODIUM SUCCINATE 60 MG: 40 INJECTION, POWDER, FOR SOLUTION INTRAMUSCULAR; INTRAVENOUS at 11:08

## 2022-08-04 RX ADMIN — PIPERACILLIN SODIUM AND TAZOBACTAM SODIUM 4.5 G: 4; .5 INJECTION, POWDER, LYOPHILIZED, FOR SOLUTION INTRAVENOUS at 09:08

## 2022-08-04 RX ADMIN — MINERAL OIL, PETROLATUM: 425; 573 OINTMENT OPHTHALMIC at 09:08

## 2022-08-04 RX ADMIN — METHYLPREDNISOLONE SODIUM SUCCINATE 60 MG: 40 INJECTION, POWDER, FOR SOLUTION INTRAMUSCULAR; INTRAVENOUS at 05:08

## 2022-08-04 RX ADMIN — INSULIN ASPART 1 UNITS: 100 INJECTION, SOLUTION INTRAVENOUS; SUBCUTANEOUS at 12:08

## 2022-08-04 RX ADMIN — MUPIROCIN: 20 OINTMENT TOPICAL at 01:08

## 2022-08-04 RX ADMIN — Medication 100 MG: at 10:08

## 2022-08-04 RX ADMIN — VANCOMYCIN HYDROCHLORIDE 1000 MG: 1 INJECTION, POWDER, LYOPHILIZED, FOR SOLUTION INTRAVENOUS at 06:08

## 2022-08-04 RX ADMIN — IPRATROPIUM BROMIDE AND ALBUTEROL SULFATE 3 ML: .5; 3 SOLUTION RESPIRATORY (INHALATION) at 07:08

## 2022-08-04 RX ADMIN — ENOXAPARIN SODIUM 40 MG: 100 INJECTION SUBCUTANEOUS at 04:08

## 2022-08-04 RX ADMIN — IPRATROPIUM BROMIDE AND ALBUTEROL SULFATE 3 ML: 2.5; .5 SOLUTION RESPIRATORY (INHALATION) at 07:08

## 2022-08-04 RX ADMIN — PROPOFOL 50 MCG/KG/MIN: 10 INJECTION, EMULSION INTRAVENOUS at 06:08

## 2022-08-04 RX ADMIN — SODIUM BICARBONATE 650 MG TABLET 650 MG: at 09:08

## 2022-08-04 RX ADMIN — CISATRACURIUM BESYLATE 2.5 MCG/KG/MIN: 200 INJECTION INTRAVENOUS at 08:08

## 2022-08-04 RX ADMIN — KETAMINE HYDROCHLORIDE 12.5 MCG/KG/MIN: 50 INJECTION INTRAMUSCULAR; INTRAVENOUS at 06:08

## 2022-08-04 RX ADMIN — PROPOFOL 50 MCG/KG/MIN: 10 INJECTION, EMULSION INTRAVENOUS at 04:08

## 2022-08-04 RX ADMIN — MUPIROCIN: 20 OINTMENT TOPICAL at 08:08

## 2022-08-04 RX ADMIN — VANCOMYCIN HYDROCHLORIDE 750 MG: 750 INJECTION, POWDER, LYOPHILIZED, FOR SOLUTION INTRAVENOUS at 11:08

## 2022-08-04 RX ADMIN — METHYLPREDNISOLONE SODIUM SUCCINATE 60 MG: 40 INJECTION, POWDER, FOR SOLUTION INTRAMUSCULAR; INTRAVENOUS at 04:08

## 2022-08-04 RX ADMIN — METHYLPREDNISOLONE SODIUM SUCCINATE 60 MG: 40 INJECTION, POWDER, FOR SOLUTION INTRAMUSCULAR; INTRAVENOUS at 12:08

## 2022-08-04 RX ADMIN — MINERAL OIL, PETROLATUM: 425; 573 OINTMENT OPHTHALMIC at 05:08

## 2022-08-04 RX ADMIN — IPRATROPIUM BROMIDE AND ALBUTEROL SULFATE 3 ML: 2.5; .5 SOLUTION RESPIRATORY (INHALATION) at 02:08

## 2022-08-04 RX ADMIN — MIDAZOLAM HYDROCHLORIDE 3 MG/HR: 1 INJECTION, SOLUTION INTRAVENOUS at 02:08

## 2022-08-04 RX ADMIN — POTASSIUM & SODIUM PHOSPHATES POWDER PACK 280-160-250 MG 2 PACKET: 280-160-250 PACK at 05:08

## 2022-08-04 RX ADMIN — KETAMINE HYDROCHLORIDE 12.5 MCG/KG/MIN: 50 INJECTION INTRAMUSCULAR; INTRAVENOUS at 03:08

## 2022-08-04 RX ADMIN — Medication 150 MCG/HR: at 04:08

## 2022-08-04 RX ADMIN — INSULIN ASPART 2 UNITS: 100 INJECTION, SOLUTION INTRAVENOUS; SUBCUTANEOUS at 05:08

## 2022-08-04 NOTE — PROGRESS NOTES
Pharmacokinetic Assessment Follow Up: IV Vancomycin    Vancomycin serum concentration assessment(s):    Vancomycin trough level resulted at 10.7 mcg/mL, drawn appropriately. Goal level is 15 to 20 mcg/mL.     Drug levels (last 3 results):  Recent Labs   Lab Result Units 08/04/22  0954   Vancomycin-Trough ug/mL 10.7     Vancomycin Regimen Plan:    Increase to vancomycin 1000 mg IV every 8 hours with next serum trough concentration measured on 8/6 1730.     Pharmacy will continue to follow and monitor vancomycin.    Please contact pharmacy at extension [phone] for questions regarding this assessment.    Thank you for the consult,   Sera Luna, PharmD, BCCCP             Patient brief summary:  Tucker Roberto is a 27 y.o. male initiated on antimicrobial therapy with IV vancomycin for treatment of lower respiratory infection    Drug Allergies:   Review of patient's allergies indicates:  No Known Allergies    Actual Body Weight:   61.7 kg     Renal Function:   Estimated Creatinine Clearance: 108.5 mL/min (based on SCr of 0.9 mg/dL).    Dialysis Method (if applicable):  N/A    CBC (last 72 hours):  Recent Labs   Lab Result Units 08/03/22  0611 08/04/22  0407   WBC K/uL 16.64* 14.76*   Hemoglobin g/dL 13.1* 10.8*   Hematocrit % 41.3 31.2*   Platelets K/uL 342 246   Gran % % 39.6 90.5*   Lymph % % 43.7 5.1*   Mono % % 8.6 3.9*   Eosinophil % % 6.9 0.0   Basophil % % 0.4 0.1   Differential Method  Automated Automated       Metabolic Panel (last 72 hours):  Recent Labs   Lab Result Units 08/03/22  0611 08/03/22  0635 08/03/22  0638 08/03/22  1049 08/03/22  1200 08/03/22  1305 08/03/22  1422 08/03/22  1510 08/03/22  1708 08/03/22  1805 08/03/22  1927 08/03/22  2238 08/04/22  0407 08/04/22  0636 08/04/22  0817 08/04/22  1146 08/04/22  1532   Sodium mmol/L 138  --   --  145 144 144  145 143 144 145 146* 142 145  145 145 147* 148* 138 147*   Potassium mmol/L 4.4  --   --   --   --  3.1*  --   --   --   --   --  3.5  3.5  3.3*  --  3.7 3.8 3.7   Chloride mmol/L 102  --   --   --   --  113*  --   --   --   --   --  121*  121* 122*  --  124* 114* 122*   CO2 mmol/L 24  --   --   --   --  15*  --   --   --   --   --  15*  15* 15*  --  18* 18* 20*   Glucose mg/dL 285*  --   --   --   --  237*  --   --   --   --   --  223*  223* 220*  --  139* 485* 98   Glucose, UA   --   --  3+*  --   --   --   --   --   --   --   --   --   --   --   --   --   --    BUN mg/dL 8  --   --   --   --  11  --   --   --   --   --  9  9 8  --  7 6 6   Creatinine mg/dL 1.3  --   --   --   --  1.2  --   --   --   --   --  1.2  1.2 1.0  --  0.9 1.0 0.9   Creatinine, Urine mg/dL  --  61.0  61.0  --   --   --   --   --   --   --   --   --   --   --   --   --   --   --    Albumin g/dL 4.1  --   --   --   --   --   --   --   --   --   --  3.3* 3.2*  --   --   --   --    Total Bilirubin mg/dL 0.3  --   --   --   --   --   --   --   --   --   --  0.3 0.4  --   --   --   --    Alkaline Phosphatase U/L 73  --   --   --   --   --   --   --   --   --   --  47* 50*  --   --   --   --    AST U/L 25  --   --   --   --   --   --   --   --   --   --  22 22  --   --   --   --    ALT U/L 33  --   --   --   --   --   --   --   --   --   --  29 26  --   --   --   --    Magnesium mg/dL 2.5  --   --   --   --   --   --   --   --   --   --   --  2.1  --   --   --   --    Phosphorus mg/dL  --   --   --   --   --   --   --   --   --   --   --   --  <1.0*  --   --  3.6  --        Vancomycin Administrations:  vancomycin given in the last 96 hours                   vancomycin 750 mg in dextrose 5 % 250 mL IVPB (ready to mix system) (mg) 750 mg New Bag 08/04/22 1110     750 mg New Bag  0251     750 mg New Bag 08/03/22 1946    vancomycin 1.25 g in dextrose 5% 250 mL IVPB (ready to mix) (mg) 1,250 mg New Bag 08/03/22 1115                Microbiologic Results:  Microbiology Results (last 7 days)     Procedure Component Value Units Date/Time    Culture, Respiratory with Gram Stain  [[id number]]  (Abnormal) Collected: 08/03/22 0929    Order Status: Completed Specimen: Respiratory from Tracheal Aspirate Updated: 08/04/22 1047     Respiratory Culture STAPHYLOCOCCUS AUREUS  Few  Susceptibility pending  Normal respiratory ary also present       Gram Stain (Respiratory) <10 epithelial cells per low power field.     Gram Stain (Respiratory) Rare WBC's     Gram Stain (Respiratory) Few Gram negative rods     Gram Stain (Respiratory) Rare Gram positive cocci    Blood culture [[id number]] Collected: 08/03/22 0652    Order Status: Completed Specimen: Blood from Peripheral, Hand, Left Updated: 08/04/22 1012     Blood Culture, Routine No Growth to date      No Growth to date    Blood culture [[id number]] Collected: 08/03/22 0652    Order Status: Completed Specimen: Blood from Peripheral, Hand, Right Updated: 08/04/22 1012     Blood Culture, Routine No Growth to date      No Growth to date

## 2022-08-04 NOTE — ASSESSMENT & PLAN NOTE
Hx heroin use, previously has denied IVDU. Not responsive to naloxone with EMS.    - UDS and ethanol negative   - CTH with diffuse cerebral edema and concern for hypoxic-ischemic injury; f/u MRI results  - consider addiction psych consult once no longer critically ill

## 2022-08-04 NOTE — ASSESSMENT & PLAN NOTE
EKG with ST elevations vs. Early repolarization. Troponin x1 elevated at 0.114 today; no previous Tpn collected on arrival for comparison.    -repeat EKG with more likely early repolarization   -f/u TTE results  -f/u Tpn x2

## 2022-08-04 NOTE — PROCEDURES
24 hr. Video EEG Monitoring    Date/Time: 8/3/2022 5:47 AM  Performed by: Bryce Painting MD  Authorized by: Judy Brewer MD       ICU EEG/VIDEO MONITORING REPORT    DATE OF SERVICE: 8/3/22-8/4/22  EEG NUMBER: FH -1,2  REQUESTED BY: Osman  LOCATION OF SERVICE: [facility]     METHODOLOGY   Electroencephalographic (EEG) recording is with electrodes placed according to the International 10-20 placement system.  Thirty two (32) channels of digital signal are simultaneously recorded from the scalp and may include EKG, EMG, and/or eye monitors.   Recording band pass was 0.1 to 512 hz.  Digital video recording of the patient is simultaneously recorded with the EEG.  The nursing staff report clinical symptoms and may press an event button when the patient has symptoms of clinical interest to the treating physicians.  EEG and video recording is stored and archived in digital format.  The entire recording is visually reviewed and the times identified by computer analysis as being spikes or seizures are reviewed again.  Activation procedures which include photic stimulation, hyperventilation and instructing patients to perform simple task are done in selected patients.   Compresses spectral analysis (CSA) is also performed on the activity recorded from each individual channel.  This is displayed as a power display of frequencies from 0 to 30 Hz over time.   The CSA analysis is done and displayed continuously.  This is reviewed for asymmetries in power between homologous areas of the scalp and for presence of changes in power which canbe seen when seizures occur.  Sections of suspected abnormalities on the CSA is then compared with the original EEG recording.     Namely software was also utilized in the review of this study.  This software suite analyzes the EEG recording in multiple domains.  Coherence and rhythmicity is computed to identify EEG sections which may contain organized seizures.  Each channel undergoes  analysis to detect presence of spike and sharp waves which have special and morphological characteristic of epileptic activity.  The routine EEG recording is converted from spacial into frequency domain.  This is then displayed comparing homologous areas to identify areas of significant asymmetry.  Algorithm to identify non-cortically generated artifact is used to separate eye movement, EMG and other artifact from the EEG.      Recording Times  Start on 8/3/22 at 11:06:09  Stop on 8/4/22 at 15:54:36  Start on 8/4/22 at 17:15:12  Stop on 8/4/22 at 09:37:14  A total of 20 hours of EEG was recorded.    EEG FINDINGS  The record shows a poor organization at rest, with no discernible posterior dominant rhythm with poor reactivity. There is mild bilateral beta activity. There is continuous diffuse delta and theta background slowing.    Drowsiness is characterized by attenuation of the background, vertex waves, and bilateral theta slowing.     Provocative maneuvers including hyperventilation and photic stimulation were not performed.     EKG recording shows a regular rhythm.    There is one push button or clinical event at 15:18 for shivering and gag with no electrographic correlate. No epileptiform discharges or electrographic seizures are seen.     IMPRESSION:  Abnormal study due to severe diffuse background slowing consistent with diffuse cerebral dysfunction and encephalopathy which may be on the basis of toxic, metabolic, or primary neuronal disorder.

## 2022-08-04 NOTE — ASSESSMENT & PLAN NOTE
Patient initially presented with GCS 3, given narcan and magnesium by EMS and intubated in the ED. Found to be in respiratory failure with hypercarbia and initial pCO2 of 86. AMS likely 2/2 to opioid overdose, respiratory depression and hypercapnia. CTH with diffuse cerebral edema and concern for hypoxic-ischemic encephalopathy. EEG video monitoring showing diffuse slowing with no seizure-like activity.     - f/u TTE results  - MRI today; KUB without metallic foreign body   - VBG improving; de-escalated duonebs; Lactate now WNL  - Na 147 now on hypertonic saline rate 40; with Sodium bicarbonate 650mg BID  - Will d/c hypertonic saline this afternoon; f/u q4h BMPs   - Phos <1 this morning; repleted. F/u 12pm repeat Phos  - q6h glucose accuchecks - goal normoglycemia  - UDS negative, TSH WNL

## 2022-08-04 NOTE — ASSESSMENT & PLAN NOTE
Per chart review, patient has history of snorting heroin (no IVDU).     - monitor for opioid withdrawal and treat accordingly

## 2022-08-04 NOTE — HOSPITAL COURSE
Patient admitted to MICU for encephalopathy and acute hypoxemic and hypercapnic respiratory failure secondary to opioid overdose (admitted to using fentanyl) triggering asthma exacerbation. He was intubated in the ED and sedated with ketamine, versed, fentanyl,and propofol with nimbex added for ventilator dyssynchrony. His CTH was concerning for generalized cerebral edema. Patient received central line placement on 8/3 for hypertonic saline administration which was continued for 1 day. MRI brain without acute changes. Patient received Vanc/Zosyn in setting of intubation for acute respiratory failure for ppx of potential aspiration; respiratory cultures w/ MSSA for which he completed a 7 day course of antibiotics. Over 8/3, patient developed metabolic acidosis with increased lactate after episode of myoclonic movements with no associated seizure activity on EEG. TTE WNL. Patient self-extubated on the morninig of 8/6 d/t agitation; VBG WNL and did well post-extubation. Addiction Psychiatry was consulted to help facilitate discussion regarding detox/rehab and potential resumption of suboxone. On 8/9, he was stable to discharge from the ICU. He will need close follow up with psychiatry, pulmonary, and PCP.  Vitals:    08/09/22 1000   BP: 123/70   Pulse: (!) 54   Resp: (!) 32   Temp:      Constitutional:       General: He is not in acute distress.     Appearance: Normal appearance. He is normal weight. He is not ill-appearing.   HENT:      Head: Normocephalic and atraumatic.      Right Ear: External ear normal.      Left Ear: External ear normal.      Nose: Nose normal.      Mouth/Throat:      Mouth: Mucous membranes are moist.      Pharynx: Oropharynx is clear.   Eyes:      Extraocular Movements: Extraocular movements intact.      Conjunctiva/sclera: Conjunctivae normal.   Cardiovascular:      Rate and Rhythm: Normal rate and regular rhythm.      Pulses: Normal pulses.      Heart sounds: Normal heart sounds.    Pulmonary:      Effort: Pulmonary effort is normal. No respiratory distress.      Breath sounds: No stridor. No wheezing.      Comments: BS clear bilaterally   Abdominal:      General: Abdomen is flat. There is no distension.      Palpations: Abdomen is soft.      Tenderness: There is no abdominal tenderness.   Musculoskeletal:      Cervical back: Neck supple.      Right lower leg: No edema.      Left lower leg: No edema.   Skin:     General: Skin is warm and dry.      Capillary Refill: Capillary refill takes less than 2 seconds.      Comments: Multiple tattoos present     Neurological:      General: No focal deficit present.      Mental Status: He is oriented to person, place, and time.      Comments: Patient with slight intermittent agitation/disorientation. Conversant    Psychiatric:         Mood and Affect: Mood normal.         Behavior: Behavior normal.

## 2022-08-04 NOTE — SUBJECTIVE & OBJECTIVE
Interval History/Significant Events: Patient with decreased UOP overnight; found to have significant obstructing bladder sediment which was irrigated. Baca in place, will continue to monitor UOP. Patient is paralyzed and sedated adequately. EEG in place currently showing diffuse slowing with no seizure activity.     Review of Systems   Unable to perform ROS: Intubated   Objective:     Vital Signs (Most Recent):  Temp: 98.96 °F (37.2 °C) (08/04/22 1000)  Pulse: 100 (08/04/22 1000)  Resp: (!) 26 (08/04/22 1000)  BP: (!) 110/56 (08/04/22 1000)  SpO2: 95 % (08/04/22 1000)   Vital Signs (24h Range):  Temp:  [93.74 °F (34.3 °C)-98.96 °F (37.2 °C)] 98.96 °F (37.2 °C)  Pulse:  [] 100  Resp:  [20-28] 26  SpO2:  [95 %-100 %] 95 %  BP: (100-128)/(49-68) 110/56   Weight: 62 kg (136 lb 11 oz)  Body mass index is 22.06 kg/m².      Intake/Output Summary (Last 24 hours) at 8/4/2022 1053  Last data filed at 8/4/2022 1000  Gross per 24 hour   Intake 5504.88 ml   Output 1930 ml   Net 3574.88 ml       Physical Exam  Constitutional:       General: He is not in acute distress.     Appearance: He is normal weight.      Interventions: He is intubated.   HENT:      Head: Normocephalic and atraumatic.      Nose: Nose normal.      Mouth/Throat:      Mouth: Mucous membranes are moist.      Pharynx: Oropharynx is clear.   Cardiovascular:      Rate and Rhythm: Normal rate and regular rhythm.      Pulses: Normal pulses.      Heart sounds: Normal heart sounds.   Pulmonary:      Effort: No respiratory distress. He is intubated.      Breath sounds: Normal breath sounds. No stridor. No wheezing or rhonchi.   Abdominal:      General: Abdomen is flat.      Palpations: Abdomen is soft.   Musculoskeletal:      Cervical back: Neck supple.      Right lower leg: No edema.      Left lower leg: No edema.      Comments: No rigidity present; paralyzed w/ Nimbex gtt   Skin:     General: Skin is warm and dry.      Capillary Refill: Capillary refill takes  less than 2 seconds.      Comments: Multiple tattoos present     Neurological:      Comments: Paralyzed and sedated; RAAS -5       Vents:  Vent Mode: A/C (08/04/22 0722)  Ventilator Initiated: Yes (08/03/22 0839)  Set Rate: 28 BPM (08/04/22 0722)  Vt Set: 490 mL (08/04/22 0722)  PEEP/CPAP: 5 cmH20 (08/04/22 0722)  Oxygen Concentration (%): 28 (08/04/22 1000)  Peak Airway Pressure: 26 cmH2O (08/04/22 0722)  Plateau Pressure: 0 cmH20 (08/04/22 0722)  Total Ve: 14.5 mL (08/04/22 0722)  Negative Inspiratory Force (cm H2O): 0 (08/04/22 0722)  F/VT Ratio<105 (RSBI): (!) 53.95 (08/04/22 0722)  Lines/Drains/Airways       Central Venous Catheter Line  Duration             Percutaneous Central Line Insertion/Assessment - Triple Lumen  08/03/22 0015 right internal jugular 1 day              Drain  Duration                  NG/OG Tube 08/03/22 0610 [place] sump 18 Fr. Center mouth 1 day         Urethral Catheter 08/03/22 2320 Non-latex 16 Fr. <1 day              Airway  Duration                  Airway - Non-Surgical 08/03/22 0605 Endotracheal Tube 1 day              Peripheral Intravenous Line  Duration                  Peripheral IV - Single Lumen 08/03/22 0550 18 G Right Antecubital 1 day         Peripheral IV - Single Lumen 08/03/22 0558 20 G Left Hand 1 day         Peripheral IV - Single Lumen 08/03/22 1800 18 G Anterior;Right Wrist <1 day         Peripheral IV - Single Lumen 08/03/22 1810 20 G Left;Posterior;Proximal Forearm <1 day                  Significant Labs:    CBC/Anemia Profile:  Recent Labs   Lab 08/03/22  0611 08/04/22  0407   WBC 16.64* 14.76*   HGB 13.1* 10.8*   HCT 41.3 31.2*    246   MCV 90 82   RDW 13.0 13.6        Chemistries:  Recent Labs   Lab 08/03/22  0611 08/03/22  1049 08/03/22  2238 08/04/22  0407 08/04/22  0636 08/04/22  0817      < > 145  145 145 147* 148*   K 4.4   < > 3.5  3.5 3.3*  --  3.7      < > 121*  121* 122*  --  124*   CO2 24   < > 15*  15* 15*  --  18*   BUN 8   <  > 9  9 8  --  7   CREATININE 1.3   < > 1.2  1.2 1.0  --  0.9   CALCIUM 9.3   < > 8.6*  8.6* 8.9  --  8.5*   ALBUMIN 4.1  --  3.3* 3.2*  --   --    PROT 7.0  --  5.6* 5.6*  --   --    BILITOT 0.3  --  0.3 0.4  --   --    ALKPHOS 73  --  47* 50*  --   --    ALT 33  --  29 26  --   --    AST 25  --  22 22  --   --    MG 2.5  --   --  2.1  --   --    PHOS  --   --   --  <1.0*  --   --     < > = values in this interval not displayed.       All pertinent labs within the past 24 hours have been reviewed.    Significant Imaging:  I have reviewed all pertinent imaging results/findings within the past 24 hours.

## 2022-08-04 NOTE — ASSESSMENT & PLAN NOTE
Patient has history of severe asthma and status asthmaticus requiring intubation in the past. Home meds include albuterol inhaler, duonebs, breo, Advair, montelukast    - Continue methylprednisone 60 mg q6h  - De-escalated duonebs from q4h to q6h (2/2 metabolic acidosis; lack of wheezing/obstruction on exam)

## 2022-08-04 NOTE — PROGRESS NOTES
[facility] - Cardiac Medical ICU  Critical Care Medicine  Progress Note    Patient Name: Tucker Roberto  MRN: [id number]  Admission Date: 8/3/2022  Hospital Length of Stay: 1 days  Code Status: Full Code  Attending Provider: Krunal Chung MD  Primary Care Provider: Karen Braxton MD   Principal Problem: Acute respiratory failure with hypoxia and hypercarbia    Subjective:     HPI:  Mr. Roberto is a 27 yo M with GITA (heroin, denies IVDU) and asthma that presents with acute encephalopathy. Previously has had repeated admissions for status asthmaticus and heroin overdose. He was brought in unresponsive by EMS, who administered narcan and magnesium in the field. GCS was 3 at time of arrival in ED. In the ED, he received albuterol and was intubated. History is limited 2/2 to acuity of situation. Initial labs significant for WBC of 16, pH 7.1, pCO2 86.2. CT head negative for acute abnormalities     Critical care medicine consulted for hypercapnic respiratory failure likely 2/2 to heroin overdose and asthma exacerbation.            Hospital/ICU Course:  No notes on file    Interval History/Significant Events: Patient with decreased UOP overnight; found to have significant obstructing bladder sediment which was irrigated. Baca in place, will continue to monitor UOP. Patient is paralyzed and sedated adequately. EEG in place currently showing diffuse slowing with no seizure activity.     Review of Systems   Unable to perform ROS: Intubated   Objective:     Vital Signs (Most Recent):  Temp: 98.96 °F (37.2 °C) (08/04/22 1000)  Pulse: 100 (08/04/22 1000)  Resp: (!) 26 (08/04/22 1000)  BP: (!) 110/56 (08/04/22 1000)  SpO2: 95 % (08/04/22 1000)   Vital Signs (24h Range):  Temp:  [93.74 °F (34.3 °C)-98.96 °F (37.2 °C)] 98.96 °F (37.2 °C)  Pulse:  [] 100  Resp:  [20-28] 26  SpO2:  [95 %-100 %] 95 %  BP: (100-128)/(49-68) 110/56   Weight: 62 kg (136 lb 11 oz)  Body mass index is 22.06 kg/m².      Intake/Output  Summary (Last 24 hours) at 8/4/2022 1053  Last data filed at 8/4/2022 1000  Gross per 24 hour   Intake 5504.88 ml   Output 1930 ml   Net 3574.88 ml       Physical Exam  Constitutional:       General: He is not in acute distress.     Appearance: He is normal weight.      Interventions: He is intubated.   HENT:      Head: Normocephalic and atraumatic.      Nose: Nose normal.      Mouth/Throat:      Mouth: Mucous membranes are moist.      Pharynx: Oropharynx is clear.   Cardiovascular:      Rate and Rhythm: Normal rate and regular rhythm.      Pulses: Normal pulses.      Heart sounds: Normal heart sounds.   Pulmonary:      Effort: No respiratory distress. He is intubated.      Breath sounds: Normal breath sounds. No stridor. No wheezing or rhonchi.   Abdominal:      General: Abdomen is flat.      Palpations: Abdomen is soft.   Musculoskeletal:      Cervical back: Neck supple.      Right lower leg: No edema.      Left lower leg: No edema.      Comments: No rigidity present; paralyzed w/ Nimbex gtt   Skin:     General: Skin is warm and dry.      Capillary Refill: Capillary refill takes less than 2 seconds.      Comments: Multiple tattoos present     Neurological:      Comments: Paralyzed and sedated; RAAS -5       Vents:  Vent Mode: A/C (08/04/22 0722)  Ventilator Initiated: Yes (08/03/22 0839)  Set Rate: 28 BPM (08/04/22 0722)  Vt Set: 490 mL (08/04/22 0722)  PEEP/CPAP: 5 cmH20 (08/04/22 0722)  Oxygen Concentration (%): 28 (08/04/22 1000)  Peak Airway Pressure: 26 cmH2O (08/04/22 0722)  Plateau Pressure: 0 cmH20 (08/04/22 0722)  Total Ve: 14.5 mL (08/04/22 0722)  Negative Inspiratory Force (cm H2O): 0 (08/04/22 0722)  F/VT Ratio<105 (RSBI): (!) 53.95 (08/04/22 0722)  Lines/Drains/Airways       Central Venous Catheter Line  Duration             Percutaneous Central Line Insertion/Assessment - Triple Lumen  08/03/22 0015 right internal jugular 1 day              Drain  Duration                  NG/OG Tube 08/03/22 0610  [place] sump 18 Fr. Center mouth 1 day         Urethral Catheter 08/03/22 2320 Non-latex 16 Fr. <1 day              Airway  Duration                  Airway - Non-Surgical 08/03/22 0605 Endotracheal Tube 1 day              Peripheral Intravenous Line  Duration                  Peripheral IV - Single Lumen 08/03/22 0550 18 G Right Antecubital 1 day         Peripheral IV - Single Lumen 08/03/22 0558 20 G Left Hand 1 day         Peripheral IV - Single Lumen 08/03/22 1800 18 G Anterior;Right Wrist <1 day         Peripheral IV - Single Lumen 08/03/22 1810 20 G Left;Posterior;Proximal Forearm <1 day                  Significant Labs:    CBC/Anemia Profile:  Recent Labs   Lab 08/03/22  0611 08/04/22  0407   WBC 16.64* 14.76*   HGB 13.1* 10.8*   HCT 41.3 31.2*    246   MCV 90 82   RDW 13.0 13.6        Chemistries:  Recent Labs   Lab 08/03/22  0611 08/03/22  1049 08/03/22  2238 08/04/22  0407 08/04/22  0636 08/04/22  0817      < > 145  145 145 147* 148*   K 4.4   < > 3.5  3.5 3.3*  --  3.7      < > 121*  121* 122*  --  124*   CO2 24   < > 15*  15* 15*  --  18*   BUN 8   < > 9  9 8  --  7   CREATININE 1.3   < > 1.2  1.2 1.0  --  0.9   CALCIUM 9.3   < > 8.6*  8.6* 8.9  --  8.5*   ALBUMIN 4.1  --  3.3* 3.2*  --   --    PROT 7.0  --  5.6* 5.6*  --   --    BILITOT 0.3  --  0.3 0.4  --   --    ALKPHOS 73  --  47* 50*  --   --    ALT 33  --  29 26  --   --    AST 25  --  22 22  --   --    MG 2.5  --   --  2.1  --   --    PHOS  --   --   --  <1.0*  --   --     < > = values in this interval not displayed.       All pertinent labs within the past 24 hours have been reviewed.    Significant Imaging:  I have reviewed all pertinent imaging results/findings within the past 24 hours.      ABG  Recent Labs   Lab 08/04/22  0955   PH 7.353   PO2 70*   PCO2 34.8*   HCO3 19.3*   BE -6     Assessment/Plan:     Neuro  Acute encephalopathy  Patient initially presented with GCS 3, given narcan and magnesium by EMS and  intubated in the ED. Found to be in respiratory failure with hypercarbia and initial pCO2 of 86. AMS likely 2/2 to opioid overdose, respiratory depression and hypercapnia. CTH with diffuse cerebral edema and concern for hypoxic-ischemic encephalopathy. EEG video monitoring showing diffuse slowing with no seizure-like activity.     - f/u TTE results  - MRI today; KUB without metallic foreign body   - VBG improving; de-escalated duonebs; Lactate now WNL  - Na 147 now on hypertonic saline rate 40; with Sodium bicarbonate 650mg BID  - Will d/c hypertonic saline this afternoon; f/u q4h BMPs   - Phos <1 this morning; repleted. F/u 12pm repeat Phos  - q6h glucose accuchecks - goal normoglycemia  - UDS negative, TSH WNL    Psychiatric  Opioid use disorder, severe, in controlled environment, dependence  Per chart review, patient has history of snorting heroin (no IVDU).     - monitor for opioid withdrawal and treat accordingly     Substance use disorder  Hx heroin use, previously has denied IVDU. Not responsive to naloxone with EMS.    - UDS and ethanol negative   - CTH with diffuse cerebral edema and concern for hypoxic-ischemic injury; f/u MRI results  - consider addiction psych consult once no longer critically ill    Pulmonary  * Acute respiratory failure with hypoxia and hypercarbia  Hx status asthmaticus found unresponsive. Suspect respiratory failure from status asthmaticus with concomitant respiratory depression in setting of opiate use. Intubated 8/3 with initial blood gas with 7.113/86.2.     - continue lung protective ventilation  - will trial paralytic holiday this afternoon, weaning as tolerated  - sedation with propofol, fentanyl, ketamine and versed gtt   - paralyzed with nimbex gtt    - VBGs indicate improvement in metabolic acidosis (7.35/34.8/70/19.3), will CTM  - BC neg x2; Respiratory culture with Staph aureus, susceptibilities pending  - continuing Vanc/Zosyn   - COVID negative    Severe persistent  asthma with acute exacerbation  Patient has history of severe asthma and status asthmaticus requiring intubation in the past. Home meds include albuterol inhaler, duonebs, breo, Advair, montelukast    - Continue methylprednisone 60 mg q6h  - De-escalated duonebs from q4h to q6h (2/2 metabolic acidosis; lack of wheezing/obstruction on exam)       Cardiac/Vascular  Elevated troponin  EKG with ST elevations vs. Early repolarization. Troponin x1 elevated at 0.114 today; no previous Tpn collected on arrival for comparison.    -repeat EKG with more likely early repolarization   -f/u TTE results  -f/u Tpn x2     Endocrine  Steroid-induced hyperglycemia  -beginning tube feeds rate 10cc today 8/4  -SSI with goal normoglycemia  -q6h accuchecks        Critical Care Daily Checklist:    A: Awake: RASS Goal/Actual Goal:  -5  Actual: [place] Agitation Sedation Scale (RASS): Unarousable   B: Spontaneous Breathing Trial Performed?  Paralyized   C: SAT & SBT Coordinated?  N/A                   D: Delirium: CAM-ICU Overall CAM-ICU: Positive   E: Early Mobility Performed? No   F: Feeding Goal:    Status:   tube feeds started - trickle  Current Diet Order   Procedures    Diet NPO      AS: Analgesia/Sedation Fent, prop, ketamine, versed   T: Thromboembolic Prophylaxis yes   H: HOB > 300 Yes   U: Stress Ulcer Prophylaxis (if needed) yes   G: Glucose Control  SSI   B: Bowel Function Stool Occurrence: 0   I: Indwelling Catheter (Lines & Sheppard) Necessity sheppard   D: De-escalation of Antimicrobials/Pharmacotherapies vanc/zosyn    Plan for the day/ETD     Code Status:  Family/Goals of Care: Full Code         Critical secondary to Patient has a condition that poses threat to life and bodily function: Severe Respiratory Distress      Critical care was time spent personally by me on the following activities: development of treatment plan with patient or surrogate and bedside caregivers, discussions with consultants, evaluation of patient's  response to treatment, examination of patient, ordering and performing treatments and interventions, ordering and review of laboratory studies, ordering and review of radiographic studies, pulse oximetry, re-evaluation of patient's condition. This critical care time did not overlap with that of any other provider or involve time for any procedures.     Soco Hood MD  Critical Care Medicine  [facility] - Cardiac Medical [facility]

## 2022-08-04 NOTE — PLAN OF CARE
CMICU DAILY GOALS       A: Awake    RASS: Goal -    Actual - RASS ([place] Agitation-Sedation Scale): -5-->unarousable   Restraint necessity: Clinical Justification: Removing medical devices  B: Breathe   SBT: Not attempted   C: Coordinate A & B, analgesics/sedatives   Pain: managed    SAT: Not attempted  D: Delirium   CAM-ICU: Overall CAM-ICU: Positive  E: Early(intubated/ Progressive (non-intubated) Mobility   MOVE Screen: Fail   Activity: Activity Management: Patient unable to perform activities  FAS: Feeding/Nutrition   Diet order: Diet/Nutrition Received: NPO,    T: Thrombus   DVT prophylaxis: VTE Required Core Measure: Pharmacological prophylaxis initiated/maintained  H: HOB Elevation   Head of Bed (HOB) Positioning: HOB at 30 degrees  U: Ulcer Prophylaxis   GI: yes  G: Glucose control   managed    S: Skin   Bathing/Skin Care: bath, complete, dressed/undressed  Device Skin Pressure Protection: absorbent pad utilized/changed, adhesive use limited, skin-to-device areas padded, skin-to-skin areas padded, pressure points protected  Pressure Reduction Devices: foam padding utilized, heel offloading device utilized, positioning supports utilized, pressure-redistributing mattress utilized  Pressure Reduction Techniques: weight shift assistance provided, pressure points protected  Skin Protection: adhesive use limited, incontinence pads utilized, skin-to-skin areas padded, skin-to-device areas padded, tubing/devices free from skin contact  B: Bowel Function   no issues   I: Indwelling Catheters   Baca necessity: [REMOVED]      Urethral Catheter 08/03/22 0610 Temperature probe 16 Fr.-Reason for Continuing Urinary Catheterization: Critically ill in ICU and requiring hourly monitoring of intake/output   CVC necessity: Yes  D: De-escalation Antibiotics   Yes    Family/Goals of care/Code Status   Code Status: Full Code    24H Vital Sign Range  Temp:  [93.74 °F (34.3 °C)-100.22 °F (37.9 °C)]   Pulse:  []   Resp:   [14-54]   BP: ()/()   SpO2:  [96 %-100 %]      Shift Events   No acute events throughout shift. Sheppard exchanged overnight. New sheppard working well and draining properly. Fentanyl, propofol, ketamine, versed, nimbex, levo, and 3%  gtts continued to infuse throughout night.     VS and assessment per flow sheet, patient progressing towards goals as tolerated, plan of care reviewed with Tucker Roberto, all concerns addressed, will continue to monitor.    [place] Turner

## 2022-08-04 NOTE — ASSESSMENT & PLAN NOTE
Hx status asthmaticus found unresponsive. Suspect respiratory failure from status asthmaticus with concomitant respiratory depression in setting of opiate use. Intubated 8/3 with initial blood gas with 7.113/86.2.     - continue lung protective ventilation  - will trial paralytic holiday this afternoon, weaning as tolerated  - sedation with propofol, fentanyl, ketamine and versed gtt   - paralyzed with nimbex gtt    - VBGs indicate improvement in metabolic acidosis (7.35/34.8/70/19.3), will CTM  - BC neg x2; Respiratory culture with Staph aureus, susceptibilities pending  - continuing Vanc/Zosyn   - COVID negative

## 2022-08-04 NOTE — PLAN OF CARE
CMICU DAILY GOALS       A: Awake    RASS: Goal -    Actual - RASS ([place] Agitation-Sedation Scale): -5-->unarousable   Restraint necessity: Clinical Justification: Removing medical devices  B: Breathe   SBT: Not attempted   C: Coordinate A & B, analgesics/sedatives   Pain: managed    SAT: Not attempted  D: Delirium   CAM-ICU: Overall CAM-ICU: Positive  E: Early(intubated/ Progressive (non-intubated) Mobility   MOVE Screen: Fail   Activity: Activity Management: Patient unable to perform activities  FAS: Feeding/Nutrition   Diet order: Diet/Nutrition Received: NPO,    T: Thrombus   DVT prophylaxis: VTE Required Core Measure: Pharmacological prophylaxis initiated/maintained  H: HOB Elevation   Head of Bed (HOB) Positioning: HOB at 30 degrees  U: Ulcer Prophylaxis   GI: yes  G: Glucose control   managed    S: Skin   Bathing/Skin Care: bath, complete, dressed/undressed  Device Skin Pressure Protection: absorbent pad utilized/changed, positioning supports utilized, pressure points protected, skin-to-device areas padded  Pressure Reduction Devices: pressure-redistributing mattress utilized, foam padding utilized, heel offloading device utilized, positioning supports utilized  Pressure Reduction Techniques: weight shift assistance provided, heels elevated off bed, positioned off wounds, pressure points protected  Skin Protection: adhesive use limited, incontinence pads utilized, protective footwear used, silicone foam dressing in place, skin-to-device areas padded, transparent dressing maintained, tubing/devices free from skin contact  B: Bowel Function   no issues   I: Indwelling Catheters   Baca necessity: [REMOVED]      Urethral Catheter 08/03/22 0610 Temperature probe 16 Fr.-Reason for Continuing Urinary Catheterization: Critically ill in ICU and requiring hourly monitoring of intake/output       Urethral Catheter 08/03/22 2320 Non-latex 16 Fr.-Reason for Continuing Urinary Catheterization: Critically ill in ICU and  requiring hourly monitoring of intake/output   CVC necessity: Yes  D: De-escalation Antibiotics   No    Family/Goals of care/Code Status   Code Status: Full Code    24H Vital Sign Range  Temp:  [93.74 °F (34.3 °C)-98.96 °F (37.2 °C)]   Pulse:  []   Resp:  [24-28]   BP: (100-128)/(49-68)   SpO2:  [95 %-100 %]      Shift Events   No acute events throughout shift. Pt currently on Prop, Ketamine, Fent, Versed and Nimbex; Levo stopped in AM. No seizure-like activity during shift. Pt awaiting MRI. Pt blood sugar spiked to >400s; SSI given. Sugars have normalized since insulin. Na monitoring cont; 3% DC. Once MRI is complete RN to stop Nimbex and initiated TF @10/hr. VS and assessment per flow sheet, patient progressing towards goals as tolerated, plan of care reviewed with family, all concerns addressed, will continue to monitor.    Marv Bullock

## 2022-08-05 PROBLEM — R79.89 ELEVATED TROPONIN: Status: RESOLVED | Noted: 2022-08-04 | Resolved: 2022-08-05

## 2022-08-05 LAB
ALBUMIN SERPL BCP-MCNC: 3.2 G/DL (ref 3.5–5.2)
ALP SERPL-CCNC: 54 U/L (ref 55–135)
ALT SERPL W/O P-5'-P-CCNC: 30 U/L (ref 10–44)
ANION GAP SERPL CALC-SCNC: 8 MMOL/L (ref 8–16)
AST SERPL-CCNC: 27 U/L (ref 10–40)
BACTERIA #/AREA URNS AUTO: ABNORMAL /HPF
BASOPHILS # BLD AUTO: 0.02 K/UL (ref 0–0.2)
BASOPHILS NFR BLD: 0.1 % (ref 0–1.9)
BILIRUB SERPL-MCNC: 0.2 MG/DL (ref 0.1–1)
BILIRUB UR QL STRIP: NEGATIVE
BUN SERPL-MCNC: 9 MG/DL (ref 6–20)
CALCIUM SERPL-MCNC: 8.8 MG/DL (ref 8.7–10.5)
CHLORIDE SERPL-SCNC: 117 MMOL/L (ref 95–110)
CLARITY UR REFRACT.AUTO: ABNORMAL
CO2 SERPL-SCNC: 20 MMOL/L (ref 23–29)
COLOR UR AUTO: YELLOW
CREAT SERPL-MCNC: 0.8 MG/DL (ref 0.5–1.4)
DIFFERENTIAL METHOD: ABNORMAL
EOSINOPHIL # BLD AUTO: 0 K/UL (ref 0–0.5)
EOSINOPHIL NFR BLD: 0 % (ref 0–8)
ERYTHROCYTE [DISTWIDTH] IN BLOOD BY AUTOMATED COUNT: 14.3 % (ref 11.5–14.5)
EST. GFR  (NO RACE VARIABLE): >60 ML/MIN/1.73 M^2
GLUCOSE SERPL-MCNC: 167 MG/DL (ref 70–110)
GLUCOSE UR QL STRIP: NEGATIVE
HCT VFR BLD AUTO: 32.7 % (ref 40–54)
HGB BLD-MCNC: 10.9 G/DL (ref 14–18)
HGB UR QL STRIP: NEGATIVE
IMM GRANULOCYTES # BLD AUTO: 0.21 K/UL (ref 0–0.04)
IMM GRANULOCYTES NFR BLD AUTO: 1 % (ref 0–0.5)
KETONES UR QL STRIP: NEGATIVE
LEUKOCYTE ESTERASE UR QL STRIP: NEGATIVE
LYMPHOCYTES # BLD AUTO: 0.7 K/UL (ref 1–4.8)
LYMPHOCYTES NFR BLD: 3.4 % (ref 18–48)
MAGNESIUM SERPL-MCNC: 2.1 MG/DL (ref 1.6–2.6)
MCH RBC QN AUTO: 27.7 PG (ref 27–31)
MCHC RBC AUTO-ENTMCNC: 33.3 G/DL (ref 32–36)
MCV RBC AUTO: 83 FL (ref 82–98)
MICROSCOPIC COMMENT: ABNORMAL
MONOCYTES # BLD AUTO: 0.7 K/UL (ref 0.3–1)
MONOCYTES NFR BLD: 3.4 % (ref 4–15)
NEUTROPHILS # BLD AUTO: 19.2 K/UL (ref 1.8–7.7)
NEUTROPHILS NFR BLD: 92.1 % (ref 38–73)
NITRITE UR QL STRIP: NEGATIVE
NRBC BLD-RTO: 0 /100 WBC
PH UR STRIP: 6 [PH] (ref 5–8)
PHOSPHATE SERPL-MCNC: 4.4 MG/DL (ref 2.7–4.5)
PLATELET # BLD AUTO: 293 K/UL (ref 150–450)
PMV BLD AUTO: 10.2 FL (ref 9.2–12.9)
POCT GLUCOSE: 108 MG/DL (ref 70–110)
POCT GLUCOSE: 109 MG/DL (ref 70–110)
POCT GLUCOSE: 119 MG/DL (ref 70–110)
POCT GLUCOSE: 122 MG/DL (ref 70–110)
POCT GLUCOSE: 138 MG/DL (ref 70–110)
POCT GLUCOSE: 156 MG/DL (ref 70–110)
POCT GLUCOSE: 96 MG/DL (ref 70–110)
POTASSIUM SERPL-SCNC: 3.6 MMOL/L (ref 3.5–5.1)
PROT SERPL-MCNC: 5.6 G/DL (ref 6–8.4)
PROT UR QL STRIP: NEGATIVE
RBC # BLD AUTO: 3.93 M/UL (ref 4.6–6.2)
RBC #/AREA URNS AUTO: 40 /HPF (ref 0–4)
SODIUM SERPL-SCNC: 145 MMOL/L (ref 136–145)
SP GR UR STRIP: >=1.03 (ref 1–1.03)
SQUAMOUS #/AREA URNS AUTO: 0 /HPF
TRIGL SERPL-MCNC: 88 MG/DL (ref 30–150)
URN SPEC COLLECT METH UR: ABNORMAL
WBC # BLD AUTO: 20.86 K/UL (ref 3.9–12.7)
WBC #/AREA URNS AUTO: 8 /HPF (ref 0–5)

## 2022-08-05 PROCEDURE — 63600175 PHARM REV CODE 636 W HCPCS

## 2022-08-05 PROCEDURE — 84478 ASSAY OF TRIGLYCERIDES: CPT

## 2022-08-05 PROCEDURE — 93010 ELECTROCARDIOGRAM REPORT: CPT | Mod: ,,, | Performed by: INTERNAL MEDICINE

## 2022-08-05 PROCEDURE — 25000003 PHARM REV CODE 250: Performed by: STUDENT IN AN ORGANIZED HEALTH CARE EDUCATION/TRAINING PROGRAM

## 2022-08-05 PROCEDURE — 25000003 PHARM REV CODE 250

## 2022-08-05 PROCEDURE — 99291 CRITICAL CARE FIRST HOUR: CPT | Mod: ,,, | Performed by: EMERGENCY MEDICINE

## 2022-08-05 PROCEDURE — 99900035 HC TECH TIME PER 15 MIN (STAT)

## 2022-08-05 PROCEDURE — 99900026 HC AIRWAY MAINTENANCE (STAT)

## 2022-08-05 PROCEDURE — 20000000 HC ICU ROOM

## 2022-08-05 PROCEDURE — 51702 INSERT TEMP BLADDER CATH: CPT

## 2022-08-05 PROCEDURE — 80053 COMPREHEN METABOLIC PANEL: CPT | Performed by: STUDENT IN AN ORGANIZED HEALTH CARE EDUCATION/TRAINING PROGRAM

## 2022-08-05 PROCEDURE — 63600175 PHARM REV CODE 636 W HCPCS: Performed by: EMERGENCY MEDICINE

## 2022-08-05 PROCEDURE — 27000221 HC OXYGEN, UP TO 24 HOURS

## 2022-08-05 PROCEDURE — 93010 EKG 12-LEAD: ICD-10-PCS | Mod: ,,, | Performed by: INTERNAL MEDICINE

## 2022-08-05 PROCEDURE — 93005 ELECTROCARDIOGRAM TRACING: CPT

## 2022-08-05 PROCEDURE — 81001 URINALYSIS AUTO W/SCOPE: CPT

## 2022-08-05 PROCEDURE — 25000242 PHARM REV CODE 250 ALT 637 W/ HCPCS

## 2022-08-05 PROCEDURE — 99291 PR CRITICAL CARE, E/M 30-74 MINUTES: ICD-10-PCS | Mod: ,,, | Performed by: EMERGENCY MEDICINE

## 2022-08-05 PROCEDURE — 63600175 PHARM REV CODE 636 W HCPCS: Performed by: STUDENT IN AN ORGANIZED HEALTH CARE EDUCATION/TRAINING PROGRAM

## 2022-08-05 PROCEDURE — 94640 AIRWAY INHALATION TREATMENT: CPT

## 2022-08-05 PROCEDURE — 83735 ASSAY OF MAGNESIUM: CPT | Performed by: STUDENT IN AN ORGANIZED HEALTH CARE EDUCATION/TRAINING PROGRAM

## 2022-08-05 PROCEDURE — 94003 VENT MGMT INPAT SUBQ DAY: CPT

## 2022-08-05 PROCEDURE — 27200966 HC CLOSED SUCTION SYSTEM

## 2022-08-05 PROCEDURE — 25000003 PHARM REV CODE 250: Performed by: EMERGENCY MEDICINE

## 2022-08-05 PROCEDURE — 94761 N-INVAS EAR/PLS OXIMETRY MLT: CPT

## 2022-08-05 PROCEDURE — 85025 COMPLETE CBC W/AUTO DIFF WBC: CPT | Performed by: STUDENT IN AN ORGANIZED HEALTH CARE EDUCATION/TRAINING PROGRAM

## 2022-08-05 PROCEDURE — 84100 ASSAY OF PHOSPHORUS: CPT | Performed by: STUDENT IN AN ORGANIZED HEALTH CARE EDUCATION/TRAINING PROGRAM

## 2022-08-05 RX ORDER — DROPERIDOL 2.5 MG/ML
1.25 INJECTION, SOLUTION INTRAMUSCULAR; INTRAVENOUS ONCE
Status: DISCONTINUED | OUTPATIENT
Start: 2022-08-06 | End: 2022-08-06

## 2022-08-05 RX ORDER — LABETALOL HCL 20 MG/4 ML
10 SYRINGE (ML) INTRAVENOUS EVERY 6 HOURS PRN
Status: DISCONTINUED | OUTPATIENT
Start: 2022-08-05 | End: 2022-08-09 | Stop reason: HOSPADM

## 2022-08-05 RX ORDER — SODIUM BICARBONATE 650 MG/1
650 TABLET ORAL 2 TIMES DAILY
Status: DISCONTINUED | OUTPATIENT
Start: 2022-08-05 | End: 2022-08-06

## 2022-08-05 RX ORDER — MONTELUKAST SODIUM 10 MG/1
10 TABLET ORAL DAILY
Status: DISCONTINUED | OUTPATIENT
Start: 2022-08-06 | End: 2022-08-06

## 2022-08-05 RX ORDER — DEXMEDETOMIDINE HYDROCHLORIDE 4 UG/ML
0-1.4 INJECTION, SOLUTION INTRAVENOUS CONTINUOUS
Status: DISCONTINUED | OUTPATIENT
Start: 2022-08-05 | End: 2022-08-06

## 2022-08-05 RX ORDER — AMOXICILLIN 250 MG
2 CAPSULE ORAL DAILY
Status: DISCONTINUED | OUTPATIENT
Start: 2022-08-05 | End: 2022-08-06

## 2022-08-05 RX ORDER — BUDESONIDE 0.5 MG/2ML
0.5 INHALANT ORAL EVERY 12 HOURS
Status: DISCONTINUED | OUTPATIENT
Start: 2022-08-05 | End: 2022-08-06

## 2022-08-05 RX ORDER — MONTELUKAST SODIUM 10 MG/1
10 TABLET ORAL DAILY
Status: DISCONTINUED | OUTPATIENT
Start: 2022-08-05 | End: 2022-08-05

## 2022-08-05 RX ADMIN — PROPOFOL 50 MCG/KG/MIN: 10 INJECTION, EMULSION INTRAVENOUS at 09:08

## 2022-08-05 RX ADMIN — SENNOSIDES AND DOCUSATE SODIUM 2 TABLET: 50; 8.6 TABLET ORAL at 10:08

## 2022-08-05 RX ADMIN — FAMOTIDINE 20 MG: 20 TABLET ORAL at 08:08

## 2022-08-05 RX ADMIN — PROPOFOL 50 MCG/KG/MIN: 10 INJECTION, EMULSION INTRAVENOUS at 06:08

## 2022-08-05 RX ADMIN — KETAMINE HYDROCHLORIDE 12.5 MCG/KG/MIN: 50 INJECTION INTRAMUSCULAR; INTRAVENOUS at 02:08

## 2022-08-05 RX ADMIN — VANCOMYCIN HYDROCHLORIDE 1000 MG: 1 INJECTION, POWDER, LYOPHILIZED, FOR SOLUTION INTRAVENOUS at 02:08

## 2022-08-05 RX ADMIN — DEXMEDETOMIDINE HYDROCHLORIDE 1.4 MCG/KG/HR: 4 INJECTION INTRAVENOUS at 05:08

## 2022-08-05 RX ADMIN — IPRATROPIUM BROMIDE AND ALBUTEROL SULFATE 3 ML: 2.5; .5 SOLUTION RESPIRATORY (INHALATION) at 01:08

## 2022-08-05 RX ADMIN — MUPIROCIN: 20 OINTMENT TOPICAL at 08:08

## 2022-08-05 RX ADMIN — Medication 100 MG: at 08:08

## 2022-08-05 RX ADMIN — METHYLPREDNISOLONE SODIUM SUCCINATE 60 MG: 40 INJECTION, POWDER, FOR SOLUTION INTRAMUSCULAR; INTRAVENOUS at 05:08

## 2022-08-05 RX ADMIN — IPRATROPIUM BROMIDE AND ALBUTEROL SULFATE 3 ML: 2.5; .5 SOLUTION RESPIRATORY (INHALATION) at 06:08

## 2022-08-05 RX ADMIN — IPRATROPIUM BROMIDE AND ALBUTEROL SULFATE 3 ML: 2.5; .5 SOLUTION RESPIRATORY (INHALATION) at 12:08

## 2022-08-05 RX ADMIN — Medication 300 MCG/HR: at 08:08

## 2022-08-05 RX ADMIN — ENOXAPARIN SODIUM 40 MG: 100 INJECTION SUBCUTANEOUS at 04:08

## 2022-08-05 RX ADMIN — MONTELUKAST 10 MG: 10 TABLET, FILM COATED ORAL at 10:08

## 2022-08-05 RX ADMIN — CEFTRIAXONE 2 G: 2 INJECTION, SOLUTION INTRAVENOUS at 12:08

## 2022-08-05 RX ADMIN — SODIUM BICARBONATE 650 MG TABLET 650 MG: at 08:08

## 2022-08-05 RX ADMIN — MINERAL OIL, PETROLATUM: 425; 573 OINTMENT OPHTHALMIC at 05:08

## 2022-08-05 RX ADMIN — PROPOFOL 50 MCG/KG/MIN: 10 INJECTION, EMULSION INTRAVENOUS at 01:08

## 2022-08-05 RX ADMIN — PIPERACILLIN SODIUM AND TAZOBACTAM SODIUM 4.5 G: 4; .5 INJECTION, POWDER, LYOPHILIZED, FOR SOLUTION INTRAVENOUS at 05:08

## 2022-08-05 RX ADMIN — DEXMEDETOMIDINE HYDROCHLORIDE 0.4 MCG/KG/HR: 4 INJECTION INTRAVENOUS at 12:08

## 2022-08-05 RX ADMIN — BUDESONIDE 0.5 MG: 0.5 INHALANT ORAL at 07:08

## 2022-08-05 RX ADMIN — Medication 150 MCG/HR: at 08:08

## 2022-08-05 RX ADMIN — IPRATROPIUM BROMIDE AND ALBUTEROL SULFATE 3 ML: 2.5; .5 SOLUTION RESPIRATORY (INHALATION) at 07:08

## 2022-08-05 RX ADMIN — PROPOFOL 40 MCG/KG/MIN: 10 INJECTION, EMULSION INTRAVENOUS at 02:08

## 2022-08-05 RX ADMIN — VANCOMYCIN HYDROCHLORIDE 1000 MG: 1 INJECTION, POWDER, LYOPHILIZED, FOR SOLUTION INTRAVENOUS at 09:08

## 2022-08-05 RX ADMIN — VANCOMYCIN HYDROCHLORIDE 1000 MG: 1 INJECTION, POWDER, LYOPHILIZED, FOR SOLUTION INTRAVENOUS at 05:08

## 2022-08-05 RX ADMIN — BUDESONIDE 0.5 MG: 0.5 INHALANT ORAL at 12:08

## 2022-08-05 RX ADMIN — METHYLPREDNISOLONE SODIUM SUCCINATE 60 MG: 40 INJECTION, POWDER, FOR SOLUTION INTRAMUSCULAR; INTRAVENOUS at 12:08

## 2022-08-05 NOTE — PLAN OF CARE
Recommendations     1. As tolerated, increase TF rate (of Isosource 1.5) to 50 mL/hr = 1800 kcals, 82 g of protein, 917 mL fluid.  2. RD to monitor & follow-up.     Goals: Meet % EEN, EPN by RD f/u date  Nutrition Goal Status: new  Communication of RD Recs: reviewed with RN

## 2022-08-05 NOTE — ASSESSMENT & PLAN NOTE
Hx heroin use, previously has denied IVDU. Not responsive to naloxone with EMS.    - UDS and ethanol negative   - MRI with no acute intracranial process; no intraparenchymal edema  - consider addiction psych consult once no longer critically ill

## 2022-08-05 NOTE — SUBJECTIVE & OBJECTIVE
Interval History/Significant Events: Paralytic d/c'd with no complication overnight. Patient is now off Versed gtt; will continue to wean sedation throughout today. Patient is comfortable, no myoclonic movements or wheezing. Patient is still heavily sedated without ventilator dyssynchrony. Will CTM throughout today as sedation is weaned. MRI brain without acute abnormality or intraparenchymal edema.     Review of Systems   Unable to perform ROS: Intubated   Objective:     Vital Signs (Most Recent):  Temp: 97.5 °F (36.4 °C) (08/05/22 0701)  Pulse: 87 (08/05/22 1100)  Resp: (!) 26 (08/05/22 1100)  BP: 121/68 (08/05/22 1100)  SpO2: 96 % (08/05/22 1100)   Vital Signs (24h Range):  Temp:  [97.2 °F (36.2 °C)-98.96 °F (37.2 °C)] 97.5 °F (36.4 °C)  Pulse:  [] 87  Resp:  [26] 26  SpO2:  [94 %-98 %] 96 %  BP: (106-131)/(53-82) 121/68   Weight: 61.7 kg (136 lb 0.4 oz)  Body mass index is 21.95 kg/m².      Intake/Output Summary (Last 24 hours) at 8/5/2022 1141  Last data filed at 8/5/2022 1121  Gross per 24 hour   Intake 3146.76 ml   Output 2460 ml   Net 686.76 ml       Physical Exam  Constitutional:       General: He is not in acute distress.     Appearance: He is normal weight.      Interventions: He is intubated.   HENT:      Head: Normocephalic and atraumatic.      Nose: Nose normal.      Mouth/Throat:      Mouth: Mucous membranes are moist.      Pharynx: Oropharynx is clear.   Cardiovascular:      Rate and Rhythm: Normal rate and regular rhythm.      Pulses: Normal pulses.      Heart sounds: Normal heart sounds.   Pulmonary:      Effort: No respiratory distress. He is intubated.      Breath sounds: Normal breath sounds. No stridor. No wheezing or rhonchi.   Abdominal:      General: Abdomen is flat.      Palpations: Abdomen is soft.   Musculoskeletal:      Cervical back: Neck supple.      Right lower leg: No edema.      Left lower leg: No edema.      Comments: No rigidity present; paralyzed w/ Nimbex gtt   Skin:      General: Skin is warm and dry.      Capillary Refill: Capillary refill takes less than 2 seconds.      Comments: Multiple tattoos present     Neurological:      Comments: Paralyzed and sedated; RAAS -5       Vents:  Vent Mode: A/C (08/05/22 0915)  Ventilator Initiated: Yes (08/03/22 0839)  Set Rate: 26 BPM (08/05/22 0915)  Vt Set: 450 mL (08/05/22 0915)  PEEP/CPAP: 5 cmH20 (08/05/22 0915)  Oxygen Concentration (%): 28 (08/05/22 1100)  Peak Airway Pressure: 30 cmH2O (08/05/22 0915)  Plateau Pressure: 17 cmH20 (08/05/22 0915)  Total Ve: 12 mL (08/05/22 0915)  Negative Inspiratory Force (cm H2O): 0 (08/05/22 0915)  F/VT Ratio<105 (RSBI): (!) 56.4 (08/05/22 0915)  Lines/Drains/Airways       Central Venous Catheter Line  Duration             Percutaneous Central Line Insertion/Assessment - Triple Lumen  08/03/22 0015 right internal jugular 2 days              Drain  Duration                  NG/OG Tube 08/03/22 0610 [place] sump 18 Fr. Center mouth 2 days    Male External Urinary Catheter 08/05/22 1122 <1 day              Airway  Duration                  Airway - Non-Surgical 08/03/22 0605 Endotracheal Tube 2 days              Peripheral Intravenous Line  Duration                  Peripheral IV - Single Lumen 08/03/22 0550 18 G Right Antecubital 2 days         Peripheral IV - Single Lumen 08/03/22 0558 20 G Left Hand 2 days         Peripheral IV - Single Lumen 08/03/22 1800 18 G Anterior;Right Wrist 1 day         Peripheral IV - Single Lumen 08/03/22 1810 20 G Left;Posterior;Proximal Forearm 1 day                  Significant Labs:    CBC/Anemia Profile:  Recent Labs   Lab 08/04/22  0407 08/05/22  0335   WBC 14.76* 20.86*   HGB 10.8* 10.9*   HCT 31.2* 32.7*    293   MCV 82 83   RDW 13.6 14.3        Chemistries:  Recent Labs   Lab 08/03/22  2238 08/04/22  0407 08/04/22  0636 08/04/22  1146 08/04/22  1532 08/05/22  0335     145 145   < > 138 147* 145   K 3.5  3.5 3.3*   < > 3.8 3.7 3.6   *  121* 122*    < > 114* 122* 117*   CO2 15*  15* 15*   < > 18* 20* 20*   BUN 9  9 8   < > 6 6 9   CREATININE 1.2  1.2 1.0   < > 1.0 0.9 0.8   CALCIUM 8.6*  8.6* 8.9   < > 7.7* 8.2* 8.8   ALBUMIN 3.3* 3.2*  --   --   --  3.2*   PROT 5.6* 5.6*  --   --   --  5.6*   BILITOT 0.3 0.4  --   --   --  0.2   ALKPHOS 47* 50*  --   --   --  54*   ALT 29 26  --   --   --  30   AST 22 22  --   --   --  27   MG  --  2.1  --   --   --  2.1   PHOS  --  <1.0*  --  3.6  --  4.4    < > = values in this interval not displayed.       All pertinent labs within the past 24 hours have been reviewed.    Significant Imaging:  I have reviewed all pertinent imaging results/findings within the past 24 hours.  MRI brain - no acute intracranial process. No intraparenchymal edema.   EKG - Qtc <500

## 2022-08-05 NOTE — ASSESSMENT & PLAN NOTE
Hx status asthmaticus found unresponsive. Suspect respiratory failure from status asthmaticus with concomitant respiratory depression in setting of opiate use. Intubated 8/3 with initial blood gas with 7.113/86.2. VBGs improved since with no evidence of air trapping or wheezing.     - continue lung protective ventilation  - paralytics d/c'd without complication 8/4   - weaning sedation with propofol, fentanyl and ketamine gtt; versed gtt d/c'd without complication - CTM  - Initiated Fentanyl IV bolus PRN for weaning sedation   - BC neg x2; Respiratory culture with Staph aureus, susceptibilities pending   - Antibiotics switched from Vanc/Zosyn to Vanc/Rocephin   - COVID negative  - EKG 8/5 with QTc < 500   - f/u Triglyceride results in setting of continued Propofol gtt

## 2022-08-05 NOTE — PROGRESS NOTES
[facility] - Cardiac Medical ICU  Critical Care Medicine  Progress Note    Patient Name: Tucker Roberto  MRN: [id number]  Admission Date: 8/3/2022  Hospital Length of Stay: 2 days  Code Status: Full Code  Attending Provider: Krunal Chung MD  Primary Care Provider: Karen Braxton MD   Principal Problem: Acute respiratory failure with hypoxia and hypercarbia    Subjective:     HPI:  Mr. Roberto is a 25 yo M with GITA (heroin, denies IVDU) and asthma that presents with acute encephalopathy. Previously has had repeated admissions for status asthmaticus and heroin overdose. He was brought in unresponsive by EMS, who administered narcan and magnesium in the field. GCS was 3 at time of arrival in ED. In the ED, he received albuterol and was intubated. History is limited 2/2 to acuity of situation. Initial labs significant for WBC of 16, pH 7.1, pCO2 86.2. CT head negative for acute abnormalities     Critical care medicine consulted for hypercapnic respiratory failure likely 2/2 to heroin overdose and asthma exacerbation.          Hospital/ICU Course:  On MICU arrival, patient sedated with improved breath sounds, although significant ventilator dyssynchrony with myoclonic movements; Nimbex gtt added. Patient received central line placement on 8/3 for hypertonic saline administration, discontinued 8/4. Patient received Vanc/Zosyn in setting of intubation for acute respiratory failure for ppx of potential aspiration; respiratory cultures w/ Staph Aureus (Vanc/Zosyn de-escalated to Vanc/Rocephin). Over 8/3, patient developed metabolic acidosis with increased lactate after episode of myoclonic movements with no associated seizure activity on EEG. Lactate now WNL with improving metabolic acidosis, no more myoclonic movements since first 24hrs of admission. Electrolytes being closely monitored, repleting as indicated. On 8/4, patient with potential ST elevation on EKG with elevated Tpn. Repeat EKG indicative of likely  early repolarization; Tpn downtrended. On 8/4 MRI brain completed showing no acute inctracranial process or intraparenchymal edema. TTE WNL. Tube feeds also began 8/4. From 8/4 to 8/5, discontinued paralytic and began weaning sedation with no complications.       Interval History/Significant Events: Paralytic d/c'd with no complication overnight. Patient is now off Versed gtt; will continue to wean sedation throughout today. Patient is comfortable, no myoclonic movements or wheezing. Patient is still heavily sedated without ventilator dyssynchrony. Will CTM throughout today as sedation is weaned. MRI brain without acute abnormality or intraparenchymal edema.     Review of Systems   Unable to perform ROS: Intubated   Objective:     Vital Signs (Most Recent):  Temp: 97.5 °F (36.4 °C) (08/05/22 0701)  Pulse: 87 (08/05/22 1100)  Resp: (!) 26 (08/05/22 1100)  BP: 121/68 (08/05/22 1100)  SpO2: 96 % (08/05/22 1100)   Vital Signs (24h Range):  Temp:  [97.2 °F (36.2 °C)-98.96 °F (37.2 °C)] 97.5 °F (36.4 °C)  Pulse:  [] 87  Resp:  [26] 26  SpO2:  [94 %-98 %] 96 %  BP: (106-131)/(53-82) 121/68   Weight: 61.7 kg (136 lb 0.4 oz)  Body mass index is 21.95 kg/m².      Intake/Output Summary (Last 24 hours) at 8/5/2022 1141  Last data filed at 8/5/2022 1121  Gross per 24 hour   Intake 3146.76 ml   Output 2460 ml   Net 686.76 ml       Physical Exam  Constitutional:       General: He is not in acute distress.     Appearance: He is normal weight.      Interventions: He is intubated.   HENT:      Head: Normocephalic and atraumatic.      Nose: Nose normal.      Mouth/Throat:      Mouth: Mucous membranes are moist.      Pharynx: Oropharynx is clear.   Cardiovascular:      Rate and Rhythm: Normal rate and regular rhythm.      Pulses: Normal pulses.      Heart sounds: Normal heart sounds.   Pulmonary:      Effort: No respiratory distress. He is intubated.      Breath sounds: Normal breath sounds. No stridor. No wheezing or rhonchi.    Abdominal:      General: Abdomen is flat.      Palpations: Abdomen is soft.   Musculoskeletal:      Cervical back: Neck supple.      Right lower leg: No edema.      Left lower leg: No edema.      Comments: No rigidity present; paralyzed w/ Nimbex gtt   Skin:     General: Skin is warm and dry.      Capillary Refill: Capillary refill takes less than 2 seconds.      Comments: Multiple tattoos present     Neurological:      Comments: Paralyzed and sedated; RAAS -5       Vents:  Vent Mode: A/C (08/05/22 0915)  Ventilator Initiated: Yes (08/03/22 0839)  Set Rate: 26 BPM (08/05/22 0915)  Vt Set: 450 mL (08/05/22 0915)  PEEP/CPAP: 5 cmH20 (08/05/22 0915)  Oxygen Concentration (%): 28 (08/05/22 1100)  Peak Airway Pressure: 30 cmH2O (08/05/22 0915)  Plateau Pressure: 17 cmH20 (08/05/22 0915)  Total Ve: 12 mL (08/05/22 0915)  Negative Inspiratory Force (cm H2O): 0 (08/05/22 0915)  F/VT Ratio<105 (RSBI): (!) 56.4 (08/05/22 0915)  Lines/Drains/Airways       Central Venous Catheter Line  Duration             Percutaneous Central Line Insertion/Assessment - Triple Lumen  08/03/22 0015 right internal jugular 2 days              Drain  Duration                  NG/OG Tube 08/03/22 0610 [place] sump 18 Fr. Center mouth 2 days    Male External Urinary Catheter 08/05/22 1122 <1 day              Airway  Duration                  Airway - Non-Surgical 08/03/22 0605 Endotracheal Tube 2 days              Peripheral Intravenous Line  Duration                  Peripheral IV - Single Lumen 08/03/22 0550 18 G Right Antecubital 2 days         Peripheral IV - Single Lumen 08/03/22 0558 20 G Left Hand 2 days         Peripheral IV - Single Lumen 08/03/22 1800 18 G Anterior;Right Wrist 1 day         Peripheral IV - Single Lumen 08/03/22 1810 20 G Left;Posterior;Proximal Forearm 1 day                  Significant Labs:    CBC/Anemia Profile:  Recent Labs   Lab 08/04/22  0407 08/05/22  0335   WBC 14.76* 20.86*   HGB 10.8* 10.9*   HCT 31.2* 32.7*     293   MCV 82 83   RDW 13.6 14.3        Chemistries:  Recent Labs   Lab 08/03/22  2238 08/04/22  0407 08/04/22  0636 08/04/22  1146 08/04/22  1532 08/05/22  0335     145 145   < > 138 147* 145   K 3.5  3.5 3.3*   < > 3.8 3.7 3.6   *  121* 122*   < > 114* 122* 117*   CO2 15*  15* 15*   < > 18* 20* 20*   BUN 9  9 8   < > 6 6 9   CREATININE 1.2  1.2 1.0   < > 1.0 0.9 0.8   CALCIUM 8.6*  8.6* 8.9   < > 7.7* 8.2* 8.8   ALBUMIN 3.3* 3.2*  --   --   --  3.2*   PROT 5.6* 5.6*  --   --   --  5.6*   BILITOT 0.3 0.4  --   --   --  0.2   ALKPHOS 47* 50*  --   --   --  54*   ALT 29 26  --   --   --  30   AST 22 22  --   --   --  27   MG  --  2.1  --   --   --  2.1   PHOS  --  <1.0*  --  3.6  --  4.4    < > = values in this interval not displayed.       All pertinent labs within the past 24 hours have been reviewed.    Significant Imaging:  I have reviewed all pertinent imaging results/findings within the past 24 hours.  MRI brain - no acute intracranial process. No intraparenchymal edema.   EKG - Qtc <500      ABG  Recent Labs   Lab 08/04/22  0955   PH 7.353   PO2 70*   PCO2 34.8*   HCO3 19.3*   BE -6     Assessment/Plan:     Neuro  Acute encephalopathy  Patient initially presented with GCS 3, given narcan and magnesium by EMS and intubated in the ED. Found to be in respiratory failure with hypercarbia and initial pCO2 of 86. AMS likely 2/2 to opioid overdose, respiratory depression and hypercapnia. CTH with diffuse cerebral edema and concern for hypoxic-ischemic encephalopathy. EEG video monitoring showing diffuse slowing with no seizure-like activity.     - MRI with no acute intracranial process; no intraparenchymal edema  - Sodium bicarbonate 650mg BIDs   - EEG with no seizure activity; continue monitoring   - q6h glucose accuchecks - goal normoglycemia  - UDS negative, TSH WNL    Psychiatric  Opioid use disorder, severe, in controlled environment, dependence  Per chart review, patient has history  of snorting heroin (no IVDU).     - monitor for opioid withdrawal and treat accordingly     Substance use disorder  Hx heroin use, previously has denied IVDU. Not responsive to naloxone with EMS.    - UDS and ethanol negative   - MRI with no acute intracranial process; no intraparenchymal edema  - consider addiction psych consult once no longer critically ill    Pulmonary  * Acute respiratory failure with hypoxia and hypercarbia  Hx status asthmaticus found unresponsive. Suspect respiratory failure from status asthmaticus with concomitant respiratory depression in setting of opiate use. Intubated 8/3 with initial blood gas with 7.113/86.2. VBGs improved since with no evidence of air trapping or wheezing.     - continue lung protective ventilation  - paralytics d/c'd without complication 8/4   - weaning sedation with propofol, fentanyl and ketamine gtt; versed gtt d/c'd without complication - CTM  - Initiated Fentanyl IV bolus PRN for weaning sedation   - BC neg x2; Respiratory culture with Staph aureus, susceptibilities pending   - Antibiotics switched from Vanc/Zosyn to Vanc/Rocephin   - COVID negative  - EKG 8/5 with QTc < 500   - f/u Triglyceride results in setting of continued Propofol gtt     Severe persistent asthma with acute exacerbation  Patient has history of severe asthma and status asthmaticus requiring intubation in the past. Home meds include albuterol inhaler, duonebs, breo, Advair, montelukast    - De-escalated methylprednisone 60 mg to QD 8/5;  2 more days before discontinuing   - Continue duonebs q6h   - Added Montelukast 10mg QD   - Initiated inhaled Budesonide Q12h        Endocrine  Steroid-induced hyperglycemia  -continue tube feeds rate 10cc   -SSI with goal normoglycemia  -q6h accuchecks        Critical Care Daily Checklist:    A: Awake: RASS Goal/Actual Goal: RASS Goal: 0-->alert and calm  Actual: [place] Agitation Sedation Scale (RASS): Deep sedation   B: Spontaneous Breathing Trial  Performed?     C: SAT & SBT Coordinated?  Yes               D: Delirium: CAM-ICU Overall CAM-ICU: Positive   E: Early Mobility Performed? No   F: Feeding Goal: Goals: Meet % EEN, EPN by RD f/u date  Status: Nutrition Goal Status: new   Current Diet Order   Procedures    Diet NPO      AS: Analgesia/Sedation Fentanyl, propofol, ketamine   T: Thromboembolic Prophylaxis Lov   H: HOB > 300 Yes   U: Stress Ulcer Prophylaxis (if needed) Pepcid   G: Glucose Control Yes; SSI   B: Bowel Function Stool Occurrence: 0   I: Indwelling Catheter (Lines & Baca) Necessity Condom cath   D: De-escalation of Antimicrobials/Pharmacotherapies Now vanc/CTX    Plan for the day/ETD Wean sedation    Code Status:  Family/Goals of Care: Full Code  Sedation wean       Critical secondary to Patient has a condition that poses threat to life and bodily function: Severe Respiratory Distress      Critical care was time spent personally by me on the following activities: development of treatment plan with patient or surrogate and bedside caregivers, discussions with consultants, evaluation of patient's response to treatment, examination of patient, ordering and performing treatments and interventions, ordering and review of laboratory studies, ordering and review of radiographic studies, pulse oximetry, re-evaluation of patient's condition. This critical care time did not overlap with that of any other provider or involve time for any procedures.     Soco Hood MD  Critical Care Medicine  [facility] - Cardiac Medical ICU

## 2022-08-05 NOTE — NURSING
In/Out cath Pt DT not voiding 6 hrs after sheppard removed and bladder scanner reading 300mL. If Pt doesn't void for another 6 hrs, RN to scan Pt report to team, for possible reinsertion sheppard orders.

## 2022-08-05 NOTE — ASSESSMENT & PLAN NOTE
Patient initially presented with GCS 3, given narcan and magnesium by EMS and intubated in the ED. Found to be in respiratory failure with hypercarbia and initial pCO2 of 86. AMS likely 2/2 to opioid overdose, respiratory depression and hypercapnia. CTH with diffuse cerebral edema and concern for hypoxic-ischemic encephalopathy. EEG video monitoring showing diffuse slowing with no seizure-like activity.     - MRI with no acute intracranial process; no intraparenchymal edema  - Sodium bicarbonate 650mg BIDs   - EEG with no seizure activity; continue monitoring   - q6h glucose accuchecks - goal normoglycemia  - UDS negative, TSH WNL

## 2022-08-05 NOTE — PROGRESS NOTES
"  [facility] - Cardiac Medical ICU  Adult Nutrition  Consult Note    SUMMARY     Recommendations    1. As tolerated, increase TF rate (of Isosource 1.5) to 50 mL/hr = 1800 kcals, 82 g of protein, 917 mL fluid.  2. RD to monitor & follow-up.    Goals: Meet % EEN, EPN by RD f/u date  Nutrition Goal Status: new  Communication of RD Recs: reviewed with RN    Assessment and Plan    Nutrition Problem:  Inadequate energy intake    Related to (etiology):   Inability to consume sufficient energy    Signs and Symptoms (as evidenced by):   NPO    Interventions(treatment strategy):  Collaboration of nutrition care w/ other providers  EN    Nutrition Diagnosis Status:   New     Malnutrition Assessment    Orbital Region (Subcutaneous Fat Loss): moderate depletion  Upper Arm Region (Subcutaneous Fat Loss): moderate depletion   [place] Region (Muscle Loss): mild depletion  Clavicle Bone Region (Muscle Loss): moderate depletion     Reason for Assessment    Reason For Assessment: new tube feeding  Diagnosis: other (see comments) (Resp. fx)  Relevant Medical History: Asthma, Substance abuse  Interdisciplinary Rounds: attended    General Information Comments: Intubated, sedated w/ no family at bedside. TFs initiated yesterday & pt tolerating thus far at trice. Unsure of PO intake PTA; UBW: 140#, per chart review. Pt appears w/ mild-moderate fat & muscle wasting. RD unable to assess for malnutrition. Will monitor.  Nutrition Discharge Planning: Pending clinical course    Nutrition/Diet History    Factors Affecting Nutritional Intake: NPO, on mechanical ventilation    Anthropometrics    Temp: 97.5 °F (36.4 °C)  Height Method: Estimated  Height: 5' 6" (167.6 cm)  Height (inches): 66 in  Weight Method: Bed Scale  Weight: 61.7 kg (136 lb 0.4 oz)  Weight (lb): 136.03 lb  Ideal Body Weight (IBW), Male: 142 lb  % Ideal Body Weight, Male (lb): 95.8 %  BMI (Calculated): 22  BMI Grade: 18.5-24.9 - normal    Lab/Procedures/Meds    Pertinent " Labs Reviewed: reviewed  Pertinent Labs Comments: Fentanyl, Ketalar, Propofol  Pertinent Medications Reviewed: reviewed  Pertinent Medications Comments: -    Estimated/Assessed Needs    Weight Used For Calorie Calculations: 61.7 kg (136 lb 0.4 oz)     Energy Calorie Requirements (kcal): 1847 kcal/d  Energy Need Method: [facility]     Protein Requirements: 74-93 g/d (1.2-1.5 g/kg)  Weight Used For Protein Calculations: 61.7 kg (136 lb 0.4 oz)     Estimated Fluid Requirement Method: other (see comments) (Per MD or 1 mL/kcal)  RDA Method (mL): 1847    Nutrition Prescription Ordered    Current Diet Order: NPO  Current Nutrition Support Formula Ordered: Isosource 1.5  Current Nutrition Support Rate Ordered: 10 mL/hr    Evaluation of Received Nutrient/Fluid Intake    Enteral Calories (kcal): 360  Enteral Protein (gm): 16  Enteral (Free Water) Fluid (mL): 183    Other Calories (kcal): 576 (Propofol)    Total Calories (kcal): 936    % Kcal Needs: 51%  % Protein Needs: 22%    I/O: +3.9L since admit    Energy Calories Required: not meeting needs  Protein Required: not meeting needs  Fluid Required: other (see comments) (Per MD)    Comments: LBM: PTA    Tolerance: tolerating    Nutrition Risk    Level of Risk/Frequency of Follow-up:  (1x/week)     Monitor and Evaluation    Food and Nutrient Intake: energy intake, food and beverage intake, enteral nutrition intake  Food and Nutrient Adminstration: diet order, enteral and parenteral nutrition administration  Physical Activity and Function: nutrition-related ADLs and IADLs  Anthropometric Measurements: weight, weight change  Biochemical Data, Medical Tests and Procedures: inflammatory profile, lipid profile, glucose/endocrine profile, electrolyte and renal panel, gastrointestinal profile  Nutrition-Focused Physical Findings: overall appearance     Nutrition Follow-Up    RD Follow-up?: Yes

## 2022-08-05 NOTE — PLAN OF CARE
CMICU DAILY GOALS       A: Awake    RASS: Goal - RASS Goal: -4-->deep sedation  Actual - RASS (La Agitation-Sedation Scale): -5-->unarousable   Restraint necessity: Clinical Justification: Removing medical devices  B: Breathe   SBT: Not attempted   C: Coordinate A & B, analgesics/sedatives   Pain: managed    SAT: Not attempted  D: Delirium   CAM-ICU: Overall CAM-ICU: Positive  E: Early(intubated/ Progressive (non-intubated) Mobility   MOVE Screen: Fail   Activity: Activity Management: Patient unable to perform activities  FAS: Feeding/Nutrition   Diet order: Diet/Nutrition Received: NPO, tube feeding,    T: Thrombus   DVT prophylaxis: VTE Required Core Measure: Pharmacological prophylaxis initiated/maintained  H: HOB Elevation   Head of Bed (HOB) Positioning: HOB at 30 degrees  U: Ulcer Prophylaxis   GI: yes  G: Glucose control   managed    S: Skin   Bathing/Skin Care: bath, complete, dressed/undressed  Device Skin Pressure Protection: absorbent pad utilized/changed, adhesive use limited, pressure points protected, skin-to-device areas padded, skin-to-skin areas padded  Pressure Reduction Devices: heel offloading device utilized, positioning supports utilized, pressure-redistributing mattress utilized, foam padding utilized  Pressure Reduction Techniques: weight shift assistance provided  Skin Protection: adhesive use limited, incontinence pads utilized, skin-to-device areas padded, skin-to-skin areas padded, tubing/devices free from skin contact  B: Bowel Function   constipation   I: Indwelling Catheters   Baca necessity: [REMOVED]      Urethral Catheter 08/03/22 0610 Temperature probe 16 Fr.-Reason for Continuing Urinary Catheterization: Critically ill in ICU and requiring hourly monitoring of intake/output       Urethral Catheter 08/03/22 2320 Non-latex 16 Fr.-Reason for Continuing Urinary Catheterization: Critically ill in ICU and requiring hourly monitoring of intake/output   CVC necessity: Yes  D:  De-escalation Antibiotics   Yes    Family/Goals of care/Code Status   Code Status: Full Code    24H Vital Sign Range  Temp:  [96.62 °F (35.9 °C)-98.96 °F (37.2 °C)]   Pulse:  []   Resp:  [26-28]   BP: (106-130)/(53-75)   SpO2:  [94 %-98 %]      Shift Events   Paralytics off. Tube feeds started.     VS and assessment per flow sheet, patient progressing towards goals as tolerated, plan of care reviewed with Tucker Roberto, all concerns addressed, will continue to monitor.    [place] Turner

## 2022-08-05 NOTE — NURSING
Pt severely agitated breathing over the vent, gagging on ETT, tachycardia RR=38. Pt suctioned, bolus of Fent given, Prop increased.

## 2022-08-05 NOTE — PLAN OF CARE
CMICU DAILY GOALS       A: Awake    RASS: Goal - RASS Goal: 0-->alert and calm  Actual - RASS (La Agitation-Sedation Scale): -5-->unarousable   Restraint necessity: Clinical Justification: Removing medical devices  B: Breathe   SBT: Not attempted   C: Coordinate A & B, analgesics/sedatives   Pain: managed    SAT: Not attempted  D: Delirium   CAM-ICU: Overall CAM-ICU: Positive  E: Early(intubated/ Progressive (non-intubated) Mobility   MOVE Screen: Fail   Activity: Activity Management: Patient unable to perform activities  FAS: Feeding/Nutrition   Diet order: Diet/Nutrition Received: tube feeding,    T: Thrombus   DVT prophylaxis: VTE Required Core Measure: Pharmacological prophylaxis initiated/maintained  H: HOB Elevation   Head of Bed (HOB) Positioning: HOB at 30-45 degrees  U: Ulcer Prophylaxis   GI: yes  G: Glucose control   managed    S: Skin   Bathing/Skin Care: bath, complete, incontinence care, linen changed  Device Skin Pressure Protection: absorbent pad utilized/changed, adhesive use limited, positioning supports utilized, pressure points protected, skin-to-device areas padded  Pressure Reduction Devices: pressure-redistributing mattress utilized, foam padding utilized, heel offloading device utilized, positioning supports utilized  Pressure Reduction Techniques: weight shift assistance provided, heels elevated off bed, pressure points protected  Skin Protection: adhesive use limited, incontinence pads utilized, protective footwear used, silicone foam dressing in place, skin-to-device areas padded, transparent dressing maintained, tubing/devices free from skin contact  B: Bowel Function   no issues   I: Indwelling Catheters   Baca necessity: [REMOVED]      Urethral Catheter 08/03/22 0610 Temperature probe 16 Fr.-Reason for Continuing Urinary Catheterization: Critically ill in ICU and requiring hourly monitoring of intake/output  [REMOVED]      Urethral Catheter 08/03/22 2320 Non-latex 16 Fr.-Reason  for Continuing Urinary Catheterization: Critically ill in ICU and requiring hourly monitoring of intake/output   CVC necessity: Yes  D: De-escalation Antibiotics   No    Family/Goals of care/Code Status   Code Status: Full Code    24H Vital Sign Range  Temp:  [97.2 °F (36.2 °C)-98.78 °F (37.1 °C)]   Pulse:  []   Resp:  [26]   BP: (106-154)/(53-95)   SpO2:  [94 %-98 %]      Shift Events   No acute events throughout shift. Baca removed; condom cath applied. Current gtts; Prop, Fent, Precedex. Versed, Ketamine DC. Weaning sedation; Prop to be the next gtt to be turned off. PRN Labetalol ordered for increasing BP. TF cont @10mL/hr. WBC. Pt cont on ACVC with minimal settings. increased likely DT steroids. VS and assessment per flow sheet, patient progressing towards goals as tolerated, plan of care reviewed with family, all concerns addressed, will continue to monitor.    Marv Bullock

## 2022-08-05 NOTE — ASSESSMENT & PLAN NOTE
Patient has history of severe asthma and status asthmaticus requiring intubation in the past. Home meds include albuterol inhaler, duonebs, breo, Advair, montelukast    - De-escalated methylprednisone 60 mg to QD 8/5;  2 more days before discontinuing   - Continue duonebs q6h   - Added Montelukast 10mg QD   - Initiated inhaled Budesonide Q12h

## 2022-08-06 PROBLEM — N17.9 AKI (ACUTE KIDNEY INJURY): Status: RESOLVED | Noted: 2022-02-19 | Resolved: 2022-08-06

## 2022-08-06 LAB
ALBUMIN SERPL BCP-MCNC: 3.1 G/DL (ref 3.5–5.2)
ALLENS TEST: ABNORMAL
ALLENS TEST: ABNORMAL
ALP SERPL-CCNC: 57 U/L (ref 55–135)
ALT SERPL W/O P-5'-P-CCNC: 33 U/L (ref 10–44)
ANION GAP SERPL CALC-SCNC: 11 MMOL/L (ref 8–16)
ANION GAP SERPL CALC-SCNC: 9 MMOL/L (ref 8–16)
AST SERPL-CCNC: 32 U/L (ref 10–40)
BASOPHILS # BLD AUTO: 0.02 K/UL (ref 0–0.2)
BASOPHILS NFR BLD: 0.1 % (ref 0–1.9)
BILIRUB SERPL-MCNC: 0.3 MG/DL (ref 0.1–1)
BUN SERPL-MCNC: 17 MG/DL (ref 6–20)
BUN SERPL-MCNC: 18 MG/DL (ref 6–20)
CALCIUM SERPL-MCNC: 8.1 MG/DL (ref 8.7–10.5)
CALCIUM SERPL-MCNC: 8.2 MG/DL (ref 8.7–10.5)
CHLORIDE SERPL-SCNC: 109 MMOL/L (ref 95–110)
CHLORIDE SERPL-SCNC: 112 MMOL/L (ref 95–110)
CO2 SERPL-SCNC: 21 MMOL/L (ref 23–29)
CO2 SERPL-SCNC: 24 MMOL/L (ref 23–29)
CREAT SERPL-MCNC: 0.8 MG/DL (ref 0.5–1.4)
CREAT SERPL-MCNC: 0.9 MG/DL (ref 0.5–1.4)
DELSYS: ABNORMAL
DIFFERENTIAL METHOD: ABNORMAL
EOSINOPHIL # BLD AUTO: 0 K/UL (ref 0–0.5)
EOSINOPHIL NFR BLD: 0.1 % (ref 0–8)
ERYTHROCYTE [DISTWIDTH] IN BLOOD BY AUTOMATED COUNT: 14.5 % (ref 11.5–14.5)
EST. GFR  (NO RACE VARIABLE): >60 ML/MIN/1.73 M^2
EST. GFR  (NO RACE VARIABLE): >60 ML/MIN/1.73 M^2
GLUCOSE SERPL-MCNC: 112 MG/DL (ref 70–110)
GLUCOSE SERPL-MCNC: 119 MG/DL (ref 70–110)
HCO3 UR-SCNC: 22.2 MMOL/L (ref 24–28)
HCO3 UR-SCNC: 22.3 MMOL/L (ref 24–28)
HCT VFR BLD AUTO: 38 % (ref 40–54)
HGB BLD-MCNC: 12.4 G/DL (ref 14–18)
IMM GRANULOCYTES # BLD AUTO: 0.15 K/UL (ref 0–0.04)
IMM GRANULOCYTES NFR BLD AUTO: 0.9 % (ref 0–0.5)
LYMPHOCYTES # BLD AUTO: 2.2 K/UL (ref 1–4.8)
LYMPHOCYTES NFR BLD: 12.8 % (ref 18–48)
MAGNESIUM SERPL-MCNC: 2.2 MG/DL (ref 1.6–2.6)
MCH RBC QN AUTO: 27.7 PG (ref 27–31)
MCHC RBC AUTO-ENTMCNC: 32.6 G/DL (ref 32–36)
MCV RBC AUTO: 85 FL (ref 82–98)
MODE: ABNORMAL
MONOCYTES # BLD AUTO: 1.4 K/UL (ref 0.3–1)
MONOCYTES NFR BLD: 8.4 % (ref 4–15)
NEUTROPHILS # BLD AUTO: 13.1 K/UL (ref 1.8–7.7)
NEUTROPHILS NFR BLD: 77.7 % (ref 38–73)
NRBC BLD-RTO: 0 /100 WBC
PCO2 BLDA: 42.4 MMHG (ref 35–45)
PCO2 BLDA: 56 MMHG (ref 35–45)
PH SMN: 7.21 [PH] (ref 7.35–7.45)
PH SMN: 7.33 [PH] (ref 7.35–7.45)
PHOSPHATE SERPL-MCNC: 2.7 MG/DL (ref 2.7–4.5)
PLATELET # BLD AUTO: 292 K/UL (ref 150–450)
PMV BLD AUTO: 11 FL (ref 9.2–12.9)
PO2 BLDA: 36 MMHG (ref 40–60)
PO2 BLDA: 37 MMHG (ref 40–60)
POC BE: -4 MMOL/L
POC BE: -6 MMOL/L
POC SATURATED O2: 58 % (ref 95–100)
POC SATURATED O2: 64 % (ref 95–100)
POC TCO2: 24 MMOL/L (ref 24–29)
POC TCO2: 24 MMOL/L (ref 24–29)
POCT GLUCOSE: 125 MG/DL (ref 70–110)
POTASSIUM SERPL-SCNC: 3.3 MMOL/L (ref 3.5–5.1)
POTASSIUM SERPL-SCNC: 3.6 MMOL/L (ref 3.5–5.1)
PROT SERPL-MCNC: 5.9 G/DL (ref 6–8.4)
RBC # BLD AUTO: 4.48 M/UL (ref 4.6–6.2)
SAMPLE: ABNORMAL
SAMPLE: ABNORMAL
SITE: ABNORMAL
SITE: ABNORMAL
SODIUM SERPL-SCNC: 142 MMOL/L (ref 136–145)
SODIUM SERPL-SCNC: 144 MMOL/L (ref 136–145)
SP02: 93
VANCOMYCIN TROUGH SERPL-MCNC: 13.8 UG/ML (ref 10–22)
WBC # BLD AUTO: 16.82 K/UL (ref 3.9–12.7)

## 2022-08-06 PROCEDURE — 80053 COMPREHEN METABOLIC PANEL: CPT | Performed by: STUDENT IN AN ORGANIZED HEALTH CARE EDUCATION/TRAINING PROGRAM

## 2022-08-06 PROCEDURE — 25000003 PHARM REV CODE 250: Performed by: EMERGENCY MEDICINE

## 2022-08-06 PROCEDURE — 99900026 HC AIRWAY MAINTENANCE (STAT)

## 2022-08-06 PROCEDURE — 63600175 PHARM REV CODE 636 W HCPCS

## 2022-08-06 PROCEDURE — 94761 N-INVAS EAR/PLS OXIMETRY MLT: CPT

## 2022-08-06 PROCEDURE — 99900035 HC TECH TIME PER 15 MIN (STAT)

## 2022-08-06 PROCEDURE — 94664 DEMO&/EVAL PT USE INHALER: CPT

## 2022-08-06 PROCEDURE — 25000242 PHARM REV CODE 250 ALT 637 W/ HCPCS

## 2022-08-06 PROCEDURE — 93010 EKG 12-LEAD: ICD-10-PCS | Mod: ,,, | Performed by: INTERNAL MEDICINE

## 2022-08-06 PROCEDURE — 94645 CONT INHLJ TX EACH ADDL HOUR: CPT

## 2022-08-06 PROCEDURE — 25000242 PHARM REV CODE 250 ALT 637 W/ HCPCS: Performed by: STUDENT IN AN ORGANIZED HEALTH CARE EDUCATION/TRAINING PROGRAM

## 2022-08-06 PROCEDURE — 93005 ELECTROCARDIOGRAM TRACING: CPT

## 2022-08-06 PROCEDURE — 27000646 HC AEROBIKA DEVICE

## 2022-08-06 PROCEDURE — 20000000 HC ICU ROOM

## 2022-08-06 PROCEDURE — 84100 ASSAY OF PHOSPHORUS: CPT | Performed by: STUDENT IN AN ORGANIZED HEALTH CARE EDUCATION/TRAINING PROGRAM

## 2022-08-06 PROCEDURE — 99291 PR CRITICAL CARE, E/M 30-74 MINUTES: ICD-10-PCS | Mod: ,,, | Performed by: EMERGENCY MEDICINE

## 2022-08-06 PROCEDURE — 25000003 PHARM REV CODE 250

## 2022-08-06 PROCEDURE — 63600175 PHARM REV CODE 636 W HCPCS: Performed by: STUDENT IN AN ORGANIZED HEALTH CARE EDUCATION/TRAINING PROGRAM

## 2022-08-06 PROCEDURE — 27000221 HC OXYGEN, UP TO 24 HOURS

## 2022-08-06 PROCEDURE — 82803 BLOOD GASES ANY COMBINATION: CPT

## 2022-08-06 PROCEDURE — 94668 MNPJ CHEST WALL SBSQ: CPT

## 2022-08-06 PROCEDURE — 99291 CRITICAL CARE FIRST HOUR: CPT | Mod: ,,, | Performed by: EMERGENCY MEDICINE

## 2022-08-06 PROCEDURE — 63600175 PHARM REV CODE 636 W HCPCS: Performed by: EMERGENCY MEDICINE

## 2022-08-06 PROCEDURE — 25000003 PHARM REV CODE 250: Performed by: STUDENT IN AN ORGANIZED HEALTH CARE EDUCATION/TRAINING PROGRAM

## 2022-08-06 PROCEDURE — 83735 ASSAY OF MAGNESIUM: CPT | Performed by: STUDENT IN AN ORGANIZED HEALTH CARE EDUCATION/TRAINING PROGRAM

## 2022-08-06 PROCEDURE — 85025 COMPLETE CBC W/AUTO DIFF WBC: CPT | Performed by: STUDENT IN AN ORGANIZED HEALTH CARE EDUCATION/TRAINING PROGRAM

## 2022-08-06 PROCEDURE — 80202 ASSAY OF VANCOMYCIN: CPT | Performed by: EMERGENCY MEDICINE

## 2022-08-06 PROCEDURE — 94640 AIRWAY INHALATION TREATMENT: CPT

## 2022-08-06 PROCEDURE — 93010 ELECTROCARDIOGRAM REPORT: CPT | Mod: ,,, | Performed by: INTERNAL MEDICINE

## 2022-08-06 PROCEDURE — 80048 BASIC METABOLIC PNL TOTAL CA: CPT | Mod: XB

## 2022-08-06 RX ORDER — METHYLPREDNISOLONE SOD SUCC 125 MG
VIAL (EA) INJECTION
Status: COMPLETED
Start: 2022-08-06 | End: 2022-08-06

## 2022-08-06 RX ORDER — PROPOFOL 10 MG/ML
VIAL (ML) INTRAVENOUS
Status: DISPENSED
Start: 2022-08-06 | End: 2022-08-06

## 2022-08-06 RX ORDER — NALOXONE HCL 0.4 MG/ML
VIAL (ML) INJECTION
Status: COMPLETED
Start: 2022-08-06 | End: 2022-08-06

## 2022-08-06 RX ORDER — AMOXICILLIN 250 MG
2 CAPSULE ORAL DAILY PRN
Status: DISCONTINUED | OUTPATIENT
Start: 2022-08-06 | End: 2022-08-09 | Stop reason: HOSPADM

## 2022-08-06 RX ORDER — IPRATROPIUM BROMIDE AND ALBUTEROL SULFATE 2.5; .5 MG/3ML; MG/3ML
3 SOLUTION RESPIRATORY (INHALATION) CONTINUOUS
Status: DISCONTINUED | OUTPATIENT
Start: 2022-08-06 | End: 2022-08-06

## 2022-08-06 RX ORDER — ROCURONIUM BROMIDE 10 MG/ML
INJECTION, SOLUTION INTRAVENOUS
Status: DISPENSED
Start: 2022-08-06 | End: 2022-08-06

## 2022-08-06 RX ORDER — IPRATROPIUM BROMIDE AND ALBUTEROL SULFATE 2.5; .5 MG/3ML; MG/3ML
12 SOLUTION RESPIRATORY (INHALATION)
Status: DISCONTINUED | OUTPATIENT
Start: 2022-08-06 | End: 2022-08-06

## 2022-08-06 RX ORDER — POTASSIUM CHLORIDE 750 MG/1
30 CAPSULE, EXTENDED RELEASE ORAL ONCE
Status: COMPLETED | OUTPATIENT
Start: 2022-08-06 | End: 2022-08-06

## 2022-08-06 RX ORDER — KETAMINE HCL IN 0.9 % NACL 50 MG/5 ML
1 SYRINGE (ML) INTRAVENOUS ONCE
Status: CANCELLED | OUTPATIENT
Start: 2022-08-06 | End: 2022-08-06

## 2022-08-06 RX ORDER — OLANZAPINE 5 MG/1
10 TABLET ORAL ONCE
Status: DISCONTINUED | OUTPATIENT
Start: 2022-08-06 | End: 2022-08-06

## 2022-08-06 RX ORDER — METHOCARBAMOL 500 MG/1
500 TABLET, FILM COATED ORAL EVERY 6 HOURS PRN
Status: DISCONTINUED | OUTPATIENT
Start: 2022-08-06 | End: 2022-08-08

## 2022-08-06 RX ORDER — KETAMINE HCL IN 0.9 % NACL 50 MG/5 ML
1 SYRINGE (ML) INTRAVENOUS ONCE
Status: DISCONTINUED | OUTPATIENT
Start: 2022-08-06 | End: 2022-08-06

## 2022-08-06 RX ORDER — ALBUTEROL SULFATE 2.5 MG/.5ML
10 SOLUTION RESPIRATORY (INHALATION) ONCE
Status: COMPLETED | OUTPATIENT
Start: 2022-08-06 | End: 2022-08-06

## 2022-08-06 RX ORDER — MONTELUKAST SODIUM 10 MG/1
10 TABLET ORAL DAILY
Status: DISCONTINUED | OUTPATIENT
Start: 2022-08-07 | End: 2022-08-09 | Stop reason: HOSPADM

## 2022-08-06 RX ORDER — FLUTICASONE FUROATE AND VILANTEROL 100; 25 UG/1; UG/1
1 POWDER RESPIRATORY (INHALATION) DAILY
Status: DISCONTINUED | OUTPATIENT
Start: 2022-08-06 | End: 2022-08-09 | Stop reason: HOSPADM

## 2022-08-06 RX ORDER — DICYCLOMINE HYDROCHLORIDE 10 MG/1
10 CAPSULE ORAL EVERY 6 HOURS PRN
Status: DISCONTINUED | OUTPATIENT
Start: 2022-08-06 | End: 2022-08-09 | Stop reason: HOSPADM

## 2022-08-06 RX ORDER — SUCCINYLCHOLINE CHLORIDE 20 MG/ML
INJECTION INTRAMUSCULAR; INTRAVENOUS
Status: DISPENSED
Start: 2022-08-06 | End: 2022-08-06

## 2022-08-06 RX ORDER — THIAMINE HCL 100 MG
100 TABLET ORAL DAILY
Status: DISCONTINUED | OUTPATIENT
Start: 2022-08-07 | End: 2022-08-09 | Stop reason: HOSPADM

## 2022-08-06 RX ORDER — ONDANSETRON 2 MG/ML
4 INJECTION INTRAMUSCULAR; INTRAVENOUS ONCE
Status: COMPLETED | OUTPATIENT
Start: 2022-08-06 | End: 2022-08-06

## 2022-08-06 RX ORDER — FUROSEMIDE 10 MG/ML
40 INJECTION INTRAMUSCULAR; INTRAVENOUS ONCE
Status: COMPLETED | OUTPATIENT
Start: 2022-08-06 | End: 2022-08-06

## 2022-08-06 RX ORDER — ONDANSETRON 2 MG/ML
INJECTION INTRAMUSCULAR; INTRAVENOUS
Status: COMPLETED
Start: 2022-08-06 | End: 2022-08-06

## 2022-08-06 RX ORDER — LOPERAMIDE HYDROCHLORIDE 2 MG/1
2 CAPSULE ORAL
Status: DISCONTINUED | OUTPATIENT
Start: 2022-08-06 | End: 2022-08-09 | Stop reason: HOSPADM

## 2022-08-06 RX ORDER — ONDANSETRON 4 MG/1
4 TABLET, FILM COATED ORAL EVERY 4 HOURS PRN
Status: DISCONTINUED | OUTPATIENT
Start: 2022-08-06 | End: 2022-08-09 | Stop reason: HOSPADM

## 2022-08-06 RX ORDER — KETAMINE HYDROCHLORIDE 10 MG/ML
1 INJECTION, SOLUTION INTRAMUSCULAR; INTRAVENOUS ONCE
Status: CANCELLED | OUTPATIENT
Start: 2022-08-06 | End: 2022-08-06

## 2022-08-06 RX ORDER — SODIUM,POTASSIUM PHOSPHATES 280-250MG
1 POWDER IN PACKET (EA) ORAL ONCE
Status: COMPLETED | OUTPATIENT
Start: 2022-08-06 | End: 2022-08-06

## 2022-08-06 RX ORDER — METHYLPREDNISOLONE SOD SUCC 125 MG
125 VIAL (EA) INJECTION ONCE
Status: COMPLETED | OUTPATIENT
Start: 2022-08-06 | End: 2022-08-06

## 2022-08-06 RX ORDER — HYDROXYZINE PAMOATE 50 MG/1
50 CAPSULE ORAL EVERY 8 HOURS PRN
Status: DISCONTINUED | OUTPATIENT
Start: 2022-08-06 | End: 2022-08-08

## 2022-08-06 RX ORDER — AMOXICILLIN 250 MG
2 CAPSULE ORAL DAILY
Status: DISCONTINUED | OUTPATIENT
Start: 2022-08-07 | End: 2022-08-06

## 2022-08-06 RX ORDER — ETOMIDATE 2 MG/ML
INJECTION INTRAVENOUS
Status: DISPENSED
Start: 2022-08-06 | End: 2022-08-06

## 2022-08-06 RX ORDER — NALOXONE HCL 0.4 MG/ML
0.4 VIAL (ML) INJECTION
Status: DISCONTINUED | OUTPATIENT
Start: 2022-08-06 | End: 2022-08-09 | Stop reason: HOSPADM

## 2022-08-06 RX ORDER — HALOPERIDOL 5 MG/ML
5 INJECTION INTRAMUSCULAR ONCE
Status: COMPLETED | OUTPATIENT
Start: 2022-08-06 | End: 2022-08-06

## 2022-08-06 RX ADMIN — Medication 125 MG: at 07:08

## 2022-08-06 RX ADMIN — HYDROXYZINE PAMOATE 50 MG: 50 CAPSULE ORAL at 08:08

## 2022-08-06 RX ADMIN — POTASSIUM CHLORIDE 30 MEQ: 10 CAPSULE, COATED, EXTENDED RELEASE ORAL at 04:08

## 2022-08-06 RX ADMIN — IPRATROPIUM BROMIDE AND ALBUTEROL SULFATE 3 ML: 2.5; .5 SOLUTION RESPIRATORY (INHALATION) at 07:08

## 2022-08-06 RX ADMIN — POTASSIUM & SODIUM PHOSPHATES POWDER PACK 280-160-250 MG 1 PACKET: 280-160-250 PACK at 06:08

## 2022-08-06 RX ADMIN — NALXONE HYDROCHLORIDE 0.4 MG: 0.4 INJECTION INTRAMUSCULAR; INTRAVENOUS; SUBCUTANEOUS at 06:08

## 2022-08-06 RX ADMIN — POTASSIUM BICARBONATE 25 MEQ: 978 TABLET, EFFERVESCENT ORAL at 06:08

## 2022-08-06 RX ADMIN — ALBUTEROL SULFATE 10 MG: 2.5 SOLUTION RESPIRATORY (INHALATION) at 06:08

## 2022-08-06 RX ADMIN — IPRATROPIUM BROMIDE AND ALBUTEROL SULFATE 12 ML: 2.5; .5 SOLUTION RESPIRATORY (INHALATION) at 08:08

## 2022-08-06 RX ADMIN — IPRATROPIUM BROMIDE AND ALBUTEROL SULFATE 3 ML: 2.5; .5 SOLUTION RESPIRATORY (INHALATION) at 12:08

## 2022-08-06 RX ADMIN — VANCOMYCIN HYDROCHLORIDE 1000 MG: 1 INJECTION, POWDER, LYOPHILIZED, FOR SOLUTION INTRAVENOUS at 10:08

## 2022-08-06 RX ADMIN — METHOCARBAMOL 500 MG: 500 TABLET ORAL at 09:08

## 2022-08-06 RX ADMIN — HALOPERIDOL LACTATE 5 MG: 5 INJECTION, SOLUTION INTRAMUSCULAR at 04:08

## 2022-08-06 RX ADMIN — ONDANSETRON 4 MG: 2 INJECTION INTRAMUSCULAR; INTRAVENOUS at 07:08

## 2022-08-06 RX ADMIN — METHYLPREDNISOLONE SODIUM SUCCINATE 60 MG: 40 INJECTION, POWDER, FOR SOLUTION INTRAMUSCULAR; INTRAVENOUS at 10:08

## 2022-08-06 RX ADMIN — VANCOMYCIN HYDROCHLORIDE 1000 MG: 1 INJECTION, POWDER, LYOPHILIZED, FOR SOLUTION INTRAVENOUS at 06:08

## 2022-08-06 RX ADMIN — IPRATROPIUM BROMIDE AND ALBUTEROL SULFATE 3 ML: 2.5; .5 SOLUTION RESPIRATORY (INHALATION) at 06:08

## 2022-08-06 RX ADMIN — METHYLPREDNISOLONE SODIUM SUCCINATE 125 MG: 125 INJECTION, POWDER, FOR SOLUTION INTRAMUSCULAR; INTRAVENOUS at 07:08

## 2022-08-06 RX ADMIN — CEFTRIAXONE 2 G: 2 INJECTION, SOLUTION INTRAVENOUS at 01:08

## 2022-08-06 RX ADMIN — IPRATROPIUM BROMIDE AND ALBUTEROL SULFATE 3 ML: .5; 3 SOLUTION RESPIRATORY (INHALATION) at 08:08

## 2022-08-06 RX ADMIN — NALOXONE HYDROCHLORIDE 0.4 MG/HR: 1 INJECTION PARENTERAL at 08:08

## 2022-08-06 RX ADMIN — ENOXAPARIN SODIUM 40 MG: 100 INJECTION SUBCUTANEOUS at 04:08

## 2022-08-06 RX ADMIN — LABETALOL HYDROCHLORIDE 10 MG: 5 INJECTION, SOLUTION INTRAVENOUS at 03:08

## 2022-08-06 RX ADMIN — IPRATROPIUM BROMIDE AND ALBUTEROL SULFATE 12 ML: 2.5; .5 SOLUTION RESPIRATORY (INHALATION) at 09:08

## 2022-08-06 RX ADMIN — FUROSEMIDE 40 MG: 10 INJECTION, SOLUTION INTRAMUSCULAR; INTRAVENOUS at 10:08

## 2022-08-06 RX ADMIN — IPRATROPIUM BROMIDE AND ALBUTEROL SULFATE 3 ML: 2.5; .5 SOLUTION RESPIRATORY (INHALATION) at 01:08

## 2022-08-06 RX ADMIN — VANCOMYCIN HYDROCHLORIDE 1000 MG: 1 INJECTION, POWDER, LYOPHILIZED, FOR SOLUTION INTRAVENOUS at 02:08

## 2022-08-06 RX ADMIN — DEXMEDETOMIDINE HYDROCHLORIDE 1.4 MCG/KG/HR: 4 INJECTION INTRAVENOUS at 02:08

## 2022-08-06 RX ADMIN — MUPIROCIN: 20 OINTMENT TOPICAL at 09:08

## 2022-08-06 RX ADMIN — BUDESONIDE 0.5 MG: 0.5 INHALANT ORAL at 06:08

## 2022-08-06 RX ADMIN — FLUTICASONE FUROATE AND VILANTEROL TRIFENATATE 1 PUFF: 100; 25 POWDER RESPIRATORY (INHALATION) at 01:08

## 2022-08-06 RX ADMIN — MUPIROCIN: 20 OINTMENT TOPICAL at 08:08

## 2022-08-06 RX ADMIN — Medication 0.4 MG: at 06:08

## 2022-08-06 NOTE — ASSESSMENT & PLAN NOTE
Hx heroin use, previously has denied IVDU. Not responsive to naloxone with EMS. UDS and ethanol negative.    - consider addiction psych consult once no longer critically ill - evaluate for suboxone candidacy

## 2022-08-06 NOTE — ASSESSMENT & PLAN NOTE
Per chart review, patient has history of snorting heroin (no IVDU).     - monitor for opioid withdrawal and treat accordingly   - discuss addiction psych consult with patient upon return to baseline mentation

## 2022-08-06 NOTE — PLAN OF CARE
CMICU DAILY GOALS       A: Awake    RASS: Goal - RASS Goal: 0-->alert and calm  Actual - RASS (La Agitation-Sedation Scale): -2-->light sedation   Restraint necessity: Clinical Justification: Removing medical devices  B: Breathe   SBT: Not attempted   C: Coordinate A & B, analgesics/sedatives   Pain: managed    SAT: Not attempted  D: Delirium   CAM-ICU: Overall CAM-ICU: Positive  E: Early(intubated/ Progressive (non-intubated) Mobility   MOVE Screen: Fail   Activity: Activity Management: Patient unable to perform activities  FAS: Feeding/Nutrition   Diet order: Diet/Nutrition Received: tube feeding,    T: Thrombus   DVT prophylaxis: VTE Required Core Measure: Pharmacological prophylaxis initiated/maintained  H: HOB Elevation   Head of Bed (HOB) Positioning: HOB at 30 degrees  U: Ulcer Prophylaxis   GI: yes  G: Glucose control   managed    S: Skin   Bathing/Skin Care: bath, complete, incontinence care, linen changed  Device Skin Pressure Protection: absorbent pad utilized/changed, skin-to-device areas padded, skin-to-skin areas padded, positioning supports utilized  Pressure Reduction Devices: positioning supports utilized  Pressure Reduction Techniques: weight shift assistance provided  Skin Protection: adhesive use limited, incontinence pads utilized, skin sealant/moisture barrier applied, skin-to-device areas padded, skin-to-skin areas padded, tubing/devices free from skin contact  B: Bowel Function   no issues   I: Indwelling Catheters   Baca necessity: [REMOVED]      Urethral Catheter 08/03/22 0610 Temperature probe 16 Fr.-Reason for Continuing Urinary Catheterization: Critically ill in ICU and requiring hourly monitoring of intake/output  [REMOVED]      Urethral Catheter 08/03/22 2320 Non-latex 16 Fr.-Reason for Continuing Urinary Catheterization: Critically ill in ICU and requiring hourly monitoring of intake/output       Urethral Catheter 08/05/22 2319-Reason for Continuing Urinary Catheterization:  Urinary retention   CVC necessity: Yes  D: De-escalation Antibiotics   Yes    Family/Goals of care/Code Status   Code Status: Full Code    24H Vital Sign Range  Temp:  [97.4 °F (36.3 °C)-99.7 °F (37.6 °C)]   Pulse:  []   Resp:  [26-45]   BP: (113-163)/()   SpO2:  [94 %-100 %]      Shift Events   Unable to wean sedation overnight as Pt wakes frequently and gags on ET tube, RR increases to 40's, HR increases to 110's-120's. During these events Pt makes attempts to self extubate. Pt following commands ans nodding yes/no appropriately.      **Sedation turned off this morning after Pt self extubated. Pt was in restraints without slack, he leaned forward in the bed and pulled the ET tube. Team at bedside, see MD note. Reported off to day shift RN.     VS and assessment per flow sheet, patient progressing towards goals as tolerated, plan of care reviewed with  patient , all concerns addressed, will continue to monitor.

## 2022-08-06 NOTE — PLAN OF CARE
CMICU DAILY GOALS       A: Awake    RASS: Goal - RASS Goal: 0-->alert and calm  Actual - RASS (La Agitation-Sedation Scale): -1-->drowsy   Restraint necessity: Clinical Justification: Removing medical devices  B: Breathe   SBT: NA   C: Coordinate A & B, analgesics/sedatives   Pain: managed    SAT: NA  D: Delirium   CAM-ICU: Overall CAM-ICU: Positive  E: Early(intubated/ Progressive (non-intubated) Mobility   MOVE Screen: Pass   Activity: Activity Management: Rolling - L1, Arm raise - L1  FAS: Feeding/Nutrition   Diet order: Diet/Nutrition Received: NPO,    T: Thrombus   DVT prophylaxis: VTE Required Core Measure: Pharmacological prophylaxis initiated/maintained  H: HOB Elevation   Head of Bed (HOB) Positioning: HOB at 30 degrees  U: Ulcer Prophylaxis   GI: yes  G: Glucose control   managed    S: Skin   Bathing/Skin Care: bath, complete, linen changed  Device Skin Pressure Protection: adhesive use limited, absorbent pad utilized/changed  Pressure Reduction Devices: positioning supports utilized  Pressure Reduction Techniques: frequent weight shift encouraged  Skin Protection: adhesive use limited  B: Bowel Function   no issues   I: Indwelling Catheters   Baca necessity: [REMOVED]      Urethral Catheter 08/03/22 0610 Temperature probe 16 Fr.-Reason for Continuing Urinary Catheterization: Critically ill in ICU and requiring hourly monitoring of intake/output  [REMOVED]      Urethral Catheter 08/03/22 2320 Non-latex 16 Fr.-Reason for Continuing Urinary Catheterization: Critically ill in ICU and requiring hourly monitoring of intake/output       Urethral Catheter 08/05/22 2319-Reason for Continuing Urinary Catheterization: Urinary retention   CVC necessity: No  D: De-escalation Antibiotics   No    Family/Goals of care/Code Status   Code Status: Full Code    24H Vital Sign Range  Temp:  [97.6 °F (36.4 °C)-99.8 °F (37.7 °C)]   Pulse:  []   Resp:  [23-63]   BP: (110-173)/()   SpO2:  [77 %-100 %]       Shift Events   Pt self extubated prior to shift change. Narcan infusion started-> shortly weaned off. Patient more alert as shift progressed. Will continue to monitor patient's neuro status.      VS and assessment per flow sheet, patient progressing towards goals as tolerated, plan of care reviewed with family, all concerns addressed, will continue to monitor.    Gerber Bradley

## 2022-08-06 NOTE — ASSESSMENT & PLAN NOTE
Patient initially presented with GCS 3, given narcan and magnesium by EMS and intubated in the ED. Found to be in respiratory failure with hypercarbia and initial pCO2 of 86. AMS likely 2/2 to opioid overdose, respiratory depression and hypercapnia. CTH with diffuse cerebral edema and concern for hypoxic-ischemic encephalopathy. Subsequent MRI with no acute intracranial process; no intraparenchymal edema. EEG video monitoring showing diffuse slowing with no seizure-like activity. Patient now extubated and weaned from sedation; mild AMS.     - Began soft mechanical diet today 8/6   - D/c'd Sodium bicarbonate 650mg BID - CTM w/ daily BMP   - q6h glucose accuchecks

## 2022-08-06 NOTE — PROGRESS NOTES
[facility] - Card[facility] ICU  Critical Care Medicine  Progress Note    Patient Name: Tucker Roberto  MRN: [id number]  Admission Date: 8/3/2022  Hospital Length of Stay: 3 days  Code Status: Full Code  Attending Provider: Krunal Chung MD  Primary Care Provider: Karen Braxton MD   Principal Problem: Acute respiratory failure with hypoxia and hypercarbia    Subjective:     HPI:  Mr. Roberto is a 25 yo M with GITA (heroin, denies IVDU) and asthma that presents with acute encephalopathy. Previously has had repeated admissions for status asthmaticus and heroin overdose. He was brought in unresponsive by EMS, who administered narcan and magnesium in the field. GCS was 3 at time of arrival in ED. In the ED, he received albuterol and was intubated. History is limited 2/2 to acuity of situation. Initial labs significant for WBC of 16, pH 7.1, pCO2 86.2. CT head negative for acute abnormalities     Critical care medicine consulted for hypercapnic respiratory failure likely 2/2 to heroin overdose and asthma exacerbation.          Hospital/ICU Course:  On MICU arrival, patient sedated with improved breath sounds, although significant ventilator dyssynchrony with myoclonic movements; Nimbex gtt added. Patient received central line placement on 8/3 for hypertonic saline administration, discontinued 8/4. Patient received Vanc/Zosyn in setting of intubation for acute respiratory failure for ppx of potential aspiration; respiratory cultures w/ Staph Aureus (Vanc/Zosyn de-escalated to Vanc/Rocephin). Over 8/3, patient developed metabolic acidosis with increased lactate after episode of myoclonic movements with no associated seizure activity on EEG. Lactate now WNL with improving metabolic acidosis, no more myoclonic movements since first 24hrs of admission. Electrolytes being closely monitored, repleting as indicated. On 8/4, patient with potential ST elevation on EKG with elevated Tpn. Repeat EKG indicative of likely  early repolarization; Tpn downtrended. On 8/4 MRI brain completed showing no acute inctracranial process or intraparenchymal edema. TTE WNL. Tube feeds also began 8/4. From 8/4 to 8/5, discontinued paralytic and began weaning sedation with no complications. Patient self-extubated on the morninig of 8/6 d/t agitation; VBG WNL; no indication to re-intubate at that time. Patient has been comfortable and conversant since.      Interval History/Significant Events: Patient with increasing agitation overnight while weaning sedation, eventually early this AM patient self-extubated without resultant respiratory distress. VBG WNL. No indication for re-intubation. Patient is now off all sedation and conversant. Central line was removed. He passed bedside swallow test. Now transitioned to PO medications and soft mechanical diet. Patient will continue Vanc/Rocephin in setting of positive Staph respiratory cultures. Family updated and at bedside.     Review of Systems   Constitutional:  Negative for chills, diaphoresis, fatigue and fever.   HENT:  Negative for congestion.    Eyes:  Negative for visual disturbance.   Respiratory:  Negative for apnea, cough, choking, chest tightness, shortness of breath, wheezing and stridor.    Cardiovascular:  Negative for chest pain, palpitations and leg swelling.   Gastrointestinal:  Positive for nausea. Negative for abdominal distention and abdominal pain.        Opioid withdrawal symptoms   Genitourinary:  Positive for difficulty urinating.        Urinary retention since yesterday; sheppard in place   Skin:  Negative for pallor and rash.   Neurological:  Negative for dizziness, tremors, speech difficulty, numbness and headaches.   Psychiatric/Behavioral:  Positive for confusion. Negative for behavioral problems.         Mild waxing and waning confusion 2/2 recent sedation wean   Objective:     Vital Signs (Most Recent):  Temp: 99.8 °F (37.7 °C) (08/06/22 1500)  Pulse: 95 (08/06/22 1600)  Resp:  (!) 25 (08/06/22 1347)  BP: 123/65 (08/06/22 1600)  SpO2: 100 % (08/06/22 1600)   Vital Signs (24h Range):  Temp:  [97.6 °F (36.4 °C)-99.8 °F (37.7 °C)] 99.8 °F (37.7 °C)  Pulse:  [] 95  Resp:  [23-63] 25  SpO2:  [77 %-100 %] 100 %  BP: (110-173)/() 123/65   Weight: 61.7 kg (136 lb 0.4 oz)  Body mass index is 21.95 kg/m².      Intake/Output Summary (Last 24 hours) at 8/6/2022 1639  Last data filed at 8/6/2022 1600  Gross per 24 hour   Intake 2168.97 ml   Output 4785 ml   Net -2616.03 ml       Physical Exam  Constitutional:       General: He is not in acute distress.     Appearance: Normal appearance. He is normal weight. He is not ill-appearing.   HENT:      Head: Normocephalic and atraumatic.      Right Ear: External ear normal.      Left Ear: External ear normal.      Nose: Nose normal.      Mouth/Throat:      Mouth: Mucous membranes are moist.      Pharynx: Oropharynx is clear.   Eyes:      Extraocular Movements: Extraocular movements intact.      Conjunctiva/sclera: Conjunctivae normal.   Cardiovascular:      Rate and Rhythm: Normal rate and regular rhythm.      Pulses: Normal pulses.      Heart sounds: Normal heart sounds.   Pulmonary:      Effort: Pulmonary effort is normal. No respiratory distress.      Breath sounds: No stridor. Rhonchi present. No wheezing.      Comments: Noisy upper airway sounds indicative of increased upper airway secretions; noted strong cough reflexes. No tachypnea or respiratory distress noted  Abdominal:      General: Abdomen is flat. There is no distension.      Palpations: Abdomen is soft.      Tenderness: There is no abdominal tenderness.   Musculoskeletal:      Cervical back: Neck supple.      Right lower leg: No edema.      Left lower leg: No edema.   Skin:     General: Skin is warm and dry.      Capillary Refill: Capillary refill takes less than 2 seconds.      Comments: Multiple tattoos present     Neurological:      General: No focal deficit present.      Mental  Status: He is oriented to person, place, and time.      Comments: Patient with slight disorientation; no agitation present. Conversant    Psychiatric:         Mood and Affect: Mood normal.         Behavior: Behavior normal.       Lines/Drains/Airways       Drain  Duration                  Urethral Catheter 08/05/22 2319 <1 day              Peripheral Intravenous Line  Duration                  Peripheral IV - Single Lumen 08/03/22 0550 18 G Right Antecubital 3 days         Peripheral IV - Single Lumen 08/03/22 0558 20 G Left Hand 3 days         Peripheral IV - Single Lumen 08/03/22 1800 18 G Anterior;Right Wrist 2 days         Peripheral IV - Single Lumen 08/03/22 1810 20 G Left;Posterior;Proximal Forearm 2 days                  Significant Labs:    CBC/Anemia Profile:  Recent Labs   Lab 08/05/22  0335 08/06/22  0330   WBC 20.86* 16.82*   HGB 10.9* 12.4*   HCT 32.7* 38.0*    292   MCV 83 85   RDW 14.3 14.5        Chemistries:  Recent Labs   Lab 08/05/22 0335 08/06/22  0330 08/06/22  1314    142 144   K 3.6 3.3* 3.6   * 112* 109   CO2 20* 21* 24   BUN 9 18 17   CREATININE 0.8 0.9 0.8   CALCIUM 8.8 8.2* 8.1*   ALBUMIN 3.2* 3.1*  --    PROT 5.6* 5.9*  --    BILITOT 0.2 0.3  --    ALKPHOS 54* 57  --    ALT 30 33  --    AST 27 32  --    MG 2.1 2.2  --    PHOS 4.4 2.7  --        All pertinent labs within the past 24 hours have been reviewed.    Significant Imaging:  I have reviewed all pertinent imaging results/findings within the past 24 hours.      ABG  Recent Labs   Lab 08/06/22  0702   PH 7.329*   PO2 36*   PCO2 42.4   HCO3 22.3*   BE -4     Assessment/Plan:     Neuro  Acute encephalopathy  Patient initially presented with GCS 3, given narcan and magnesium by EMS and intubated in the ED. Found to be in respiratory failure with hypercarbia and initial pCO2 of 86. AMS likely 2/2 to opioid overdose, respiratory depression and hypercapnia. CTH with diffuse cerebral edema and concern for  hypoxic-ischemic encephalopathy. Subsequent MRI with no acute intracranial process; no intraparenchymal edema. EEG video monitoring showing diffuse slowing with no seizure-like activity. Patient now extubated and weaned from sedation; mild AMS.     - Began soft mechanical diet today 8/6   - D/c'd Sodium bicarbonate 650mg BID - CTM w/ daily BMP   - q6h glucose accuchecks     Psychiatric  Opioid use disorder, severe, in controlled environment, dependence  Per chart review, patient has history of snorting heroin (no IVDU).     - monitor for opioid withdrawal and treat accordingly   - discuss addiction psych consult with patient upon return to baseline mentation    Substance use disorder  Hx heroin use, previously has denied IVDU. Not responsive to naloxone with EMS. UDS and ethanol negative.    - consider addiction psych consult once no longer critically ill - evaluate for suboxone candidacy     Pulmonary  * Acute respiratory failure with hypoxia and hypercarbia  Hx status asthmaticus found unresponsive. Suspect respiratory failure from status asthmaticus with concomitant respiratory depression in setting of opiate use. Intubated 8/3 with initial blood gas with 7.113/86.2. VBGs improved since with no evidence of air trapping or wheezing. Self-extubated AM of 8/6 without need for re-intubation. VBG WNL. Passed bedside swallow test; transitioned to PO meds.    - BC NGTD; Respiratory culture with Staph aureus, f/u susceptibilities  - Continue Vanc/Rocephin  - pulmonary toilet for increased secretions  - BMP w/ K 3.6; repleted    Severe persistent asthma with acute exacerbation  Patient has history of severe asthma and status asthmaticus requiring intubation in the past. Home meds include albuterol inhaler, duonebs, breo, Advair, montelukast    - Continue methylprednisone 60 mg QD;  2 more days before discontinuing   - Continue duonebs q6h   - Continue Montelukast 10mg QD    - Initiated Breo         Endocrine  Steroid-induced hyperglycemia  Plan to discontinue IV steroids tomorrow 8/7; continuing ICS for treatment of severe asthma w/ acute exacerbation.    -SSI with goal normoglycemia  -q6h accuchecks   - soft-mechanical diet     Critical Care Daily Checklist:    A: Awake: RASS Goal/Actual Goal: RASS Goal: 0-->alert and calm  Actual: La Agitation Sedation Scale (RASS): Drowsy   B: Spontaneous Breathing Trial Performed?     C: SAT & SBT Coordinated?  Extubated                   D: Delirium: CAM-ICU Overall CAM-ICU: Positive   E: Early Mobility Performed? No   F: Feeding Goal: Goals: Meet % EEN, EPN by RD f/u date  Status: Nutrition Goal Status: new   Current Diet Order   Procedures    Diet Dysphagia Mechanical Soft (IDDSI Level 5)      AS: Analgesia/Sedation None   T: Thromboembolic Prophylaxis Lov   H: HOB > 300 Yes   U: Stress Ulcer Prophylaxis (if needed) Pepcid   G: Glucose Control SSI   B: Bowel Function Stool Occurrence: 1   I: Indwelling Catheter (Lines & Baca) Necessity Baca; PIV   D: De-escalation of Antimicrobials/Pharmacotherapies Vanc/Rocephin    Plan for the day/ETD Diet, pulmonary toilet, CTM    Code Status:  Family/Goals of Care: Full Code  Return to baseline       Critical secondary to Patient has a condition that poses threat to life and bodily function: Weaning from heavy sedation; Extubated <24 hrs prior      Critical care was time spent personally by me on the following activities: development of treatment plan with patient or surrogate and bedside caregivers, discussions with consultants, evaluation of patient's response to treatment, examination of patient, ordering and performing treatments and interventions, ordering and review of laboratory studies, ordering and review of radiographic studies, pulse oximetry, re-evaluation of patient's condition. This critical care time did not overlap with that of any other provider or involve time for any procedures.     Soco  MD Sana  Critical Care Medicine  [facility] - Card[facility] ICU

## 2022-08-06 NOTE — ASSESSMENT & PLAN NOTE
Patient has history of severe asthma and status asthmaticus requiring intubation in the past. Home meds include albuterol inhaler, duonebs, breo, Advair, montelukast    - Continue methylprednisone 60 mg QD;  2 more days before discontinuing   - Continue duonebs q6h   - Continue Montelukast 10mg QD    - Initiated Breo

## 2022-08-06 NOTE — ASSESSMENT & PLAN NOTE
Plan to discontinue IV steroids tomorrow 8/7; continuing ICS for treatment of severe asthma w/ acute exacerbation.    -SSI with goal normoglycemia  -q6h accuchecks   - soft-mechanical diet

## 2022-08-06 NOTE — ASSESSMENT & PLAN NOTE
Hx status asthmaticus found unresponsive. Suspect respiratory failure from status asthmaticus with concomitant respiratory depression in setting of opiate use. Intubated 8/3 with initial blood gas with 7.113/86.2. VBGs improved since with no evidence of air trapping or wheezing. Self-extubated AM of 8/6 without need for re-intubation. VBG WNL. Passed bedside swallow test; transitioned to PO meds.    - BC NGTD; Respiratory culture with Staph aureus, f/u susceptibilities  - Continue Vanc/Rocephin  - pulmonary toilet for increased secretions  - BMP w/ K 3.6; repleted

## 2022-08-06 NOTE — CARE UPDATE
Notified by RN that patient pulled ETT. Patient was restrained, on fentanyl, propofol, and precidex at time of extubation. Cuff was up. Drips were stopped immediately. Arrived at bedside to find patient not spontaneously opening eyes, but conversant and apologetic to staff. Given somnolent state, patient was given 0.4mg Narcan with subsequent tachypnea, nausea and yawning. Attempted BiPAP but patient appeared to be nauseous, BiPAP removed and patient was given Zofran. Continued to have RR 35-60 with scattered wheezing. VBG 7.21/56/37/22. Given nebs with transient improvement in wheezing. While at bedside, patient maintained O2 sats and had strong cough. Repeat VBG 7.33/42/36/22. Narcan gtt ordered. Continuous nebs ordered and oncoming team notified of events.

## 2022-08-06 NOTE — PROGRESS NOTES
Pt woke and began cough and gagging on ETT, attempts made to calm Pt without increasing sedation. Pt attempted to self soothe but ultimately required a 50mcg bolus of Fentanyl as he was thrashing in the bed and reaching for the ET tube. Pt responded well to bolus. Pt following commands on current sedation and synchronous with the vent as long as not stimulated.

## 2022-08-06 NOTE — SUBJECTIVE & OBJECTIVE
Interval History/Significant Events: Patient with increasing agitation overnight while weaning sedation, eventually early this AM patient self-extubated without resultant respiratory distress. VBG WNL. No indication for re-intubation. Patient is now off all sedation and conversant. Central line was removed. He passed bedside swallow test. Now transitioned to PO medications and soft mechanical diet. Patient will continue Vanc/Rocephin in setting of positive Staph respiratory cultures. Family updated and at bedside.     Review of Systems   Constitutional:  Negative for chills, diaphoresis, fatigue and fever.   HENT:  Negative for congestion.    Eyes:  Negative for visual disturbance.   Respiratory:  Negative for apnea, cough, choking, chest tightness, shortness of breath, wheezing and stridor.    Cardiovascular:  Negative for chest pain, palpitations and leg swelling.   Gastrointestinal:  Positive for nausea. Negative for abdominal distention and abdominal pain.        Opioid withdrawal symptoms   Genitourinary:  Positive for difficulty urinating.        Urinary retention since yesterday; sheppard in place   Skin:  Negative for pallor and rash.   Neurological:  Negative for dizziness, tremors, speech difficulty, numbness and headaches.   Psychiatric/Behavioral:  Positive for confusion. Negative for behavioral problems.         Mild waxing and waning confusion 2/2 recent sedation wean   Objective:     Vital Signs (Most Recent):  Temp: 99.8 °F (37.7 °C) (08/06/22 1500)  Pulse: 95 (08/06/22 1600)  Resp: (!) 25 (08/06/22 1347)  BP: 123/65 (08/06/22 1600)  SpO2: 100 % (08/06/22 1600)   Vital Signs (24h Range):  Temp:  [97.6 °F (36.4 °C)-99.8 °F (37.7 °C)] 99.8 °F (37.7 °C)  Pulse:  [] 95  Resp:  [23-63] 25  SpO2:  [77 %-100 %] 100 %  BP: (110-173)/() 123/65   Weight: 61.7 kg (136 lb 0.4 oz)  Body mass index is 21.95 kg/m².      Intake/Output Summary (Last 24 hours) at 8/6/2022 4504  Last data filed at 8/6/2022  1600  Gross per 24 hour   Intake 2168.97 ml   Output 4785 ml   Net -2616.03 ml       Physical Exam  Constitutional:       General: He is not in acute distress.     Appearance: Normal appearance. He is normal weight. He is not ill-appearing.   HENT:      Head: Normocephalic and atraumatic.      Right Ear: External ear normal.      Left Ear: External ear normal.      Nose: Nose normal.      Mouth/Throat:      Mouth: Mucous membranes are moist.      Pharynx: Oropharynx is clear.   Eyes:      Extraocular Movements: Extraocular movements intact.      Conjunctiva/sclera: Conjunctivae normal.   Cardiovascular:      Rate and Rhythm: Normal rate and regular rhythm.      Pulses: Normal pulses.      Heart sounds: Normal heart sounds.   Pulmonary:      Effort: Pulmonary effort is normal. No respiratory distress.      Breath sounds: No stridor. Rhonchi present. No wheezing.      Comments: Noisy upper airway sounds indicative of increased upper airway secretions; noted strong cough reflexes. No tachypnea or respiratory distress noted  Abdominal:      General: Abdomen is flat. There is no distension.      Palpations: Abdomen is soft.      Tenderness: There is no abdominal tenderness.   Musculoskeletal:      Cervical back: Neck supple.      Right lower leg: No edema.      Left lower leg: No edema.   Skin:     General: Skin is warm and dry.      Capillary Refill: Capillary refill takes less than 2 seconds.      Comments: Multiple tattoos present     Neurological:      General: No focal deficit present.      Mental Status: He is oriented to person, place, and time.      Comments: Patient with slight disorientation; no agitation present. Conversant    Psychiatric:         Mood and Affect: Mood normal.         Behavior: Behavior normal.       Lines/Drains/Airways       Drain  Duration                  Urethral Catheter 08/05/22 3986 <1 day              Peripheral Intravenous Line  Duration                  Peripheral IV - Single Lumen  08/03/22 0550 18 G Right Antecubital 3 days         Peripheral IV - Single Lumen 08/03/22 0558 20 G Left Hand 3 days         Peripheral IV - Single Lumen 08/03/22 1800 18 G Anterior;Right Wrist 2 days         Peripheral IV - Single Lumen 08/03/22 1810 20 G Left;Posterior;Proximal Forearm 2 days                  Significant Labs:    CBC/Anemia Profile:  Recent Labs   Lab 08/05/22  0335 08/06/22  0330   WBC 20.86* 16.82*   HGB 10.9* 12.4*   HCT 32.7* 38.0*    292   MCV 83 85   RDW 14.3 14.5        Chemistries:  Recent Labs   Lab 08/05/22  0335 08/06/22  0330 08/06/22  1314    142 144   K 3.6 3.3* 3.6   * 112* 109   CO2 20* 21* 24   BUN 9 18 17   CREATININE 0.8 0.9 0.8   CALCIUM 8.8 8.2* 8.1*   ALBUMIN 3.2* 3.1*  --    PROT 5.6* 5.9*  --    BILITOT 0.2 0.3  --    ALKPHOS 54* 57  --    ALT 30 33  --    AST 27 32  --    MG 2.1 2.2  --    PHOS 4.4 2.7  --        All pertinent labs within the past 24 hours have been reviewed.    Significant Imaging:  I have reviewed all pertinent imaging results/findings within the past 24 hours.

## 2022-08-07 PROBLEM — F11.20: Chronic | Status: ACTIVE | Noted: 2021-11-02

## 2022-08-07 LAB
ALBUMIN SERPL BCP-MCNC: 3.5 G/DL (ref 3.5–5.2)
ALP SERPL-CCNC: 57 U/L (ref 55–135)
ALT SERPL W/O P-5'-P-CCNC: 89 U/L (ref 10–44)
ANION GAP SERPL CALC-SCNC: 10 MMOL/L (ref 8–16)
ANISOCYTOSIS BLD QL SMEAR: SLIGHT
AST SERPL-CCNC: 69 U/L (ref 10–40)
BACTERIA SPEC AEROBE CULT: ABNORMAL
BACTERIA SPEC AEROBE CULT: ABNORMAL
BASOPHILS # BLD AUTO: 0.06 K/UL (ref 0–0.2)
BASOPHILS NFR BLD: 0.4 % (ref 0–1.9)
BILIRUB SERPL-MCNC: 0.5 MG/DL (ref 0.1–1)
BUN SERPL-MCNC: 14 MG/DL (ref 6–20)
CALCIUM SERPL-MCNC: 8.9 MG/DL (ref 8.7–10.5)
CHLORIDE SERPL-SCNC: 112 MMOL/L (ref 95–110)
CO2 SERPL-SCNC: 24 MMOL/L (ref 23–29)
CREAT SERPL-MCNC: 0.9 MG/DL (ref 0.5–1.4)
DIFFERENTIAL METHOD: ABNORMAL
EOSINOPHIL # BLD AUTO: 0 K/UL (ref 0–0.5)
EOSINOPHIL NFR BLD: 0.1 % (ref 0–8)
ERYTHROCYTE [DISTWIDTH] IN BLOOD BY AUTOMATED COUNT: 14 % (ref 11.5–14.5)
EST. GFR  (NO RACE VARIABLE): >60 ML/MIN/1.73 M^2
GLUCOSE SERPL-MCNC: 87 MG/DL (ref 70–110)
GRAM STN SPEC: ABNORMAL
HCT VFR BLD AUTO: 36.4 % (ref 40–54)
HGB BLD-MCNC: 12.2 G/DL (ref 14–18)
HYPOCHROMIA BLD QL SMEAR: ABNORMAL
IMM GRANULOCYTES # BLD AUTO: 0.25 K/UL (ref 0–0.04)
IMM GRANULOCYTES NFR BLD AUTO: 1.6 % (ref 0–0.5)
LYMPHOCYTES # BLD AUTO: 2.6 K/UL (ref 1–4.8)
LYMPHOCYTES NFR BLD: 16.4 % (ref 18–48)
MAGNESIUM SERPL-MCNC: 2.7 MG/DL (ref 1.6–2.6)
MCH RBC QN AUTO: 27.1 PG (ref 27–31)
MCHC RBC AUTO-ENTMCNC: 33.5 G/DL (ref 32–36)
MCV RBC AUTO: 81 FL (ref 82–98)
MONOCYTES # BLD AUTO: 1.6 K/UL (ref 0.3–1)
MONOCYTES NFR BLD: 10.4 % (ref 4–15)
NEUTROPHILS # BLD AUTO: 11.2 K/UL (ref 1.8–7.7)
NEUTROPHILS NFR BLD: 71.1 % (ref 38–73)
NRBC BLD-RTO: 0 /100 WBC
PHOSPHATE SERPL-MCNC: 2.8 MG/DL (ref 2.7–4.5)
PLATELET # BLD AUTO: 253 K/UL (ref 150–450)
PLATELET BLD QL SMEAR: ABNORMAL
PMV BLD AUTO: 10.6 FL (ref 9.2–12.9)
POTASSIUM SERPL-SCNC: 3.4 MMOL/L (ref 3.5–5.1)
PROT SERPL-MCNC: 6.4 G/DL (ref 6–8.4)
RBC # BLD AUTO: 4.5 M/UL (ref 4.6–6.2)
SODIUM SERPL-SCNC: 146 MMOL/L (ref 136–145)
TROPONIN I SERPL DL<=0.01 NG/ML-MCNC: 0.03 NG/ML (ref 0–0.03)
WBC # BLD AUTO: 15.69 K/UL (ref 3.9–12.7)

## 2022-08-07 PROCEDURE — 99900035 HC TECH TIME PER 15 MIN (STAT)

## 2022-08-07 PROCEDURE — 63600175 PHARM REV CODE 636 W HCPCS: Performed by: EMERGENCY MEDICINE

## 2022-08-07 PROCEDURE — 20000000 HC ICU ROOM

## 2022-08-07 PROCEDURE — 94640 AIRWAY INHALATION TREATMENT: CPT

## 2022-08-07 PROCEDURE — 25000003 PHARM REV CODE 250: Performed by: EMERGENCY MEDICINE

## 2022-08-07 PROCEDURE — 84484 ASSAY OF TROPONIN QUANT: CPT | Performed by: STUDENT IN AN ORGANIZED HEALTH CARE EDUCATION/TRAINING PROGRAM

## 2022-08-07 PROCEDURE — 63600175 PHARM REV CODE 636 W HCPCS: Performed by: STUDENT IN AN ORGANIZED HEALTH CARE EDUCATION/TRAINING PROGRAM

## 2022-08-07 PROCEDURE — 25000003 PHARM REV CODE 250

## 2022-08-07 PROCEDURE — 97162 PT EVAL MOD COMPLEX 30 MIN: CPT

## 2022-08-07 PROCEDURE — 94761 N-INVAS EAR/PLS OXIMETRY MLT: CPT

## 2022-08-07 PROCEDURE — 83735 ASSAY OF MAGNESIUM: CPT | Performed by: STUDENT IN AN ORGANIZED HEALTH CARE EDUCATION/TRAINING PROGRAM

## 2022-08-07 PROCEDURE — 99233 SBSQ HOSP IP/OBS HIGH 50: CPT | Mod: ,,, | Performed by: EMERGENCY MEDICINE

## 2022-08-07 PROCEDURE — 94664 DEMO&/EVAL PT USE INHALER: CPT

## 2022-08-07 PROCEDURE — 63600175 PHARM REV CODE 636 W HCPCS

## 2022-08-07 PROCEDURE — 25000003 PHARM REV CODE 250: Performed by: STUDENT IN AN ORGANIZED HEALTH CARE EDUCATION/TRAINING PROGRAM

## 2022-08-07 PROCEDURE — 97116 GAIT TRAINING THERAPY: CPT

## 2022-08-07 PROCEDURE — 27000646 HC AEROBIKA DEVICE

## 2022-08-07 PROCEDURE — 25000003 PHARM REV CODE 250: Performed by: NURSE PRACTITIONER

## 2022-08-07 PROCEDURE — 80053 COMPREHEN METABOLIC PANEL: CPT | Performed by: STUDENT IN AN ORGANIZED HEALTH CARE EDUCATION/TRAINING PROGRAM

## 2022-08-07 PROCEDURE — 25000242 PHARM REV CODE 250 ALT 637 W/ HCPCS

## 2022-08-07 PROCEDURE — 84100 ASSAY OF PHOSPHORUS: CPT | Performed by: STUDENT IN AN ORGANIZED HEALTH CARE EDUCATION/TRAINING PROGRAM

## 2022-08-07 PROCEDURE — 99233 PR SUBSEQUENT HOSPITAL CARE,LEVL III: ICD-10-PCS | Mod: ,,, | Performed by: EMERGENCY MEDICINE

## 2022-08-07 PROCEDURE — 85025 COMPLETE CBC W/AUTO DIFF WBC: CPT | Performed by: STUDENT IN AN ORGANIZED HEALTH CARE EDUCATION/TRAINING PROGRAM

## 2022-08-07 RX ORDER — CEFAZOLIN SODIUM/D5W 2 G/100 ML
2 PLASTIC BAG, INJECTION (ML) INTRAVENOUS
Status: DISCONTINUED | OUTPATIENT
Start: 2022-08-07 | End: 2022-08-09 | Stop reason: HOSPADM

## 2022-08-07 RX ORDER — SODIUM,POTASSIUM PHOSPHATES 280-250MG
1 POWDER IN PACKET (EA) ORAL ONCE
Status: COMPLETED | OUTPATIENT
Start: 2022-08-07 | End: 2022-08-07

## 2022-08-07 RX ORDER — TALC
6 POWDER (GRAM) TOPICAL NIGHTLY PRN
Status: DISCONTINUED | OUTPATIENT
Start: 2022-08-07 | End: 2022-08-09 | Stop reason: HOSPADM

## 2022-08-07 RX ORDER — QUETIAPINE FUMARATE 25 MG/1
100 TABLET, FILM COATED ORAL ONCE
Status: COMPLETED | OUTPATIENT
Start: 2022-08-07 | End: 2022-08-07

## 2022-08-07 RX ORDER — POTASSIUM CHLORIDE 20 MEQ/1
40 TABLET, EXTENDED RELEASE ORAL ONCE
Status: COMPLETED | OUTPATIENT
Start: 2022-08-07 | End: 2022-08-07

## 2022-08-07 RX ADMIN — VANCOMYCIN HYDROCHLORIDE 1250 MG: 1.25 INJECTION, POWDER, LYOPHILIZED, FOR SOLUTION INTRAVENOUS at 02:08

## 2022-08-07 RX ADMIN — QUETIAPINE FUMARATE 100 MG: 25 TABLET ORAL at 10:08

## 2022-08-07 RX ADMIN — DICYCLOMINE HYDROCHLORIDE 10 MG: 10 CAPSULE ORAL at 12:08

## 2022-08-07 RX ADMIN — LOPERAMIDE HYDROCHLORIDE 2 MG: 2 CAPSULE ORAL at 10:08

## 2022-08-07 RX ADMIN — MUPIROCIN: 20 OINTMENT TOPICAL at 08:08

## 2022-08-07 RX ADMIN — IPRATROPIUM BROMIDE AND ALBUTEROL SULFATE 3 ML: 2.5; .5 SOLUTION RESPIRATORY (INHALATION) at 01:08

## 2022-08-07 RX ADMIN — Medication 6 MG: at 08:08

## 2022-08-07 RX ADMIN — POTASSIUM & SODIUM PHOSPHATES POWDER PACK 280-160-250 MG 1 PACKET: 280-160-250 PACK at 04:08

## 2022-08-07 RX ADMIN — Medication 2 G: at 04:08

## 2022-08-07 RX ADMIN — ENOXAPARIN SODIUM 40 MG: 100 INJECTION SUBCUTANEOUS at 04:08

## 2022-08-07 RX ADMIN — POTASSIUM CHLORIDE 40 MEQ: 1500 TABLET, EXTENDED RELEASE ORAL at 08:08

## 2022-08-07 RX ADMIN — IPRATROPIUM BROMIDE AND ALBUTEROL SULFATE 3 ML: 2.5; .5 SOLUTION RESPIRATORY (INHALATION) at 07:08

## 2022-08-07 RX ADMIN — FLUTICASONE FUROATE AND VILANTEROL TRIFENATATE 1 PUFF: 100; 25 POWDER RESPIRATORY (INHALATION) at 08:08

## 2022-08-07 RX ADMIN — LOPERAMIDE HYDROCHLORIDE 2 MG: 2 CAPSULE ORAL at 08:08

## 2022-08-07 RX ADMIN — VANCOMYCIN HYDROCHLORIDE 1250 MG: 1.25 INJECTION, POWDER, LYOPHILIZED, FOR SOLUTION INTRAVENOUS at 10:08

## 2022-08-07 RX ADMIN — ONDANSETRON HYDROCHLORIDE 4 MG: 4 TABLET, FILM COATED ORAL at 06:08

## 2022-08-07 RX ADMIN — IPRATROPIUM BROMIDE AND ALBUTEROL SULFATE 3 ML: 2.5; .5 SOLUTION RESPIRATORY (INHALATION) at 08:08

## 2022-08-07 RX ADMIN — MONTELUKAST 10 MG: 10 TABLET, FILM COATED ORAL at 08:08

## 2022-08-07 RX ADMIN — HYDROXYZINE PAMOATE 50 MG: 50 CAPSULE ORAL at 08:08

## 2022-08-07 RX ADMIN — METHOCARBAMOL 500 MG: 500 TABLET ORAL at 08:08

## 2022-08-07 RX ADMIN — METHOCARBAMOL 500 MG: 500 TABLET ORAL at 04:08

## 2022-08-07 RX ADMIN — METHOCARBAMOL 500 MG: 500 TABLET ORAL at 10:08

## 2022-08-07 RX ADMIN — Medication 100 MG: at 08:08

## 2022-08-07 RX ADMIN — HYDROXYZINE PAMOATE 50 MG: 50 CAPSULE ORAL at 04:08

## 2022-08-07 RX ADMIN — METHYLPREDNISOLONE SODIUM SUCCINATE 60 MG: 40 INJECTION, POWDER, FOR SOLUTION INTRAMUSCULAR; INTRAVENOUS at 08:08

## 2022-08-07 NOTE — ASSESSMENT & PLAN NOTE
Patient initially presented with GCS 3, given narcan and magnesium by EMS and intubated in the ED. Found to be in respiratory failure with hypercarbia and initial pCO2 of 86. AMS likely 2/2 to opioid overdose, respiratory depression and hypercapnia. CTH with diffuse cerebral edema and concern for hypoxic-ischemic encephalopathy. Subsequent MRI with no acute intracranial process; no intraparenchymal edema. EEG video monitoring showing diffuse slowing with no seizure-like activity. Patient now extubated and weaned from sedation; mild AMS.     - Advanced to regular diet today 8/7  - q6h glucose accuchecks   - exploring baseline neurologic status with family   - PT consulted

## 2022-08-07 NOTE — PLAN OF CARE
Problem: Physical Therapy  Goal: Physical Therapy Goal  Description: Goals to be met by: 2022    Patient will increase functional independence with mobility by performin. Supine to sit with Supervision   2. Sit to stand transfer with Supervision using LRAD  3. Gait  x 150 feet with Supervision using LRAD  4. Stand for 3 minutes with Supervision using LRAD    Outcome: Ongoing, Progressing     Eval completed. Goals appropriate.

## 2022-08-07 NOTE — PROGRESS NOTES
[facility] - Cardiac Medical ICU  Critical Care Medicine  Progress Note    Patient Name: Tucker Roberto  MRN: [id number]  Admission Date: 8/3/2022  Hospital Length of Stay: 4 days  Code Status: Full Code  Attending Provider: Krunal Chung MD  Primary Care Provider: Karen Braxton MD   Principal Problem: Acute respiratory failure with hypoxia and hypercarbia    Subjective:     HPI:  Mr. Roberto is a 27 yo M with GITA (heroin, denies IVDU) and asthma that presents with acute encephalopathy. Previously has had repeated admissions for status asthmaticus and heroin overdose. He was brought in unresponsive by EMS, who administered narcan and magnesium in the field. GCS was 3 at time of arrival in ED. In the ED, he received albuterol and was intubated. History is limited 2/2 to acuity of situation. Initial labs significant for WBC of 16, pH 7.1, pCO2 86.2. CT head negative for acute abnormalities     Critical care medicine consulted for hypercapnic respiratory failure likely 2/2 to heroin overdose and asthma exacerbation.            Hospital/ICU Course:  On MICU arrival, patient sedated with improved breath sounds, although significant ventilator dyssynchrony with myoclonic movements; Nimbex gtt added. Patient received central line placement on 8/3 for hypertonic saline administration, discontinued 8/4. Patient received Vanc/Zosyn in setting of intubation for acute respiratory failure for ppx of potential aspiration; respiratory cultures w/ Staph Aureus (Vanc/Zosyn de-escalated to Vanc/Rocephin). Over 8/3, patient developed metabolic acidosis with increased lactate after episode of myoclonic movements with no associated seizure activity on EEG. Lactate now WNL with improving metabolic acidosis, no more myoclonic movements since first 24hrs of admission. Electrolytes being closely monitored, repleting as indicated. On 8/4, patient with potential ST elevation on EKG with elevated Tpn. Repeat EKG indicative of  likely early repolarization; Tpn downtrended. On 8/4 MRI brain completed showing no acute inctracranial process or intraparenchymal edema. TTE WNL. Tube feeds also began 8/4. From 8/4 to 8/5, discontinued paralytic and began weaning sedation with no complications. Patient self-extubated on the morninig of 8/6 d/t agitation; VBG WNL; no indication to re-intubate at that time. Patient has been comfortable and conversant since.      Interval History/Significant Events: Patient off all sedation and conversant. All lines other than PIV removed. Patient is on all PO meds, tolerating regular solid diet.     Review of Systems   Constitutional:  Negative for chills, diaphoresis, fatigue and fever.   HENT:  Negative for congestion.    Eyes:  Negative for visual disturbance.   Respiratory:  Negative for apnea, cough, choking, chest tightness, shortness of breath, wheezing and stridor.    Cardiovascular:  Negative for chest pain, palpitations and leg swelling.   Gastrointestinal:  Positive for diarrhea and nausea. Negative for abdominal distention and abdominal pain.        2/2 opioid withdrawal   Genitourinary:  Negative for decreased urine volume and dysuria.   Skin:  Negative for pallor and rash.   Neurological:  Positive for tremors. Negative for dizziness, numbness and headaches.        Intermittent muscle cramping with associated twitch during episodes   Psychiatric/Behavioral:  Positive for confusion. Negative for behavioral problems.         Mild waxing and waning agitation/AMS      Objective:     Vital Signs (Most Recent):  Temp: 99.7 °F (37.6 °C) (08/07/22 1500)  Pulse: 72 (08/07/22 1500)  Resp: (!) 39 (08/07/22 1500)  BP: 130/69 (08/07/22 1500)  SpO2: 99 % (08/07/22 1500)   Vital Signs (24h Range):  Temp:  [98.3 °F (36.8 °C)-99.7 °F (37.6 °C)] 99.7 °F (37.6 °C)  Pulse:  [] 72  Resp:  [17-53] 39  SpO2:  [88 %-100 %] 99 %  BP: (110-143)/(64-80) 130/69   Weight: 61.7 kg (136 lb 0.4 oz)  Body mass index is 21.95  kg/m².      Intake/Output Summary (Last 24 hours) at 8/7/2022 1507  Last data filed at 8/7/2022 1400  Gross per 24 hour   Intake 2258.86 ml   Output 4425 ml   Net -2166.14 ml       Physical Exam  Constitutional:       General: He is not in acute distress.     Appearance: Normal appearance. He is normal weight. He is not ill-appearing.   HENT:      Head: Normocephalic and atraumatic.      Right Ear: External ear normal.      Left Ear: External ear normal.      Nose: Nose normal.      Mouth/Throat:      Mouth: Mucous membranes are moist.      Pharynx: Oropharynx is clear.   Eyes:      Extraocular Movements: Extraocular movements intact.      Conjunctiva/sclera: Conjunctivae normal.   Cardiovascular:      Rate and Rhythm: Normal rate and regular rhythm.      Pulses: Normal pulses.      Heart sounds: Normal heart sounds.   Pulmonary:      Effort: Pulmonary effort is normal. No respiratory distress.      Breath sounds: No stridor. No wheezing.      Comments: BS clear bilaterally   Abdominal:      General: Abdomen is flat. There is no distension.      Palpations: Abdomen is soft.      Tenderness: There is no abdominal tenderness.   Musculoskeletal:      Cervical back: Neck supple.      Right lower leg: No edema.      Left lower leg: No edema.   Skin:     General: Skin is warm and dry.      Capillary Refill: Capillary refill takes less than 2 seconds.      Comments: Multiple tattoos present     Neurological:      General: No focal deficit present.      Mental Status: He is oriented to person, place, and time.      Comments: Patient with slight intermittent agitation/disorientation. Conversant    Psychiatric:         Mood and Affect: Mood normal.         Behavior: Behavior normal.       Lines/Drains/Airways       Drain  Duration                  Rectal Tube 08/07/22 0400 rectal tube w/ balloon (indicate number of mLs) <1 day              Peripheral Intravenous Line  Duration                  Peripheral IV - Single Lumen  08/03/22 0550 18 G Right Antecubital 4 days         Peripheral IV - Single Lumen 08/03/22 0558 20 G Left Hand 4 days         Peripheral IV - Single Lumen 08/03/22 1800 18 G Anterior;Right Wrist 3 days         Peripheral IV - Single Lumen 08/03/22 1810 20 G Left;Posterior;Proximal Forearm 3 days                  Significant Labs:    CBC/Anemia Profile:  Recent Labs   Lab 08/06/22  0330 08/07/22  0255   WBC 16.82* 15.69*   HGB 12.4* 12.2*   HCT 38.0* 36.4*    253   MCV 85 81*   RDW 14.5 14.0        Chemistries:  Recent Labs   Lab 08/06/22  0330 08/06/22  1314 08/07/22  0255    144 146*   K 3.3* 3.6 3.4*   * 109 112*   CO2 21* 24 24   BUN 18 17 14   CREATININE 0.9 0.8 0.9   CALCIUM 8.2* 8.1* 8.9   ALBUMIN 3.1*  --  3.5   PROT 5.9*  --  6.4   BILITOT 0.3  --  0.5   ALKPHOS 57  --  57   ALT 33  --  89*   AST 32  --  69*   MG 2.2  --  2.7*   PHOS 2.7  --  2.8       All pertinent labs within the past 24 hours have been reviewed.    Significant Imaging:  I have reviewed all pertinent imaging results/findings within the past 24 hours.      ABG  Recent Labs   Lab 08/06/22  0702   PH 7.329*   PO2 36*   PCO2 42.4   HCO3 22.3*   BE -4     Assessment/Plan:     Neuro  Acute encephalopathy  Patient initially presented with GCS 3, given narcan and magnesium by EMS and intubated in the ED. Found to be in respiratory failure with hypercarbia and initial pCO2 of 86. AMS likely 2/2 to opioid overdose, respiratory depression and hypercapnia. CTH with diffuse cerebral edema and concern for hypoxic-ischemic encephalopathy. Subsequent MRI with no acute intracranial process; no intraparenchymal edema. EEG video monitoring showing diffuse slowing with no seizure-like activity. Patient now extubated and weaned from sedation; mild AMS.     - Advanced to regular diet today 8/7  - q6h glucose accuchecks   - exploring baseline neurologic status with family   - PT consulted    Psychiatric  Opioid use disorder, severe, in  controlled environment, dependence  Per chart review, patient has history of snorting heroin (no IVDU).     - monitor for opioid withdrawal and treat accordingly       Substance use disorder  Hx heroin use, previously has denied IVDU. Not responsive to naloxone with EMS. UDS and ethanol negative. Previously tried unknown anxiolytic as well as suboxone treatment.     - Addiction psych consulted    Pulmonary  * Acute respiratory failure with hypoxia and hypercarbia  Hx status asthmaticus found unresponsive. Suspect respiratory failure from status asthmaticus with concomitant respiratory depression in setting of opiate use. Intubated 8/3 with initial blood gas with 7.113/86.2. VBGs improved since with no evidence of air trapping or wheezing. Self-extubated AM of 8/6 without need for re-intubation. VBG WNL. Passed bedside swallow test; transitioned to PO meds.    - BC NGTD; Respiratory culture with MSSA  - Vanc/Rocephin de-escalated to Cefazolin (on day 3 of 7 day course)   - pulmonary toilet for increased secretions    Severe persistent asthma with acute exacerbation  Patient has history of severe asthma and status asthmaticus requiring intubation in the past. Home meds include albuterol inhaler, duonebs, breo, Advair, montelukast    - Methylprednisone 60 mg QD - last dose today 8/7  - Continue duonebs q6h   - Continue Montelukast 10mg QD    - Initiated Breo         Endocrine  Steroid-induced hyperglycemia  Plan to discontinue IV steroids tomorrow 8/7; continuing ICS for treatment of severe asthma w/ acute exacerbation.    -SSI with goal normoglycemia  -q6h accuchecks      Critical Care Daily Checklist:    A: Awake: RASS Goal/Actual Goal: RASS Goal: 0-->alert and calm  Actual: [place] Agitation Sedation Scale (RASS): Alert and calm   B: Spontaneous Breathing Trial Performed?     C: SAT & SBT Coordinated?  No                    D: Delirium: CAM-ICU Overall CAM-ICU: Negative   E: Early Mobility Performed? Yes   F:  Feeding Goal: Goals: Meet % EEN, EPN by RD f/u date  Status: Nutrition Goal Status: new   Current Diet Order   Procedures    Diet Adult Regular (IDDSI Level 7)      AS: Analgesia/Sedation none   T: Thromboembolic Prophylaxis lov   H: HOB > 300 Yes   U: Stress Ulcer Prophylaxis (if needed) pepcid   G: Glucose Control SSI   B: Bowel Function Stool Occurrence: 2   I: Indwelling Catheter (Lines & Baca) Necessity none   D: De-escalation of Antimicrobials/Pharmacotherapies Cefazolin     Plan for the day/ETD CTM, mobility, asthma management    Code Status:  Family/Goals of Care: Full Code  Return to patient baseline       Critical secondary to Patient has a condition that poses threat to life and bodily function: Severe Respiratory Distress      Critical care was time spent personally by me on the following activities: development of treatment plan with patient or surrogate and bedside caregivers, discussions with consultants, evaluation of patient's response to treatment, examination of patient, ordering and performing treatments and interventions, ordering and review of laboratory studies, ordering and review of radiographic studies, pulse oximetry, re-evaluation of patient's condition. This critical care time did not overlap with that of any other provider or involve time for any procedures.     Soco Hood MD  Critical Care Medicine  [facility] - Cardiac Medical ICU

## 2022-08-07 NOTE — PT/OT/SLP EVAL
Physical Therapy Evaluation    Patient Name:  Tucker Roberto   MRN:  [id number]  Admit Date: 8/3/2022  Admitting Diagnosis:  Acute respiratory failure with hypoxia and hypercarbia  Length of Stay: 4 days  Recent Surgery: * No surgery found *      Recommendations:     Discharge Recommendations:  home   Discharge Equipment Recommendations: other (see comments) (TBD)   Barriers to discharge: None    Assessment:     Tucker Roberto is a 27 y.o. male admitted with a medical diagnosis of Acute respiratory failure with hypoxia and hypercarbia. Pt found alert and cooperative. Pt demo'd good participation in session. Pt demo'd good B LE ROM and strength. Pt able to ambulate a short distance in the room to the chair with one person assistance. Recommend pt return home once medically stable for discharge.      Problem List: weakness, impaired endurance, impaired functional mobility, gait instability, impaired cardiopulmonary response to activity  Rehab Prognosis: Good; patient would benefit from acute skilled PT services to address these deficits and reach maximum level of function.      Plan:     During this hospitalization, patient to be seen 4 x/week to address the identified rehab impairments via gait training, therapeutic activities, therapeutic exercises, neuromuscular re-education and progress towards the established goals.    · Plan of Care Expires:  09/06/22    Subjective   Communicated with RN prior to session.  Patient found HOB elevated upon PT entry to room, agreeable to evaluation. Tucker Roberto's alone during session.    Chief Complaint: Unresponsive (GCS 3)    Patient/Family Comments/goals: to get better and return home   Pain/Comfort:  · Pain Rating 1: 5/10  · Location 1:  (B LE)  · Pain Addressed 1: Reposition, Distraction  · Pain Rating Post-Intervention 1: 5/10    Living Environment:  Pt lives with mother in a [facility] with no BARBARA. Pt was ind with ambulation and ADLs. Patient uses DME as follows: none.  "DME owned (not currently used): none    Patient reports they will have assistance from mother upon discharge.    Objective:   Patient found with: telemetry, pulse ox (continuous), sheppard catheter, blood pressure cuff     General Precautions: Standard, Cardiac fall   Orthopedic Precautions:N/A   Braces: N/A   Oxygen Device: Room Air  Vitals: /77   Pulse 73   Temp 98.7 °F (37.1 °C) (Axillary)   Resp (!) 37   Ht 5' 6" (1.676 m)   Wt 61.7 kg (136 lb 0.4 oz)   SpO2 98%   BMI 21.95 kg/m²     Exams:  · Cognition:   · Alert and Cooperative  · Ox4  · Command following: Follows multistep  commands  · Fluency: clear/fluent    · RLE ROM: WFL  · RLE Strength: grossly 4/5  · LLE ROM: WFL  · LLE Strength: grossly 4/5     No signs of drop foot; equal strength     Outcome Measures:  AM-PAC 6 CLICK MOBILITY  Turning over in bed (including adjusting bedclothes, sheets and blankets)?: 3  Sitting down on and standing up from a chair with arms (e.g., wheelchair, bedside commode, etc.): 3  Moving from lying on back to sitting on the side of the bed?: 3  Moving to and from a bed to a chair (including a wheelchair)?: 3  Need to walk in hospital room?: 3  Climbing 3-5 steps with a railing?: 3  Basic Mobility Total Score: 18     Functional Mobility:  Additional staff present: RN  Bed Mobility:  · Supine to Sit: minimum assistance; HOB elevated  · Scooting anteriorly to EOB to have both feet planted on floor: contact guard assistance    Sitting Balance at Edge of Bed:   Assistance Level Required: SBA    Transfers:   · Sit <> Stand Transfer: contact guard assistance with hand-held assist from EOB      Gait:   · Patient ambulated: 6ft to chair    · Patient required: contact guard  · Patient used: hand-held assist  · Gait Pattern observed: reciprocal gait  · Gait Deviation(s): occasional unsteady gait, decreased step length and decreased jude  · Impairments due to: hospital acquired instability, weakness, impaired " balance  · Comments:   · External stability provided by HHA  · Verbal cuing for upright posture, forward gaze, and direction  · VSS    Therapeutic Activities, Exercises, & Education:   Educated pt on PT role/POC  Educated pt on importance of OOB activity and daily ambulation   Pt verbalized understanding     T/f to chair to increase tolerance to OOB activity and to create optimal positioning for lung expansion     Patient left up in chair with all lines intact, call button in reach and RN notified.    GOALS:   Multidisciplinary Problems     Physical Therapy Goals        Problem: Physical Therapy    Goal Priority Disciplines Outcome Goal Variances Interventions   Physical Therapy Goal     PT, PT/OT Ongoing, Progressing     Description: Goals to be met by: 2022    Patient will increase functional independence with mobility by performin. Supine to sit with Supervision   2. Sit to stand transfer with Supervision using LRAD  3. Gait  x 150 feet with Supervision using LRAD  4. Stand for 3 minutes with Supervision using LRAD                     History:     Past Medical History:   Diagnosis Date    Asthma     Substance use disorder 10/27/2021       No past surgical history on file.    Time Tracking:     PT Received On: 22  PT Start Time: 1256     PT Stop Time: 1315  PT Total Time (min): 19 min     Billable Minutes: Evaluation 8 and Gait Training 8

## 2022-08-07 NOTE — HOSPITAL COURSE
Toxicology was negative for opiates and any other substances. He had improved breath sounds but significant ventilator dyssynchrony with myoclonic movements. Cisatracurium was added. Central venous catheter was placed. He was given hypertonic saline until 8/4/2022. He was given piperacillin-tazobactam and vancomycin. Respiratory culture grew Staphylococcus aureus. Piperacillin-tazobactam was changed to ceftriaxone. He had lactic acidosis after myoclonic movements. Electroencephalogram showed no seizure activity. Lactic acidosis resolved. On 8/4/2022 he had potential ST elevation with elevated troponin. Repeat EKG showed likely early repolarization and troponin decreased. Brain MRI on 8/4/2022 showed no intracranial process or intraparenchymal edema. Echocardiogram was normal. Tube feeds were given on 8/4/2022. He was given scheduled albuterol-ipratropium nebulizer treatments. Cisatracurium was stopped and sedation was weaned without complications. He extubated himself on the morning of 8/6/2022 due to agitation. Venous blood gas was normal. He was comfortable and conversant with mild intermittent altered mental status. On 8/7/2022 he became agitated and threatened to leave against medical advice if he was not allowed to get out of bed. Physical Therapy evaluated him, found him able to ambulate a short distance in his room to the chair with one person assistance, and recommended he return home once medically stable for discharge. Antibiotics were changed to cefazolin. Mentation returned to normal. He reported recreational fentanyl use so his respiratory failure was attributed to recurrent opioid overdose. Addiction Psychiatry was consulted as he was interested in Suboxone therapy. They recommended holding off on starting Suboxone due to lack of withdrawal at the time and need to determine if he is able to reestablish care with an outpatient Suboxone provider, and starting mirtazapine nightly.

## 2022-08-07 NOTE — PLAN OF CARE
CMICU DAILY GOALS       A: Awake    RASS: Goal - RASS Goal: 0-->alert and calm  Actual - RASS (La Agitation-Sedation Scale): 0-->alert and calm   Restraint necessity: Clinical Justification: Removing medical devices  B: Breathe   SBT: NA   C: Coordinate A & B, analgesics/sedatives   Pain: managed    SAT: Not intubated  D: Delirium   CAM-ICU: Overall CAM-ICU: Negative  E: Early(intubated/ Progressive (non-intubated) Mobility   MOVE Screen: Pass   Activity: Activity Management: Patient unable to perform activities  FAS: Feeding/Nutrition   Diet order: Diet/Nutrition Received: mechanical/dental soft, Specialty Diet/Nutrition Received: dysphagia mechanically altered  T: Thrombus   DVT prophylaxis: VTE Required Core Measure: Pharmacological prophylaxis initiated/maintained  H: HOB Elevation   Head of Bed (HOB) Positioning: HOB at 15 degrees  U: Ulcer Prophylaxis   GI: yes  G: Glucose control   managed    S: Skin   Bathing/Skin Care: incontinence care  Device Skin Pressure Protection: absorbent pad utilized/changed, adhesive use limited  Pressure Reduction Devices: positioning supports utilized, pressure-redistributing mattress utilized  Pressure Reduction Techniques: frequent weight shift encouraged, positioned off wounds, pressure points protected  Skin Protection: tubing/devices free from skin contact  B: Bowel Function   diarrhea   I: Indwelling Catheters   Baca necessity: [REMOVED]      Urethral Catheter 08/03/22 0610 Temperature probe 16 Fr.-Reason for Continuing Urinary Catheterization: Critically ill in ICU and requiring hourly monitoring of intake/output  [REMOVED]      Urethral Catheter 08/03/22 2320 Non-latex 16 Fr.-Reason for Continuing Urinary Catheterization: Critically ill in ICU and requiring hourly monitoring of intake/output  [REMOVED]      Urethral Catheter 08/05/22 2319-Reason for Continuing Urinary Catheterization: Urinary retention   CVC necessity: No  D: De-escalation  Antibiotics   Yes    Family/Goals of care/Code Status   Code Status: Full Code    24H Vital Sign Range  Temp:  [98.3 °F (36.8 °C)-99.7 °F (37.6 °C)]   Pulse:  []   Resp:  [17-53]   BP: (110-143)/(64-80)   SpO2:  [88 %-100 %]      Shift Events   No acute events throughout shift    VS and assessment per flow sheet, patient progressing towards goals as tolerated, plan of care reviewed with family, all concerns addressed, will continue to monitor.    Gerebr Bradley

## 2022-08-07 NOTE — HPI
Tucker Roberto is a 27 year old  man with asthma with history of hospitalizations for exacerbations, nicotine vaping, marijuana use, heroin dependence with history of overdoses. He lives in [place], [place]. His primary care physician is Dr. Karen Braxton.   He was taken via emergency medical services to [facility] on 8/3/2022 with unresponsiveness after being given naloxone and magnesium. His Anuja coma scale was 3 on arrival. He was given albuterol and intubated. [place] [address]/uL. Venous blood gas showed pH 7.113 and pCO2 86.2 mmHg. Head CT showed generalized cerebral edema and suggested hypoxic-ischemic encephalopathy. He was suspected to have recurrence of both asthma exacerbation and heroin overdose. He was admitted to Critical Care Medicine.

## 2022-08-07 NOTE — PROGRESS NOTES
Pharmacokinetic Assessment Follow Up: IV Vancomycin    Vancomycin serum concentration assessment(s):    Vancomycin trough level resulted at 13.8 mcg/mL, drawn appropriately. Goal level is 15 to 20 mcg/mL.     Drug levels (last 3 results):  Recent Labs   Lab Result Units 08/04/22  0954 08/06/22  1821   Vancomycin-Trough ug/mL 10.7 13.8     Vancomycin Regimen Plan:    Increase to vancomycin 1250 mg IV every 8 hours with next serum trough concentration measured on 8/8 0930.     Pharmacy will continue to follow and monitor vancomycin.    Please contact pharmacy at extension [phone] for questions regarding this assessment.    Thank you for the consult,   Sera Luna, PharmD, BCCCP             Patient brief summary:  Tucker Roberto is a 27 y.o. male initiated on antimicrobial therapy with IV vancomycin for treatment of lower respiratory infection    Drug Allergies:   Review of patient's allergies indicates:  No Known Allergies    Actual Body Weight:   61.7 kg     Renal Function:   Estimated Creatinine Clearance: 122.1 mL/min (based on SCr of 0.8 mg/dL).    Dialysis Method (if applicable):  N/A    CBC (last 72 hours):  Recent Labs   Lab Result Units 08/04/22  0407 08/05/22  0335 08/06/22  0330   WBC K/uL 14.76* 20.86* 16.82*   Hemoglobin g/dL 10.8* 10.9* 12.4*   Hematocrit % 31.2* 32.7* 38.0*   Platelets K/uL 246 293 292   Gran % % 90.5* 92.1* 77.7*   Lymph % % 5.1* 3.4* 12.8*   Mono % % 3.9* 3.4* 8.4   Eosinophil % % 0.0 0.0 0.1   Basophil % % 0.1 0.1 0.1   Differential Method  Automated Automated Automated       Metabolic Panel (last 72 hours):  Recent Labs   Lab Result Units 08/03/22  1927 08/03/22  2238 08/04/22  0407 08/04/22  0636 08/04/22  0817 08/04/22  1146 08/04/22  1532 08/05/22  0335 08/05/22  2223 08/06/22  0330 08/06/22  1314   Sodium mmol/L 142 145  145 145 147* 148* 138 147* 145  --  142 144   Potassium mmol/L  --  3.5  3.5 3.3*  --  3.7 3.8 3.7 3.6  --  3.3* 3.6   Chloride mmol/L  --  121*  121*  122*  --  124* 114* 122* 117*  --  112* 109   CO2 mmol/L  --  15*  15* 15*  --  18* 18* 20* 20*  --  21* 24   Glucose mg/dL  --  223*  223* 220*  --  139* 485* 98 167*  --  112* 119*   Glucose, UA   --   --   --   --   --   --   --   --  Negative  --   --    BUN mg/dL  --  9  9 8  --  7 6 6 9  --  18 17   Creatinine mg/dL  --  1.2  1.2 1.0  --  0.9 1.0 0.9 0.8  --  0.9 0.8   Albumin g/dL  --  3.3* 3.2*  --   --   --   --  3.2*  --  3.1*  --    Total Bilirubin mg/dL  --  0.3 0.4  --   --   --   --  0.2  --  0.3  --    Alkaline Phosphatase U/L  --  47* 50*  --   --   --   --  54*  --  57  --    AST U/L  --  22 22  --   --   --   --  27  --  32  --    ALT U/L  --  29 26  --   --   --   --  30  --  33  --    Magnesium mg/dL  --   --  2.1  --   --   --   --  2.1  --  2.2  --    Phosphorus mg/dL  --   --  <1.0*  --   --  3.6  --  4.4  --  2.7  --        Vancomycin Administrations:  vancomycin given in the last 96 hours                   vancomycin 750 mg in dextrose 5 % 250 mL IVPB (ready to mix system) (mg) 750 mg New Bag 08/04/22 1110     750 mg New Bag  0251     750 mg New Bag 08/03/22 1946    vancomycin 1.25 g in dextrose 5% 250 mL IVPB (ready to mix) (mg) 1,250 mg New Bag 08/03/22 1115                Microbiologic Results:  Microbiology Results (last 7 days)     Procedure Component Value Units Date/Time    Blood culture [[id number]] Collected: 08/03/22 0652    Order Status: Completed Specimen: Blood from Peripheral, Hand, Left Updated: 08/06/22 1012     Blood Culture, Routine No Growth to date      No Growth to date      No Growth to date      No Growth to date    Blood culture [[id number]] Collected: 08/03/22 0652    Order Status: Completed Specimen: Blood from Peripheral, Hand, Right Updated: 08/06/22 1012     Blood Culture, Routine No Growth to date      No Growth to date      No Growth to date      No Growth to date    Culture, Respiratory with Gram Stain [[id number]]  (Abnormal) Collected: 08/03/22 0937     Order Status: Completed Specimen: Respiratory from Tracheal Aspirate Updated: 08/06/22 0919     Respiratory Culture No Pseudomonas isolated.      STAPHYLOCOCCUS AUREUS  Few  Susceptibility pending  Normal respiratory ary also present       Gram Stain (Respiratory) <10 epithelial cells per low power field.     Gram Stain (Respiratory) Rare WBC's     Gram Stain (Respiratory) Few Gram negative rods     Gram Stain (Respiratory) Rare Gram positive cocci

## 2022-08-07 NOTE — ASSESSMENT & PLAN NOTE
Hx status asthmaticus found unresponsive. Suspect respiratory failure from status asthmaticus with concomitant respiratory depression in setting of opiate use. Intubated 8/3 with initial blood gas with 7.113/86.2. VBGs improved since with no evidence of air trapping or wheezing. Self-extubated AM of 8/6 without need for re-intubation. VBG WNL. Passed bedside swallow test; transitioned to PO meds.    - BC NGTD; Respiratory culture with MSSA  - Vanc/Rocephin de-escalated to Cefazolin (on day 3 of 7 day course)   - pulmonary toilet for increased secretions

## 2022-08-07 NOTE — ASSESSMENT & PLAN NOTE
Hx heroin use, previously has denied IVDU. Not responsive to naloxone with EMS. UDS and ethanol negative. Previously tried unknown anxiolytic as well as suboxone treatment.     - Addiction psych consulted

## 2022-08-07 NOTE — ASSESSMENT & PLAN NOTE
Plan to discontinue IV steroids tomorrow 8/7; continuing ICS for treatment of severe asthma w/ acute exacerbation.    -SSI with goal normoglycemia  -q6h accuchecks

## 2022-08-07 NOTE — RESIDENT HANDOFF
Handoff     Primary Team: Networked reference to record [facility]  Room Number: 6076/6076 A     Patient Name: Tucker Roberto MRN: [id number]     Date of Birth: [id number] Allergies: Patient has no known allergies.     Age: 27 y.o. Admit Date: 8/3/2022     Sex: male  BMI: Body mass index is 21.95 kg/m².     Code Status: Full Code        Illness Level (current clinical status): Watcher - No     Reason for Admission: Acute respiratory failure with hypoxia and hypercarbia    Brief HPI (pertinent PMH and diagnosis or differential diagnosis): 26 yo M w/ PMHx intranasal heroin use, substance use disorder and severe asthma presented to the MICU unresponsive in acute hypoxemic and hypercapneic respiratory failure w/ status asthmaticus, intubated and ventilated. Patient was found down for unknown amount of time and brought in via EMS, administered narcan and Mag in the field.      Of note, patient has had multiple previous admissions for status asthmaticus and heroin overdose requiring intubation in the past.     Procedure Date: NA    Hospital Course (updated, brief assessment by system or problem, significant events): On MICU arrival, patient sedated with improved breath sounds, although significant ventilator dyssynchrony with myoclonic movements; Nimbex gtt added. Patient received central line placement on 8/3 for hypertonic saline administration, discontinued 8/4. Patient received Vanc/Zosyn in setting of intubation for acute respiratory failure for ppx of potential aspiration; respiratory cultures w/ Staph Aureus (Vanc/Zosyn de-escalated to Vanc/Rocephin). Over 8/3, patient developed metabolic acidosis with increased lactate after episode of myoclonic movements with no associated seizure activity on EEG. Lactate now WNL with improving metabolic acidosis, no more myoclonic movements since first 24hrs of admission. Electrolytes being closely monitored, repleting as indicated. On 8/4, patient with potential ST elevation on EKG with  elevated Tpn. Repeat EKG indicative of likely early repolarization; Tpn downtrended. On 8/4 MRI brain completed showing no acute inctracranial process or intraparenchymal edema. TTE WNL. Tube feeds also began 8/4. From 8/4 to 8/5, discontinued paralytic and began weaning sedation with no complications. Patient self-extubated on the morninig of 8/6 d/t agitation; VBG WNL; no indication to re-intubate at that time. Patient has been comfortable and conversant since, with mild intermittent altered mental status. On 8/7 patient became agitated and threatened to leave AMA if he wasn't able to get out of bed immediately. PT at bedside shortly, patient is      Tasks (specific, using if-then statements): Follow-up addiction psych recs regarding suboxone and SSRI/SNRI. Explore patient's mental/neurologic baseline with family (sister, mother and girlfriend previously at bedside during MICU stay). Needs pulmonary referral on discharge for severe asthma management. Treat MSSA pneumonia with 7d course Cefazolin (on day 3 of 7).     Contingency Plan (special circumstances anticipated and plan): If patient becomes agitated, verbal re-orientation has previously been adequate.    Estimated Discharge Date: 8/10    Discharge Disposition: Home or Self Care    Mentored By: Krunal Chung MD

## 2022-08-07 NOTE — ASSESSMENT & PLAN NOTE
Patient has history of severe asthma and status asthmaticus requiring intubation in the past. Home meds include albuterol inhaler, duonebs, breo, Advair, montelukast    - Methylprednisone 60 mg QD - last dose today 8/7  - Continue duonebs q6h   - Continue Montelukast 10mg QD    - Initiated Breo

## 2022-08-07 NOTE — PLAN OF CARE
Hospital Medicine Team A  ICU stepdown acceptance note    Tucker Roberto is a 27 year old  man with asthma with history of hospitalizations for exacerbations, nicotine vaping, marijuana use, heroin dependence with history of overdoses. He lives in [place], [place]. His primary care physician is Dr. Karen Braxton.   He was taken via emergency medical services to [facility] Emergency Department on 8/3/2022 with unresponsiveness after being given naloxone and magnesium. His Anuja coma scale was 3 on arrival. He was given albuterol and intubated. Labs [place] of [address]/uL. Venous blood gas showed pH 7.113 and pCO2 86.2 mmHg. Head CT showed generalized cerebral edema and suggested hypoxic-ischemic encephalopathy. He was suspected to have recurrence of both asthma exacerbation and heroin overdose. He was admitted to Critical Care Medicine.    Toxicology was negative for opiates and any other substances. He had improved breath sounds but significant ventilator dyssynchrony with myoclonic movements. Cisatracurium was added. Central venous catheter was placed. He was given hypertonic saline until 8/4/2022. He was given piperacillin-tazobactam and vancomycin. Respiratory culture grew Staphylococcus aureus. Piperacillin-tazobactam was changed to ceftriaxone. He had lactic acidosis after myoclonic movements. Electroencephalogram showed no seizure activity. Lactic acidosis resolved. On 8/4/2022 he had potential ST elevation with elevated troponin. Repeat EKG showed likely early repolarization and troponin decreased. Brain MRI on 8/4/2022 showed no intracranial process or intraparenchymal edema. Echocardiogram was normal. Tube feeds were given on 8/4/2022. He was given scheduled albuterol-ipratropium nebulizer treatments. Cisatracurium was stopped and sedation was weaned without complications. He extubated himself on the morning of 8/6/2022 due to agitation. Venous blood gas was  normal. He was comfortable and conversant with mild intermittent altered mental status. On 8/7/2022 he became agitated and threatened to leave against medical advice if he was not allowed to get out of bed. Physical Therapy evaluated him, found him able to ambulate a short distance in his room to the chair with one person assistance, and recommended he return home once medically stable for discharge. Antibiotics were changed to cefazolin. Mentation returned to normal. He reported recreational fentanyl use so his respiratory failure was attributed to recurrent opioid overdose. Addiction Psychiatry was consulted as he was interested in Suboxone therapy. They recommended holding off on starting Suboxone due to lack of withdrawal at the time and need to determine if he is able to reestablish care with an outpatient Suboxone provider, and starting mirtazapine nightly.     X-Ray Chest AP Portable 8/03/22: FINDINGS:   Endotracheal tube tip lies just superior to the apex of the aortic arch, well above the froilan.  Distal aspect of the enteric tube is coiled upon itself within the stomach, with both the tube tip and distal side port in the gastric fundus, the tube tip immediately beneath the left hemidiaphragm.  Heart size is normal.  Lung zones demonstrate no significant detrimental change since the previous examination referenced above, with no significant airspace consolidation or volume loss evident.  No pleural fluid.  No abnormal mediastinal widening.  No pneumothorax or pneumomediastinum.  Some gastric distention is observed.   Impression:  No significant detrimental interval change in cardiopulmonary status since 02/19/2022 is appreciated.   CT Head Without Contrast 8/03/22: FINDINGS:   Exam quality is limited by slight motion and streak artifact secondary to the presence of metallic earrings.  Support devices partially visualized on the  radiograph.   No evidence of acute territorial infarct, hemorrhage, mass  effect, or midline shift.   There is loss of normal sulcation near the vertex and decreased gray-white matter differentiation.  Scattered minimal hyperdensity may be related to pseudosubarachnoid hemorrhage.  Slight like appearance of the ventricles with crowding of the basal cisterns and posterior fossa.   There is mild scalp edema.  No displaced calvarial fracture.   Mucosal thickening in the ethmoid air cells.  Otherwise, the visualized paranasal sinuses and mastoid air cells are essentially clear.   Impression:  CT findings suggestive of generalized cerebral edema and potentially hypoxic-ischemic encephalopathy in the appropriate clinical setting.  Further evaluation/follow-up as clinically appropriate.   X-Ray Chest AP Portable 8/03/22: FINDINGS:   Endotracheal tube tip lies at the level of the medial ends of the clavicles, approximately 4.3 cm above the froilan.  Distal aspect of the enteric tube is again noted to be coiled upon itself within the stomach, with both the tube tip and the distal side port in the gastric fundus.  Heart size remains normal.  Lung zones continue essentially clear, free of significant superimposed airspace consolidation or volume loss.  No pleural fluid.  No pneumothorax or pneumomediastinum.  Gastric distention is again observed.   Impression:  Allowing for a slightly poorer inspiratory depth level on the current examination, there has been no significant detrimental interval change in the appearance of the chest since 08/03/2022 at 06:19.   X-Ray Chest 1 View 8/03/22: FINDINGS:   Right jugular origin vascular catheter is now seen, tip in the superior vena cava.  No pneumothorax.  Endotracheal tube tip lies at the level of the apex of the aortic arch, well above the froilan.  Distal aspect of the enteric tube is coiled upon itself within the stomach, with both the tube tip and distal side port in the gastric fundus.  Heart size remains normal.  Lung zones continue essentially clear,  with no significant superimposed airspace consolidation or volume loss evident.  No pleural fluid.  Gastric distention is no longer demonstrated.  Note is made of numerous opacities superimposed over both hemithoraces which relate to structures external to the patient.   Impression:  No significant detrimental interval change in the appearance of the chest since 08/03/2022 at 07:07.  No post procedure pneumothorax, with vascular placement as above.   X-Ray Abdomen AP 1 View 8/04/22: FINDINGS:   And enteric tube is present which is coiled back on itself in the upper stomach (J-shaped).  With respect to the visualized abdomen and gastrointestinal tract, nonobstructive bowel gas pattern.  No free air.  No organomegaly or masses.  No intra-abdominal intrapelvic foreign bodies or metallic radiodensities.   Transthoracic echo complete 8/04/22:   · The left ventricle is normal in size with normal systolic function.  · The estimated ejection fraction is 65%.  · There is abnormal septal wall motion.  · Normal left ventricular diastolic function.  · Normal right ventricular size with normal right ventricular systolic function.  · Mild tricuspid regurgitation.  · Elevated central venous pressure (15 mmHg).  · The estimated PA systolic pressure is 39 mmHg.  MRI Brain Without Contrast 8/05/22: FINDINGS:   There is no evidence of hydrocephalus, mass effect, intracranial hemorrhage or acute infarct.  The brain parenchyma maintains normal signal intensity.  No evidence of parenchymal edema is identified.   Normal arterial flow voids are preserved at the skull base.   Nonspecific minimal mastoid mucosal thickening in this intubated patient.   Impression:  No acute intracranial process is identified.

## 2022-08-07 NOTE — SUBJECTIVE & OBJECTIVE
Interval History/Significant Events: Patient off all sedation and conversant. All lines other than PIV removed. Patient is on all PO meds, tolerating regular solid diet.     Review of Systems   Constitutional:  Negative for chills, diaphoresis, fatigue and fever.   HENT:  Negative for congestion.    Eyes:  Negative for visual disturbance.   Respiratory:  Negative for apnea, cough, choking, chest tightness, shortness of breath, wheezing and stridor.    Cardiovascular:  Negative for chest pain, palpitations and leg swelling.   Gastrointestinal:  Positive for diarrhea and nausea. Negative for abdominal distention and abdominal pain.        2/2 opioid withdrawal   Genitourinary:  Negative for decreased urine volume and dysuria.   Skin:  Negative for pallor and rash.   Neurological:  Positive for tremors. Negative for dizziness, numbness and headaches.        Intermittent muscle cramping with associated twitch during episodes   Psychiatric/Behavioral:  Positive for confusion. Negative for behavioral problems.         Mild waxing and waning agitation/AMS      Objective:     Vital Signs (Most Recent):  Temp: 99.7 °F (37.6 °C) (08/07/22 1500)  Pulse: 72 (08/07/22 1500)  Resp: (!) 39 (08/07/22 1500)  BP: 130/69 (08/07/22 1500)  SpO2: 99 % (08/07/22 1500)   Vital Signs (24h Range):  Temp:  [98.3 °F (36.8 °C)-99.7 °F (37.6 °C)] 99.7 °F (37.6 °C)  Pulse:  [] 72  Resp:  [17-53] 39  SpO2:  [88 %-100 %] 99 %  BP: (110-143)/(64-80) 130/69   Weight: 61.7 kg (136 lb 0.4 oz)  Body mass index is 21.95 kg/m².      Intake/Output Summary (Last 24 hours) at 8/7/2022 1507  Last data filed at 8/7/2022 1400  Gross per 24 hour   Intake 2258.86 ml   Output 4425 ml   Net -2166.14 ml       Physical Exam  Constitutional:       General: He is not in acute distress.     Appearance: Normal appearance. He is normal weight. He is not ill-appearing.   HENT:      Head: Normocephalic and atraumatic.      Right Ear: External ear normal.      Left  Ear: External ear normal.      Nose: Nose normal.      Mouth/Throat:      Mouth: Mucous membranes are moist.      Pharynx: Oropharynx is clear.   Eyes:      Extraocular Movements: Extraocular movements intact.      Conjunctiva/sclera: Conjunctivae normal.   Cardiovascular:      Rate and Rhythm: Normal rate and regular rhythm.      Pulses: Normal pulses.      Heart sounds: Normal heart sounds.   Pulmonary:      Effort: Pulmonary effort is normal. No respiratory distress.      Breath sounds: No stridor. No wheezing.      Comments: BS clear bilaterally   Abdominal:      General: Abdomen is flat. There is no distension.      Palpations: Abdomen is soft.      Tenderness: There is no abdominal tenderness.   Musculoskeletal:      Cervical back: Neck supple.      Right lower leg: No edema.      Left lower leg: No edema.   Skin:     General: Skin is warm and dry.      Capillary Refill: Capillary refill takes less than 2 seconds.      Comments: Multiple tattoos present     Neurological:      General: No focal deficit present.      Mental Status: He is oriented to person, place, and time.      Comments: Patient with slight intermittent agitation/disorientation. Conversant    Psychiatric:         Mood and Affect: Mood normal.         Behavior: Behavior normal.       Lines/Drains/Airways       Drain  Duration                  Rectal Tube 08/07/22 0400 rectal tube w/ balloon (indicate number of mLs) <1 day              Peripheral Intravenous Line  Duration                  Peripheral IV - Single Lumen 08/03/22 0550 18 G Right Antecubital 4 days         Peripheral IV - Single Lumen 08/03/22 0558 20 G Left Hand 4 days         Peripheral IV - Single Lumen 08/03/22 1800 18 G Anterior;Right Wrist 3 days         Peripheral IV - Single Lumen 08/03/22 1810 20 G Left;Posterior;Proximal Forearm 3 days                  Significant Labs:    CBC/Anemia Profile:  Recent Labs   Lab 08/06/22  0330 08/07/22  0255   WBC 16.82* 15.69*   HGB 12.4*  12.2*   HCT 38.0* 36.4*    253   MCV 85 81*   RDW 14.5 14.0        Chemistries:  Recent Labs   Lab 08/06/22  0330 08/06/22  1314 08/07/22  0255    144 146*   K 3.3* 3.6 3.4*   * 109 112*   CO2 21* 24 24   BUN 18 17 14   CREATININE 0.9 0.8 0.9   CALCIUM 8.2* 8.1* 8.9   ALBUMIN 3.1*  --  3.5   PROT 5.9*  --  6.4   BILITOT 0.3  --  0.5   ALKPHOS 57  --  57   ALT 33  --  89*   AST 32  --  69*   MG 2.2  --  2.7*   PHOS 2.7  --  2.8       All pertinent labs within the past 24 hours have been reviewed.    Significant Imaging:  I have reviewed all pertinent imaging results/findings within the past 24 hours.

## 2022-08-08 PROBLEM — F19.94 SUBSTANCE INDUCED MOOD DISORDER: Status: ACTIVE | Noted: 2022-08-08

## 2022-08-08 LAB
ALBUMIN SERPL BCP-MCNC: 3.7 G/DL (ref 3.5–5.2)
ALP SERPL-CCNC: 60 U/L (ref 55–135)
ALT SERPL W/O P-5'-P-CCNC: 88 U/L (ref 10–44)
ANION GAP SERPL CALC-SCNC: 12 MMOL/L (ref 8–16)
AST SERPL-CCNC: 49 U/L (ref 10–40)
BACTERIA BLD CULT: NORMAL
BACTERIA BLD CULT: NORMAL
BASOPHILS # BLD AUTO: 0.04 K/UL (ref 0–0.2)
BASOPHILS NFR BLD: 0.3 % (ref 0–1.9)
BILIRUB SERPL-MCNC: 0.6 MG/DL (ref 0.1–1)
BUN SERPL-MCNC: 17 MG/DL (ref 6–20)
CALCIUM SERPL-MCNC: 9.2 MG/DL (ref 8.7–10.5)
CHLORIDE SERPL-SCNC: 110 MMOL/L (ref 95–110)
CO2 SERPL-SCNC: 21 MMOL/L (ref 23–29)
CREAT SERPL-MCNC: 1 MG/DL (ref 0.5–1.4)
DIFFERENTIAL METHOD: ABNORMAL
EOSINOPHIL # BLD AUTO: 0.1 K/UL (ref 0–0.5)
EOSINOPHIL NFR BLD: 0.4 % (ref 0–8)
ERYTHROCYTE [DISTWIDTH] IN BLOOD BY AUTOMATED COUNT: 13.6 % (ref 11.5–14.5)
EST. GFR  (NO RACE VARIABLE): >60 ML/MIN/1.73 M^2
GLUCOSE SERPL-MCNC: 77 MG/DL (ref 70–110)
HCT VFR BLD AUTO: 42.1 % (ref 40–54)
HGB BLD-MCNC: 13.7 G/DL (ref 14–18)
IMM GRANULOCYTES # BLD AUTO: 0.21 K/UL (ref 0–0.04)
IMM GRANULOCYTES NFR BLD AUTO: 1.8 % (ref 0–0.5)
LYMPHOCYTES # BLD AUTO: 2 K/UL (ref 1–4.8)
LYMPHOCYTES NFR BLD: 17.2 % (ref 18–48)
MAGNESIUM SERPL-MCNC: 2.6 MG/DL (ref 1.6–2.6)
MCH RBC QN AUTO: 27.6 PG (ref 27–31)
MCHC RBC AUTO-ENTMCNC: 32.5 G/DL (ref 32–36)
MCV RBC AUTO: 85 FL (ref 82–98)
MONOCYTES # BLD AUTO: 1.2 K/UL (ref 0.3–1)
MONOCYTES NFR BLD: 10.1 % (ref 4–15)
NEUTROPHILS # BLD AUTO: 8.2 K/UL (ref 1.8–7.7)
NEUTROPHILS NFR BLD: 70.2 % (ref 38–73)
NRBC BLD-RTO: 0 /100 WBC
PHOSPHATE SERPL-MCNC: 4.4 MG/DL (ref 2.7–4.5)
PLATELET # BLD AUTO: ABNORMAL K/UL (ref 150–450)
PLATELET BLD QL SMEAR: ABNORMAL
PMV BLD AUTO: 11.6 FL (ref 9.2–12.9)
POTASSIUM SERPL-SCNC: 4.5 MMOL/L (ref 3.5–5.1)
PROT SERPL-MCNC: 7.1 G/DL (ref 6–8.4)
RBC # BLD AUTO: 4.97 M/UL (ref 4.6–6.2)
SODIUM SERPL-SCNC: 143 MMOL/L (ref 136–145)
WBC # BLD AUTO: 11.63 K/UL (ref 3.9–12.7)

## 2022-08-08 PROCEDURE — 94640 AIRWAY INHALATION TREATMENT: CPT

## 2022-08-08 PROCEDURE — 99233 SBSQ HOSP IP/OBS HIGH 50: CPT | Mod: ,,, | Performed by: INTERNAL MEDICINE

## 2022-08-08 PROCEDURE — 99233 PR SUBSEQUENT HOSPITAL CARE,LEVL III: ICD-10-PCS | Mod: ,,, | Performed by: INTERNAL MEDICINE

## 2022-08-08 PROCEDURE — 63600175 PHARM REV CODE 636 W HCPCS: Performed by: STUDENT IN AN ORGANIZED HEALTH CARE EDUCATION/TRAINING PROGRAM

## 2022-08-08 PROCEDURE — 25000242 PHARM REV CODE 250 ALT 637 W/ HCPCS

## 2022-08-08 PROCEDURE — 20000000 HC ICU ROOM

## 2022-08-08 PROCEDURE — 83735 ASSAY OF MAGNESIUM: CPT | Performed by: STUDENT IN AN ORGANIZED HEALTH CARE EDUCATION/TRAINING PROGRAM

## 2022-08-08 PROCEDURE — 25000003 PHARM REV CODE 250: Performed by: STUDENT IN AN ORGANIZED HEALTH CARE EDUCATION/TRAINING PROGRAM

## 2022-08-08 PROCEDURE — 84100 ASSAY OF PHOSPHORUS: CPT | Performed by: STUDENT IN AN ORGANIZED HEALTH CARE EDUCATION/TRAINING PROGRAM

## 2022-08-08 PROCEDURE — 25000003 PHARM REV CODE 250: Performed by: INTERNAL MEDICINE

## 2022-08-08 PROCEDURE — 97165 OT EVAL LOW COMPLEX 30 MIN: CPT

## 2022-08-08 PROCEDURE — 94664 DEMO&/EVAL PT USE INHALER: CPT

## 2022-08-08 PROCEDURE — 99222 PR INITIAL HOSPITAL CARE,LEVL II: ICD-10-PCS | Mod: AF,HB,, | Performed by: PSYCHIATRY & NEUROLOGY

## 2022-08-08 PROCEDURE — 94761 N-INVAS EAR/PLS OXIMETRY MLT: CPT

## 2022-08-08 PROCEDURE — 97535 SELF CARE MNGMENT TRAINING: CPT

## 2022-08-08 PROCEDURE — 63600175 PHARM REV CODE 636 W HCPCS

## 2022-08-08 PROCEDURE — 97116 GAIT TRAINING THERAPY: CPT

## 2022-08-08 PROCEDURE — 99900035 HC TECH TIME PER 15 MIN (STAT)

## 2022-08-08 PROCEDURE — 85025 COMPLETE CBC W/AUTO DIFF WBC: CPT | Performed by: INTERNAL MEDICINE

## 2022-08-08 PROCEDURE — 99222 1ST HOSP IP/OBS MODERATE 55: CPT | Mod: AF,HB,, | Performed by: PSYCHIATRY & NEUROLOGY

## 2022-08-08 PROCEDURE — 25000003 PHARM REV CODE 250

## 2022-08-08 PROCEDURE — 25000003 PHARM REV CODE 250: Performed by: NURSE PRACTITIONER

## 2022-08-08 PROCEDURE — 80053 COMPREHEN METABOLIC PANEL: CPT | Performed by: STUDENT IN AN ORGANIZED HEALTH CARE EDUCATION/TRAINING PROGRAM

## 2022-08-08 PROCEDURE — 27000646 HC AEROBIKA DEVICE

## 2022-08-08 RX ORDER — HALOPERIDOL 5 MG/ML
5 INJECTION INTRAMUSCULAR ONCE
Status: COMPLETED | OUTPATIENT
Start: 2022-08-08 | End: 2022-08-08

## 2022-08-08 RX ADMIN — IPRATROPIUM BROMIDE AND ALBUTEROL SULFATE 3 ML: 2.5; .5 SOLUTION RESPIRATORY (INHALATION) at 07:08

## 2022-08-08 RX ADMIN — Medication 100 MG: at 08:08

## 2022-08-08 RX ADMIN — Medication 2 G: at 08:08

## 2022-08-08 RX ADMIN — FLUTICASONE FUROATE AND VILANTEROL TRIFENATATE 1 PUFF: 100; 25 POWDER RESPIRATORY (INHALATION) at 07:08

## 2022-08-08 RX ADMIN — IPRATROPIUM BROMIDE AND ALBUTEROL SULFATE 3 ML: 2.5; .5 SOLUTION RESPIRATORY (INHALATION) at 12:08

## 2022-08-08 RX ADMIN — Medication 6 MG: at 09:08

## 2022-08-08 RX ADMIN — Medication 2 G: at 05:08

## 2022-08-08 RX ADMIN — IPRATROPIUM BROMIDE AND ALBUTEROL SULFATE 3 ML: 2.5; .5 SOLUTION RESPIRATORY (INHALATION) at 04:08

## 2022-08-08 RX ADMIN — HALOPERIDOL LACTATE 5 MG: 5 INJECTION, SOLUTION INTRAMUSCULAR at 02:08

## 2022-08-08 RX ADMIN — Medication 2 G: at 12:08

## 2022-08-08 RX ADMIN — MUPIROCIN: 20 OINTMENT TOPICAL at 08:08

## 2022-08-08 RX ADMIN — MONTELUKAST 10 MG: 10 TABLET, FILM COATED ORAL at 08:08

## 2022-08-08 RX ADMIN — LOPERAMIDE HYDROCHLORIDE 2 MG: 2 CAPSULE ORAL at 09:08

## 2022-08-08 RX ADMIN — ENOXAPARIN SODIUM 40 MG: 100 INJECTION SUBCUTANEOUS at 05:08

## 2022-08-08 NOTE — ASSESSMENT & PLAN NOTE
Patient has history of severe asthma and status asthmaticus requiring intubation in the past. Home meds include albuterol inhaler, duonebs, breo, Advair, montelukast    - Continue duonebs q6h   - Continue Montelukast 10mg QD    - Initiated Breo  - plan to continue Breo + symbicort upon discharge

## 2022-08-08 NOTE — SUBJECTIVE & OBJECTIVE
Interval History/Significant Events: Overnight, patient had episode of mild paranoia and agitation requiring 1x hydroxyzine and 1x haldol. Patient was not aggressive or combative. He is still doing well today, AAOx4. Per family, patient mentation is not at baseline. Will CTM.     Review of Systems   Constitutional:  Negative for chills, diaphoresis, fatigue and fever.   HENT:  Negative for congestion.    Eyes:  Negative for visual disturbance.   Respiratory:  Negative for apnea, cough, choking, chest tightness, shortness of breath, wheezing and stridor.    Cardiovascular:  Negative for chest pain, palpitations and leg swelling.   Gastrointestinal:  Positive for diarrhea and nausea. Negative for abdominal distention and abdominal pain.        2/2 opioid withdrawal   Genitourinary:  Negative for decreased urine volume and dysuria.   Skin:  Negative for pallor and rash.   Neurological:  Positive for tremors. Negative for dizziness, numbness and headaches.        Intermittent muscle cramping with associated twitch during episodes   Psychiatric/Behavioral:  Positive for confusion. Negative for behavioral problems.         Mild waxing and waning agitation/AMS      Objective:     Vital Signs (Most Recent):  Temp: 99.1 °F (37.3 °C) (08/08/22 1500)  Pulse: 65 (08/08/22 1500)  Resp: (!) 25 (08/08/22 1500)  BP: 120/69 (08/08/22 1415)  SpO2: 97 % (08/08/22 1500)   Vital Signs (24h Range):  Temp:  [98.2 °F (36.8 °C)-99.2 °F (37.3 °C)] 99.1 °F (37.3 °C)  Pulse:  [] 65  Resp:  [17-43] 25  SpO2:  [85 %-100 %] 97 %  BP: ()/(56-95) 120/69   Weight: 61.7 kg (136 lb 0.4 oz)  Body mass index is 21.95 kg/m².      Intake/Output Summary (Last 24 hours) at 8/8/2022 1522  Last data filed at 8/8/2022 1000  Gross per 24 hour   Intake 540.25 ml   Output 1000 ml   Net -459.75 ml       Physical Exam  Constitutional:       General: He is not in acute distress.     Appearance: Normal appearance. He is normal weight. He is not  ill-appearing.   HENT:      Head: Normocephalic and atraumatic.      Right Ear: External ear normal.      Left Ear: External ear normal.      Nose: Nose normal.      Mouth/Throat:      Mouth: Mucous membranes are moist.      Pharynx: Oropharynx is clear.   Eyes:      Extraocular Movements: Extraocular movements intact.      Conjunctiva/sclera: Conjunctivae normal.   Cardiovascular:      Rate and Rhythm: Normal rate and regular rhythm.      Pulses: Normal pulses.      Heart sounds: Normal heart sounds.   Pulmonary:      Effort: Pulmonary effort is normal. No respiratory distress.      Breath sounds: No stridor. No wheezing.      Comments: BS clear bilaterally   Abdominal:      General: Abdomen is flat. There is no distension.      Palpations: Abdomen is soft.      Tenderness: There is no abdominal tenderness.   Musculoskeletal:      Cervical back: Neck supple.      Right lower leg: No edema.      Left lower leg: No edema.   Skin:     General: Skin is warm and dry.      Capillary Refill: Capillary refill takes less than 2 seconds.      Comments: Multiple tattoos present     Neurological:      General: No focal deficit present.      Mental Status: He is oriented to person, place, and time.      Comments: Patient with slight intermittent agitation/disorientation. Conversant    Psychiatric:         Mood and Affect: Mood normal.         Behavior: Behavior normal.       Lines/Drains/Airways       Peripheral Intravenous Line  Duration                  Peripheral IV - Single Lumen 08/03/22 0550 18 G Right Antecubital 5 days         Peripheral IV - Single Lumen 08/03/22 1810 20 G Left;Posterior;Proximal Forearm 4 days         Peripheral IV - Single Lumen 08/08/22 0400 22 G Anterior;Left;Proximal Forearm <1 day                  Significant Labs:    CBC/Anemia Profile:  Recent Labs   Lab 08/07/22  0255 08/08/22  0437   WBC 15.69* 11.63   HGB 12.2* 13.7*   HCT 36.4* 42.1    SEE COMMENT   MCV 81* 85   RDW 14.0 13.6         Chemistries:  Recent Labs   Lab 08/07/22  0255 08/08/22  0318   * 143   K 3.4* 4.5   * 110   CO2 24 21*   BUN 14 17   CREATININE 0.9 1.0   CALCIUM 8.9 9.2   ALBUMIN 3.5 3.7   PROT 6.4 7.1   BILITOT 0.5 0.6   ALKPHOS 57 60   ALT 89* 88*   AST 69* 49*   MG 2.7* 2.6   PHOS 2.8 4.4       All pertinent labs within the past 24 hours have been reviewed.    Significant Imaging:  I have reviewed all pertinent imaging results/findings within the past 24 hours.

## 2022-08-08 NOTE — ASSESSMENT & PLAN NOTE
Patient initially presented with GCS 3, given narcan and magnesium by EMS and intubated in the ED. Found to be in respiratory failure with hypercarbia and initial pCO2 of 86. AMS likely 2/2 to opioid overdose, respiratory depression and hypercapnia. CTH with diffuse cerebral edema and concern for hypoxic-ischemic encephalopathy. Subsequent MRI with no acute intracranial process; no intraparenchymal edema. EEG video monitoring showing diffuse slowing with no seizure-like activity. Patient now extubated and weaned from sedation; mild AMS.     - q6h glucose accuchecks   - exploring baseline neurologic status with family   - PT evaluation complete

## 2022-08-08 NOTE — CONSULTS
[facility] - Cardiac Medical ICU  Psychiatry  Consult Note    Patient Name: Tucker Roberto  MRN: [id number]   Code Status: Full Code  Admission Date: 8/3/2022  Hospital Length of Stay: 5 days  Attending Physician: Red Bustillo MD  Primary Care Provider: Karen Braxton MD    Current Legal Status: Uncontested    Patient information was obtained from patient and ER records.   Inpatient consult to Psychiatry  Consult performed by: Rachana Nuñez MD  Consult ordered by: Jasvir Senior MD        Subjective:     Principal Problem:Acute respiratory failure with hypoxia and hypercarbia    Chief Complaint:  Opioid use disorder     HPI:         [facility]  DEPARTMENT OF PSYCHIATRY    ADDICTION CONSULT INITIAL EVALUATION     SITE: [facility] [address]    8/8/2022 12:31 PM  Tucker Roberto  1995  [id number]    DATE OF ADMISSION: 8/3/2022  5:47 AM  LENGTH OF STAY: 5 days    EXAMINING PRACTITIONER: Rachana Nuñez    Consults    KEY:     I[]I Y = II[x][]II = Yes / Present / Present Though Denies / Endorses  I[]I N = II[][x]II = No / Absent / Absent Though Endorses / Denies  I[]I U = II[][]II = Unknown / Unable to Assess/Enact / Unwilling to Participate/Provide  I[]I A = II[x][x]II = Ambiguity / Uncertainty of Accuracy Exists  I[]I D = Denial or Minimization is Suspected/Evident  I[]I N/A = Non-Applicable    CHIEF COMPLAINT:     Patient Name: Tucker Roberto  YOB: 1995  MRN: [id number]    Tucker Roberto is a 27 y.o. male who is seen today for an initial psychiatric evaluation by the addiction psychiatry consult service.  Tucker Roberto presents with the chief complaint of: problematic substance use/abuse, alcohol and/or drug addiction, and depression    CHART REVIEW:     Available documentation has been reviewed, and pertinent elements of the chart have been incorporated into this evaluation where appropriate.    Per Primary Team:    Mr. Roberto is a 27 yo M with GITA (heroin,  denies IVDU) and asthma that presents with acute encephalopathy. Previously has had repeated admissions for status asthmaticus and heroin overdose. He was brought in unresponsive by EMS, who administered narcan and magnesium in the field. GCS was 3 at time of arrival in ED. In the ED, he received albuterol and was intubated. History is limited 2/2 to acuity of situation. Initial labs significant for WBC of 16, pH 7.1, pCO2 86.2. CT head negative for acute abnormalities      Critical care medicine consulted for hypercapnic respiratory failure likely 2/2 to heroin overdose and asthma exacerbation.     Hospital/ICU Course:  On MICU arrival, patient sedated with improved breath sounds, although significant ventilator dyssynchrony with myoclonic movements; Nimbex gtt added. Patient received central line placement on 8/3 for hypertonic saline administration, discontinued 8/4. Patient received Vanc/Zosyn in setting of intubation for acute respiratory failure for ppx of potential aspiration; respiratory cultures w/ Staph Aureus (Vanc/Zosyn de-escalated to Vanc/Rocephin). Over 8/3, patient developed metabolic acidosis with increased lactate after episode of myoclonic movements with no associated seizure activity on EEG. Lactate now WNL with improving metabolic acidosis, no more myoclonic movements since first 24hrs of admission. Electrolytes being closely monitored, repleting as indicated. On 8/4, patient with potential ST elevation on EKG with elevated Tpn. Repeat EKG indicative of likely early repolarization; Tpn downtrended. On 8/4 MRI brain completed showing no acute inctracranial process or intraparenchymal edema. TTE WNL. Tube feeds also began 8/4. From 8/4 to 8/5, discontinued paralytic and began weaning sedation with no complications. Patient self-extubated on the morninig of 8/6 d/t agitation; VBG WNL; no indication to re-intubate at that time. Patient has been comfortable and conversant since.    PRESENTATION:  "    HISTORY OF PRESENT ILLNESS:     Patient seen and chart reviewed. Drowsy on exam and intermittently confused at times while providing some inconsistent responses to repeated questions, however, oriented x4 and CAM ICU negative. Patient able to provide vague history of what happened prior to his hospitalization. He reports last using Heroin/fentanyl the day of admission and then having an asthma attack. Reports use of 1 g/day intranasally for the last year, denies IVDU. Denies alcohol or any other substance use.  Patient states that he started using with his current girlfriend because she is also an addict, but repeatedly implies that this relationship is over because he wants to get his life back. Patient reports using Subutex in the past after going to [facility], but unable to provide the last time he used this (per , 04/2022). Reports history of 2 prior accidental overdoses and denies any other rehab history. He is not sure if he would be interested in rehab again or other resources.   Regarding his mood, pt reports becoming depressed about 3 years ago. Denies any inciting event. Reports poor sleep, daily fatigue, and decreased appetite but denies suicidal thoughts or history of self harm or suicide. Denies previously seeing psychiatrist or therapist. Reports he is interested in starting a medication that would help with his mood. Pt only able to provide "work" as an example of something that makes him happy. 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       COLLATERAL:     Patient information was obtained from patient.    Also present with the patient at the time of the evaluation: patient evaluated alone.      RELIABILITY, ACCURACY & AGENCY:     The patient is deemed to be a historian of unknown reliability and accuracy, with evidence of intermittent confusion during interview .    Inconsistencies between patient reporting, collateral information, and/or chart review are present.    Legal Status:  " "uncontested      ADDICTION:     SUBSTANCE USE:     I[]I Patient denies any substance use history, and none is known.  I[]I Patient unable or unwilling to provide any substance use history.    I[]I Y  I[x]I N  I[]I U  I[]I Current  I[]I Former  Nicotine Use:   I[]I Y  I[x]I N  I[]I U  I[]I Current  I[]I Former  Alcohol Misuse/Abuse:   I[x]I Y  I[]I N  I[]I U  I[]I Current  I[]I Former  Illicit Drug Use/Misuse/Abuse:   I[]I Y  I[x]I N  I[]I U  I[]I Current  I[]I Former  Misuse/Abuse of Prescription Medications:     Current Alcohol Use:   I[]I U    I[x]I N    I[]I Rare    I[]I Social    I[]I Exceeds Recommended Health Limits    I[]I Binge Pattern    I[]I Daily or Near Daily    I[]I Physiologically Dependent    I[x]I N/A  I[]I U  Average Consumption (e.g. type, quantity, frequency):   I[x]I N/A  I[]I U  Last drink:     Substances Used (Lifetime):   I[]I U    I[]I N    II[][]II Cannabis    II[][]II Cocaine    II[x][]II Heroin   II[x][]II Opioids    II[][]II Methamphetamine    II[][]II Stimulants    II[][]II Benzodiazepines    I[]I Other:     Tobacco Cessation ("Wants to Quit"):   I[x]I N/A  I[]I U  II[][]II nterested in Quitting    II[][]II Uninterested in Quitting   II[][]II Making Efforts to Cut Back or Quit    II[][]II Advised to Quit    II[][]II Assistance Provided    II[][]II Encouragement Provided    II[][]II Motivational Interviewing Provided    II[][]II Resources Provided    II[][]II Referral Made     I[]I N/A  I[x]I Y  I[]I N  I[]I U  Meets Criteria for Substance Use Disorder:   I[]I N/A  I[x]I Y  I[]I N  I[]I U  Advised to Quit/Cut Back:   I[]I N/A  I[x]I Y  I[]I N  I[]I U  Motivated to Do So:     ADDITIONAL FACTORS RELATED TO ADDICTION:     I[]I Y  I[x]I N  I[]I U  Hx of IVDU:   I[x]I Y  I[]I N  I[]I U  Hx of Accidental Overdose: 3 times  I[]I Y  I[]I N  I[x]I U  Hx of DUI:   I[]I Y  I[]I N  I[x]I U  Hx of Complicated Withdrawal (i.e. Seizures and/or Delirium Tremens):   I[]I Y  I[]I N  I[x]I U  Hx of " "Known/Suspected Substance-Induced Psychiatric Disorder:   I[x]I Y  I[]I N  I[]I U  Hx of Detox:   I[]I Y  I[x]I N  I[]I U  Hx of Rehab:   I[x]I Y  I[]I N  I[]I U  Hx of Medication Assisted Treatment:   I[x]I Y  I[]I N  I[]I U  Hx of Twelve Step Program (or Equivalent) Involvement:   I[]I Y  I[x]I N  I[]I U  Currently Exhibits Signs of Intoxication:   I[]I Y  I[x]I N  I[]I U  Currently Exhibits Signs of Withdrawal:   I[]I Y  I[x]I N  I[]I U  Currently Active in Recovery:     Substance(s) of Choice: Heroin, Fentanyl   Substances Used Currently/Recently: heroin  Duration of Sobriety/Abstinence: "a few days" but then also states "3 months"  Date of Last Use of Substances: Day of admission   View/Acceptance of Twelve Step (or Equivalent) Programs: motivated to join   Social Support: yes, family   Spouse/Partner Consumption: yes-girlfriend uses, patient plans to end relationship    I[]I N/A  I[x]I Y  I[]I N  I[]I U  I[]I A  Awareness of Biomedical Complications:   I[]I N/A  I[]I Y  I[]I N  I[x]I U  I[]I A  Understands Need for Lifetime Sobriety:   I[]I N/A  I[x]I Y  I[]I N  I[]I U  I[]I A  Acknowledges/Accepts Addiction:   I[]I N/A  I[]I Y  I[]I N  I[]I U  I[]I A  Cessation of Problematic Alcohol/Drug Use ("Wants to Quit"): Interested in Quitting  I[]I N/A  I[]I Y  I[]I N  I[]I U  I[]I A  Motivation to Pursue Treatment: Moderate    Additional Relevant Substance Use History, As Applicable:       REVIEW OF SYSTEMS:     MEDICAL ROS:     Complete review of systems performed covering Constitutional, Eyes, ENT/Mouth, Cardiovascular, Respiratory, Gastrointestinal, Genitourinary, Musculoskeletal, Skin, Neurologic, Endocrine, Heme/Lymph, and Allergy/Immune.     Complete review of systems was negative with the exception of the following positive symptoms: denies any current symptoms     PSYCHIATRIC ROS:     I[]I Patient denies any pertinent Psychiatric ROS, and none is known.  I[]I Patient unable or unwilling to provide any " Psychiatric ROS.    II[x][]II sleep disturbance:   II[x][]II appetite/weight change:   II[x][]II fatigue/anergia:   II[][x]II anhedonia/amotivation:   II[][x]II guilty/worthless/helpless/hopeless:   II[][x]II depressed mood:     II[][x]II inattention/distractibility:   II[][x]II irritability:     II[][x]II racing thoughts/pressured speech:   II[][x]II grandiosity:   II[][x]II elevated/persistent energy/activity:   II[][x]II impulsive behaviors:   II[][x]II risky behavior:   II[][x]II mood dysregulation/lability:     II[x][]II anxiety/worry:   II[][x]II panic attacks:   II[][x]II agoraphobia/social phobia:   II[][x]II somatic symptoms:   II[][x]II recurrent nightmares:   II[][x]II hypervigilance/startle:   II[][x]II avoidance:   II[][x]II intrusive thoughts/recurrent behaviors:      II[][x]II paranoia:   II[][x]II delusions:   II[][x]II hallucinations:     Additional Relevant Psychiatric ROS, As Applicable:       HISTORY:     I[]I Patient unable or unwilling to provide any history.  Information Obtained Via Collateral/Chart Review, Yet To Be Documented, If Available:     PAST PSYCHIATRIC:     I[]I Patient denies any past psychiatric history, and none is known.  I[]I Patient unable or unwilling to provide any past psychiatric history.    I[]I Y  I[x]I N  I[]I U  Psychiatric Diagnoses:   I[]I Y  I[x]I N  I[]I U  Current Psychiatric Provider (if Applicable): per chart review, has seen Dr. Harvey for subutex though patient denies any hx with psychiatric provider  I[]I Y  I[x]I N  I[]I U  Hx of Psychiatric Hospitalization:   I[]I Y  I[x]I N  I[]I U  Hx of Outpatient Psychiatric Treatment (psychiatry/psychotherapy): Suboxone  I[]I Y  I[x]I N  I[]I U  Psychotropic Trials:   I[]I Y  I[x]I N  I[]I U  Prior Suicide Attempts:   I[]I Y  I[x]I N  I[]I U  Hx of Suicidal Ideation:   I[]I Y  I[x]I N  I[]I U  Hx of Homicidal Ideation:   I[]I Y  I[x]I N  I[]I U  Hx of Self-Injurious Behavior (Non-Suicidal):   I[]I Y  I[x]I N  I[]I U   Hx of Violence:   I[]I Y  I[x]I N  I[]I U  Documented Hx of Malingering:     Additional Relevant Past Psychiatric History, As Applicable:       TRAUMA:     I[]I Patient denies any history of trauma, abuse or neglect, and none is known.  I[]I Patient unable or unwilling to provide any history of trauma, abuse, or neglect.    I[]I Y  I[]I N  I[x]I U  Hx of Trauma/Neglect:   I[]I Y  I[x]I N  I[]I U  Hx of Physical Abuse:   I[]I Y  I[x]I N  I[]I U  Hx of Sexual Abuse:     Additional Relevant Trauma History, As Applicable:       FAMILY:     I[]I Y  I[x]I N  I[]I U          SOCIAL:     I[]I Patient unable or unwilling to provide any social history.    II[x][]II Grew Up Locally?:   II[x][]II Happy Childhood?:   II[][x]II Significant Developmental Delay/Disability?:   II[x][]II GED/High School Dipoloma?:   II[][x]II Post High School Education?:   II[x][]II Currently Employed?:   II[][x]II On or Applying for Disability?:   II[x][]II Functions Independently?:   II[][]II Financially Stable?:   II[][]II Domiciled?: unsure  II[][x]II Lives Alone?:   II[x][]II Heterosexual/Cisgender?:   II[x][]II Currently in a Romantic Relationship?:   II[][x]II Ever ?:   II[][x]II Children/Dependents?:   II[][]II Latter-day/Spiritual?: unsure  II[][x]II  History?:   II[x][]II Engaged in Hobbies/Recreational Activities?:   II[][x]II Access to a Gun?:     Additional Relevant Social History, As Applicable:       LEGAL:     I[]I Patient denies any legal history, and none is known.  I[]I Patient unable or unwilling to provide any legal history.    I[]I Y  I[x]I N  I[]I U  Current Legal Issues:   I[]I Y  I[x]I N  I[]I U  Past Charges/Convictions:   I[]I Y  I[x]I N  I[]I U  Hx of Incarceration:     Additional Relevant Past Psychiatric History, As Applicable:       MEDICAL:     The patient's past medical history has been reviewed and updated as appropriate within the electronic medical record system.    General Medical History:     I[]I N   I[]I U      Patient Active Problem List   Diagnosis    Severe persistent asthma with acute exacerbation    Substance use disorder    Opioid use disorder, severe, in controlled environment, dependence    Acute respiratory failure with hypoxia and hypercarbia    Accidental overdose of heroin    Acute encephalopathy    Steroid-induced hyperglycemia       NEUROLOGIC:     I[]I Y  I[x]I N  I[]I U  Hx of Seizure:   I[]I Y  I[x]I N  I[]I U  Hx of Significant Head Trauma (e.g., Loss of Consciousness, Concussion, Coma):      Additional Relevant Neurologic History, As Applicable:       MEDICATIONS:     Current Psychotropic Medications:     I[x]I N  I[]I U      Scheduled and PRN Medications:   The electronic chart was reviewed and updated as appropriate.  See Medcard for details.    Current Facility-Administered Medications:     albuterol-ipratropium 2.5 mg-0.5 mg/3 mL nebulizer solution 3 mL, 3 mL, Nebulization, Q6H, Judy Brewer MD, 3 mL at 08/08/22 0747    ceFAZolin 2 gram in dextrose 5% 100 mL IVPB (premix), 2 g, Intravenous, Q8H, Red Bustillo MD, Stopped at 08/08/22 0850    dicyclomine capsule 10 mg, 10 mg, Oral, Q6H PRN, Gabby P Kakkar, DO, 10 mg at 08/07/22 0000    enoxaparin injection 40 mg, 40 mg, Subcutaneous, Daily, Marilee Echeverria MD, 40 mg at 08/07/22 1658    fluticasone furoate-vilanteroL 100-25 mcg/dose diskus inhaler 1 puff, 1 puff, Inhalation, Daily, Jasvir Senior MD, 1 puff at 08/08/22 0747    glucagon (human recombinant) injection 1 mg, 1 mg, Intramuscular, PRN, Jazz Yoon MD    labetalol 20 mg/4 mL (5 mg/mL) IV syring, 10 mg, Intravenous, Q6H PRN, Gabby P Kakkar, DO, 10 mg at 08/06/22 0303    loperamide capsule 2 mg, 2 mg, Oral, Q1H PRN, Gabby P Kakkar, DO, 2 mg at 08/07/22 2003    melatonin tablet 6 mg, 6 mg, Oral, Nightly PRN, Sivan Zamroa NP, 6 mg at 08/07/22 2002    montelukast tablet 10 mg, 10 mg, Oral, Daily, Jasvir Senior MD, 10 mg at 08/08/22 0816    naloxone 0.4  mg/mL injection 0.4 mg, 0.4 mg, Intravenous, PRN, Bee Carrizales MD, 0.4 mg at 08/06/22 0630    ondansetron tablet 4 mg, 4 mg, Oral, Q4H PRN, Gabby Hanson DO, 4 mg at 08/07/22 1812    senna-docusate 8.6-50 mg per tablet 2 tablet, 2 tablet, Oral, Daily PRN, Gabby Hanson DO    thiamine tablet 100 mg, 100 mg, Oral, Daily, Jasvir Senior MD, 100 mg at 08/08/22 0816    ALLERGIES:     Review of patient's allergies indicates:  No Known Allergies    RISK:     MITIGATION:     Risk Mitigation and Safety Netting: risk mitigation strategies and safety netting are important elements of risk management, and as such WERE employed during this encounter, whenever feasible, relevant and applicable:     I[]I N/A  I[]I U       Harm Reduction: harm reduction techniques are used in an effort to mitigate risk from high risk behaviors (e.g., addictive, impulsive, compulsive), until abstinence (i.e., complete cessation of problematic behavior) can be established, and as such WERE employed during this encounter, whenever feasible, relevant and applicable:     I[]I N/A  I[]I U       Psychoeducation: the patient WERE informed, if not already aware, of the biomedical complications associated with substance abuse and mental illness.  Additional psychoeducation pertaining to etiology, natural progression, disease processes, time course and treatment options was provided, as warranted:     I[]I N/A  I[]I U       Ample time was allotted to address questions/concerns of the patient, and material was presented in a manner and at a level commensurate with cognitive capacity, cultural background and fund of knowledge:     PRESCRIPTION DRUG MONITORING:     LA/MS  AWARE  Site reviewed - yes  Subutex prescriptions noted    FIREARMS:     Access to Firearms:   See above for screening on access to firearms.  NOTE: patient counseled on gun safety.  NOTE: patient counseled on increased risks associated with gun ownership.    RISK PARAMETERS:  "    The following risk parameters were assessed during this evaluation:    I[]I Y  I[x]I N  I[]I U  I[]I A  Suicidal Ideation/Behavior:   I[]I Y  I[x]I N  I[]I U  I[]I A  Homicidal Ideation/Behavior:   I[]I Y  I[x]I N  I[]I U  I[]I A  Violence:   I[]I Y  I[x]I N  I[]I U  I[]I A  Self-Injurious Behavior:     I[]I Y  I[x]I N  I[]I U  I[]I A  Minimization of Symptoms Suspected/Evident:   I[]I Y  I[x]I N  I[]I U  I[]I A  Exaggeration of Symptoms Suspected/Evident:       CLINICAL RISK ASSESSMENT:     The patient was noted to be future oriented.    Currently does not meet or exceed the threshold for psychiatric hospitalization, as the patient can be managed safely and successfully in a less restrictive level of care or the community.    CLINICAL RISK DETERMINATION:     Current risk is judged to be:  I[]I Low    I[x]I Moderate   I[]I High    The following criteria were met for involuntary psychiatric admission:   I[x]I None    I[]I Dangerous to Self    I[]I Dangerous to Others    I[]I Gravely Disabled      EXAMINATION:     Vitals:    08/08/22 0800 08/08/22 0900 08/08/22 1000 08/08/22 1100   BP: 124/62 113/63 (!) 92/59    BP Location:       Patient Position:       Pulse: 61 65 (!) 59 64   Resp: (!) 35 (!) 36 (!) 32 (!) 32   Temp:    98.7 °F (37.1 °C)   TempSrc:    Axillary   SpO2: 98% 98% 97% 98%   Weight:       Height:           MENTAL STATUS EXAMINATION:     General Appearance: appears stated age, normal weight, lying in bed  Behavior: pleasant, appropriate eye-contact  Involuntary Movements and Motor Activity: no abnormal involuntary movements noted  Gait and Station: unable to assess - patient lying down or seated  Speech: decreased spontaneity, slowed  Language: intact  Mood: "good"  Affect: blunted  Thought Process: mostly goal oriented and organized, some intermittent disorganization that required clarification   Associations: +loosening of associations  Thought Content and Perceptions: no suicidal or homicidal " ideation, no auditory or visual hallucinations, no paranoid ideation, no ideas of reference, no evidence of delusions or psychosis  Sensorium: drowsy  Orientation: oriented to person, place, time, and situation  Recent and Remote Memory: mild impairments noted  Attention and Concentration: grossly intact  Fund of Knowledge: grossly intact  Insight: demonstrates awareness of illness, demonstrates awareness of situation  Judgment: behavior is adequate/appropriate to circumstances    CAM ICU negative     ASSESSMENT:     A diagnostic psychiatric evaluation was performed and responsiveness to treatment was assessed.  The patient demonstrates adequate ability/capacity to respond to treatment.    PSYCHOSOCIAL FACTORS  Stressors (Biopsychosocial, Cultural and Environmental): relationships, substance use/addiction    STRENGTHS AND LIABILITIES   Strength: Patient is motivated for change.  Liability: Patient is in active addiction.    INITIAL DIAGNOSES AND PROBLEMS:     Opioid Use Disorder, severe   Unspecified depression       PLAN:     IMPRESSION AND RECOMMENDATIONS:     MANAGEMENT PLAN, TREATMENT GOALS, THERAPEUTIC TECHNIQUES/APPROACHES & CLINICAL REASONING    -Will hold off on initiation of Suboxone for now. Patient denies signs or symptoms of withdrawal at present (last dose of Fentanyl 8/6). Will continue to monitor for withdrawal and continue PRN opioid withdrawal medications per primary. Will determine if patient is able to re-establish care with outpatient Suboxone provider  -Recommend to start Remeron 15 mg nightly for sleep augmentation and mood   -Addiction psychiatry will continue to follow    Shared medical decision making with the patient was employed (to the extent possible) when selecting, or considering but not selecting, proposed treatment and management options.    ADDICTION COUNSELING AND MANAGEMENT:     Timely and targeted counseling is an important intervention in the treatment of substance use  disorders.  Active and ongoing management is a hallmark of good clinical care.    Addiction counseling and management WAS employed during this encounter.    [x] The patient was counseled on abstinence from alcohol and substances of abuse (illicit and prescription).  [x] Harm reduction techniques were discussed, as warranted, to mitigate risk from problematic behaviors.  [x] Serial alcohol and drug laboratory testing (e.g. PETH, urine toxicology) is recommended to provide accountability, as well as to guide and refine substance abuse treatment moving forward.  [x] Relapse prevention and motivational interviewing was provided.  [x] Education was provided on 12 step recovery programs.    PRESCRIPTION DRUG MANAGEMENT:     Prescription drug management WAS performed during the encounter.  The patient's ability to understand, participate and engage in a conversation surrounding this was deemed to be: rudimentary.    Prescription Drug Management entails the following:  [x] The review, recommendation, or consideration without recommendation of medications during the encounter.  [x] Discussion (to the extent possible) with the patient and/or other interested parties of the diagnosis, target symptoms, intended outcomes, and possible benefits and risks of medication, as well as alternatives (including no treatment), if not otherwise known or stated prior.  [x] Discussion (to the extent possible) with the patient and/or other interested parties of possible expectable adverse effects of any proposed individual psychotropic agents, as well as the inherent unpredictability of treatment, if not otherwise known or stated prior.  [x] Informed consent was sought from the patient (or a guardian if applicable) after a thorough discussion (to the extent possible) of the aforementioned points outlined above.  [x] The provision of counseling (to the extent possible) to the patient and/or other interested parties on the importance of full  compliance with any prescribed medication, if not otherwise known or stated prior.    Information on psychotropic medication can be found at: [employer]: Information on Mental Health Medications      In cases of emergency, daily coverage provided by Acute/ER Psych MD, NP, or SW, with contact numbers located in [facility] On Call Schedule    Case discussed with staff addiction psychiatrist: MD Rachana GRANT MD  Department of Psychiatry  [facility]      DATA:     DIAGNOSTIC TESTING:     The chart was reviewed for recent diagnostic investigations, and pertinent results are noted below.      Recent Weights/BMI on File:    Wt Readings from Last 5 Encounters:   08/05/22 61.7 kg (136 lb 0.4 oz)   02/19/22 64 kg (141 lb)   11/03/21 64.4 kg (141 lb 15.6 oz)   09/05/21 66.2 kg (146 lb)   06/04/21 65.8 kg (145 lb)       No data recorded    Current body mass index is 21.95 kg/m².      Most Recent BP/HR on File:    BP Readings from Last 1 Encounters:   08/08/22 (!) 92/59       Pulse Readings from Last 1 Encounters:   08/08/22 64         Most Recent EKG on File:    Results for orders placed or performed during the hospital encounter of 08/03/22   EKG 12-lead    Collection Time: 08/06/22  5:37 AM    Narrative    Test Reason :     Vent. Rate : 057 BPM     Atrial Rate : 057 BPM     P-R Int : 158 ms          QRS Dur : 096 ms      QT Int : 448 ms       P-R-T Axes : 078 087 078 degrees     QTc Int : 436 ms    Sinus bradycardia  Otherwise normal ECG  When compared with ECG of 05-AUG-2022 10:53,  Vent. rate has decreased BY  30 BPM  Confirmed by Derrek CARIAS MD ([id number]) on 8/6/2022 10:25:36 AM    Referred By: [email or web address]   SELF           Confirmed By:Derrek CARIAS MD           PERTINENT LABORATORY RESULTS    Most Recent Electrolytes on File:  (i.e. Na, K, Ca, Phos, Mg, CO2)    Sodium (mmol/L)   Date Value   08/08/2022 143     Potassium (mmol/L)   Date Value   08/08/2022 4.5     Calcium  (mg/dL)   Date Value   08/08/2022 9.2     Phosphorus (mg/dL)   Date Value   08/08/2022 4.4     Magnesium (mg/dL)   Date Value   08/08/2022 2.6     CO2 (mmol/L)   Date Value   08/08/2022 21 (L)         Most Recent Blood Glucose & HgA1c on File:    Glucose (mg/dL)   Date Value   08/08/2022 77     Hemoglobin A1C (%)   Date Value   02/19/2022 5.2         Most Recent Renal Function Tests on File:  (i.e. Cr, BUN, GFR, Urine Specific Gravity, Urine Protein)    Creatinine (mg/dL)   Date Value   08/08/2022 1.0     BUN (mg/dL)   Date Value   08/08/2022 17     eGFR if African American (mL/min/1.73 m^2)   Date Value   02/20/2022 >60.0     eGFR if non African American (mL/min/1.73 m^2)   Date Value   02/20/2022 >60.0     Specific [place], UA (no units)   Date Value   08/05/2022 >=1.030 (A)     Protein, UA (no units)   Date Value   08/05/2022 Negative         Most Recent Liver Function Tests on File:  (i.e. AST, ALT, Total Bili, Ammonia, Albumin, INR)    AST (U/L)   Date Value   08/08/2022 49 (H)     ALT (U/L)   Date Value   08/08/2022 88 (H)     Total Bilirubin (mg/dL)   Date Value   08/08/2022 0.6     Albumin (g/dL)   Date Value   08/08/2022 3.7     INR (no units)   Date Value   08/04/2022 1.1         Most Recent Pancreatic Function Tests on File:  (i.e. Amylase, Lipase)    No results found for: AMYLASE, LIPASE      Most Recent Blood Counts on File:  (i.e. WBC, ANC, RBC, MCV, Platelets)    WBC (K/uL)   Date Value   08/08/2022 11.63     Gran # (ANC) (K/uL)   Date Value   08/08/2022 8.2 (H)     Gran % (%)   Date Value   08/08/2022 70.2     RBC (M/uL)   Date Value   08/08/2022 4.97     MCV (fL)   Date Value   08/08/2022 85     Platelets (K/uL)   Date Value   08/08/2022 SEE COMMENT         Most Recent Thyroid/Parathyroid Function Tests on File:  (i.e. TSH, Free T4, Total T4, Free T3, Total T3, PTH)    TSH (uIU/mL)   Date Value   08/03/2022 3.891         Most Recent Lipid Panel Results on File:  (i.e. Cholesterol, Triglycerides,  LDH, HDL)    Triglycerides (mg/dL)   Date Value   08/05/2022 88         Most Recent CPK & Prolactin on File:    CPK (U/L)   Date Value   08/03/2022 487 (H)         Most Recent Vitamin Levels on File:  (i.e. Vitamin B12, Folate, Vitamin D)    No results found for: XLHVJFLY11, FOLATE, [id number]       Most Recent Infection Disease Panel on File:  (i.e. Syphilis, Hepatitis C, Hepatitis B, HIV)     Hepatitis C Ab (no units)   Date Value   08/03/2022 Negative     HIV 1/2 Ag/Ab (no units)   Date Value   08/03/2022 Negative         Pregnancy Screenings:    No results found for: PREGTESTUR, PREGNANCYTES, HCGQUANT      Lithium & Valproate Levels on File:    No results found for: LITHIUM    No results found for: VALPROATE      Most Recent Carbamazepine & Lamotrigine Levels on File:    No results found for: CBMZ, LAMOTRIGINE      Most Recent Clozapine Level on File:    No results found for: CLOZAPINE, NORCLOZAP, CLOZNORCLOZ      Results on File for Alcohol Screens (Blood, Urine):    Alcohol, Serum (mg/dL)   Date Value   08/03/2022 <10       Alcohol, Urine (mg/dL)   Date Value   08/03/2022 <10   02/19/2022 <10         Results on File for Urine Drug Screens (Benzodiazepines, Barbiturates, Methadone, Opiates, Cocaine, Amphetamines, THC, PCP):    Benzodiazepines (no units)   Date Value   08/03/2022 Negative   08/03/2022 Negative   02/19/2022 Negative     Barbiturate Screen, Ur (no units)   Date Value   08/03/2022 Negative   08/03/2022 Negative   02/19/2022 Negative     Methadone metabolites (no units)   Date Value   08/03/2022 Negative   08/03/2022 Negative   02/19/2022 Negative     Opiate Scrn, Ur (no units)   Date Value   08/03/2022 Negative   08/03/2022 Negative   02/19/2022 Negative     Cocaine (Metab.) (no units)   Date Value   08/03/2022 Negative   08/03/2022 Negative   02/19/2022 Negative     Amphetamine Screen, Ur (no units)   Date Value   08/03/2022 Negative   08/03/2022 Negative   02/19/2022 Negative     THC (no units)    Date Value   08/03/2022 Negative   08/03/2022 Negative   02/19/2022 Negative     Phencyclidine (no units)   Date Value   08/03/2022 Negative   08/03/2022 Negative   02/19/2022 Negative         Most Recent Results for Buprenorphine Testing:    No results found for: BUPRENORPH, NORBUPRENOR      Results on File for Phosphatidylethanol (PETH):    No results found for: PETH  No results found for: [id number]  No results found for: [id number]      Results on File for Ethyl Glucuronide (EtG) and Ethyl Sulfate (EtS):    No results found for: THEYLGLUCU, ETHYLSULF      Most Recent Results on File for Alcohol Biomarkers (GGT, CDT):    No results found for: GGT, CDT        RELEVANT NEUROLOGIC IMAGING:  (i.e. CT/MRI brain)      CT HEAD WITHOUT CONTRAST    Results for orders placed during the hospital encounter of 08/03/22    CT Head Without Contrast    Narrative  EXAMINATION:  CT HEAD WITHOUT CONTRAST    CLINICAL HISTORY:  unresponsive;    TECHNIQUE:  Low dose axial images were obtained through the head.  Coronal and sagittal reformations were also performed. Contrast was not administered.    COMPARISON:  None.    FINDINGS:  Exam quality is limited by slight motion and streak artifact secondary to the presence of metallic earrings.  Support devices partially visualized on the  radiograph.    No evidence of acute territorial infarct, hemorrhage, mass effect, or midline shift.    There is loss of normal sulcation near the vertex and decreased gray-white matter differentiation.  Scattered minimal hyperdensity may be related to pseudosubarachnoid hemorrhage.  Slight like appearance of the ventricles with crowding of the basal cisterns and posterior fossa.    There is mild scalp edema.  No displaced calvarial fracture.    Mucosal thickening in the ethmoid air cells.  Otherwise, the visualized paranasal sinuses and mastoid air cells are essentially clear.    Impression  CT findings suggestive of generalized cerebral edema and  potentially hypoxic-ischemic encephalopathy in the appropriate clinical setting.  Further evaluation/follow-up as clinically appropriate.    This report was flagged in Epic as abnormal.      Electronically signed by: Mo Gonzalez MD  Date:    08/03/2022  Time:    07:00      MRI HEAD WITHOUT CONTRAST    Results for orders placed during the hospital encounter of 08/03/22    MRI Brain Without Contrast    Narrative  EXAMINATION:  MRI BRAIN WITHOUT CONTRAST    CLINICAL HISTORY:  Mental status change, unknown cause;    TECHNIQUE:  Multiplanar multisequence MR imaging of the brain was performed without contrast.    COMPARISON:  CT head 08/03/2022    FINDINGS:  There is no evidence of hydrocephalus, mass effect, intracranial hemorrhage or acute infarct.  The brain parenchyma maintains normal signal intensity.  No evidence of parenchymal edema is identified.    Normal arterial flow voids are preserved at the skull base.    Nonspecific minimal mastoid mucosal thickening in this intubated patient.    Impression  No acute intracranial process is identified.      Electronically signed by: Justen Russell  Date:    08/05/2022  Time:    08:00      MRI HEAD WITH AND WITHOUT CONTRAST    No results found for this or any previous visit.             Hospital Course: No notes on file    No new subjective & objective note has been filed under this hospital service since the last note was generated.    Assessment/Plan:     Opioid use disorder, severe, in controlled environment, dependence  -Will hold off on initiation of Suboxone for now. Patient denies signs or symptoms of withdrawal at present (last dose of Fentanyl 8/6). Will continue to monitor for withdrawal and continue PRN opioid withdrawal medications per primary. Will need to determine if patient is able to re-establish care with outpatient Suboxone provider  -Recommend to start Remeron 15 mg nightly for sleep augmentation and mood   -Addiction psychiatry will continue to  follow       Total Time:  60 minutes      Rachana Nuñez MD   Psychiatry  [facility]

## 2022-08-08 NOTE — PT/OT/SLP PROGRESS
Physical Therapy  Co-Treatment with OT    Patient Name:  Tucker Roberto   MRN:  [id number]    Recommendations:     Discharge Recommendations:   (home no needs)   Discharge Equipment Recommendations: none   Barriers to discharge: None    Assessment:     Tucker Roberto is a 27 y.o. male admitted with a medical diagnosis of Acute respiratory failure with hypoxia and hypercarbia.  He presents with the following impairments/functional limitations:  impaired endurance, impaired functional mobility, gait instability, impaired balance, decreased safety awareness pt tolerated treatment better being able to gait train farther. Pt will benefit from cont skilled PT 4x/wk to progress physically. Pt should be able to discharge home with no needs when medically stable.     Rehab Prognosis: Good; patient would benefit from acute skilled PT services to address these deficits and reach maximum level of function.    Recent Surgery: * No surgery found *      Plan:     During this hospitalization, patient to be seen 4 x/week to address the identified rehab impairments via gait training, therapeutic activities, therapeutic exercises and progress toward the following goals:    · Plan of Care Expires:  09/06/22    Subjective     Chief Complaint: pt had no complaints during treatment.   Patient/Family Comments/goals: to get better and go home.   Pain/Comfort:  · Pain Rating 1: 0/10  · Pain Rating Post-Intervention 1: 0/10      Objective:     Communicated with nurse prior to session.  Patient found supine with telemetry, pulse ox (continuous), blood pressure cuff, peripheral IV upon PT entry to room.     General Precautions: Standard, fall   Orthopedic Precautions:N/A   Braces:    Respiratory Status: Room air     Functional Mobility:  · Bed Mobility:     · Rolling Right: minimum assistance  · Supine to Sit: minimum assistance  ·   · Transfers:     · Sit to Stand:  contact guard assistance with hand-held assist  ·   · Gait: pt received  gait training ~ 36 ft with CGA in room.   ·   · Balance: pt stood at sink with SBA to perform ADLS with OT.     Due to pt complex medical condition, the skill of 2 licensed therapists is needed to maximize treatment session and progression towards goals        AM-PAC 6 CLICK MOBILITY  Turning over in bed (including adjusting bedclothes, sheets and blankets)?: 3  Sitting down on and standing up from a chair with arms (e.g., wheelchair, bedside commode, etc.): 3  Moving from lying on back to sitting on the side of the bed?: 3  Moving to and from a bed to a chair (including a wheelchair)?: 3  Need to walk in hospital room?: 3  Climbing 3-5 steps with a railing?: 2  Basic Mobility Total Score: 17       Therapeutic Activities and Exercises:   pt received verbal instructions in PT POC and verbally expressed understanding of such.     Patient left up in chair with all lines intact, call button in reach and RN notified..    GOALS:   Multidisciplinary Problems     Physical Therapy Goals        Problem: Physical Therapy    Goal Priority Disciplines Outcome Goal Variances Interventions   Physical Therapy Goal     PT, PT/OT Ongoing, Progressing     Description: Goals to be met by: 2022    Patient will increase functional independence with mobility by performin. Supine to sit with Supervision -not met  2. Sit to stand transfer with Supervision using LRAD -not met  3. Gait  x 150 feet with Supervision using LRAD -not met  4. Stand for 3 minutes with Supervision using LRAD -not met                     Time Tracking:     PT Received On: 22  PT Start Time: 1000     PT Stop Time: 1015  PT Total Time (min): 15 min     Billable Minutes: Gait Training 15 min       PT/PTA: PT     PTA Visit Number: 0     2022

## 2022-08-08 NOTE — PT/OT/SLP EVAL
Occupational Therapy   Evaluation    Name: Tucker Roberto  MRN: [id number]  Admitting Diagnosis:  Acute respiratory failure with hypoxia and hypercarbia  Recent Surgery: * No surgery found *      Recommendations:     Discharge Recommendations: home  Discharge Equipment Recommendations:  none  Barriers to discharge:       Assessment:     Tucker Roberto is a 27 y.o. male with a medical diagnosis of Acute respiratory failure with hypoxia and hypercarbia. Pt walked 36' with HHA due to unsteadiness during mobility and standing activities. Performance deficits affecting function: weakness, impaired endurance, impaired self care skills, impaired functional mobility, gait instability, impaired balance.      Rehab Prognosis: Good; patient would benefit from acute skilled OT services to address these deficits and reach maximum level of function.       Plan:     Patient to be seen 3 x/week to address the above listed problems via self-care/home management, therapeutic activities, therapeutic exercises  · Plan of Care Expires: 09/07/22  · Plan of Care Reviewed with: patient    Subjective     Occupational Profile:  Living Environment: Pt lives with mother in a [facility] with no BARBARA.    Previous level of function: Pt was ind with ambulation and ADLs.   Equipment Used at Home:  none  Assistance upon Discharge: Patient reports they will have assistance from mother upon discharge.    Pain/Comfort:  · Pain Rating 1: 0/10    Patients cultural, spiritual, Taoism conflicts given the current situation:      Objective:     Communicated with: rn prior to session.  Patient found supine with peripheral IV, pulse ox (continuous), telemetry upon OT entry to room.    General Precautions: Standard, fall   Orthopedic Precautions:    Braces:    Respiratory Status: Room air    Occupational Performance:    Bed Mobility:    · Patient completed Scooting/Bridging with minimum assistance  · Patient completed Supine to Sit with minimum  assistance    Functional Mobility/Transfers:  · Patient completed Sit <> Stand Transfer with contact guard assistance  with  no assistive device   · Patient completed Bed <> Chair Transfer using Step Transfer technique with contact guard assistance with hand-held assist  · Functional Mobility: Pt walked 36' with HHA.    Activities of Daily Living:  · Feeding:  independence   · Grooming: stand by assistance in standing.  · Lower Body Dressing: stand by assistance   · Toileting: maximal assistance for pericare in standing.    Cognitive/Visual Perceptual:  Cognitive/Psychosocial Skills:     -       Oriented to: Person, Place, Time and Situation   -       Safety awareness/insight to disability: intact     Physical Exam:  BUE AROM/MMT: WNL  EOB balance = Good    AMPAC 6 Click ADL:  AMPAC Total Score: 21    Treatment & Education:  UE ROM/MMT  Bed mobility training / assessment  Functional mobility assessment  Sit/standing balance assessment  Educated on importance of sitting OOB in bedside chair to promote increased strength, endurance & breathing.  Discussed OT POC / Post-acute plan  Education:    Patient left up in chair with all lines intact and call button in reach    GOALS:   Multidisciplinary Problems     Occupational Therapy Goals        Problem: Occupational Therapy    Goal Priority Disciplines Outcome Interventions   Occupational Therapy Goal     OT, PT/OT Ongoing, Progressing    Description: Goals to be met by: 8/15/22    Patient will increase functional independence with ADLs by performing:    Grooming while standing at sink with [place].  Toileting from toilet with [place] for hygiene and clothing management.   Supine to sit with [place].  Toilet transfer to toilet with [place].                     History:     Past Medical History:   Diagnosis Date    Asthma     Substance use disorder 10/27/2021       No past surgical history on file.    Time Tracking:     OT Date of Treatment:  08/08/22  OT Start Time: 0959  OT Stop Time: 1016  OT Total Time (min): 17 min    Billable Minutes:Evaluation 8  Self Care/Home Management 9    8/8/2022

## 2022-08-08 NOTE — PLAN OF CARE
CMICU DAILY GOALS       A: Awake    RASS: Goal - RASS Goal: 0-->alert and calm  Actual - RASS ([place] Agitation-Sedation Scale): 0-->alert and calm   Restraint necessity: Clinical Justification: Removing medical devices  B: Breathe   SBT: Not intubated   C: Coordinate A & B, analgesics/sedatives   Pain: managed    SAT: Not intubated  D: Delirium   CAM-ICU: Overall CAM-ICU: Positive  E: Early(intubated/ Progressive (non-intubated) Mobility   MOVE Screen: Pass   Activity: Activity Management: Ambulated in room - L4  FAS: Feeding/Nutrition   Diet order: Diet/Nutrition Received: regular, Specialty Diet/Nutrition Received: dysphagia advanced  T: Thrombus   DVT prophylaxis: VTE Required Core Measure: Pharmacological prophylaxis initiated/maintained  H: HOB Elevation   Head of Bed (HOB) Positioning: HOB at 30 degrees  U: Ulcer Prophylaxis   GI: yes  G: Glucose control   managed    S: Skin   Bathing/Skin Care: dressed/undressed, linen changed  Device Skin Pressure Protection: absorbent pad utilized/changed  Pressure Reduction Devices: positioning supports utilized  Pressure Reduction Techniques: frequent weight shift encouraged  Skin Protection: adhesive use limited  B: Bowel Function   diarrhea   I: Indwelling Catheters   Baca necessity: [REMOVED]      Urethral Catheter 08/03/22 0610 Temperature probe 16 Fr.-Reason for Continuing Urinary Catheterization: Critically ill in ICU and requiring hourly monitoring of intake/output  [REMOVED]      Urethral Catheter 08/03/22 2320 Non-latex 16 Fr.-Reason for Continuing Urinary Catheterization: Critically ill in ICU and requiring hourly monitoring of intake/output  [REMOVED]      Urethral Catheter 08/05/22 2319-Reason for Continuing Urinary Catheterization: Urinary retention   CVC necessity: No  D: De-escalation Antibiotics   Yes    Family/Goals of care/Code Status   Code Status: Full Code    24H Vital Sign Range  Temp:  [98.2 °F (36.8 °C)-99.2 °F (37.3 °C)]   Pulse:  []    Resp:  [17-43]   BP: ()/(56-95)   SpO2:  [85 %-100 %]      Shift Events   No acute events throughout shift    VS and assessment per flow sheet, patient progressing towards goals as tolerated, plan of care reviewed with family, all concerns addressed, will continue to monitor.    Gerber Bradley

## 2022-08-08 NOTE — HPI
[facility]  DEPARTMENT OF PSYCHIATRY    ADDICTION CONSULT INITIAL EVALUATION     SITE: [facility], [address]    8/8/2022 12:31 PM  Tucker Roberto  1995  [id number]    DATE OF ADMISSION: 8/3/2022  5:47 AM  LENGTH OF STAY: 5 days    EXAMINING PRACTITIONER: Rachana Nuñez    Consults    KEY:     I[]I Y = II[x][]II = Yes / Present / Present Though Denies / Endorses  I[]I N = II[][x]II = No / Absent / Absent Though Endorses / Denies  I[]I U = II[][]II = Unknown / Unable to Assess/Enact / Unwilling to Participate/Provide  I[]I A = II[x][x]II = Ambiguity / Uncertainty of Accuracy Exists  I[]I D = Denial or Minimization is Suspected/Evident  I[]I N/A = Non-Applicable    CHIEF COMPLAINT:     Patient Name: Tucker Roberto  YOB: 1995  MRN: [id number]    Tucker Roberto is a 27 y.o. male who is seen today for an initial psychiatric evaluation by the addiction psychiatry consult service.  Tucker Roberto presents with the chief complaint of: problematic substance use/abuse, alcohol and/or drug addiction, and depression    CHART REVIEW:     Available documentation has been reviewed, and pertinent elements of the chart have been incorporated into this evaluation where appropriate.    Per Primary Team:    Mr. Roberto is a 27 yo M with GITA (heroin, denies IVDU) and asthma that presents with acute encephalopathy. Previously has had repeated admissions for status asthmaticus and heroin overdose. He was brought in unresponsive by EMS, who administered narcan and magnesium in the field. GCS was 3 at time of arrival in ED. In the ED, he received albuterol and was intubated. History is limited 2/2 to acuity of situation. Initial labs significant for WBC of 16, pH 7.1, pCO2 86.2. CT head negative for acute abnormalities      Critical care medicine consulted for hypercapnic respiratory failure likely 2/2 to heroin overdose and asthma exacerbation.     Hospital/ICU Course:  On MICU  arrival, patient sedated with improved breath sounds, although significant ventilator dyssynchrony with myoclonic movements; Nimbex gtt added. Patient received central line placement on 8/3 for hypertonic saline administration, discontinued 8/4. Patient received Vanc/Zosyn in setting of intubation for acute respiratory failure for ppx of potential aspiration; respiratory cultures w/ Staph Aureus (Vanc/Zosyn de-escalated to Vanc/Rocephin). Over 8/3, patient developed metabolic acidosis with increased lactate after episode of myoclonic movements with no associated seizure activity on EEG. Lactate now WNL with improving metabolic acidosis, no more myoclonic movements since first 24hrs of admission. Electrolytes being closely monitored, repleting as indicated. On 8/4, patient with potential ST elevation on EKG with elevated Tpn. Repeat EKG indicative of likely early repolarization; Tpn downtrended. On 8/4 MRI brain completed showing no acute inctracranial process or intraparenchymal edema. TTE WNL. Tube feeds also began 8/4. From 8/4 to 8/5, discontinued paralytic and began weaning sedation with no complications. Patient self-extubated on the morninig of 8/6 d/t agitation; VBG WNL; no indication to re-intubate at that time. Patient has been comfortable and conversant since.    PRESENTATION:     HISTORY OF PRESENT ILLNESS:     Patient seen and chart reviewed. Drowsy on exam and intermittently confused at times while providing some inconsistent responses to repeated questions, however, oriented x4 and CAM ICU negative. Patient able to provide vague history of what happened prior to his hospitalization. He reports last using Heroin/fentanyl the day of admission and then having an asthma attack. Reports use of 1 g/day intranasally for the last year, denies IVDU. Denies alcohol or any other substance use.  Patient states that he started using with his current girlfriend because she is also an addict, but repeatedly implies  "that this relationship is over because he wants to get his life back. Patient reports using Subutex in the past after going to [facility], but unable to provide the last time he used this (per , 04/2022). Reports history of 2 prior accidental overdoses and denies any other rehab history. He is not sure if he would be interested in rehab again or other resources.   Regarding his mood, pt reports becoming depressed about 3 years ago. Denies any inciting event. Reports poor sleep, daily fatigue, and decreased appetite but denies suicidal thoughts or history of self harm or suicide. Denies previously seeing psychiatrist or therapist. Reports he is interested in starting a medication that would help with his mood. Pt only able to provide "work" as an example of something that makes him happy. 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       COLLATERAL:     Patient information was obtained from patient.    Also present with the patient at the time of the evaluation: patient evaluated alone.      RELIABILITY, ACCURACY & AGENCY:     The patient is deemed to be a historian of unknown reliability and accuracy, with evidence of intermittent confusion during interview .    Inconsistencies between patient reporting, collateral information, and/or chart review are present.    Legal Status:  uncontested      ADDICTION:     SUBSTANCE USE:     I[]I Patient denies any substance use history, and none is known.  I[]I Patient unable or unwilling to provide any substance use history.    I[]I Y  I[x]I N  I[]I U  I[]I Current  I[]I Former  Nicotine Use:   I[]I Y  I[x]I N  I[]I U  I[]I Current  I[]I Former  Alcohol Misuse/Abuse:   I[x]I Y  I[]I N  I[]I U  I[]I Current  I[]I Former  Illicit Drug Use/Misuse/Abuse:   I[]I Y  I[x]I N  I[]I U  I[]I Current  I[]I Former  Misuse/Abuse of Prescription Medications:     Current Alcohol Use:   I[]I U    I[x]I N    I[]I Rare    I[]I Social    I[]I Exceeds Recommended Health Limits    I[]I Binge Pattern    " "I[]I Daily or Near Daily    I[]I Physiologically Dependent    I[x]I N/A  I[]I U  Average Consumption (e.g. type, quantity, frequency):   I[x]I N/A  I[]I U  Last drink:     Substances Used (Lifetime):   I[]I U    I[]I N    II[][]II Cannabis    II[][]II Cocaine    II[x][]II Heroin   II[x][]II Opioids    II[][]II Methamphetamine    II[][]II Stimulants    II[][]II Benzodiazepines    I[]I Other:     Tobacco Cessation ("Wants to Quit"):   I[x]I N/A  I[]I U  II[][]II nterested in Quitting    II[][]II Uninterested in Quitting   II[][]II Making Efforts to Cut Back or Quit    II[][]II Advised to Quit    II[][]II Assistance Provided    II[][]II Encouragement Provided    II[][]II Motivational Interviewing Provided    II[][]II Resources Provided    II[][]II Referral Made     I[]I N/A  I[x]I Y  I[]I N  I[]I U  Meets Criteria for Substance Use Disorder:   I[]I N/A  I[x]I Y  I[]I N  I[]I U  Advised to Quit/Cut Back:   I[]I N/A  I[x]I Y  I[]I N  I[]I U  Motivated to Do So:     ADDITIONAL FACTORS RELATED TO ADDICTION:     I[]I Y  I[x]I N  I[]I U  Hx of IVDU:   I[x]I Y  I[]I N  I[]I U  Hx of Accidental Overdose: 3 times  I[]I Y  I[]I N  I[x]I U  Hx of DUI:   I[]I Y  I[]I N  I[x]I U  Hx of Complicated Withdrawal (i.e. Seizures and/or Delirium Tremens):   I[]I Y  I[]I N  I[x]I U  Hx of Known/Suspected Substance-Induced Psychiatric Disorder:   I[x]I Y  I[]I N  I[]I U  Hx of Detox:   I[]I Y  I[x]I N  I[]I U  Hx of Rehab:   I[x]I Y  I[]I N  I[]I U  Hx of Medication Assisted Treatment:   I[x]I Y  I[]I N  I[]I U  Hx of Twelve Step Program (or Equivalent) Involvement:   I[]I Y  I[x]I N  I[]I U  Currently Exhibits Signs of Intoxication:   I[]I Y  I[x]I N  I[]I U  Currently Exhibits Signs of Withdrawal:   I[]I Y  I[x]I N  I[]I U  Currently Active in Recovery:     Substance(s) of Choice: Heroin, Fentanyl   Substances Used Currently/Recently: heroin  Duration of Sobriety/Abstinence: "a few days" but then also states "3 months"  Date of Last Use " "of Substances: Day of admission   View/Acceptance of Twelve Step (or Equivalent) Programs: motivated to join   Social Support: yes, family   Spouse/Partner Consumption: yes-girlfriend uses, patient plans to end relationship    I[]I N/A  I[x]I Y  I[]I N  I[]I U  I[]I A  Awareness of Biomedical Complications:   I[]I N/A  I[]I Y  I[]I N  I[x]I U  I[]I A  Understands Need for Lifetime Sobriety:   I[]I N/A  I[x]I Y  I[]I N  I[]I U  I[]I A  Acknowledges/Accepts Addiction:   I[]I N/A  I[]I Y  I[]I N  I[]I U  I[]I A  Cessation of Problematic Alcohol/Drug Use ("Wants to Quit"): Interested in Quitting  I[]I N/A  I[]I Y  I[]I N  I[]I U  I[]I A  Motivation to Pursue Treatment: Moderate    Additional Relevant Substance Use History, As Applicable:       REVIEW OF SYSTEMS:     MEDICAL ROS:     Complete review of systems performed covering Constitutional, Eyes, ENT/Mouth, Cardiovascular, Respiratory, Gastrointestinal, Genitourinary, Musculoskeletal, Skin, Neurologic, Endocrine, Heme/Lymph, and Allergy/Immune.     Complete review of systems was negative with the exception of the following positive symptoms: denies any current symptoms     PSYCHIATRIC ROS:     I[]I Patient denies any pertinent Psychiatric ROS, and none is known.  I[]I Patient unable or unwilling to provide any Psychiatric ROS.    II[x][]II sleep disturbance:   II[x][]II appetite/weight change:   II[x][]II fatigue/anergia:   II[][x]II anhedonia/amotivation:   II[][x]II guilty/worthless/helpless/hopeless:   II[][x]II depressed mood:     II[][x]II inattention/distractibility:   II[][x]II irritability:     II[][x]II racing thoughts/pressured speech:   II[][x]II grandiosity:   II[][x]II elevated/persistent energy/activity:   II[][x]II impulsive behaviors:   II[][x]II risky behavior:   II[][x]II mood dysregulation/lability:     II[x][]II anxiety/worry:   II[][x]II panic attacks:   II[][x]II agoraphobia/social phobia:   II[][x]II somatic symptoms:   II[][x]II recurrent " nightmares:   II[][x]II hypervigilance/startle:   II[][x]II avoidance:   II[][x]II intrusive thoughts/recurrent behaviors:      II[][x]II paranoia:   II[][x]II delusions:   II[][x]II hallucinations:     Additional Relevant Psychiatric ROS, As Applicable:       HISTORY:     I[]I Patient unable or unwilling to provide any history.  Information Obtained Via Collateral/Chart Review, Yet To Be Documented, If Available:     PAST PSYCHIATRIC:     I[]I Patient denies any past psychiatric history, and none is known.  I[]I Patient unable or unwilling to provide any past psychiatric history.    I[]I Y  I[x]I N  I[]I U  Psychiatric Diagnoses:   I[]I Y  I[x]I N  I[]I U  Current Psychiatric Provider (if Applicable): per chart review, has seen Dr. Harvey for subutex though patient denies any hx with psychiatric provider  I[]I Y  I[x]I N  I[]I U  Hx of Psychiatric Hospitalization:   I[]I Y  I[x]I N  I[]I U  Hx of Outpatient Psychiatric Treatment (psychiatry/psychotherapy): Suboxone  I[]I Y  I[x]I N  I[]I U  Psychotropic Trials:   I[]I Y  I[x]I N  I[]I U  Prior Suicide Attempts:   I[]I Y  I[x]I N  I[]I U  Hx of Suicidal Ideation:   I[]I Y  I[x]I N  I[]I U  Hx of Homicidal Ideation:   I[]I Y  I[x]I N  I[]I U  Hx of Self-Injurious Behavior (Non-Suicidal):   I[]I Y  I[x]I N  I[]I U  Hx of Violence:   I[]I Y  I[x]I N  I[]I U  Documented Hx of Malingering:     Additional Relevant Past Psychiatric History, As Applicable:       TRAUMA:     I[]I Patient denies any history of trauma, abuse or neglect, and none is known.  I[]I Patient unable or unwilling to provide any history of trauma, abuse, or neglect.    I[]I Y  I[]I N  I[x]I U  Hx of Trauma/Neglect:   I[]I Y  I[x]I N  I[]I U  Hx of Physical Abuse:   I[]I Y  I[x]I N  I[]I U  Hx of Sexual Abuse:     Additional Relevant Trauma History, As Applicable:       FAMILY:     I[]I Y  I[x]I N  I[]I U          SOCIAL:     I[]I Patient unable or unwilling to provide any social history.    II[x][]II  Grew Up Locally?:   II[x][]II Happy Childhood?:   II[][x]II Significant Developmental Delay/Disability?:   II[x][]II GED/High School Dipoloma?:   II[][x]II Post High School Education?:   II[x][]II Currently Employed?:   II[][x]II On or Applying for Disability?:   II[x][]II Functions Independently?:   II[][]II Financially Stable?:   II[][]II Domiciled?: unsure  II[][x]II Lives Alone?:   II[x][]II Heterosexual/Cisgender?:   II[x][]II Currently in a Romantic Relationship?:   II[][x]II Ever ?:   II[][x]II Children/Dependents?:   II[][]II Jain/Spiritual?: unsure  II[][x]II  History?:   II[x][]II Engaged in Hobbies/Recreational Activities?:   II[][x]II Access to a Gun?:     Additional Relevant Social History, As Applicable:       LEGAL:     I[]I Patient denies any legal history, and none is known.  I[]I Patient unable or unwilling to provide any legal history.    I[]I Y  I[x]I N  I[]I U  Current Legal Issues:   I[]I Y  I[x]I N  I[]I U  Past Charges/Convictions:   I[]I Y  I[x]I N  I[]I U  Hx of Incarceration:     Additional Relevant Past Psychiatric History, As Applicable:       MEDICAL:     The patient's past medical history has been reviewed and updated as appropriate within the electronic medical record system.    General Medical History:     I[]I N  I[]I U      Patient Active Problem List   Diagnosis    Severe persistent asthma with acute exacerbation    Substance use disorder    Opioid use disorder, severe, in controlled environment, dependence    Acute respiratory failure with hypoxia and hypercarbia    Accidental overdose of heroin    Acute encephalopathy    Steroid-induced hyperglycemia       NEUROLOGIC:     I[]I Y  I[x]I N  I[]I U  Hx of Seizure:   I[]I Y  I[x]I N  I[]I U  Hx of Significant Head Trauma (e.g., Loss of Consciousness, Concussion, Coma):      Additional Relevant Neurologic History, As Applicable:       MEDICATIONS:     Current Psychotropic Medications:     I[x]I N  I[]I U       Scheduled and PRN Medications:   The electronic chart was reviewed and updated as appropriate.  See Medcard for details.    Current Facility-Administered Medications:     albuterol-ipratropium 2.5 mg-0.5 mg/3 mL nebulizer solution 3 mL, 3 mL, Nebulization, Q6H, Judy Brewer MD, 3 mL at 08/08/22 0747    ceFAZolin 2 gram in dextrose 5% 100 mL IVPB (premix), 2 g, Intravenous, Q8H, Red Bustillo MD, Stopped at 08/08/22 0850    dicyclomine capsule 10 mg, 10 mg, Oral, Q6H PRN, Gabby Hanson, DO, 10 mg at 08/07/22 0000    enoxaparin injection 40 mg, 40 mg, Subcutaneous, Daily, Marilee Echeverria MD, 40 mg at 08/07/22 1658    fluticasone furoate-vilanteroL 100-25 mcg/dose diskus inhaler 1 puff, 1 puff, Inhalation, Daily, Jasvir Senior MD, 1 puff at 08/08/22 0747    glucagon (human recombinant) injection 1 mg, 1 mg, Intramuscular, PRN, Jazz Yoon MD    labetalol 20 mg/4 mL (5 mg/mL) IV syring, 10 mg, Intravenous, Q6H PRN, Gabby Hanson, DO, 10 mg at 08/06/22 0303    loperamide capsule 2 mg, 2 mg, Oral, Q1H PRN, Gabby Hanson DO, 2 mg at 08/07/22 2003    melatonin tablet 6 mg, 6 mg, Oral, Nightly PRN, Sivan Zamora NP, 6 mg at 08/07/22 2002    montelukast tablet 10 mg, 10 mg, Oral, Daily, Jasvir Senior MD, 10 mg at 08/08/22 0816    naloxone 0.4 mg/mL injection 0.4 mg, 0.4 mg, Intravenous, PRN, Bee Carrizales MD, 0.4 mg at 08/06/22 0630    ondansetron tablet 4 mg, 4 mg, Oral, Q4H PRN, Gabby Hanson DO, 4 mg at 08/07/22 1812    senna-docusate 8.6-50 mg per tablet 2 tablet, 2 tablet, Oral, Daily PRN, Gabby Hanson DO    thiamine tablet 100 mg, 100 mg, Oral, Daily, Jasvir Senior MD, 100 mg at 08/08/22 0816    ALLERGIES:     Review of patient's allergies indicates:  No Known Allergies    RISK:     MITIGATION:     Risk Mitigation and Safety Netting: risk mitigation strategies and safety netting are important elements of risk management, and as such WERE employed during this encounter, whenever feasible,  relevant and applicable:     I[]I N/A  I[]I U       Harm Reduction: harm reduction techniques are used in an effort to mitigate risk from high risk behaviors (e.g., addictive, impulsive, compulsive), until abstinence (i.e., complete cessation of problematic behavior) can be established, and as such WERE employed during this encounter, whenever feasible, relevant and applicable:     I[]I N/A  I[]I U       Psychoeducation: the patient WERE informed, if not already aware, of the biomedical complications associated with substance abuse and mental illness.  Additional psychoeducation pertaining to etiology, natural progression, disease processes, time course and treatment options was provided, as warranted:     I[]I N/A  I[]I U       Ample time was allotted to address questions/concerns of {XX-Patient-And-Family:06211}, and material was presented in a manner and at a level commensurate with cognitive capacity, cultural background and fund of knowledge:     PRESCRIPTION DRUG MONITORING:     LA/MS  AWARE  Site reviewed - yes  Subutex prescriptions noted    FIREARMS:     Access to Firearms:   See above for screening on access to firearms.  NOTE: patient counseled on gun safety.  NOTE: patient counseled on increased risks associated with gun ownership.    RISK PARAMETERS:     The following risk parameters were assessed during this evaluation:    I[]I Y  I[x]I N  I[]I U  I[]I A  Suicidal Ideation/Behavior:   I[]I Y  I[x]I N  I[]I U  I[]I A  Homicidal Ideation/Behavior:   I[]I Y  I[x]I N  I[]I U  I[]I A  Violence:   I[]I Y  I[x]I N  I[]I U  I[]I A  Self-Injurious Behavior:     I[]I Y  I[x]I N  I[]I U  I[]I A  Minimization of Symptoms Suspected/Evident:   I[]I Y  I[x]I N  I[]I U  I[]I A  Exaggeration of Symptoms Suspected/Evident:       CLINICAL RISK ASSESSMENT:     The patient was noted to be future oriented.    Currently does not meet or exceed the threshold for psychiatric hospitalization, as the patient can be managed  "safely and successfully in a less restrictive level of care or the community.    CLINICAL RISK DETERMINATION:     Current risk is judged to be:  I[]I Low    I[x]I Moderate   I[]I High    The following criteria were met for involuntary psychiatric admission:   I[x]I None    I[]I Dangerous to Self    I[]I Dangerous to Others    I[]I Gravely Disabled  ***    EXAMINATION:     Vitals:    08/08/22 0800 08/08/22 0900 08/08/22 1000 08/08/22 1100   BP: 124/62 113/63 (!) 92/59    BP Location:       Patient Position:       Pulse: 61 65 (!) 59 64   Resp: (!) 35 (!) 36 (!) 32 (!) 32   Temp:    98.7 °F (37.1 °C)   TempSrc:    Axillary   SpO2: 98% 98% 97% 98%   Weight:       Height:           MENTAL STATUS EXAMINATION:     General Appearance: appears stated age, normal weight, lying in bed  Behavior: pleasant, appropriate eye-contact  Involuntary Movements and Motor Activity: no abnormal involuntary movements noted  Gait and Station: unable to assess - patient lying down or seated  Speech: decreased spontaneity, slowed  Language: intact  Mood: "good"  Affect: blunted  Thought Process: mostly goal oriented and organized, some intermittent disorganization that required clarification   Associations: +loosening of associations  Thought Content and Perceptions: no suicidal or homicidal ideation, no auditory or visual hallucinations, no paranoid ideation, no ideas of reference, no evidence of delusions or psychosis  Sensorium: drowsy  Orientation: oriented to person, place, time, and situation  Recent and Remote Memory: mild impairments noted  Attention and Concentration: grossly intact  Fund of Knowledge: grossly intact  Insight: demonstrates awareness of illness, demonstrates awareness of situation  Judgment: behavior is adequate/appropriate to circumstances    CAM ICU negative     ASSESSMENT:     A diagnostic psychiatric evaluation was performed and responsiveness to treatment was assessed.  The patient demonstrates adequate " ability/capacity to respond to treatment.    PSYCHOSOCIAL FACTORS  Stressors (Biopsychosocial, Cultural and Environmental): relationships, substance use/addiction    STRENGTHS AND LIABILITIES   Strength: Patient is motivated for change.  Liability: Patient is in active addiction.    INITIAL DIAGNOSES AND PROBLEMS:     Opioid Use Disorder, severe   Unspecified depression       PLAN:     IMPRESSION AND RECOMMENDATIONS:     MANAGEMENT PLAN, TREATMENT GOALS, THERAPEUTIC TECHNIQUES/APPROACHES & CLINICAL REASONING    -Will hold off on initiation of Suboxone for now. Patient denies signs or symptoms of withdrawal at present (last dose of Fentanyl 8/6). Will continue to monitor for withdrawal and continue PRN opioid withdrawal medications per primary. Will determine if patient is able to re-establish care with outpatient Suboxone provider  -Recommend to start Remeron 15 mg nightly for sleep augmentation and mood   -Addiction psychiatry will continue to follow    Shared medical decision making with the patient was employed (to the extent possible) when selecting, or considering but not selecting, proposed treatment and management options.    ADDICTION COUNSELING AND MANAGEMENT:     Timely and targeted counseling is an important intervention in the treatment of substance use disorders.  Active and ongoing management is a hallmark of good clinical care.    Addiction counseling and management WAS employed during this encounter.    [x] The patient was counseled on abstinence from alcohol and substances of abuse (illicit and prescription).  [x] Harm reduction techniques were discussed, as warranted, to mitigate risk from problematic behaviors.  [x] Serial alcohol and drug laboratory testing (e.g. PETH, urine toxicology) is recommended to provide accountability, as well as to guide and refine substance abuse treatment moving forward.  [x] Relapse prevention and motivational interviewing was provided.  [x] Education was provided on  12 step recovery programs.    PRESCRIPTION DRUG MANAGEMENT:     Prescription drug management WAS performed during the encounter.  The patient's ability to understand, participate and engage in a conversation surrounding this was deemed to be: rudimentary.    Prescription Drug Management entails the following:  [x] The review, recommendation, or consideration without recommendation of medications during the encounter.  [x] Discussion (to the extent possible) with the patient and/or other interested parties of the diagnosis, target symptoms, intended outcomes, and possible benefits and risks of medication, as well as alternatives (including no treatment), if not otherwise known or stated prior.  [x] Discussion (to the extent possible) with the patient and/or other interested parties of possible expectable adverse effects of any proposed individual psychotropic agents, as well as the inherent unpredictability of treatment, if not otherwise known or stated prior.  [x] Informed consent was sought from the patient (or a guardian if applicable) after a thorough discussion (to the extent possible) of the aforementioned points outlined above.  [x] The provision of counseling (to the extent possible) to the patient and/or other interested parties on the importance of full compliance with any prescribed medication, if not otherwise known or stated prior.    Information on psychotropic medication can be found at: [employer] [place] [employer]: Information on Mental Health Medications      In cases of emergency, daily coverage provided by Acute/ER Psych MD, NP, or SW, with contact numbers located in [facility] On Call Schedule    Case discussed with staff addiction psychiatrist: MD Rachana GRANT MD  Department of Psychiatry  [facility]      DATA:     DIAGNOSTIC TESTING:     The chart was reviewed for recent diagnostic investigations, and pertinent results are noted below.      Recent  Weights/BMI on File:    Wt Readings from Last 5 Encounters:   08/05/22 61.7 kg (136 lb 0.4 oz)   02/19/22 64 kg (141 lb)   11/03/21 64.4 kg (141 lb 15.6 oz)   09/05/21 66.2 kg (146 lb)   06/04/21 65.8 kg (145 lb)       No data recorded    Current body mass index is 21.95 kg/m².      Most Recent BP/HR on File:    BP Readings from Last 1 Encounters:   08/08/22 (!) 92/59       Pulse Readings from Last 1 Encounters:   08/08/22 64         Most Recent EKG on File:    Results for orders placed or performed during the hospital encounter of 08/03/22   EKG 12-lead    Collection Time: 08/06/22  5:37 AM    Narrative    Test Reason :     Vent. Rate : 057 BPM     Atrial Rate : 057 BPM     P-R Int : 158 ms          QRS Dur : 096 ms      QT Int : 448 ms       P-R-T Axes : 078 087 078 degrees     QTc Int : 436 ms    Sinus bradycardia  Otherwise normal ECG  When compared with ECG of 05-AUG-2022 10:53,  Vent. rate has decreased BY  30 BPM  Confirmed by Derrek CARIAS MD ([id number]) on 8/6/2022 10:25:36 AM    Referred By: [email or web address]   SELF           Confirmed By:Derrek CARIAS MD           PERTINENT LABORATORY RESULTS    Most Recent Electrolytes on File:  (i.e. Na, K, Ca, Phos, Mg, CO2)    Sodium (mmol/L)   Date Value   08/08/2022 143     Potassium (mmol/L)   Date Value   08/08/2022 4.5     Calcium (mg/dL)   Date Value   08/08/2022 9.2     Phosphorus (mg/dL)   Date Value   08/08/2022 4.4     Magnesium (mg/dL)   Date Value   08/08/2022 2.6     CO2 (mmol/L)   Date Value   08/08/2022 21 (L)         Most Recent Blood Glucose & HgA1c on File:    Glucose (mg/dL)   Date Value   08/08/2022 77     Hemoglobin A1C (%)   Date Value   02/19/2022 5.2         Most Recent Renal Function Tests on File:  (i.e. Cr, BUN, GFR, Urine Specific Gravity, Urine Protein)    Creatinine (mg/dL)   Date Value   08/08/2022 1.0     BUN (mg/dL)   Date Value   08/08/2022 17     eGFR if African American (mL/min/1.73 m^2)   Date Value   02/20/2022 >60.0     eGFR if non African American  (mL/min/1.73 m^2)   Date Value   02/20/2022 >60.0     Specific [place], UA (no units)   Date Value   08/05/2022 >=1.030 (A)     Protein, UA (no units)   Date Value   08/05/2022 Negative         Most Recent Liver Function Tests on File:  (i.e. AST, ALT, Total Bili, Ammonia, Albumin, INR)    AST (U/L)   Date Value   08/08/2022 49 (H)     ALT (U/L)   Date Value   08/08/2022 88 (H)     Total Bilirubin (mg/dL)   Date Value   08/08/2022 0.6     Albumin (g/dL)   Date Value   08/08/2022 3.7     INR (no units)   Date Value   08/04/2022 1.1         Most Recent Pancreatic Function Tests on File:  (i.e. Amylase, Lipase)    No results found for: AMYLASE, LIPASE      Most Recent Blood Counts on File:  (i.e. WBC, ANC, RBC, MCV, Platelets)    WBC (K/uL)   Date Value   08/08/2022 11.63     Gran # (ANC) (K/uL)   Date Value   08/08/2022 8.2 (H)     Gran % (%)   Date Value   08/08/2022 70.2     RBC (M/uL)   Date Value   08/08/2022 4.97     MCV (fL)   Date Value   08/08/2022 85     Platelets (K/uL)   Date Value   08/08/2022 SEE COMMENT         Most Recent Thyroid/Parathyroid Function Tests on File:  (i.e. TSH, Free T4, Total T4, Free T3, Total T3, PTH)    TSH (uIU/mL)   Date Value   08/03/2022 3.891         Most Recent Lipid Panel Results on File:  (i.e. Cholesterol, Triglycerides, LDH, HDL)    Triglycerides (mg/dL)   Date Value   08/05/2022 88         Most Recent CPK & Prolactin on File:    CPK (U/L)   Date Value   08/03/2022 487 (H)         Most Recent Vitamin Levels on File:  (i.e. Vitamin B12, Folate, Vitamin D)    No results found for: [id number], FOLATE, [id number]       Most Recent Infection Disease Panel on File:  (i.e. Syphilis, Hepatitis C, Hepatitis B, HIV)     Hepatitis C Ab (no units)   Date Value   08/03/2022 Negative     HIV 1/2 Ag/Ab (no units)   Date Value   08/03/2022 Negative         Pregnancy Screenings:    No results found for: PREGTESTUR, PREGNANCYTES, HCGQUANT      Lithium & Valproate Levels on File:    No  results found for: LITHIUM    No results found for: VALPROATE      Most Recent Carbamazepine & Lamotrigine Levels on File:    No results found for: CBMZ, LAMOTRIGINE      Most Recent Clozapine Level on File:    No results found for: CLOZAPINE, NORCLOZAP, CLOZNORCLOZ      Results on File for Alcohol Screens (Blood, Urine):    Alcohol, Serum (mg/dL)   Date Value   08/03/2022 <10       Alcohol, Urine (mg/dL)   Date Value   08/03/2022 <10   02/19/2022 <10         Results on File for Urine Drug Screens (Benzodiazepines, Barbiturates, Methadone, Opiates, Cocaine, Amphetamines, THC, PCP):    Benzodiazepines (no units)   Date Value   08/03/2022 Negative   08/03/2022 Negative   02/19/2022 Negative     Barbiturate Screen, Ur (no units)   Date Value   08/03/2022 Negative   08/03/2022 Negative   02/19/2022 Negative     Methadone metabolites (no units)   Date Value   08/03/2022 Negative   08/03/2022 Negative   02/19/2022 Negative     Opiate Scrn, Ur (no units)   Date Value   08/03/2022 Negative   08/03/2022 Negative   02/19/2022 Negative     Cocaine (Metab.) (no units)   Date Value   08/03/2022 Negative   08/03/2022 Negative   02/19/2022 Negative     Amphetamine Screen, Ur (no units)   Date Value   08/03/2022 Negative   08/03/2022 Negative   02/19/2022 Negative     THC (no units)   Date Value   08/03/2022 Negative   08/03/2022 Negative   02/19/2022 Negative     Phencyclidine (no units)   Date Value   08/03/2022 Negative   08/03/2022 Negative   02/19/2022 Negative         Most Recent Results for Buprenorphine Testing:    No results found for: BUPRENORPH, NORBUPRENOR      Results on File for Phosphatidylethanol (PETH):    No results found for: PETH  No results found for: [id number]  No results found for: [id number]      Results on File for Ethyl Glucuronide (EtG) and Ethyl Sulfate (EtS):    No results found for: THEYLGLUCU, ETHYLSULF      Most Recent Results on File for Alcohol Biomarkers (GGT, CDT):    No results found for: GGT,  CDT        RELEVANT NEUROLOGIC IMAGING:  (i.e. CT/MRI brain)      CT HEAD WITHOUT CONTRAST    Results for orders placed during the hospital encounter of 08/03/22    CT Head Without Contrast    Narrative  EXAMINATION:  CT HEAD WITHOUT CONTRAST    CLINICAL HISTORY:  unresponsive;    TECHNIQUE:  Low dose axial images were obtained through the head.  Coronal and sagittal reformations were also performed. Contrast was not administered.    COMPARISON:  None.    FINDINGS:  Exam quality is limited by slight motion and streak artifact secondary to the presence of metallic earrings.  Support devices partially visualized on the  radiograph.    No evidence of acute territorial infarct, hemorrhage, mass effect, or midline shift.    There is loss of normal sulcation near the vertex and decreased gray-white matter differentiation.  Scattered minimal hyperdensity may be related to pseudosubarachnoid hemorrhage.  Slight like appearance of the ventricles with crowding of the basal cisterns and posterior fossa.    There is mild scalp edema.  No displaced calvarial fracture.    Mucosal thickening in the ethmoid air cells.  Otherwise, the visualized paranasal sinuses and mastoid air cells are essentially clear.    Impression  CT findings suggestive of generalized cerebral edema and potentially hypoxic-ischemic encephalopathy in the appropriate clinical setting.  Further evaluation/follow-up as clinically appropriate.    This report was flagged in Epic as abnormal.      Electronically signed by: Mo Gonzalez MD  Date:    08/03/2022  Time:    07:00      MRI HEAD WITHOUT CONTRAST    Results for orders placed during the hospital encounter of 08/03/22    MRI Brain Without Contrast    Narrative  EXAMINATION:  MRI BRAIN WITHOUT CONTRAST    CLINICAL HISTORY:  Mental status change, unknown cause;    TECHNIQUE:  Multiplanar multisequence MR imaging of the brain was performed without contrast.    COMPARISON:  CT head  08/03/2022    FINDINGS:  There is no evidence of hydrocephalus, mass effect, intracranial hemorrhage or acute infarct.  The brain parenchyma maintains normal signal intensity.  No evidence of parenchymal edema is identified.    Normal arterial flow voids are preserved at the skull base.    Nonspecific minimal mastoid mucosal thickening in this intubated patient.    Impression  No acute intracranial process is identified.      Electronically signed by: Justen Russell  Date:    08/05/2022  Time:    08:00      MRI HEAD WITH AND WITHOUT CONTRAST    No results found for this or any previous visit.

## 2022-08-08 NOTE — PLAN OF CARE
Problem: Physical Therapy  Goal: Physical Therapy Goal  Description: Goals to be met by: 2022    Patient will increase functional independence with mobility by performin. Supine to sit with Supervision -not met  2. Sit to stand transfer with Supervision using LRAD -not met  3. Gait  x 150 feet with Supervision using LRAD -not met  4. Stand for 3 minutes with Supervision using LRAD -not met    Outcome: Ongoing, Progressing   Goals remain appropriate. 2022

## 2022-08-08 NOTE — PROGRESS NOTES
[facility] - Cardiac Medical ICU  Critical Care Medicine  Progress Note    Patient Name: Tucker Roberto  MRN: [id number]  Admission Date: 8/3/2022  Hospital Length of Stay: 5 days  Code Status: Full Code  Attending Provider: Red Bustillo MD  Primary Care Provider: Karen Braxton MD   Principal Problem: Acute respiratory failure with hypoxia and hypercarbia    Subjective:     HPI:  Mr. Roberto is a 25 yo M with GITA (heroin, denies IVDU) and asthma that presents with acute encephalopathy. Previously has had repeated admissions for status asthmaticus and heroin overdose. He was brought in unresponsive by EMS, who administered narcan and magnesium in the field. GCS was 3 at time of arrival in ED. In the ED, he received albuterol and was intubated. History is limited 2/2 to acuity of situation. Initial labs significant for WBC of 16, pH 7.1, pCO2 86.2. CT head negative for acute abnormalities     Critical care medicine consulted for hypercapnic respiratory failure likely 2/2 to heroin overdose and asthma exacerbation.            Hospital/ICU Course:  On MICU arrival, patient sedated with improved breath sounds, although significant ventilator dyssynchrony with myoclonic movements; Nimbex gtt added. Patient received central line placement on 8/3 for hypertonic saline administration, discontinued 8/4. Patient received Vanc/Zosyn in setting of intubation for acute respiratory failure for ppx of potential aspiration; respiratory cultures w/ Staph Aureus (Vanc/Zosyn de-escalated to Vanc/Rocephin). Over 8/3, patient developed metabolic acidosis with increased lactate after episode of myoclonic movements with no associated seizure activity on EEG. Lactate now WNL with improving metabolic acidosis, no more myoclonic movements since first 24hrs of admission. Electrolytes being closely monitored, repleting as indicated. On 8/4, patient with potential ST elevation on EKG with elevated Tpn. Repeat EKG indicative of  likely early repolarization; Tpn downtrended. On 8/4 MRI brain completed showing no acute inctracranial process or intraparenchymal edema. TTE WNL. Tube feeds also began 8/4. From 8/4 to 8/5, discontinued paralytic and began weaning sedation with no complications. Patient self-extubated on the morninig of 8/6 d/t agitation; VBG WNL; no indication to re-intubate at that time. Patient has been comfortable and conversant since.      Interval History/Significant Events: Overnight, patient had episode of mild paranoia and agitation requiring 1x hydroxyzine and 1x haldol. Patient was not aggressive or combative. He is still doing well today, AAOx4. Per family, patient mentation is not at baseline. Will CTM.     Review of Systems   Constitutional:  Negative for chills, diaphoresis, fatigue and fever.   HENT:  Negative for congestion.    Eyes:  Negative for visual disturbance.   Respiratory:  Negative for apnea, cough, choking, chest tightness, shortness of breath, wheezing and stridor.    Cardiovascular:  Negative for chest pain, palpitations and leg swelling.   Gastrointestinal:  Positive for diarrhea and nausea. Negative for abdominal distention and abdominal pain.        2/2 opioid withdrawal   Genitourinary:  Negative for decreased urine volume and dysuria.   Skin:  Negative for pallor and rash.   Neurological:  Positive for tremors. Negative for dizziness, numbness and headaches.        Intermittent muscle cramping with associated twitch during episodes   Psychiatric/Behavioral:  Positive for confusion. Negative for behavioral problems.         Mild waxing and waning agitation/AMS      Objective:     Vital Signs (Most Recent):  Temp: 99.1 °F (37.3 °C) (08/08/22 1500)  Pulse: 65 (08/08/22 1500)  Resp: (!) 25 (08/08/22 1500)  BP: 120/69 (08/08/22 1415)  SpO2: 97 % (08/08/22 1500)   Vital Signs (24h Range):  Temp:  [98.2 °F (36.8 °C)-99.2 °F (37.3 °C)] 99.1 °F (37.3 °C)  Pulse:  [] 65  Resp:  [17-43] 25  SpO2:  [85  %-100 %] 97 %  BP: ()/(56-95) 120/69   Weight: 61.7 kg (136 lb 0.4 oz)  Body mass index is 21.95 kg/m².      Intake/Output Summary (Last 24 hours) at 8/8/2022 1522  Last data filed at 8/8/2022 1000  Gross per 24 hour   Intake 540.25 ml   Output 1000 ml   Net -459.75 ml       Physical Exam  Constitutional:       General: He is not in acute distress.     Appearance: Normal appearance. He is normal weight. He is not ill-appearing.   HENT:      Head: Normocephalic and atraumatic.      Right Ear: External ear normal.      Left Ear: External ear normal.      Nose: Nose normal.      Mouth/Throat:      Mouth: Mucous membranes are moist.      Pharynx: Oropharynx is clear.   Eyes:      Extraocular Movements: Extraocular movements intact.      Conjunctiva/sclera: Conjunctivae normal.   Cardiovascular:      Rate and Rhythm: Normal rate and regular rhythm.      Pulses: Normal pulses.      Heart sounds: Normal heart sounds.   Pulmonary:      Effort: Pulmonary effort is normal. No respiratory distress.      Breath sounds: No stridor. No wheezing.      Comments: BS clear bilaterally   Abdominal:      General: Abdomen is flat. There is no distension.      Palpations: Abdomen is soft.      Tenderness: There is no abdominal tenderness.   Musculoskeletal:      Cervical back: Neck supple.      Right lower leg: No edema.      Left lower leg: No edema.   Skin:     General: Skin is warm and dry.      Capillary Refill: Capillary refill takes less than 2 seconds.      Comments: Multiple tattoos present     Neurological:      General: No focal deficit present.      Mental Status: He is oriented to person, place, and time.      Comments: Patient with slight intermittent agitation/disorientation. Conversant    Psychiatric:         Mood and Affect: Mood normal.         Behavior: Behavior normal.       Lines/Drains/Airways       Peripheral Intravenous Line  Duration                  Peripheral IV - Single Lumen 08/03/22 0550 18 G Right  Antecubital 5 days         Peripheral IV - Single Lumen 08/03/22 1810 20 G Left;Posterior;Proximal Forearm 4 days         Peripheral IV - Single Lumen 08/08/22 0400 22 G Anterior;Left;Proximal Forearm <1 day                  Significant Labs:    CBC/Anemia Profile:  Recent Labs   Lab 08/07/22  0255 08/08/22  0437   WBC 15.69* 11.63   HGB 12.2* 13.7*   HCT 36.4* 42.1    SEE COMMENT   MCV 81* 85   RDW 14.0 13.6        Chemistries:  Recent Labs   Lab 08/07/22  0255 08/08/22  0318   * 143   K 3.4* 4.5   * 110   CO2 24 21*   BUN 14 17   CREATININE 0.9 1.0   CALCIUM 8.9 9.2   ALBUMIN 3.5 3.7   PROT 6.4 7.1   BILITOT 0.5 0.6   ALKPHOS 57 60   ALT 89* 88*   AST 69* 49*   MG 2.7* 2.6   PHOS 2.8 4.4       All pertinent labs within the past 24 hours have been reviewed.    Significant Imaging:  I have reviewed all pertinent imaging results/findings within the past 24 hours.      ABG  Recent Labs   Lab 08/06/22  0702   PH 7.329*   PO2 36*   PCO2 42.4   HCO3 22.3*   BE -4     Assessment/Plan:     Neuro  Acute encephalopathy  Patient initially presented with GCS 3, given narcan and magnesium by EMS and intubated in the ED. Found to be in respiratory failure with hypercarbia and initial pCO2 of 86. AMS likely 2/2 to opioid overdose, respiratory depression and hypercapnia. CTH with diffuse cerebral edema and concern for hypoxic-ischemic encephalopathy. Subsequent MRI with no acute intracranial process; no intraparenchymal edema. EEG video monitoring showing diffuse slowing with no seizure-like activity. Patient now extubated and weaned from sedation; mild AMS.     - q6h glucose accuchecks   - exploring baseline neurologic status with family   - PT evaluation complete      Psychiatric  Opioid use disorder, severe, in controlled environment, dependence  Per chart review, patient has history of snorting heroin (no IVDU).     - monitor for opioid withdrawal and treat accordingly       Substance use disorder  Hx heroin  use, previously has denied IVDU. Not responsive to naloxone with EMS. UDS and ethanol negative. Previously tried unknown anxiolytic as well as suboxone treatment.     - Addiction psych consulted     Pulmonary  * Acute respiratory failure with hypoxia and hypercarbia  Hx status asthmaticus found unresponsive. Suspect respiratory failure from status asthmaticus with concomitant respiratory depression in setting of opiate use. Intubated 8/3 with initial blood gas with 7.113/86.2. VBGs improved since with no evidence of air trapping or wheezing. Self-extubated AM of 8/6 without need for re-intubation. VBG WNL. Passed bedside swallow test; transitioned to PO meds. Patient has continued to be without wheezing or respiratory distress throughout remainder of hospital course.    - BC NGTD; Respiratory culture with MSSA  - Continue Cefazolin (on day 6 of 7 day course)   - pulmonary toilet for increased secretions    Severe persistent asthma with acute exacerbation  Patient has history of severe asthma and status asthmaticus requiring intubation in the past. Home meds include albuterol inhaler, duonebs, breo, Advair, montelukast    - Continue duonebs q6h   - Continue Montelukast 10mg QD    - Initiated Breo  - plan to continue Breo + symbicort upon discharge       Endocrine  Steroid-induced hyperglycemia  Plan to discontinue IV steroids tomorrow 8/7; continuing ICS for treatment of severe asthma w/ acute exacerbation.    -SSI with goal normoglycemia  -q6h accuchecks        Critical Care Daily Checklist:    A: Awake: RASS Goal/Actual Goal: RASS Goal: 0-->alert and calm  Actual: [place] Agitation Sedation Scale (RASS): Alert and calm   B: Spontaneous Breathing Trial Performed?     C: SAT & SBT Coordinated?  Self-extubated                      D: Delirium: CAM-ICU Overall CAM-ICU: Positive   E: Early Mobility Performed? Yes   F: Feeding Goal: Goals: Meet % EEN, EPN by RD f/u date  Status: Nutrition Goal Status: new    Current Diet Order   Procedures    Diet Adult Regular (IDDSI Level 7)      AS: Analgesia/Sedation None    T: Thromboembolic Prophylaxis Lov   H: HOB > 300 Yes   U: Stress Ulcer Prophylaxis (if needed) Pepcid   G: Glucose Control SSI   B: Bowel Function Stool Occurrence: 1   I: Indwelling Catheter (Lines & [place]aca) Necessity None    D: De-escalation of Antimicrobials/Pharmacotherapies Cefazolin (day 6 of 7)    Plan for the day/ETD CTM; Discharge tomorrow    Code Status:  Family/Goals of Care: Full Code  Home tomorrow        Critical secondary to Patient has a condition that poses threat to life and bodily function: Severe Respiratory Distress      Critical care was time spent personally by me on the following activities: development of treatment plan with patient or surrogate and bedside caregivers, discussions with consultants, evaluation of patient's response to treatment, examination of patient, ordering and performing treatments and interventions, ordering and review of laboratory studies, ordering and review of radiographic studies, pulse oximetry, re-evaluation of patient's condition. This critical care time did not overlap with that of any other provider or involve time for any procedures.     Soco Hood MD  Critical Care Medicine  [facility] - Cardiac Medical ICU

## 2022-08-08 NOTE — ASSESSMENT & PLAN NOTE
Hx status asthmaticus found unresponsive. Suspect respiratory failure from status asthmaticus with concomitant respiratory depression in setting of opiate use. Intubated 8/3 with initial blood gas with 7.113/86.2. VBGs improved since with no evidence of air trapping or wheezing. Self-extubated AM of 8/6 without need for re-intubation. VBG WNL. Passed bedside swallow test; transitioned to PO meds. Patient has continued to be without wheezing or respiratory distress throughout remainder of hospital course.    - BC NGTD; Respiratory culture with MSSA  - Continue Cefazolin (on day 6 of 7 day course)   - pulmonary toilet for increased secretions

## 2022-08-09 VITALS
SYSTOLIC BLOOD PRESSURE: 123 MMHG | RESPIRATION RATE: 32 BRPM | HEART RATE: 54 BPM | OXYGEN SATURATION: 100 % | WEIGHT: 136 LBS | HEIGHT: 66 IN | TEMPERATURE: 98 F | BODY MASS INDEX: 21.86 KG/M2 | DIASTOLIC BLOOD PRESSURE: 70 MMHG

## 2022-08-09 PROCEDURE — 25000242 PHARM REV CODE 250 ALT 637 W/ HCPCS

## 2022-08-09 PROCEDURE — 25000003 PHARM REV CODE 250: Performed by: INTERNAL MEDICINE

## 2022-08-09 PROCEDURE — 25000003 PHARM REV CODE 250

## 2022-08-09 PROCEDURE — 99232 PR SUBSEQUENT HOSPITAL CARE,LEVL II: ICD-10-PCS | Mod: AF,HB,, | Performed by: PSYCHIATRY & NEUROLOGY

## 2022-08-09 PROCEDURE — 27000646 HC AEROBIKA DEVICE

## 2022-08-09 PROCEDURE — 99232 SBSQ HOSP IP/OBS MODERATE 35: CPT | Mod: AF,HB,, | Performed by: PSYCHIATRY & NEUROLOGY

## 2022-08-09 PROCEDURE — 94664 DEMO&/EVAL PT USE INHALER: CPT

## 2022-08-09 PROCEDURE — 63600175 PHARM REV CODE 636 W HCPCS: Performed by: INTERNAL MEDICINE

## 2022-08-09 PROCEDURE — 94761 N-INVAS EAR/PLS OXIMETRY MLT: CPT

## 2022-08-09 PROCEDURE — 99900035 HC TECH TIME PER 15 MIN (STAT)

## 2022-08-09 PROCEDURE — 94640 AIRWAY INHALATION TREATMENT: CPT

## 2022-08-09 RX ORDER — MIRTAZAPINE 15 MG/1
15 TABLET, ORALLY DISINTEGRATING ORAL NIGHTLY
Status: DISCONTINUED | OUTPATIENT
Start: 2022-08-09 | End: 2022-08-09 | Stop reason: HOSPADM

## 2022-08-09 RX ORDER — IPRATROPIUM BROMIDE AND ALBUTEROL SULFATE 2.5; .5 MG/3ML; MG/3ML
3 SOLUTION RESPIRATORY (INHALATION) EVERY 6 HOURS PRN
Status: DISCONTINUED | OUTPATIENT
Start: 2022-08-09 | End: 2022-08-09 | Stop reason: HOSPADM

## 2022-08-09 RX ORDER — MIRTAZAPINE 15 MG/1
15 TABLET, ORALLY DISINTEGRATING ORAL NIGHTLY PRN
Qty: 30 TABLET | Refills: 2 | Status: SHIPPED | OUTPATIENT
Start: 2022-08-09 | End: 2023-08-09

## 2022-08-09 RX ADMIN — Medication 2 G: at 10:08

## 2022-08-09 RX ADMIN — MIRTAZAPINE 15 MG: 15 TABLET, ORALLY DISINTEGRATING ORAL at 01:08

## 2022-08-09 RX ADMIN — Medication 100 MG: at 09:08

## 2022-08-09 RX ADMIN — Medication 2 G: at 01:08

## 2022-08-09 RX ADMIN — MONTELUKAST 10 MG: 10 TABLET, FILM COATED ORAL at 09:08

## 2022-08-09 RX ADMIN — IPRATROPIUM BROMIDE AND ALBUTEROL SULFATE 3 ML: 2.5; .5 SOLUTION RESPIRATORY (INHALATION) at 01:08

## 2022-08-09 NOTE — DISCHARGE SUMMARY
[facility] - Cardiac Medical ICU  Critical Care Medicine  Discharge Summary      Patient Name: Tucker Roberto  MRN: [id number]  Admission Date: 8/3/2022  Hospital Length of Stay: 6 days  Discharge Date and Time:  08/09/2022 1:05 PM  Attending Physician: Red Bustillo MD   Discharging Provider: Gabby Hanson DO  Primary Care Provider: Karen Braxton MD  Reason for Admission: AMS    HPI:   Mr. Roberto is a 25 yo M with GITA (heroin, denies IVDU) and asthma that presents with acute encephalopathy. Previously has had repeated admissions for status asthmaticus and heroin overdose. He was brought in unresponsive by EMS, who administered narcan and magnesium in the field. GCS was 3 at time of arrival in ED. In the ED, he received albuterol and was intubated. History is limited 2/2 to acuity of situation. Initial labs significant for WBC of 16, pH 7.1, pCO2 86.2. CT head negative for acute abnormalities     Critical care medicine consulted for hypercapnic respiratory failure likely 2/2 to heroin overdose and asthma exacerbation.            * No surgery found *    Indwelling Lines/Drains at Time of Discharge:   Lines/Drains/Airways     None               Hospital Course:   Patient admitted to MICU for encephalopathy and acute hypoxemic and hypercapnic respiratory failure secondary to opioid overdose (admitted to using fentanyl) triggering asthma exacerbation. He was intubated in the ED and sedated with ketamine, versed, fentanyl,and propofol with nimbex added for ventilator dyssynchrony. His CTH was concerning for generalized cerebral edema. Patient received central line placement on 8/3 for hypertonic saline administration which was continued for 1 day. MRI brain without acute changes. Patient received Vanc/Zosyn in setting of intubation for acute respiratory failure for ppx of potential aspiration; respiratory cultures w/ MSSA for which he completed a 7 day course of antibiotics. Over 8/3, patient developed  metabolic acidosis with increased lactate after episode of myoclonic movements with no associated seizure activity on EEG. TTE WNL. Patient self-extubated on the morninig of 8/6 d/t agitation; VBG WNL and did well post-extubation. Addiction Psychiatry was consulted to help facilitate discussion regarding detox/rehab and potential resumption of suboxone. On 8/9, he was stable to discharge from the ICU. He will need close follow up with psychiatry, pulmonary, and PCP.  Vitals:    08/09/22 1000   BP: 123/70   Pulse: (!) 54   Resp: (!) 32   Temp:      Constitutional:       General: He is not in acute distress.     Appearance: Normal appearance. He is normal weight. He is not ill-appearing.   HENT:      Head: Normocephalic and atraumatic.      Right Ear: External ear normal.      Left Ear: External ear normal.      Nose: Nose normal.      Mouth/Throat:      Mouth: Mucous membranes are moist.      Pharynx: Oropharynx is clear.   Eyes:      Extraocular Movements: Extraocular movements intact.      Conjunctiva/sclera: Conjunctivae normal.   Cardiovascular:      Rate and Rhythm: Normal rate and regular rhythm.      Pulses: Normal pulses.      Heart sounds: Normal heart sounds.   Pulmonary:      Effort: Pulmonary effort is normal. No respiratory distress.      Breath sounds: No stridor. No wheezing.      Comments: BS clear bilaterally   Abdominal:      General: Abdomen is flat. There is no distension.      Palpations: Abdomen is soft.      Tenderness: There is no abdominal tenderness.   Musculoskeletal:      Cervical back: Neck supple.      Right lower leg: No edema.      Left lower leg: No edema.   Skin:     General: Skin is warm and dry.      Capillary Refill: Capillary refill takes less than 2 seconds.      Comments: Multiple tattoos present     Neurological:      General: No focal deficit present.      Mental Status: He is oriented to person, place, and time.      Comments: Patient with slight intermittent  agitation/disorientation. Conversant    Psychiatric:         Mood and Affect: Mood normal.         Behavior: Behavior normal.         Consults (From admission, onward)        Status Ordering Provider     Inpatient consult to Psychiatry  Once        Provider:  (Not yet assigned)    CHASE Meyer        Significant Labs:  All pertinent labs within the past 24 hours have been reviewed.    Significant Imaging:  I have reviewed all pertinent imaging results/findings within the past 24 hours.    Pending Diagnostic Studies:     Procedure Component Value Units Date/Time    EKG 12-lead [[id number]]     Order Status: Sent Lab Status: No result         Final Active Diagnoses:    Diagnosis Date Noted POA    PRINCIPAL PROBLEM:  Acute respiratory failure with hypoxia and hypercarbia [J96.01, J96.02] 02/19/2022 Yes    Substance induced mood disorder [F19.94] 08/08/2022 Yes    Acute encephalopathy [G93.40] 08/03/2022 Yes    Steroid-induced hyperglycemia [R73.9, T38.0X5A] 08/03/2022 Yes    Opioid use disorder, severe, in controlled environment, dependence [F11.20] 11/02/2021 Yes     Chronic    Substance use disorder [F19.90] 10/27/2021 Yes     Chronic    Severe persistent asthma with acute exacerbation [J45.51] 10/26/2021 Yes      Problems Resolved During this Admission:    Diagnosis Date Noted Date Resolved POA    Elevated troponin [R77.8] 08/04/2022 08/05/2022 No     No new Assessment & Plan notes have been filed under this hospital service since the last note was generated.  Service: Critical Care Medicine    Discharged Condition: good    Disposition:      Follow-up Information     Karen Braxton MD Follow up on 8/16/2022.    Specialty: Family Medicine  Why: Hospital follow up visit at 1:30pm, please arrive 30 minutes early. Please bring your discharge summary, insurance card, current medications, and picture ID  Contact information:  Korina ANGELES [id number]  [phone]                        Patient Instructions:   No discharge procedures on file.  Medications:  Reconciled Home Medications:      Medication List      START taking these medications    mirtazapine 15 MG disintegrating tablet  Commonly known as: REMERON SOL-TAB  Dissolve 1 tablet (15 mg total) by mouth nightly as needed (Sleep).        CONTINUE taking these medications    * albuterol 90 mcg/actuation inhaler  Commonly known as: PROVENTIL/VENTOLIN HFA  Inhale 2 puffs into the lungs every 6 (six) hours as needed (for wheezing).     * albuterol 2.5 mg /3 mL (0.083 %) nebulizer solution  Commonly known as: PROVENTIL  Take 3 mLs by nebulization every 4 (four) hours as needed.     SYMBICORT 80-4.5 mcg/actuation Hfaa  Generic drug: budesonide-formoterol 80-4.5 mcg  Inhale 2 puffs into the lungs 2 (two) times a day.         * This list has 2 medication(s) that are the same as other medications prescribed for you. Read the directions carefully, and ask your doctor or other care provider to review them with you.            STOP taking these medications    ADVAIR DISKUS 100-50 mcg/dose diskus inhaler  Generic drug: fluticasone-salmeterol 100-50 mcg/dose     albuterol-ipratropium 2.5 mg-0.5 mg/3 mL nebulizer solution  Commonly known as: DUO-NEB     fluticasone furoate-vilanteroL 200-25 mcg/dose Dsdv diskus inhaler  Commonly known as: KRISTEN Hanson DO  Critical Care Medicine  [facility] - Cardiac Medical ICU

## 2022-08-09 NOTE — DISCHARGE INSTRUCTIONS
MENTAL HEALTH/ADDICTIVE DISORDERS  REFERRAL RECOMMENDATIONS    Suboxone    [facility] - [phone]  (can do Sublocade)  [address]  [place] [address]    [facility] (can do Sublocade)  [address] [place]  [address]    [facility]  [address], [address], [place] [address]  [phone]; [phone]    [facility]   [address], [place]    [phone]    [facility] ([facility])  [address], [place], [place]  [phone]    [facility] ([facility])  [address] [facility] [facility] [place] [address]  [phone]    Marlon De La Torre DNP - (can do sublocade)  [phone]  [address]      Methadone Maintenance    Behavioral Health Group   [place] - [address] [place] [address], [phone]  [facility] [address], [place], [place] [address] [phone]      I. AA ([phone]) [email or web address] or NA [phone])    II. [facility] Int[facility] [phone], option 2    III. Other Places for Outpatient Addictive Disorders and Mental Health Treatment in [facility]:    [facility] -[phone]; [address]    [facility] ([place], [place], [place]; accepts Medicaid, commercial insurance) [phone]    [facility] ([place]) [phone], [address] [facility] [phone]; Crisis [phone] - Call for options A-E:    [facility] [place], [address] [place] [address]    [facility], [address] [place] [address]    [facility], [address] [place] [address],    [facility] [place], 2nd floor [address] [facility] [place] [facility] [place] [address]    [facility], [address] , [place] [address]    [facility] [place], [place] [address]    [facility], [address] [place] [address], [phone]    [facility],  [place] [phone]    [facility] (Mental & General), [address] [facility], [phone]    [facility] (Mental & General Health, not only HIV+, 3 [place] locations) [phone]    [facility], [address] [place], [place], [address], [phone]     [facility], [address] [facility] [address] [place], [phone], [phone]    [facility], [address] [place] [address], [place], [place]   [phone]        IV. Addiction and Mental Health Treatment in Other [facility]:    [facility], [address] [place], [phone]    [facility], [phone], [address] [facility], [address], [phone]    [facility]. [address] [place] [address], [phone]    [facility], [address] [facility], [phone]    [facility], [address] [facility], [phone] ([facility])    [facility], [address] [facility], [phone] ([facility])    [facility], [address] [facility], [place], [phone] ([place] [place])    [facility], [address] [facility], [phone] ([facility])    [facility] Hotline [phone], [phone]    [facility], [address] [facility], [facility] Center, [address] [facility], [facility], [address] [facility], [facility], [address] [facility] [address] [facility], [address] [facility], [facility], [address], [place] [phone]    [facility], [address] [facility] [place] [phone]    [facility]  "Center for [facility] Recovery, [address] [facility], [phone]    [facility]. for Addiction Recovery, [address] [facility], [phone]      V. Residential Addictive Disorders Treatment (call every day until you get in):    [facility] [address] [place], [phone]    [facility] [address]   [phone] (intake by appointment only)    [facility] [place] (men only) [address] [facility], [phone]    [facility], [address] [place] [address], men's program [phone], women's program, [phone]    [facility], [address] [facility], [phone]    [facility] (Female only) [address] [facility] [place] , [phone]    [facility] (IOP, residential, low cost, MCaid) [address] [place], [phone]    [facility] (Men only, [phone]), [address] [facility], [place]    [facility] [place] (Pregnant/women with or without children, [phone])    [facility] (Women only), [address] [place] [address], [phone]    [facility] (men only)[facility] [phone]    VI. Inpatient Rehabs (out of area)    [place], [place], [place], [phone]     [facility], [phone]    [facility], [phone]    [facility], [phone]    [place], [place] ([phone])    [place] ([facility]) [phone]    VII. Czech Speaking:  Información de la reunión de Alcohólicos Anónimos  Marv [facility], 10:00 am  Habla español  Esta reunión está abierta y cualquiera puede asistir.    Czech speaking Alcoholics anonymous meetings:  El "Marv [place] AA Skype" es un marv on line de Alcohólicos Anónimos en español. El marv es haydee, gratuito y virtual a través de Skype Audio. El marv funciona mediante ester llamada grupal de voz, por lo que no se utiliza la videollamada, ni se pueden zachary las imágenes o rostros de los participantes. Hace paramjit años y medio abrimos el primer Marv de AA por Skype en [place], merrill actualmente " asisten personas desde [place], [place], [place], [place], [place], [place], [place], [place], [place], [place], [place] y [place], entre otros.    El luanne es muy útil para los alcohólicos que residen en lugares donde no se celebran reuniones de AA, o residen en lugares donde las reuniones de AA son un número limitado de días a la semana, o para aquellos compañeros que se hayan de viaje o que, por cualquier motivo, se hayan convalecientes y no pueden desplazarse. Todos los días nos reuniones a las 21:00 (hora española)    Podéis obtener más información sobre el luanne y dk sesiones en la página web [email or web address]    VIII. Suboxone Doctors in Other regions    [place], [place]:    [facility] - [address] [place] [address] - Tel: [phone]    Robert Wang - [address] [place] [place] [address] - Tel: [phone]    [facility] - [address] [place] [address] - Tel: [phone]    Harish Copeland - [address] [place] [address] - Tel: [phone]    [facility] - [address] [place] [address] Tel:[phone]    [place], [place]:     Dr. Yennifer Warner and Dr. Manan Torres - [address] [place], [place] - Tel: [phone]    Dr. Naz Cobos - [address] [place] - Tel: [phone]    Dr. Miller Mireles - Tel: [phone]    Dr. Jordan Lee - [facility] - [phone]        VIII. In case of suicidal thinking, call the COPE LINE [phone] / [phone]

## 2022-08-09 NOTE — PROGRESS NOTES
"ADDICTION CONSULT FOLLOW UP VISIT     DEPARTMENT:  Psychiatry  SITE: [facility], [address]    DATE OF ADMISSION: 8/3/2022  5:47 AM  LENGTH OF STAY: 6 days    EXAMINING PRACTITIONER: Rachana Nuñez MD    SUBJECTIVE:     HISTORY    Patient Name: Tucker Roberto  YOB: 1995    CHIEF COMPLAINT   Tucker Roberto is a 27 y.o. male who is being seen today for a follow up visit by the addiction psychiatry consult service.  Tucker Roberto presents with the chief complaint of: opioid addiction     HPI & PSYCHIATRIC ROS     Patient seen and chart reviewed. Drowsy on exam and intermittently confused at times while providing some inconsistent responses to repeated questions, however, oriented x4 and CAM ICU negative. Patient able to provide vague history of what happened prior to his hospitalization. He reports last using Heroin/fentanyl the day of admission and then having an asthma attack. Reports use of 1 g/day intranasally for the last year, denies IVDU. Denies alcohol or any other substance use.  Patient states that he started using with his current girlfriend because she is also an addict, but repeatedly implies that this relationship is over because he wants to get his life back. Patient reports using Subutex in the past after going to [facility], but unable to provide the last time he used this (per , 04/2022). Reports history of 2 prior accidental overdoses and denies any other rehab history. He is not sure if he would be interested in rehab again or other resources.   Regarding his mood, pt reports becoming depressed about 3 years ago. Denies any inciting event. Reports poor sleep, daily fatigue, and decreased appetite but denies suicidal thoughts or history of self harm or suicide. Denies previously seeing psychiatrist or therapist. Reports he is interested in starting a medication that would help with his mood. Pt only able to provide "work" as an example of something that " makes him happy. 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      Interval History:  Patient seen and chart reviewed. Mental status improved from yesterday. Able to articulate that he would like to go to [facility] following discharge, he has already called and secured a spot. He would like to hold off on suboxone as well and currently denies any signs or symptoms of withdrawal. Took the Remeron last night around 130, reports he did not sleep very well but is willing to try it another night. Continues to be motivated towards sobriety.     PFSH: The patient's past medical history has been reviewed and updated as appropriate within the electronic medical record system.    ROS:  MEDICAL ROS:      Complete review of systems performed covering Constitutional, Eyes, ENT/Mouth, Cardiovascular, Respiratory, Gastrointestinal, Genitourinary, Musculoskeletal, Skin, Neurologic, Endocrine, Heme/Lymph, and Allergy/Immune.      Complete review of systems was negative with the exception of the following positive symptoms: denies any current symptoms      PSYCHIATRIC ROS:      I[]?I Patient denies any pertinent Psychiatric ROS, and none is known.  I[]?I Patient unable or unwilling to provide any Psychiatric ROS.     II[x]?[]?II sleep disturbance:   II[x]?[]?II appetite/weight change:   II[x]?[]?II fatigue/anergia:   II[]?[x]?II anhedonia/amotivation:   II[]?[x]?II guilty/worthless/helpless/hopeless:   II[]?[x]?II depressed mood:      II[]?[x]?II inattention/distractibility:   II[]?[x]?II irritability:      II[]?[x]?II racing thoughts/pressured speech:   II[]?[x]?II grandiosity:   II[]?[x]?II elevated/persistent energy/activity:   II[]?[x]?II impulsive behaviors:   II[]?[x]?II risky behavior:   II[]?[x]?II mood dysregulation/lability:      II[x]?[]?II anxiety/worry:   II[]?[x]?II panic attacks:   II[]?[x]?II agoraphobia/social phobia:   II[]?[x]?II somatic symptoms:   II[]?[x]?II recurrent nightmares:   II[]?[x]?II  hypervigilance/startle:   II[]?[x]?II avoidance:   II[]?[x]?II intrusive thoughts/recurrent behaviors:      II[]?[x]?II paranoia:   II[]?[x]?II delusions:   II[]?[x]?II hallucinations:      PSYCHOTROPIC MEDICATIONS   Remeron 15 mg nightly      OBJECTIVE:     EXAMINATION    Vitals:    08/09/22 0600 08/09/22 0615 08/09/22 0630 08/09/22 0645   BP: 117/66      BP Location: Left arm      Patient Position: Lying      Pulse: 67 (!) 57 60 (!) 59   Resp: (!) 25 15 (!) 34 12   Temp:       TempSrc:       SpO2: 97% 100% 99% 99%   Weight:       Height:           CONSTITUTIONAL  General Appearance: resting comfortably in bed, no acute distress    MUSCULOSKELETAL  Abnormal Involuntary Movements: none    PSYCHIATRIC   Orientation:  Oriented to person, place, situation, day of week// disoriented to month (October)   Speech: normal rate, tone, vol  Language: intact, fluent  Mood: fine  Affect: constricted  Thought Process: logical, goal-oriented  Associations: intact, no loosening of associations   Thought Content: no SI, HI, AVH  Memory: intact  Attention and Concentration: intact  Fund of Knowledge: aware of current events  Insight: good  Judgment: good      ASSESSMENT:     DIAGNOSES & PROBLEMS    Patient Active Problem List   Diagnosis    Severe persistent asthma with acute exacerbation    Substance use disorder    Opioid use disorder, severe, in controlled environment, dependence    Acute respiratory failure with hypoxia and hypercarbia    Accidental overdose of heroin    Acute encephalopathy    Steroid-induced hyperglycemia    Substance induced mood disorder         PLAN:       MANAGEMENT PLAN, TREATMENT GOALS, THERAPEUTIC TECHNIQUES/APPROACHES & CLINICAL REASONING    -Will hold off on initiation of Suboxone for now. Patient denies signs or symptoms of withdrawal at present (last dose of Fentanyl 8/6). Will continue to monitor for withdrawal and continue PRN opioid withdrawal medications per primary.   -Patient plans to  go straight to [facility] following discharge  -Continue Remeron 15 mg nightly for sleep augmentation and mood   -Addiction psychiatry will sign off. Please call with any questions.  -Additional resources have been placed in AVS       In cases of emergency, daily coverage provided by Acute/ER Psych MD, NP, or SW, with contact numbers located in [facility] On Call Schedule    Case discussed with staff addiction psychiatrist: MD Rachana GRANT MD  [facility] PGY-2    LABS/IMAGING/STUDIES   Recent Weights/BMI on File:    Wt Readings from Last 5 Encounters:   08/05/22 61.7 kg (136 lb 0.4 oz)   02/19/22 64 kg (141 lb)   11/03/21 64.4 kg (141 lb 15.6 oz)   09/05/21 66.2 kg (146 lb)   06/04/21 65.8 kg (145 lb)       No data recorded    Current body mass index is 21.95 kg/m².      Most Recent BP/HR on File:    BP Readings from Last 1 Encounters:   08/09/22 117/66       Pulse Readings from Last 1 Encounters:   08/09/22 (!) 59         Most Recent EKG on File:    Results for orders placed or performed during the hospital encounter of 08/03/22   EKG 12-lead    Collection Time: 08/06/22  5:37 AM    Narrative    Test Reason :     Vent. Rate : 057 BPM     Atrial Rate : 057 BPM     P-R Int : 158 ms          QRS Dur : 096 ms      QT Int : 448 ms       P-R-T Axes : 078 087 078 degrees     QTc Int : 436 ms    Sinus bradycardia  Otherwise normal ECG  When compared with ECG of 05-AUG-2022 10:53,  Vent. rate has decreased BY  30 BPM  Confirmed by Derrek CARIAS MD ([id number]) on 8/6/2022 10:25:36 AM    Referred By: [email or web address]   SELF           Confirmed By:Derrek CARIAS MD           PERTINENT LABORATORY RESULTS    Most Recent Electrolytes on File:  (i.e. Na, K, Ca, Phos, Mg, CO2)    Sodium (mmol/L)   Date Value   08/08/2022 143     Potassium (mmol/L)   Date Value   08/08/2022 4.5     Calcium (mg/dL)   Date Value   08/08/2022 9.2     Phosphorus (mg/dL)   Date Value   08/08/2022 4.4     Magnesium (mg/dL)   Date  Value   08/08/2022 2.6     CO2 (mmol/L)   Date Value   08/08/2022 21 (L)         Most Recent Blood Glucose & HgA1c on File:    Glucose (mg/dL)   Date Value   08/08/2022 77     Hemoglobin A1C (%)   Date Value   02/19/2022 5.2         Most Recent Renal Function Tests on File:  (i.e. Cr, BUN, GFR, Urine Specific Gravity, Urine Protein)    Creatinine (mg/dL)   Date Value   08/08/2022 1.0     BUN (mg/dL)   Date Value   08/08/2022 17     eGFR if African American (mL/min/1.73 m^2)   Date Value   02/20/2022 >60.0     eGFR if non African American (mL/min/1.73 m^2)   Date Value   02/20/2022 >60.0     Specific [place], UA (no units)   Date Value   08/05/2022 >=1.030 (A)     Protein, UA (no units)   Date Value   08/05/2022 Negative         Most Recent Liver Function Tests on File:  (i.e. AST, ALT, Total Bili, Ammonia, Albumin, INR)    AST (U/L)   Date Value   08/08/2022 49 (H)     ALT (U/L)   Date Value   08/08/2022 88 (H)     Total Bilirubin (mg/dL)   Date Value   08/08/2022 0.6     Albumin (g/dL)   Date Value   08/08/2022 3.7     INR (no units)   Date Value   08/04/2022 1.1         Most Recent Pancreatic Function Tests on File:  (i.e. Amylase, Lipase)    No results found for: AMYLASE, LIPASE      Most Recent Blood Counts on File:  (i.e. WBC, ANC, RBC, MCV, Platelets)    WBC (K/uL)   Date Value   08/08/2022 11.63     Gran # (ANC) (K/uL)   Date Value   08/08/2022 8.2 (H)     Gran % (%)   Date Value   08/08/2022 70.2     RBC (M/uL)   Date Value   08/08/2022 4.97     MCV (fL)   Date Value   08/08/2022 85     Platelets (K/uL)   Date Value   08/08/2022 SEE COMMENT         Most Recent Thyroid/Parathyroid Function Tests on File:  (i.e. TSH, Free T4, Total T4, Free T3, Total T3, PTH)    TSH (uIU/mL)   Date Value   08/03/2022 3.891         Most Recent Lipid Panel Results on File:  (i.e. Cholesterol, Triglycerides, LDH, HDL)    Triglycerides (mg/dL)   Date Value   08/05/2022 88         Most Recent CPK & Prolactin on File:    CPK (U/L)    Date Value   08/03/2022 487 (H)         Most Recent Vitamin Levels on File:  (i.e. Vitamin B12, Folate, Vitamin D)    No results found for: MZQMQGOC07, FOLATE, [id number]       Most Recent Infection Disease Panel on File:  (i.e. Syphilis, Hepatitis C, Hepatitis B, HIV)     Hepatitis C Ab (no units)   Date Value   08/03/2022 Negative     HIV 1/2 Ag/Ab (no units)   Date Value   08/03/2022 Negative         Pregnancy Screenings:    No results found for: PREGTESTUR, PREGNANCYTES, HCGQUANT      Lithium & Valproate Levels on File:    No results found for: LITHIUM    No results found for: VALPROATE      Most Recent Carbamazepine & Lamotrigine Levels on File:    No results found for: CBMZ, LAMOTRIGINE      Most Recent Clozapine Level on File:    No results found for: CLOZAPINE, NORCLOZAP, CLOZNORCLOZ      Results on File for Alcohol Screens (Blood, Urine):    Alcohol, Serum (mg/dL)   Date Value   08/03/2022 <10       Alcohol, Urine (mg/dL)   Date Value   08/03/2022 <10   02/19/2022 <10         Results on File for Urine Drug Screens (Benzodiazepines, Barbiturates, Methadone, Opiates, Cocaine, Amphetamines, THC, PCP):    Benzodiazepines (no units)   Date Value   08/03/2022 Negative   08/03/2022 Negative   02/19/2022 Negative     Barbiturate Screen, Ur (no units)   Date Value   08/03/2022 Negative   08/03/2022 Negative   02/19/2022 Negative     Methadone metabolites (no units)   Date Value   08/03/2022 Negative   08/03/2022 Negative   02/19/2022 Negative     Opiate Scrn, Ur (no units)   Date Value   08/03/2022 Negative   08/03/2022 Negative   02/19/2022 Negative     Cocaine (Metab.) (no units)   Date Value   08/03/2022 Negative   08/03/2022 Negative   02/19/2022 Negative     Amphetamine Screen, Ur (no units)   Date Value   08/03/2022 Negative   08/03/2022 Negative   02/19/2022 Negative     THC (no units)   Date Value   08/03/2022 Negative   08/03/2022 Negative   02/19/2022 Negative     Phencyclidine (no units)   Date Value    08/03/2022 Negative   08/03/2022 Negative   02/19/2022 Negative         Most Recent Results for Buprenorphine Testing:    No results found for: BUPRENORPH, NORBUPRENOR      Results on File for Phosphatidylethanol (PETH):    No results found for: PETH  No results found for: [id number]  No results found for: [id number]      Results on File for Ethyl Glucuronide (EtG) and Ethyl Sulfate (EtS):    No results found for: THEYLGLUCU, ETHYLSULF      Most Recent Results on File for Alcohol Biomarkers (GGT, CDT):    No results found for: GGT, CDT        RELEVANT NEUROLOGIC IMAGING:  (i.e. CT/MRI brain)      CT HEAD WITHOUT CONTRAST    Results for orders placed during the hospital encounter of 08/03/22    CT Head Without Contrast    Narrative  EXAMINATION:  CT HEAD WITHOUT CONTRAST    CLINICAL HISTORY:  unresponsive;    TECHNIQUE:  Low dose axial images were obtained through the head.  Coronal and sagittal reformations were also performed. Contrast was not administered.    COMPARISON:  None.    FINDINGS:  Exam quality is limited by slight motion and streak artifact secondary to the presence of metallic earrings.  Support devices partially visualized on the  radiograph.    No evidence of acute territorial infarct, hemorrhage, mass effect, or midline shift.    There is loss of normal sulcation near the vertex and decreased gray-white matter differentiation.  Scattered minimal hyperdensity may be related to pseudosubarachnoid hemorrhage.  Slight like appearance of the ventricles with crowding of the basal cisterns and posterior fossa.    There is mild scalp edema.  No displaced calvarial fracture.    Mucosal thickening in the ethmoid air cells.  Otherwise, the visualized paranasal sinuses and mastoid air cells are essentially clear.    Impression  CT findings suggestive of generalized cerebral edema and potentially hypoxic-ischemic encephalopathy in the appropriate clinical setting.  Further evaluation/follow-up as clinically  appropriate.    This report was flagged in Epic as abnormal.      Electronically signed by: Mo Gonzalez MD  Date:    08/03/2022  Time:    07:00      MRI HEAD WITHOUT CONTRAST    Results for orders placed during the hospital encounter of 08/03/22    MRI Brain Without Contrast    Narrative  EXAMINATION:  MRI BRAIN WITHOUT CONTRAST    CLINICAL HISTORY:  Mental status change, unknown cause;    TECHNIQUE:  Multiplanar multisequence MR imaging of the brain was performed without contrast.    COMPARISON:  CT head 08/03/2022    FINDINGS:  There is no evidence of hydrocephalus, mass effect, intracranial hemorrhage or acute infarct.  The brain parenchyma maintains normal signal intensity.  No evidence of parenchymal edema is identified.    Normal arterial flow voids are preserved at the skull base.    Nonspecific minimal mastoid mucosal thickening in this intubated patient.    Impression  No acute intracranial process is identified.      Electronically signed by: Justen Russell  Date:    08/05/2022  Time:    08:00      MRI HEAD WITH AND WITHOUT CONTRAST    No results found for this or any previous visit.

## 2022-08-09 NOTE — PLAN OF CARE
CMICU DAILY GOALS       A: Awake    RASS: Goal - RASS Goal: 0-->alert and calm  Actual - RASS ([place] Agitation-Sedation Scale): 0-->alert and calm   Restraint necessity: Clinical Justification: Removing medical devices  B: Breathe   SBT: NA   C: Coordinate A & B, analgesics/sedatives   Pain:  na     SAT: NA  D: Delirium   CAM-ICU: Overall CAM-ICU: Negative  E: Early(intubated/ Progressive (non-intubated) Mobility   MOVE Screen:  na   Activity: Activity Management: Ambulated -L4  FAS: Feeding/Nutrition   Diet order: Diet/Nutrition Received: regular, Specialty Diet/Nutrition Received: dysphagia advanced  T: Thrombus   DVT prophylaxis: VTE Required Core Measure: Pharmacological prophylaxis initiated/maintained  H: HOB Elevation   Head of Bed (HOB) Positioning: HOB at 20-30 degrees  U: Ulcer Prophylaxis   GI: no  G: Glucose control   {Managed/ Uncontrolled:23462}    S: Skin   Bathing/Skin Care: bath, complete, back care, dressed/undressed, electrode patches/site rotation, linen changed, shampoo  Device Skin Pressure Protection: positioning supports utilized, pressure points protected  Pressure Reduction Devices: pressure-redistributing mattress utilized  Pressure Reduction Techniques: frequent weight shift encouraged  Skin Protection: silicone foam dressing in place, tubing/devices free from skin contact  B: Bowel Function   {bowel:86945}   I: Indwelling Catheters   [place] necessity: [REMOVED]      Urethral Catheter 08/03/22 0610 Temperature probe 16 Fr.-Reason for Continuing Urinary Catheterization: Critically ill in ICU and requiring hourly monitoring of intake/output  [REMOVED]      Urethral Catheter 08/03/22 2320 Non-latex 16 Fr.-Reason for Continuing Urinary Catheterization: Critically ill in ICU and requiring hourly monitoring of intake/output  [REMOVED]      Urethral Catheter 08/05/22 2319-Reason for Continuing Urinary Catheterization: Urinary retention   CVC necessity: {YES:75645}  D: De-escalation  Antibiotics   {YES:37487}    Family/Goals of care/Code Status   Code Status: Full Code    24H Vital Sign Range  Temp:  [98.4 °F (36.9 °C)-99.1 °F (37.3 °C)]   Pulse:  [54-96]   Resp:  [12-51]   BP: (108-125)/(59-79)   SpO2:  [90 %-100 %]      Shift Events   {Daily Events:03263}    VS and assessment per flow sheet, patient progressing towards goals as tolerated, plan of care reviewed with {POC Review:42302}, all concerns addressed, will continue to monitor.    Crispin Bauer

## 2022-08-09 NOTE — CARE UPDATE
Have tried to call patient's mother twice today with help of language interpretor, VM left instructing her to call back to ICU at her earliest convenience. Will continue to try later this PM.    Gabby Hanson, DO  PGY3

## 2022-08-09 NOTE — PLAN OF CARE
[facility] Cardiac Medical ICU  Discharge Final Note    Primary Care Provider: Karen Braxton MD    Expected Discharge Date: 8/9/2022    Final Discharge Note (most recent)     Final Note - 08/09/22 1035        Final Note    Assessment Type Final Discharge Note     Anticipated Discharge Disposition Home or Self Care     What phone number can be called within the next 1-3 days to see how you are doing after discharge? [phone]     Hospital Resources/Appts/Education Provided Post-Acute resouces added to AVS;Appointments scheduled and added to AVS        Post-Acute Status    Coverage Medicaid - [facility] ([place])     Discharge Delays None known at this time               Patient discharged home with after hospital stay appt with [facility].  Family available for transportation.  Information given to him per medical staff for f/u and symptoms to notify provider.      Leola Rivera LMSW  [facility]  X [id number]            Important Message from Medicare             Contact Info     Karen Braxton MD   Specialty: Family Medicine   Relationship: PCP - [place] [id number] [phone]   Phone: [phone]       Next Steps: Follow up on 8/16/2022    Instructions: Hospital follow up visit at 1:30pm, please arrive 30 minutes early. Please bring your discharge summary, insurance card, current medications, and picture ID

## 2022-08-16 NOTE — PHYSICIAN QUERY
PT Name: Tucker Roberto  MR #: [id number]    DOCUMENTATION CLARIFICATION     CDS/: Qian Blanton RN, BSN, CCDS               Contact information:  [email or web address]     This form is a permanent document in the medical record.     Query Date: August 16, 2022    By submitting this query, we are merely seeking further clarification of documentation. Please utilize your independent clinical judgment when addressing the question(s) below.    The Medical Record contains the following:   Indicators   Supporting Clinical Findings Location in Medical Record   X AMS, Confusion,  LOC, etc.  Acute encephalopathy  Patient initially presented with GCS 3, given narcan and magnesium by EMS and intubated in the ED. Found to be in respiratory failure with hypercarbia and initial pCO2 of 86. AMS likely  2/2 to opioid overdose, respiratory depression and hypercapnia.    Drowsy on exam and intermittently confused at times while providing some inconsistent responses to repeated questions, however, oriented x4 and CAM ICU negative.     Patient with slight intermittent agitation/disorientation. Conversant    Psychiatric:         Mood and Affect: Mood normal.         Behavior: Behavior normal.   08/03 H&P          08/08 Psychiatric Consult        08/09 DC Summary   X Acute/Chronic Illness Acute encephalopathy  Severe persistent asthma with acute exacerbation and status asthmaticus  Opioid dependence  Acute respiratory failure with hypoxia and hypercarbia      X Toxicology Negative   08/03 Toxicology   X Radiology Findings CT findings suggestive of generalized cerebral edema and potentially hypoxic-ischemic encephalopathy in the appropriate clinical setting.  Further evaluation/follow-up as clinically appropriate.    There is no evidence of hydrocephalus, mass effect, intracranial hemorrhage or acute infarct.  The brain parenchyma maintains normal signal intensity.  No evidence of parenchymal edema is identified.      Normal arterial flow voids are preserved at the skull base.     Nonspecific minimal mastoid mucosal thickening in this intubated patient.    08/03 CT Head        MRI Brain   X Electrolyte Imbalance Glucose 285  Potassium 3.3  CO2 15   08/03 Lab  08/04 Lab  08/04 Lab   X Medication Haldol 5 mg IV X 2 doses  Ketamine IV  Methylprednisolone  mg - 60 mg - tapering  Thiamine 100 mg IV/po daily   08/06, 08/08 MAR  08/03 MAR  08/03 - 08/07 08/03 - 08/08 MAR    Treatment             X Other Possible opioid ingestion, but tox unrevealing. 08/06 HM PN     The noted clinical guidelines are only system guidelines and do not replace the providers clinical judgment.    The National [place] of Neurologic Disorders and Stroke (NINDS) of the NIH describes encephalopathy as any diffuse disease of the brain that alters brain function or structure.    Provider, please specify the diagnosis or diagnoses associated with above clinical findings.  [   ] Metabolic Encephalopathy - Due to electrolyte imbalance, metabolic derangements, or infectious processes, includes Septic Encephalopathy, Uremic Encephalopathy   [   ] Toxic Encephalopathy - Due to drugs, chemicals, or other toxic substances   [   ] Anoxic/Hypoxic Encephalopathy- Permanent brain damage due to anoxia/hypoxia   [ x  ] Other Encephalopathy (please specify): patient had findings of two encephalopathies: toxic encephalopathy secondary to fentanyl overdose & anoxic encephalopathy secondary to hypoxemia prior to admission.   [   ] Other neurological condition- Includes Post-ictal altered mental status (please specify condition): __________   [   ]  Clinically Undetermined         Please document in your progress notes daily for the duration of treatment until resolved, and include in your discharge summary.    References:  JOSÉ MANUEL He RN, CCDS. (2018, June 9). Notes from the Instructor: Encephalopathy tips. Retrieved October 22, 2020, from  [email or web address].[email or web address]/articles/[email or web address]-instructor-encephalopathy-tips    ICD-9-CM Coding Clinic First Quarter 2013, Effective with discharges: October 21, 2013 [facility] § Seizure with encephalopathy due to postictal state (2013).    ICD-10-CM/PCS  Integrated Codebook (Version V.20.8.10.0) [Computer software]. (2020). Retrieved October 21, 2020.    National [place] of Neurological Disorders and Stroke. (2019, March 27). Retrieved October 22, 2020, from https://www.ninds.nih.gov/Disorders/All-Disorders/Exlmsszfhvnywz-Frzjdkhwyeh-Kjiy    Form No. 37931

## 2023-04-19 ENCOUNTER — PATIENT MESSAGE (OUTPATIENT)
Dept: RESEARCH | Facility: HOSPITAL | Age: 28
End: 2023-04-19
Payer: MEDICAID

## 2023-08-30 ENCOUNTER — HOSPITAL ENCOUNTER (EMERGENCY)
Facility: HOSPITAL | Age: 28
Discharge: HOME | End: 2023-08-30
Attending: EMERGENCY MEDICINE
Payer: MEDICAID

## 2023-08-30 VITALS
SYSTOLIC BLOOD PRESSURE: 119 MMHG | OXYGEN SATURATION: 100 % | TEMPERATURE: 98 F | HEART RATE: 57 BPM | DIASTOLIC BLOOD PRESSURE: 59 MMHG | RESPIRATION RATE: 18 BRPM | BODY MASS INDEX: 24.16 KG/M2 | HEIGHT: 65 IN | WEIGHT: 145 LBS

## 2023-08-30 DIAGNOSIS — S39.012A STRAIN OF LUMBAR REGION, INITIAL ENCOUNTER: Primary | ICD-10-CM

## 2023-08-30 PROCEDURE — 96372 THER/PROPH/DIAG INJ SC/IM: CPT | Performed by: NURSE PRACTITIONER

## 2023-08-30 PROCEDURE — 63600175 PHARM REV CODE 636 W HCPCS: Performed by: NURSE PRACTITIONER

## 2023-08-30 PROCEDURE — 99284 EMERGENCY DEPT VISIT MOD MDM: CPT

## 2023-08-30 PROCEDURE — 25000003 PHARM REV CODE 250: Performed by: NURSE PRACTITIONER

## 2023-08-30 RX ORDER — METHOCARBAMOL 500 MG/1
500 TABLET, FILM COATED ORAL 3 TIMES DAILY
Qty: 15 TABLET | Refills: 0 | Status: SHIPPED | OUTPATIENT
Start: 2023-08-30 | End: 2023-09-04

## 2023-08-30 RX ORDER — LIDOCAINE 50 MG/G
1 PATCH TOPICAL
Status: DISCONTINUED | OUTPATIENT
Start: 2023-08-30 | End: 2023-08-30 | Stop reason: HOSPADM

## 2023-08-30 RX ORDER — IBUPROFEN 800 MG/1
800 TABLET ORAL EVERY 6 HOURS PRN
Qty: 20 TABLET | Refills: 0 | Status: SHIPPED | OUTPATIENT
Start: 2023-08-30

## 2023-08-30 RX ORDER — KETOROLAC TROMETHAMINE 30 MG/ML
30 INJECTION, SOLUTION INTRAMUSCULAR; INTRAVENOUS
Status: COMPLETED | OUTPATIENT
Start: 2023-08-30 | End: 2023-08-30

## 2023-08-30 RX ORDER — METHOCARBAMOL 500 MG/1
500 TABLET, FILM COATED ORAL
Status: COMPLETED | OUTPATIENT
Start: 2023-08-30 | End: 2023-08-30

## 2023-08-30 RX ORDER — LIDOCAINE 50 MG/G
1 PATCH TOPICAL DAILY
Qty: 7 PATCH | Refills: 0 | Status: SHIPPED | OUTPATIENT
Start: 2023-08-30 | End: 2023-09-06

## 2023-08-30 RX ADMIN — KETOROLAC TROMETHAMINE 30 MG: 30 INJECTION, SOLUTION INTRAMUSCULAR; INTRAVENOUS at 05:08

## 2023-08-30 RX ADMIN — LIDOCAINE 1 PATCH: 50 PATCH CUTANEOUS at 05:08

## 2023-08-30 RX ADMIN — METHOCARBAMOL TABLETS 500 MG: 500 TABLET, COATED ORAL at 05:08

## 2023-08-30 NOTE — ED PROVIDER NOTES
Encounter Date: 8/30/2023       History     Chief Complaint   Patient presents with    Back Pain     Pt went skating on Friday, turned a strange way and has had lower back pain since. Pt denies dysuria. Pt reports usually stretching will help his back pain but not this time.      28-year-old male presents emergency room with reports of lower back pain.  Patient reports that he injured himself when skating by turning specific way and feeling the pain in his lower back.  Denies bowel or bladder loss, denies numbness or tingling.  Patient was ambulatory into the emergency room in no acute distress.  He is a past medical history of asthma and substance use disorder.  Patient reports he can typically alleviate his discomfort with stretching however this time he has been unsuccessful.  Denies urinary complaints, denies testicular pain or swelling.    The history is provided by the patient and a relative. No  was used.     Review of patient's allergies indicates:  No Known Allergies  Past Medical History:   Diagnosis Date    Asthma     Substance use disorder 10/27/2021     No past surgical history on file.  No family history on file.  Social History     Tobacco Use    Smoking status: Some Days     Types: Vaping with nicotine   Substance Use Topics    Alcohol use: No    Drug use: Yes     Types: Marijuana, Heroin     Review of Systems   Musculoskeletal:  Positive for back pain.   All other systems reviewed and are negative.      Physical Exam     Initial Vitals [08/30/23 1629]   BP Pulse Resp Temp SpO2   (!) 119/59 (!) 57 18 98.2 °F (36.8 °C) 100 %      MAP       --         Physical Exam    Constitutional: He appears well-developed and well-nourished. He is not diaphoretic. No distress.   HENT:   Head: Normocephalic and atraumatic.   Right Ear: Hearing and tympanic membrane normal.   Left Ear: Hearing and tympanic membrane normal.   Nose: Nose normal.   Mouth/Throat: Uvula is midline, oropharynx is clear  and moist and mucous membranes are normal.   Eyes: Lids are normal. Pupils are equal, round, and reactive to light.   Cardiovascular:  Normal rate.           Pulmonary/Chest: Effort normal and breath sounds normal. No respiratory distress. He has no wheezes. He has no rhonchi.   Abdominal: Abdomen is soft. There is no abdominal tenderness.   Musculoskeletal:         General: Normal range of motion.      Cervical back: No rigidity.      Comments: No paraspinal tenderness, no step offs, no erythema or swelling.      Neurological: He is oriented to person, place, and time.   Skin: Skin is warm and dry. No rash noted.   Psychiatric: He has a normal mood and affect. His behavior is normal. Judgment and thought content normal.         ED Course   Procedures  Labs Reviewed - No data to display       Imaging Results    None          Medications   LIDOcaine 5 % patch 1 patch (1 patch Transdermal Patch Applied 8/30/23 1705)   ketorolac injection 30 mg (30 mg Intramuscular Given 8/30/23 1704)   methocarbamoL tablet 500 mg (500 mg Oral Given 8/30/23 1705)     Medical Decision Making  Differential Diagnosis includes, but is not limited to:  Cauda equina syndrome, diskitis/osteomyelitis, epidural/paraspinal abscess, AAA, aortic dissection, post-op/hardware infection, trauma/vertebral fracture, spinal cord injury, disc herniation, spinal stenosis, sciatica, radiculopathy, neoplasm, lumbar muscle strain, muscle spasm, neuropathic pain, UTI/pyelonephritis, nephrolithiasis.     Risk  Prescription drug management.               ED Course as of 08/30/23 1940   Wed Aug 30, 2023   1745 Patient notified of the need for follow-up care with the primary care provider in addition to the medications prescribed.  Strict return precautions have been given however there are no red flags noted on this examination today.  The patient is otherwise stable at this time for discharge. [DT]      ED Course User Index  [DT] Kavya Crum NP                     Clinical Impression:   Final diagnoses:  [S39.012A] Strain of lumbar region, initial encounter (Primary)        ED Disposition Condition    Discharge Stable          ED Prescriptions       Medication Sig Dispense Start Date End Date Auth. Provider    methocarbamoL (ROBAXIN) 500 MG Tab Take 1 tablet (500 mg total) by mouth 3 (three) times daily. for 5 days 15 tablet 8/30/2023 9/4/2023 Kavya Crum NP    ibuprofen (ADVIL,MOTRIN) 800 MG tablet Take 1 tablet (800 mg total) by mouth every 6 (six) hours as needed for Pain. 20 tablet 8/30/2023 -- Kavya Crum NP    LIDOcaine (LIDODERM) 5 % Place 1 patch onto the skin once daily. Remove & Discard patch within 12 hours or as directed by MD for 7 days 7 patch 8/30/2023 9/6/2023 Kavya Crum NP          Follow-up Information       Follow up With Specialties Details Why Contact Info    Karen Braxton MD Family Medicine Schedule an appointment as soon as possible for a visit in 2 days  [address]  [address]  [place] [address]  [phone]               Kavya Crum NP  08/30/23 4423

## 2023-08-30 NOTE — Clinical Note
"Tucker Roberto (Luis) was seen and treated in our emergency department on 8/30/2023.  He may return to work on 09/03/2023.       If you have any questions or concerns, please don't hesitate to call.      Kavya Crum NP"

## 2023-08-30 NOTE — DISCHARGE INSTRUCTIONS

## 2023-11-10 NOTE — ASSESSMENT & PLAN NOTE
Hx heroin use, previously has denied IVDU. Not responsive to naloxone with EMS. UDS and ethanol negative. Previously tried unknown anxiolytic as well as suboxone treatment.     - Addiction psych consulted    AVR with reconstruction of ascending aorta/yes

## 2024-10-19 ENCOUNTER — OFFICE VISIT (OUTPATIENT)
Dept: URGENT CARE | Facility: CLINIC | Age: 29
End: 2024-10-19
Payer: COMMERCIAL

## 2024-10-19 VITALS
HEIGHT: 65 IN | RESPIRATION RATE: 20 BRPM | BODY MASS INDEX: 24.16 KG/M2 | HEART RATE: 80 BPM | OXYGEN SATURATION: 99 % | SYSTOLIC BLOOD PRESSURE: 100 MMHG | WEIGHT: 145 LBS | TEMPERATURE: 97 F | DIASTOLIC BLOOD PRESSURE: 74 MMHG

## 2024-10-19 DIAGNOSIS — S39.012A LOW BACK STRAIN, INITIAL ENCOUNTER: Primary | ICD-10-CM

## 2024-10-19 PROCEDURE — 99213 OFFICE O/P EST LOW 20 MIN: CPT | Mod: 25,S$GLB,,

## 2024-10-19 PROCEDURE — 96372 THER/PROPH/DIAG INJ SC/IM: CPT | Mod: S$GLB,,,

## 2024-10-19 RX ORDER — KETOROLAC TROMETHAMINE 10 MG/1
10 TABLET, FILM COATED ORAL EVERY 6 HOURS PRN
Qty: 20 TABLET | Refills: 0 | Status: SHIPPED | OUTPATIENT
Start: 2024-10-19 | End: 2024-10-24

## 2024-10-19 RX ORDER — CYCLOBENZAPRINE HCL 10 MG
10 TABLET ORAL NIGHTLY PRN
Qty: 7 TABLET | Refills: 0 | Status: SHIPPED | OUTPATIENT
Start: 2024-10-19 | End: 2024-10-26

## 2024-10-19 RX ORDER — KETOROLAC TROMETHAMINE 30 MG/ML
30 INJECTION, SOLUTION INTRAMUSCULAR; INTRAVENOUS
Status: COMPLETED | OUTPATIENT
Start: 2024-10-19 | End: 2024-10-19

## 2024-10-19 RX ADMIN — KETOROLAC TROMETHAMINE 30 MG: 30 INJECTION, SOLUTION INTRAMUSCULAR; INTRAVENOUS at 04:10

## 2024-10-19 NOTE — PATIENT INSTRUCTIONS
Pain Control  Toradol injection today in clinic. This is a very powerful NSAID. It's effects will last up to 24 hours. Please do not take another NSAID (ie aspirin, ibuprofen, Aleve, Advil or Motrin) until this time tomorrow.  If you continue to have pain, you may take Tylenol (acetaminophen) if you are not allergic to this medication.  Toradol 10 mg tablet every 6 hours as needed for pain starting TOMORROW. Do not combine with other NSAIDs such as ibuprofen, naproxen, aspirin. Take with food.   Flexeril 10 mg tablet nightly as needed for muscle spasms. Do not drive. Do not operate machinery. Do not drink alcohol. This is a muscle relaxer medication and can cause drowsiness.  Put a heating pad on your back for 20 minutes at a time a few times each day. Never go to sleep with heat or ice on your back.  Stay as active as you can without causing too much pain. It is OK to rest your back for a day or so. Be sure to get up and move around gently during the day as you are able. After a few days, slowly start to increase your activity level as you are able to. If something causes your pain to come back or get worse, stop and go back to doing easier activities that did not hurt.  Protect your back. Limit sports, twisting, and heavy lifting until you are fully recovered.  Do not sit or  one position for a long time. You may want to sleep with a pillow under or between your knees if this eases your pain.    Please drink plenty of fluids.  Please get plenty of rest.    Please remember that you have received care at an urgent care today. Urgent cares are not emergency rooms and are not equipped to handle life threatening emergencies and cannot rule in or out certain medical conditions and you may be released before all of your medical problems are known or treated. Please arrange follow up with your primary care physician or speciality clinic (orthopedics) within 2-5 days if your signs and symptoms have not resolved or  worsen.     Patient can call our Referral Hotline at [phone] to make an appointment.    Please return here or go to the Emergency Department for any concerns or worsening of condition.Patient was educated on signs/symptoms that would warrant emergent medical attention.   You have sudden shortness of breath or a sudden chest pain.  You have very bad belly pain, especially if it is worse when you try to get up or walk.  You start to have very bad pain in your chest, back, or head.  You feel like you might pass out when you try to sit up or stand.  You are very unsteady when you try to walk.  You are throwing up a lot.  You become confused or very sleepy or cannot wake up.  You have a wound that opens up and you can see muscle or other tissue below the skin.  You have a wound that is draining thick yellow, green, or bad-smelling discharge.  You have weakness or numbness in your arms or legs.  You have blood in your urine or bowel movements.  You have a fever of 100.4°F (38°C) or higher.  You have pain that does not get better with pain medicine.  You have a wound that is not healing.  You have a headache or stiff neck that does not get better in 2 to 3 days.

## 2024-10-19 NOTE — PROGRESS NOTES
"Subjective:      Patient ID: Tucker Roberto is a 29 y.o. male.    Vitals:  height is 5' 5" (1.651 m) and weight is 65.8 kg (145 lb). His oral temperature is 97 °F (36.1 °C). His blood pressure is 100/74 and his pulse is 80. His respiration is 20 and oxygen saturation is 99%.     Chief Complaint: lower back pain/ sec to skate boarding     Pt states on Wednesday he was skate boarding and twisted his lower back, denies fall, Pain scale 8/10, denies pain radiates ,Home tx: tylenol     Provider note starts below:  Patient presents to clinic for evaluation of low back pain.  Patient states symptoms onset suddenly 4 days ago while he was skateboarding.  States he was on a skateboarding ramp and twisted his lower back while riding.  States he instantly felt a tightness in his lower back.  Patient has been taking Tylenol and Advil at home with no improvement of symptoms.  He has also been using a heating pad with no improvement.  States his pain worsens with any twisting movements of the back or bending over.  Patient concerned because he has to go to work tomorrow and unsure how he can work with the pain.  Patient denies any extremity weakness/numbness, saddle anesthesia, bowel/bladder incontinence, fever, chills, nausea, vomiting, neck pain, abdominal pain.  No other complaints.    Back Pain  This is a new problem. The current episode started in the past 7 days. The problem occurs constantly. The problem is unchanged. The pain is present in the lumbar spine. The pain is at a severity of 7/10. Pertinent negatives include no abdominal pain, chest pain, fever, headaches or numbness.       Constitution: Negative for chills and fever.   Neck: Negative for neck pain and neck stiffness.   Cardiovascular:  Negative for chest pain.   Respiratory:  Negative for shortness of breath.    Gastrointestinal:  Negative for abdominal pain, nausea and vomiting.   Genitourinary:  Negative for flank pain and hematuria.   Musculoskeletal:  " Positive for back pain (lower). Negative for joint pain, joint swelling, abnormal ROM of joint and pain with walking.   Skin:  Negative for pale and rash.   Neurological:  Negative for headaches, disorientation, altered mental status, numbness and tingling.   Psychiatric/Behavioral:  Negative for altered mental status, disorientation and confusion.       Objective:     Physical Exam   Constitutional: He is oriented to person, place, and time.  Non-toxic appearance. He does not appear ill. No distress.   HENT:   Head: Normocephalic and atraumatic.   Eyes: Conjunctivae are normal. Extraocular movement intact   Pulmonary/Chest: Effort normal.   Abdominal: Normal appearance.   Musculoskeletal: Normal range of motion.         General: Normal range of motion.      Comments: Tenderness and muscle spasm to lumbar paraspinal muscles.  No bony tenderness over C, T, or L-spine.  Extremities are neurovascularly intact.   Neurological: He is alert, oriented to person, place, and time and at baseline.   Skin: Skin is warm and dry.   Psychiatric: His behavior is normal. Mood normal.   Nursing note and vitals reviewed.    Assessment:     1. Low back strain, initial encounter        Plan:     Low back strain, initial encounter  -     ketorolac injection 30 mg  -     ketorolac (TORADOL) 10 mg tablet; Take 1 tablet (10 mg total) by mouth every 6 (six) hours as needed for Pain. Decrease the amount of times you take your medication as your pain starts to decrease. Do not take motrin, ibuprofen, advil, or aleve while on this medication  Dispense: 20 tablet; Refill: 0  -     cyclobenzaprine (FLEXERIL) 10 MG tablet; Take 1 tablet (10 mg total) by mouth nightly as needed for Muscle spasms.  Dispense: 7 tablet; Refill: 0            Patient Instructions   Pain Control  Toradol injection today in clinic. This is a very powerful NSAID. It's effects will last up to 24 hours. Please do not take another NSAID (ie aspirin, ibuprofen, Aleve, Advil  or Motrin) until this time tomorrow.  If you continue to have pain, you may take Tylenol (acetaminophen) if you are not allergic to this medication.  Toradol 10 mg tablet every 6 hours as needed for pain starting TOMORROW. Do not combine with other NSAIDs such as ibuprofen, naproxen, aspirin. Take with food.   Flexeril 10 mg tablet nightly as needed for muscle spasms. Do not drive. Do not operate machinery. Do not drink alcohol. This is a muscle relaxer medication and can cause drowsiness.  Put a heating pad on your back for 20 minutes at a time a few times each day. Never go to sleep with heat or ice on your back.  Stay as active as you can without causing too much pain. It is OK to rest your back for a day or so. Be sure to get up and move around gently during the day as you are able. After a few days, slowly start to increase your activity level as you are able to. If something causes your pain to come back or get worse, stop and go back to doing easier activities that did not hurt.  Protect your back. Limit sports, twisting, and heavy lifting until you are fully recovered.  Do not sit or  one position for a long time. You may want to sleep with a pillow under or between your knees if this eases your pain.    Please drink plenty of fluids.  Please get plenty of rest.    Please remember that you have received care at an urgent care today. Urgent cares are not emergency rooms and are not equipped to handle life threatening emergencies and cannot rule in or out certain medical conditions and you may be released before all of your medical problems are known or treated. Please arrange follow up with your primary care physician or speciality clinic (orthopedics) within 2-5 days if your signs and symptoms have not resolved or worsen.     Patient can call our Referral Hotline at [phone] to make an appointment.    Please return here or go to the Emergency Department for any concerns or worsening of  condition.Patient was educated on signs/symptoms that would warrant emergent medical attention.   You have sudden shortness of breath or a sudden chest pain.  You have very bad belly pain, especially if it is worse when you try to get up or walk.  You start to have very bad pain in your chest, back, or head.  You feel like you might pass out when you try to sit up or stand.  You are very unsteady when you try to walk.  You are throwing up a lot.  You become confused or very sleepy or cannot wake up.  You have a wound that opens up and you can see muscle or other tissue below the skin.  You have a wound that is draining thick yellow, green, or bad-smelling discharge.  You have weakness or numbness in your arms or legs.  You have blood in your urine or bowel movements.  You have a fever of 100.4°F (38°C) or higher.  You have pain that does not get better with pain medicine.  You have a wound that is not healing.  You have a headache or stiff neck that does not get better in 2 to 3 days.

## 2025-03-11 ENCOUNTER — HOSPITAL ENCOUNTER (EMERGENCY)
Facility: HOSPITAL | Age: 30
Discharge: HOME | End: 2025-03-11
Attending: EMERGENCY MEDICINE

## 2025-03-11 VITALS
TEMPERATURE: 98 F | HEART RATE: 95 BPM | SYSTOLIC BLOOD PRESSURE: 122 MMHG | HEIGHT: 65 IN | WEIGHT: 145 LBS | BODY MASS INDEX: 24.16 KG/M2 | OXYGEN SATURATION: 94 % | DIASTOLIC BLOOD PRESSURE: 67 MMHG | RESPIRATION RATE: 20 BRPM

## 2025-03-11 DIAGNOSIS — J45.909 ASTHMA, UNSPECIFIED ASTHMA SEVERITY, UNSPECIFIED WHETHER COMPLICATED, UNSPECIFIED WHETHER PERSISTENT: Primary | ICD-10-CM

## 2025-03-11 LAB
ALBUMIN SERPL BCP-MCNC: 4 G/DL (ref 3.5–5.2)
ALP SERPL-CCNC: 62 U/L (ref 40–150)
ALT SERPL W/O P-5'-P-CCNC: 20 U/L (ref 10–44)
ANION GAP SERPL CALC-SCNC: 11 MMOL/L (ref 8–16)
AST SERPL-CCNC: 27 U/L (ref 10–40)
BASOPHILS # BLD AUTO: 0.04 K/UL (ref 0–0.2)
BASOPHILS NFR BLD: 0.6 % (ref 0–1.9)
BILIRUB SERPL-MCNC: 0.5 MG/DL (ref 0.1–1)
BUN SERPL-MCNC: 9 MG/DL (ref 6–20)
CALCIUM SERPL-MCNC: 9 MG/DL (ref 8.7–10.5)
CHLORIDE SERPL-SCNC: 107 MMOL/L (ref 95–110)
CO2 SERPL-SCNC: 24 MMOL/L (ref 23–29)
CREAT SERPL-MCNC: 1.1 MG/DL (ref 0.5–1.4)
DIFFERENTIAL METHOD BLD: ABNORMAL
EOSINOPHIL # BLD AUTO: 1.2 K/UL (ref 0–0.5)
EOSINOPHIL NFR BLD: 16.5 % (ref 0–8)
ERYTHROCYTE [DISTWIDTH] IN BLOOD BY AUTOMATED COUNT: 12 % (ref 11.5–14.5)
EST. GFR  (NO RACE VARIABLE): >60 ML/MIN/1.73 M^2
GLUCOSE SERPL-MCNC: 91 MG/DL (ref 70–110)
HCT VFR BLD AUTO: 46.3 % (ref 40–54)
HGB BLD-MCNC: 15 G/DL (ref 14–18)
IMM GRANULOCYTES # BLD AUTO: 0.02 K/UL (ref 0–0.04)
IMM GRANULOCYTES NFR BLD AUTO: 0.3 % (ref 0–0.5)
LYMPHOCYTES # BLD AUTO: 2.4 K/UL (ref 1–4.8)
LYMPHOCYTES NFR BLD: 34 % (ref 18–48)
MAGNESIUM SERPL-MCNC: 1.9 MG/DL (ref 1.6–2.6)
MCH RBC QN AUTO: 28 PG (ref 27–31)
MCHC RBC AUTO-ENTMCNC: 32.4 G/DL (ref 32–36)
MCV RBC AUTO: 86 FL (ref 82–98)
MONOCYTES # BLD AUTO: 0.7 K/UL (ref 0.3–1)
MONOCYTES NFR BLD: 10.3 % (ref 4–15)
NEUTROPHILS # BLD AUTO: 2.7 K/UL (ref 1.8–7.7)
NEUTROPHILS NFR BLD: 38.3 % (ref 38–73)
NRBC BLD-RTO: 0 /100 WBC
PHOSPHATE SERPL-MCNC: 3.1 MG/DL (ref 2.7–4.5)
PLATELET # BLD AUTO: 354 K/UL (ref 150–450)
PMV BLD AUTO: 11.3 FL (ref 9.2–12.9)
POTASSIUM SERPL-SCNC: 4.1 MMOL/L (ref 3.5–5.1)
PROT SERPL-MCNC: 7.1 G/DL (ref 6–8.4)
RBC # BLD AUTO: 5.36 M/UL (ref 4.6–6.2)
SODIUM SERPL-SCNC: 142 MMOL/L (ref 136–145)
WBC # BLD AUTO: 6.98 K/UL (ref 3.9–12.7)

## 2025-03-11 PROCEDURE — 99284 EMERGENCY DEPT VISIT MOD MDM: CPT | Mod: 25

## 2025-03-11 PROCEDURE — 80053 COMPREHEN METABOLIC PANEL: CPT

## 2025-03-11 PROCEDURE — 94761 N-INVAS EAR/PLS OXIMETRY MLT: CPT

## 2025-03-11 PROCEDURE — 85025 COMPLETE CBC W/AUTO DIFF WBC: CPT

## 2025-03-11 PROCEDURE — 94640 AIRWAY INHALATION TREATMENT: CPT

## 2025-03-11 PROCEDURE — 63600175 PHARM REV CODE 636 W HCPCS: Mod: JZ,TB

## 2025-03-11 PROCEDURE — 94640 AIRWAY INHALATION TREATMENT: CPT | Mod: XB

## 2025-03-11 PROCEDURE — 83735 ASSAY OF MAGNESIUM: CPT

## 2025-03-11 PROCEDURE — 25000242 PHARM REV CODE 250 ALT 637 W/ HCPCS: Performed by: EMERGENCY MEDICINE

## 2025-03-11 PROCEDURE — 96374 THER/PROPH/DIAG INJ IV PUSH: CPT

## 2025-03-11 PROCEDURE — 25000242 PHARM REV CODE 250 ALT 637 W/ HCPCS

## 2025-03-11 PROCEDURE — 27100098 HC SPACER

## 2025-03-11 PROCEDURE — 84100 ASSAY OF PHOSPHORUS: CPT

## 2025-03-11 RX ORDER — ADENOSINE 3 MG/ML
INJECTION, SOLUTION INTRAVENOUS
Status: DISCONTINUED
Start: 2025-03-11 | End: 2025-03-11 | Stop reason: HOSPADM

## 2025-03-11 RX ORDER — METHYLPREDNISOLONE SOD SUCC 125 MG
125 VIAL (EA) INJECTION
Status: COMPLETED | OUTPATIENT
Start: 2025-03-11 | End: 2025-03-11

## 2025-03-11 RX ORDER — IPRATROPIUM BROMIDE AND ALBUTEROL SULFATE 2.5; .5 MG/3ML; MG/3ML
3 SOLUTION RESPIRATORY (INHALATION)
Status: DISCONTINUED | OUTPATIENT
Start: 2025-03-11 | End: 2025-03-11

## 2025-03-11 RX ORDER — DILTIAZEM HYDROCHLORIDE 60 MG/1
2 TABLET, FILM COATED ORAL 2 TIMES DAILY
Qty: 10.2 G | Refills: 0 | Status: SHIPPED | OUTPATIENT
Start: 2025-03-11 | End: 2025-04-10

## 2025-03-11 RX ORDER — ALBUTEROL SULFATE 90 UG/1
2 INHALANT RESPIRATORY (INHALATION) EVERY 6 HOURS PRN
Status: DISCONTINUED | OUTPATIENT
Start: 2025-03-11 | End: 2025-03-11 | Stop reason: HOSPADM

## 2025-03-11 RX ORDER — IPRATROPIUM BROMIDE AND ALBUTEROL SULFATE 2.5; .5 MG/3ML; MG/3ML
9 SOLUTION RESPIRATORY (INHALATION) ONCE
Status: COMPLETED | OUTPATIENT
Start: 2025-03-11 | End: 2025-03-11

## 2025-03-11 RX ORDER — ALBUTEROL SULFATE 90 UG/1
2 INHALANT RESPIRATORY (INHALATION) EVERY 6 HOURS PRN
Qty: 18 G | Refills: 3 | Status: SHIPPED | OUTPATIENT
Start: 2025-03-11 | End: 2025-03-12

## 2025-03-11 RX ADMIN — ALBUTEROL SULFATE 2 PUFF: 108 INHALANT RESPIRATORY (INHALATION) at 10:03

## 2025-03-11 RX ADMIN — IPRATROPIUM BROMIDE AND ALBUTEROL SULFATE 9 ML: 2.5; .5 SOLUTION RESPIRATORY (INHALATION) at 06:03

## 2025-03-11 RX ADMIN — METHYLPREDNISOLONE SODIUM SUCCINATE 125 MG: 125 INJECTION, POWDER, FOR SOLUTION INTRAMUSCULAR; INTRAVENOUS at 06:03

## 2025-03-11 NOTE — ED NOTES
Assumed care of the patient. Report received from [facility]. Pt on continuous cardiac monitoring, continuous pulse oximetry, and automatic BP cuff cycling Q30min. Pt in hospital gown, side rails up X2, bed low and locked, and call light is placed within reach. No family/visitors at bedside at this time. Pt denies any complaints or needs. Breathing tx in process, pt expresses that he is feeling better. Denies CP, SOB, abd pain, NVD, dizziness.

## 2025-03-11 NOTE — DISCHARGE INSTRUCTIONS
Diagnosis: Asthma Exacerbation     Follow-Up Plan:  - Follow-up with primary care doctor within 3 - 5 days  - Additional testing and/or evaluation as directed by your primary doctor  -We have sent refills on your inhalers to the pharmacy. If you are needing your albuterol inhaler sooner than every 4 hours please return to the ED for re-evaluation    Return to the Emergency Department for symptoms including but not limited to: worsening symptoms, shortness of breath or chest pain, vomiting with inability to hold down fluids, fevers greater than 100.4°F, passing out/fainting/unconsciousness, or other concerning symptoms.    We would like to thank you for coming today and our hope is that we served you and your family well during your stay

## 2025-03-11 NOTE — ED PROVIDER NOTES
Encounter Date: 3/11/2025       History     Chief Complaint   Patient presents with    Shortness of Breath     SHORTNESS OF BREATH X 30 MIN. OUT OF ALBUTEROL INHALOR AND NEBS     HPI  Tucker Roberto is a 29 y.o. male, PMH Asthma presenting with complaints of difficulty breathing and concern for asthma exacerbation. Pt reports he ran out of his inhalers and used a vape this AM and has had difficulty with breathing since. He reports recurrent wheezing for several months now and does report seasonal allergies but has not been taking anti-histamines. Pt reports he works outside and this is a hard time of year with his allergies. He reports daily use of Advair and albuterol recently. He reports chronic cough but denies other sick symptoms and has no sick contacts.     Review of patient's allergies indicates:  No Known Allergies  Past Medical History:   Diagnosis Date    Asthma     Substance use disorder 10/27/2021     History reviewed. No pertinent surgical history.  No family history on file.  Social History[1]  Review of Systems    Physical Exam     Initial Vitals [03/11/25 0626]   BP Pulse Resp Temp SpO2   (!) 142/86 (!) 114 (!) 24 97.9 °F (36.6 °C) (!) 93 %      MAP       --         Physical Exam    Nursing note and vitals reviewed.  Constitutional: He appears well-developed and well-nourished. He is not diaphoretic. No distress.   HENT:   Head: Normocephalic.   Eyes: Conjunctivae and EOM are normal.   Neck:   Normal range of motion.  Cardiovascular:  Normal rate, regular rhythm and intact distal pulses.     Exam reveals no gallop and no friction rub.       No murmur heard.  Pulmonary/Chest: Accessory muscle usage present. Tachypnea noted. No respiratory distress. He has decreased breath sounds. He has wheezes. He has no rhonchi. He has no rales.   Diffuse wheezing throughout and decreased air movement.    Abdominal: Abdomen is soft. He exhibits no distension. There is no abdominal tenderness. There is no rebound  and no guarding.   Musculoskeletal:         General: No edema.      Cervical back: Normal range of motion.     Neurological: He is alert. GCS score is 15. GCS eye subscore is 4. GCS verbal subscore is 5. GCS motor subscore is 6.   Skin: Skin is warm and dry. Capillary refill takes less than 2 seconds. No pallor.   Psychiatric: He has a normal mood and affect. His behavior is normal. Thought content normal.         ED Course   Procedures  Labs Reviewed   CBC W/ AUTO DIFFERENTIAL - Abnormal       Result Value    WBC 6.98      RBC 5.36      Hemoglobin 15.0      Hematocrit 46.3      MCV 86      MCH 28.0      MCHC 32.4      RDW 12.0      Platelets 354      MPV 11.3      Immature Granulocytes 0.3      Gran # (ANC) 2.7      Immature Grans (Abs) 0.02      Lymph # 2.4      Mono # 0.7      Eos # 1.2 (*)     Baso # 0.04      nRBC 0      Gran % 38.3      Lymph % 34.0      Mono % 10.3      Eosinophil % 16.5 (*)     Basophil % 0.6      Differential Method Automated     COMPREHENSIVE METABOLIC PANEL    Sodium 142      Potassium 4.1      Chloride 107      CO2 24      Glucose 91      BUN 9      Creatinine 1.1      Calcium 9.0      Total Protein 7.1      Albumin 4.0      Total Bilirubin 0.5      Alkaline Phosphatase 62      AST 27      ALT 20      eGFR >60.0      Anion Gap 11     MAGNESIUM    Magnesium 1.9     PHOSPHORUS    Phosphorus 3.1            Imaging Results              X-Ray Chest AP Portable (Final result)  Result time 03/11/25 07:10:58   Procedure changed from X-Ray Chest PA And Lateral     Final result by Harish Quesada MD (03/11/25 07:10:58)                   Impression:      No significant intrathoracic abnormality.  No significant detrimental interval change in the appearance of the chest since the examinations referenced above is appreciated.      Electronically signed by: Harish Quesada MD  Date:    03/11/2025  Time:    07:10               Narrative:    EXAMINATION:  XR CHEST AP PORTABLE    CLINICAL  HISTORY:  Asthma;    TECHNIQUE:  One view    COMPARISON:  Comparison is made to 08/03/2022 at 10:05, the most recent prior chest radiograph, as well as to 02/19/2022.  Clinical information obtained from the electronic medical record indicates shortness of breath in a patient with known asthma.    FINDINGS:  Heart size is normal, as is the appearance of the pulmonary vascularity.  Lung zones are clear, and are free of significant airspace consolidation or volume loss.  No pleural fluid.  No hilar or mediastinal mass lesion.  No pneumothorax or pneumomediastinum.                                       Medications   methylPREDNISolone sodium succinate injection 125 mg (125 mg Intravenous Given 3/11/25 0697)   albuterol-ipratropium 2.5 mg-0.5 mg/3 mL nebulizer solution 9 mL (9 mLs Nebulization Given 3/11/25 0624)     Medical Decision Making  Tucker Roberto  is a 29 y.o. male, presenting with complaints of wheezing after running out of his home asthma meds, history of present illness obtained from pt as seen above. Patient able to provide adequate and detailed history of present illness and tolerated physical exam well. Patient found to be in moderate respiratory distress, unstable. Exam notable as above.     DDX includes but is not limited to: Asthma, COPD, Vape-associated lung injury, PE.   Pt improved dramatically with duonebs X3 and reports feeling at his baseline. He was watched for 4 hours without recurrence of his respiratory distress. Reviewed using metered inhaler with spacer and instructed with respiratory therapy. Pt given Solumedrol and refills on his inhalers sent to the pharmacy. We dicussed need for daily anti-histamines until his allergies improve. He would like to get this medication OTC as he is currently uninsured. Pt without hypoxia or SOB on ambulation trial. Pt reports feeling well and in agreement with plan for discharge home with return precautions should his symptoms return or if he needs  inhalers sooner than every 4 hours.     See ED course for additional discussion.    Data Reviewed/Counseling: I have reviewed the patient's vital signs, nursing notes, and other relevant tests/information. I had a detailed discussion regarding the historical points, exam findings, and any diagnostic results supporting the discharge diagnosis. Any incidental findings were discussed with the patient. I also discussed the need for outpatient follow-up and the need to return to the ED if symptoms worsen or if there are any questions or concerns that arise at home. Strict return and follow-up precautions have been given by me personally to the patient/family/caregiver(s).  Disposition: Discharged     Amount and/or Complexity of Data Reviewed  Labs: ordered.  Radiology: ordered. Decision-making details documented in ED Course.    Risk  Prescription drug management.              Attending Attestation:             Attending ED Notes:   Attending Note:  I have seen the patient, have repeated the key portions of the history and physical, reviewed and agree with the medical documentation, and supervised and managed the medical care of the patient. Additionally, I was present for the critical portion of any procedure(s) performed.    29 M hx of asthma, intubated x 2 in the past here for acute asthma exacerbation after vaping.  Tachycardic, hypertensive, borderline hypoxemia with tachypnea on arrival.  Patient moved to acute care room, treated with nebulizer, Solu-Medrol.    Symptoms improved significantly after treatment.  Will continue to monitor, if remains asymptomatic okay for discharge    DARNELL Ceballos MD  Staff ED Physician  03/11/2025 8:14 AM              ED Course as of 03/11/25 1728   Tue Mar 11, 2025   0718 X-Ray Chest AP Portable  Independent interpretation CXR: no concern for pneumonia, pleural effusion, pneumomediastinum, pneumothorax, tracheal deviation, bone fractures, subcutaneous emphysema, free air under the  diaphragm. Evaluation of pathology in the retrocardiac space limited.   [HB]   0718 SpO2(!): 93 % [HB]   0730 SpO2: 98 % [HB]      ED Course User Index  [HB] Didi Bonilla MD                           Clinical Impression:  Final diagnoses:  [J45.909] Asthma, unspecified asthma severity, unspecified whether complicated, unspecified whether persistent (Primary)          ED Disposition Condition    Discharge Stable          ED Prescriptions       Medication Sig Dispense Start Date End Date Auth. Provider    albuterol (PROVENTIL/VENTOLIN HFA) 90 mcg/actuation inhaler Inhale 2 puffs into the lungs every 6 (six) hours as needed for Wheezing (for wheezing). 18 g 3/11/2025 4/10/2025 Didi Bnoilla MD    SYMBICORT 80-4.5 mcg/actuation HFAA Inhale 2 puffs into the lungs 2 (two) times a day. 10.2 g 3/11/2025 4/10/2025 Didi Bonilla MD          Follow-up Information    None              [1]   Social History  Tobacco Use    Smoking status: Some Days     Types: Vaping with nicotine   Substance Use Topics    Alcohol use: No    Drug use: Yes     Types: Marijuana, Heroin        Didi Bonilla MD  Resident  03/11/25 5222

## 2025-03-11 NOTE — ED TRIAGE NOTES
Tucker Roberto, a 29 y.o. male presents to the ED w/ history of asthma, stating he is out of his rescue inhaler and having SOB x30 mins    Triage note:  Chief Complaint   Patient presents with    Shortness of Breath     SHORTNESS OF BREATH X 30 MIN. OUT OF ALBUTEROL INHALOR AND NEBS     Review of patient's allergies indicates:  No Known Allergies  Past Medical History:   Diagnosis Date    Asthma     Substance use disorder 10/27/2021

## 2025-03-11 NOTE — Clinical Note
"Tucker Roberto (Luis) was seen and treated in our emergency department on 3/11/2025.  He may return to work on 03/12/2025.       If you have any questions or concerns, please don't hesitate to call.       RN    "

## 2025-03-12 ENCOUNTER — HOSPITAL ENCOUNTER (EMERGENCY)
Facility: HOSPITAL | Age: 30
Discharge: HOME | End: 2025-03-12
Attending: EMERGENCY MEDICINE

## 2025-03-12 VITALS
OXYGEN SATURATION: 93 % | DIASTOLIC BLOOD PRESSURE: 61 MMHG | SYSTOLIC BLOOD PRESSURE: 109 MMHG | HEART RATE: 106 BPM | TEMPERATURE: 98 F | RESPIRATION RATE: 15 BRPM

## 2025-03-12 DIAGNOSIS — J45.901 MODERATE ASTHMA WITH ACUTE EXACERBATION, UNSPECIFIED WHETHER PERSISTENT: Primary | ICD-10-CM

## 2025-03-12 LAB
ALBUMIN SERPL BCP-MCNC: 4.8 G/DL (ref 3.5–5.2)
ALP SERPL-CCNC: 66 U/L (ref 40–150)
ALT SERPL W/O P-5'-P-CCNC: 23 U/L (ref 10–44)
ANION GAP SERPL CALC-SCNC: 18 MMOL/L (ref 8–16)
AST SERPL-CCNC: 23 U/L (ref 10–40)
BASOPHILS # BLD AUTO: 0.06 K/UL (ref 0–0.2)
BASOPHILS NFR BLD: 0.8 % (ref 0–1.9)
BILIRUB SERPL-MCNC: 0.6 MG/DL (ref 0.1–1)
BNP SERPL-MCNC: <10 PG/ML (ref 0–99)
BUN SERPL-MCNC: 9 MG/DL (ref 6–20)
CALCIUM SERPL-MCNC: 9.8 MG/DL (ref 8.7–10.5)
CHLORIDE SERPL-SCNC: 104 MMOL/L (ref 95–110)
CO2 SERPL-SCNC: 23 MMOL/L (ref 23–29)
CREAT SERPL-MCNC: 1.3 MG/DL (ref 0.5–1.4)
DIFFERENTIAL METHOD BLD: ABNORMAL
EOSINOPHIL # BLD AUTO: 0.7 K/UL (ref 0–0.5)
EOSINOPHIL NFR BLD: 8.3 % (ref 0–8)
ERYTHROCYTE [DISTWIDTH] IN BLOOD BY AUTOMATED COUNT: 12.2 % (ref 11.5–14.5)
EST. GFR  (NO RACE VARIABLE): >60 ML/MIN/1.73 M^2
GLUCOSE SERPL-MCNC: 133 MG/DL (ref 70–110)
HCT VFR BLD AUTO: 45.5 % (ref 40–54)
HGB BLD-MCNC: 15.2 G/DL (ref 14–18)
IMM GRANULOCYTES # BLD AUTO: 0.02 K/UL (ref 0–0.04)
IMM GRANULOCYTES NFR BLD AUTO: 0.3 % (ref 0–0.5)
LYMPHOCYTES # BLD AUTO: 1.7 K/UL (ref 1–4.8)
LYMPHOCYTES NFR BLD: 21.6 % (ref 18–48)
MCH RBC QN AUTO: 28.8 PG (ref 27–31)
MCHC RBC AUTO-ENTMCNC: 33.4 G/DL (ref 32–36)
MCV RBC AUTO: 86 FL (ref 82–98)
MONOCYTES # BLD AUTO: 0.7 K/UL (ref 0.3–1)
MONOCYTES NFR BLD: 8.6 % (ref 4–15)
NEUTROPHILS # BLD AUTO: 4.8 K/UL (ref 1.8–7.7)
NEUTROPHILS NFR BLD: 60.4 % (ref 38–73)
NRBC BLD-RTO: 0 /100 WBC
PLATELET # BLD AUTO: 360 K/UL (ref 150–450)
PMV BLD AUTO: 10.8 FL (ref 9.2–12.9)
POTASSIUM SERPL-SCNC: 3.3 MMOL/L (ref 3.5–5.1)
PROT SERPL-MCNC: 8.5 G/DL (ref 6–8.4)
RBC # BLD AUTO: 5.28 M/UL (ref 4.6–6.2)
SODIUM SERPL-SCNC: 145 MMOL/L (ref 136–145)
TROPONIN I SERPL DL<=0.01 NG/ML-MCNC: <0.006 NG/ML (ref 0–0.03)
WBC # BLD AUTO: 7.98 K/UL (ref 3.9–12.7)

## 2025-03-12 PROCEDURE — 94645 CONT INHLJ TX EACH ADDL HOUR: CPT

## 2025-03-12 PROCEDURE — 85025 COMPLETE CBC W/AUTO DIFF WBC: CPT | Performed by: EMERGENCY MEDICINE

## 2025-03-12 PROCEDURE — 94644 CONT INHLJ TX 1ST HOUR: CPT

## 2025-03-12 PROCEDURE — 96374 THER/PROPH/DIAG INJ IV PUSH: CPT

## 2025-03-12 PROCEDURE — 25000242 PHARM REV CODE 250 ALT 637 W/ HCPCS: Performed by: EMERGENCY MEDICINE

## 2025-03-12 PROCEDURE — 80053 COMPREHEN METABOLIC PANEL: CPT | Performed by: EMERGENCY MEDICINE

## 2025-03-12 PROCEDURE — 83880 ASSAY OF NATRIURETIC PEPTIDE: CPT | Performed by: EMERGENCY MEDICINE

## 2025-03-12 PROCEDURE — 99285 EMERGENCY DEPT VISIT HI MDM: CPT | Mod: 25

## 2025-03-12 PROCEDURE — 63600175 PHARM REV CODE 636 W HCPCS: Mod: JZ,TB | Performed by: EMERGENCY MEDICINE

## 2025-03-12 PROCEDURE — 84484 ASSAY OF TROPONIN QUANT: CPT | Performed by: EMERGENCY MEDICINE

## 2025-03-12 RX ORDER — PREDNISONE 10 MG/1
TABLET ORAL
Qty: 21 TABLET | Refills: 0 | Status: SHIPPED | OUTPATIENT
Start: 2025-03-12

## 2025-03-12 RX ORDER — ALBUTEROL SULFATE 2.5 MG/.5ML
10 SOLUTION RESPIRATORY (INHALATION)
Status: COMPLETED | OUTPATIENT
Start: 2025-03-12 | End: 2025-03-12

## 2025-03-12 RX ORDER — METHYLPREDNISOLONE SOD SUCC 125 MG
125 VIAL (EA) INJECTION
Status: COMPLETED | OUTPATIENT
Start: 2025-03-12 | End: 2025-03-12

## 2025-03-12 RX ORDER — ALBUTEROL SULFATE 0.83 MG/ML
2.5 SOLUTION RESPIRATORY (INHALATION) EVERY 4 HOURS PRN
Qty: 100 EACH | Refills: 0 | Status: SHIPPED | OUTPATIENT
Start: 2025-03-12

## 2025-03-12 RX ORDER — IPRATROPIUM BROMIDE 0.5 MG/2.5ML
0.5 SOLUTION RESPIRATORY (INHALATION)
Status: COMPLETED | OUTPATIENT
Start: 2025-03-12 | End: 2025-03-12

## 2025-03-12 RX ORDER — ALBUTEROL SULFATE 90 UG/1
2 INHALANT RESPIRATORY (INHALATION) EVERY 6 HOURS PRN
Qty: 18 G | Refills: 3 | Status: SHIPPED | OUTPATIENT
Start: 2025-03-12 | End: 2025-04-11

## 2025-03-12 RX ADMIN — METHYLPREDNISOLONE SODIUM SUCCINATE 125 MG: 125 INJECTION, POWDER, FOR SOLUTION INTRAMUSCULAR; INTRAVENOUS at 10:03

## 2025-03-12 RX ADMIN — ALBUTEROL SULFATE 10 MG: 2.5 SOLUTION RESPIRATORY (INHALATION) at 11:03

## 2025-03-12 RX ADMIN — ALBUTEROL SULFATE 10 MG: 2.5 SOLUTION RESPIRATORY (INHALATION) at 10:03

## 2025-03-12 RX ADMIN — IPRATROPIUM BROMIDE 0.5 MG: 0.5 SOLUTION RESPIRATORY (INHALATION) at 10:03

## 2025-03-12 RX ADMIN — IPRATROPIUM BROMIDE 0.5 MG: 0.5 SOLUTION RESPIRATORY (INHALATION) at 11:03

## 2025-03-12 NOTE — Clinical Note
"Tucker Roberto (Luis) was seen and treated in our emergency department on 3/12/2025.  He may return to work on 03/13/2025.       If you have any questions or concerns, please don't hesitate to call.      Tala Horvath MD"

## 2025-03-12 NOTE — ED PROVIDER NOTES
Encounter Date: 3/12/2025       History     Chief Complaint   Patient presents with    Shortness of Breath     SOB [id number]. Resp distress, SPO2 87%. Hx asthma. Bilat insp and exp wheezing, tachy 130's, diaphoretic, tachypnea w/ accessory muscle use.      The patient is a 29-year-old male who came to the emergency department shortness of breath for the past 30 minutes.  The patient has history of asthma and states he started wheezing approximately 30 minutes ago.  The patient admits to vaping.      Review of patient's allergies indicates:  No Known Allergies  Past Medical History:   Diagnosis Date    Asthma     Substance use disorder 10/27/2021     History reviewed. No pertinent surgical history.  No family history on file.  Social History[1]  Review of Systems   All other systems reviewed and are negative.      Physical Exam     Initial Vitals   BP Pulse Resp Temp SpO2   03/12/25 1110 03/12/25 1029 03/12/25 0946 -- 03/12/25 0946   132/70 88 (!) 32  (!) 87 %      MAP       --                Physical Exam    Nursing note and vitals reviewed.  Constitutional: He appears well-developed and well-nourished.   HENT:   Head: Normocephalic and atraumatic.   Eyes: EOM are normal. Pupils are equal, round, and reactive to light.   Neck: Neck supple.   Normal range of motion.  Cardiovascular:  Normal rate, regular rhythm, normal heart sounds and intact distal pulses.           Pulmonary/Chest: He has wheezes (Expiratory wheezing in all lung fields).   Abdominal: Abdomen is soft. Bowel sounds are normal. He exhibits no distension. There is no abdominal tenderness. There is no rebound and no guarding.   Musculoskeletal:         General: No edema. Normal range of motion.      Cervical back: Normal range of motion and neck supple.     Neurological: He is alert and oriented to person, place, and time.   Skin: Skin is warm and dry.   Psychiatric: He has a normal mood and affect. His behavior is normal. Judgment and thought content  normal.         ED Course   Critical Care    Date/Time: 3/12/2025 1:39 PM    Performed by: Tala Horvath MD  Authorized by: Tala Horvath MD  Direct patient critical care time: 5 minutes  Additional history critical care time: 5 minutes  Ordering / reviewing critical care time: 5 minutes  Documentation critical care time: 10 minutes  Consulting other physicians critical care time: 5 minutes  Total critical care time (exclusive of procedural time) : 30 minutes  Critical care time was exclusive of separately billable procedures and treating other patients.  Critical care was necessary to treat or prevent imminent or life-threatening deterioration of the following conditions: respiratory failure.  Critical care was time spent personally by me on the following activities: development of treatment plan with patient or surrogate, evaluation of patient's response to treatment, examination of patient, obtaining history from patient or surrogate, ordering and performing treatments and interventions, ordering and review of laboratory studies, ordering and review of radiographic studies, pulse oximetry and re-evaluation of patient's condition.        Labs Reviewed   CBC W/ AUTO DIFFERENTIAL - Abnormal       Result Value    WBC 7.98      RBC 5.28      Hemoglobin 15.2      Hematocrit 45.5      MCV 86      MCH 28.8      MCHC 33.4      RDW 12.2      Platelets 360      MPV 10.8      Immature Granulocytes 0.3      Gran # (ANC) 4.8      Immature Grans (Abs) 0.02      Lymph # 1.7      Mono # 0.7      Eos # 0.7 (*)     Baso # 0.06      nRBC 0      Gran % 60.4      Lymph % 21.6      Mono % 8.6      Eosinophil % 8.3 (*)     Basophil % 0.8      Differential Method Automated     COMPREHENSIVE METABOLIC PANEL - Abnormal    Sodium 145      Potassium 3.3 (*)     Chloride 104      CO2 23      Glucose 133 (*)     BUN 9      Creatinine 1.3      Calcium 9.8      Total Protein 8.5 (*)     Albumin 4.8      Total Bilirubin 0.6      Alkaline  Phosphatase 66      AST 23      ALT 23      eGFR >60      Anion Gap 18 (*)    TROPONIN I    Troponin I <0.006     B-TYPE NATRIURETIC PEPTIDE    BNP <10       EKG Readings: (Independently Interpreted)   Initial: 0955. Rhythm: Sinus Tachycardia. Heart Rate: 118. Ectopy: No Ectopy. Conduction: Normal. ST Segments: Normal ST Segments. T Waves: Normal. Clinical Impression: Normal Sinus Rhythm       Imaging Results              X-Ray Chest AP Portable (Final result)  Result time 03/12/25 10:46:17      Final result by Randy Brady MD (03/12/25 10:46:17)                   Impression:      No acute intrathoracic abnormality.      Electronically signed by: Randy Brady  Date:    03/12/2025  Time:    10:46               Narrative:    EXAMINATION:  XR CHEST AP PORTABLE    CLINICAL HISTORY:  Chest Pain;    TECHNIQUE:  Single frontal view of the chest was performed.    COMPARISON:  Radiograph 03/11/2025    FINDINGS:  The lungs are clear, with normal appearance of pulmonary vasculature and no pleural effusion or pneumothorax.    The cardiac silhouette is normal in size. The hilar and mediastinal contours are unremarkable.    Bones are intact.                                       Medications   albuterol sulfate nebulizer solution 10 mg (10 mg Nebulization Given 3/12/25 1038)   ipratropium 0.02 % nebulizer solution 0.5 mg (0.5 mg Nebulization Given 3/12/25 1038)   methylPREDNISolone sodium succinate injection 125 mg (125 mg Intravenous Given 3/12/25 1037)   albuterol sulfate nebulizer solution 10 mg (10 mg Nebulization Given 3/12/25 1152)   ipratropium 0.02 % nebulizer solution 0.5 mg (0.5 mg Nebulization Given 3/12/25 1152)     Medical Decision Making  Differential Diagnosis includes, but is not limited to:  PE, MI/ACS, pneumothorax, pericardial effusion/tamonade, pneumonia, lung abscess, pericarditis/myocarditis, pleural effusion, lung mass, CHF exacerbation, asthma exacerbation, COPD exacerbation, aspirated/ingested foreign  body, airway obstruction, CO poisoning, anemia, metabolic derangement, allergy/atopy, influenza, viral URI, viral syndrome.     MDM:  The patient is a 29-year-old otherwise healthy male with asthma came in wheezing.  He will be given a 1 hour neb treatment.    1135:  Neb treatment has completed, the patient continues to wheeze, his oxygen saturations are 91% on room air.  He will be given another hour long treatment.    1328: The patient is now feeling better. Wheezing has resolved. He will be discharged with steroid taper, inhaler and neb treatments for his machine.    Amount and/or Complexity of Data Reviewed  Labs: ordered. Decision-making details documented in ED Course.  Radiology: ordered. Decision-making details documented in ED Course.    Risk  Prescription drug management.                                      Clinical Impression:  Final diagnoses:  [J45.901] Moderate asthma with acute exacerbation, unspecified whether persistent (Primary)          ED Disposition Condition    Discharge Stable          ED Prescriptions       Medication Sig Dispense Start Date End Date Auth. Provider    albuterol (PROVENTIL/VENTOLIN HFA) 90 mcg/actuation inhaler Inhale 2 puffs into the lungs every 6 (six) hours as needed for Wheezing (for wheezing). 18 g 3/12/2025 4/11/2025 Tala Horvath MD    predniSONE (DELTASONE) 10 MG tablet Take 4 tabs x 3 days, then  Take 2 tabs x 3 days, then   Take 1 tab x 3 days. 21 tablet 3/12/2025 -- Tala Horvath MD    albuterol (PROVENTIL) 2.5 mg /3 mL (0.083 %) nebulizer solution Take 3 mLs (2.5 mg total) by nebulization every 4 (four) hours as needed. 100 each 3/12/2025 -- Tala Hovrath MD          Follow-up Information       Follow up With Specialties Details Why Contact Info Additional Information    [facility] Schedule an appointment as soon as possible for a visit   [address] [place] [id number] [phone] Please park in [address] or [address] and use [address]. Take Medical Office Bldg. elevators.                 [1]   Social History  Tobacco Use    Smoking status: Some Days     Types: Vaping with nicotine   Substance Use Topics    Alcohol use: No    Drug use: Yes     Types: Marijuana, Heroin        Tala Horvath MD  03/12/25 1977

## 2025-03-12 NOTE — DISCHARGE INSTRUCTIONS
Take prednisone taper as directed  Use albuterol as needed for wheezing  Return to the emergency department as needed for shortness of breath

## 2025-03-12 NOTE — ED NOTES
Patient reports shortness of breath and midsternal chest pain that started approx. 20 minutes prior to arrival. Patient reports hx of asthma and is out of his inhaler. He reports chest pain 4/10 and sharp.

## 2025-03-13 LAB
OHS QRS DURATION: 82 MS
OHS QTC CALCULATION: 428 MS

## 2025-03-23 ENCOUNTER — HOSPITAL ENCOUNTER (EMERGENCY)
Facility: HOSPITAL | Age: 30
Discharge: HOME | End: 2025-03-23
Attending: STUDENT IN AN ORGANIZED HEALTH CARE EDUCATION/TRAINING PROGRAM

## 2025-03-23 VITALS
TEMPERATURE: 98 F | OXYGEN SATURATION: 98 % | RESPIRATION RATE: 20 BRPM | SYSTOLIC BLOOD PRESSURE: 155 MMHG | DIASTOLIC BLOOD PRESSURE: 81 MMHG | BODY MASS INDEX: 23.1 KG/M2 | WEIGHT: 143.75 LBS | HEIGHT: 66 IN | HEART RATE: 98 BPM

## 2025-03-23 DIAGNOSIS — R06.02 SOB (SHORTNESS OF BREATH): ICD-10-CM

## 2025-03-23 DIAGNOSIS — J45.909 ASTHMA: ICD-10-CM

## 2025-03-23 LAB
HCV AB SERPL QL IA: NORMAL
HIV 1+2 AB+HIV1 P24 AG SERPL QL IA: NORMAL
OHS QRS DURATION: 78 MS
OHS QRS DURATION: 84 MS
OHS QTC CALCULATION: 409 MS
OHS QTC CALCULATION: 414 MS

## 2025-03-23 PROCEDURE — 96365 THER/PROPH/DIAG IV INF INIT: CPT

## 2025-03-23 PROCEDURE — 94761 N-INVAS EAR/PLS OXIMETRY MLT: CPT

## 2025-03-23 PROCEDURE — 86803 HEPATITIS C AB TEST: CPT | Performed by: PHYSICIAN ASSISTANT

## 2025-03-23 PROCEDURE — 94640 AIRWAY INHALATION TREATMENT: CPT | Mod: XB

## 2025-03-23 PROCEDURE — 99285 EMERGENCY DEPT VISIT HI MDM: CPT | Mod: 25

## 2025-03-23 PROCEDURE — 25000242 PHARM REV CODE 250 ALT 637 W/ HCPCS

## 2025-03-23 PROCEDURE — 63600175 PHARM REV CODE 636 W HCPCS: Mod: JZ,TB

## 2025-03-23 PROCEDURE — 87389 HIV-1 AG W/HIV-1&-2 AB AG IA: CPT | Performed by: PHYSICIAN ASSISTANT

## 2025-03-23 PROCEDURE — 96375 TX/PRO/DX INJ NEW DRUG ADDON: CPT

## 2025-03-23 PROCEDURE — 96366 THER/PROPH/DIAG IV INF ADDON: CPT

## 2025-03-23 PROCEDURE — 93010 ELECTROCARDIOGRAM REPORT: CPT | Mod: ,,, | Performed by: INTERNAL MEDICINE

## 2025-03-23 PROCEDURE — 93005 ELECTROCARDIOGRAM TRACING: CPT

## 2025-03-23 PROCEDURE — 94644 CONT INHLJ TX 1ST HOUR: CPT

## 2025-03-23 RX ORDER — MAGNESIUM SULFATE HEPTAHYDRATE 40 MG/ML
2 INJECTION, SOLUTION INTRAVENOUS ONCE
Status: COMPLETED | OUTPATIENT
Start: 2025-03-23 | End: 2025-03-23

## 2025-03-23 RX ORDER — IPRATROPIUM BROMIDE AND ALBUTEROL SULFATE 2.5; .5 MG/3ML; MG/3ML
3 SOLUTION RESPIRATORY (INHALATION)
Status: COMPLETED | OUTPATIENT
Start: 2025-03-23 | End: 2025-03-23

## 2025-03-23 RX ORDER — METHYLPREDNISOLONE SOD SUCC 125 MG
125 VIAL (EA) INJECTION
Status: COMPLETED | OUTPATIENT
Start: 2025-03-23 | End: 2025-03-23

## 2025-03-23 RX ORDER — PREDNISONE 10 MG/1
10 TABLET ORAL DAILY
Qty: 21 TABLET | Refills: 0 | Status: SHIPPED | OUTPATIENT
Start: 2025-03-23

## 2025-03-23 RX ORDER — ALBUTEROL SULFATE 2.5 MG/.5ML
10 SOLUTION RESPIRATORY (INHALATION) CONTINUOUS
Status: DISCONTINUED | OUTPATIENT
Start: 2025-03-23 | End: 2025-03-23 | Stop reason: HOSPADM

## 2025-03-23 RX ADMIN — IPRATROPIUM BROMIDE AND ALBUTEROL SULFATE 3 ML: 2.5; .5 SOLUTION RESPIRATORY (INHALATION) at 01:03

## 2025-03-23 RX ADMIN — METHYLPREDNISOLONE SODIUM SUCCINATE 125 MG: 125 INJECTION, POWDER, FOR SOLUTION INTRAMUSCULAR; INTRAVENOUS at 02:03

## 2025-03-23 RX ADMIN — MAGNESIUM SULFATE HEPTAHYDRATE 2 G: 40 INJECTION, SOLUTION INTRAVENOUS at 02:03

## 2025-03-23 RX ADMIN — ALBUTEROL SULFATE 10 MG: 2.5 SOLUTION RESPIRATORY (INHALATION) at 02:03

## 2025-03-23 RX ADMIN — IPRATROPIUM BROMIDE AND ALBUTEROL SULFATE 3 ML: 2.5; .5 SOLUTION RESPIRATORY (INHALATION) at 02:03

## 2025-03-23 NOTE — ED PROVIDER NOTES
Encounter Date: 3/23/2025       History     Chief Complaint   Patient presents with    Shortness of Breath     Patient c/o SOB and CP. Hx of asthma. +Wheezing. Patient did three breathing treatments at home but has not gotten any relief yet.  Was recently in ED last week  for same complaint.      29-year-old male with past medical history of asthma on MDIs presenting for shortness of breath. Patient's shortness of breath started this morning and is refractory to at-home breathing treatments. He states that his chest tightness started this morning and he has tried his short-acting inhaler multiple times and 3 breathing treatments with minimal relief of sxs. He endorses a cough productive of clearish phlegm. Denies fever, chills or any flu-like sxs. Of note, he has visited the emergency twice within the past 2 weeks for similar SOB refractory to at-home treatment and completed a steroid taper for his asthma 4 days ago. He has a required intubation for asthmatic exacerbations in the remote past.     The history is provided by the patient.     Review of patient's allergies indicates:  No Known Allergies  Past Medical History:   Diagnosis Date    Asthma     Substance use disorder 10/27/2021     History reviewed. No pertinent surgical history.  No family history on file.  Social History[1]  Review of Systems  See HPI     Physical Exam     Initial Vitals [03/23/25 0108]   BP Pulse Resp Temp SpO2   (!) 147/89 (!) 126 (!) 21 98.6 °F (37 °C) (!) 94 %      MAP       --         Physical Exam    Nursing note and vitals reviewed.      Gen: AxOx3, well nourished, appears stated age, no pallor, no jaundice, appears well hydrated  Eye: EOMI, no scleral icterus, no periorbital edema or ecchymosis  Head: Normocephalic, atraumatic, no lesions, scalp appears normal  ENT: Neck supple, no stridor, no masses, no drooling or voice changes  CVS: All distal pulses intact with normal rate and rhythm, no JVD, normal S1/S2, no murmur  Pulm:  Increased work of breathing with biphasic wheeze and accessory muscle use.  No crackles  Abd:  Nondistended, soft, nontender, no organomegaly, no CVAT  Ext: No edema, no lesions, rashes, or deformity  Neuro: GCS15, moving all extremities, gait intact, face grossly symmetric  Psych: normal affect, cooperative, well groomed, makes good eye contact      ED Course   Procedures  Labs Reviewed   HEPATITIS C ANTIBODY - Normal       Result Value    Hep C Ab Interp Non-Reactive     HIV 1 / 2 ANTIBODY - Normal    HIV 1/2 Ag/Ab Non-Reactive            Imaging Results              X-Ray Chest AP Portable (Final result)  Result time 03/23/25 02:46:26      Final result by Ryley Paredes MD (03/23/25 02:46:26)                   Impression:      No consolidation or pneumothorax.    Unusually good inspiratory result versus bilateral pathologic pulmonary air-trapping, as could be seen with bronchospasm.  Correlate clinically.    Electronically signed by resident: Justen Navas  Date:    03/23/2025  Time:    02:17    Electronically signed by: Ryley Paredes  Date:    03/23/2025  Time:    02:46               Narrative:    EXAMINATION:  XR CHEST AP PORTABLE    CLINICAL HISTORY:  Unspecified asthma, uncomplicated    TECHNIQUE:  Single frontal view of the chest was performed.    COMPARISON:  Radiograph 03/12/2025    FINDINGS:  Overlying cardiac monitoring leads and other support devices.    Lungs are symmetrically and deeply expanded.  No focal consolidation, pleural effusion, or pneumothorax.    Cardiac silhouette is normal size.Hilar and mediastinal contours are unremarkable.  Upper mediastinum partially obscured by superimposed respiratory support device.    As visualized radiographically, the imaged portions of the thoracic skeleton and upper abdomen demonstrate no acute abnormalities.                                       Medications   albuterol sulfate nebulizer solution 10 mg (10 mg Nebulization New Bag 3/23/25 0201)   magnesium  sulfate 2g in water 50mL IVPB (premix) (2 g Intravenous New Bag 3/23/25 0235)   albuterol-ipratropium 2.5 mg-0.5 mg/3 mL nebulizer solution 3 mL (3 mLs Nebulization Given by Other 3/23/25 0201)   methylPREDNISolone sodium succinate injection 125 mg (125 mg Intravenous Given 3/23/25 0230)     Medical Decision Making  Initial assessment  29-year-old male with past medical history of asthma on MDIs presenting for shortness of breath. Patient is able to vocalise, breathing spontaneously, hemodynamically stable, oriented, moving all 4 limbs spontaneously.  Examination consistent with accessory muscle use and biphasic wheeze.      Differential diagnosis  Asthma exacerbation  Considered pneumonia  Doubt foreign body  Viral infection  Considered other life-threatening emergencies including ACS and PE but versus suspicion      ED management  Patient was stable on arrival with oxygen saturation 97% on room air on my evaluation. Examination concerning for asthma exacerbation and was given methylprednisolone and DuoNeb.  Chest x-ray obtained given his recent increased sputum production  On re-evaluation patient had ongoing wheeze and was given magnesium and continuous albuterol for 1 hour.  On re-evaluation patient is much improved and wheeze has resolved patient is breathing comfortably on room air with oxygen saturation 98%.  Patient was given prednisolone taper and pulmonology referral.  Patient was discharged with strong return precautions for worsening shortness of breath.    Amount and/or Complexity of Data Reviewed  Radiology: ordered. Decision-making details documented in ED Course.  ECG/medicine tests:  Decision-making details documented in ED Course.    Risk  Prescription drug management.               ED Course as of 03/23/25 0426   Sun Mar 23, 2025   0120 BP(!): 147/89 [PM]   0120 Pulse(!): 126 [PM]   0120 Resp(!): 21 [PM]   0120 SpO2(!): 94 % [PM]   0121 EKG 12-lead  Sinus tachycardia.  Rightward axis.  Intervals  are normal.  No acute ischemia is noted. [AC]   0236 X-Ray Chest AP Portable  No consolidations [AC]   0255 X-Ray Chest AP Portable  As per my independent interpretation increased inspiration concerning for asthma with air trapping [PM]      ED Course User Index  [AC] Asim Ying, DO  [PM] Mary Ellen Andujar MD                           Clinical Impression:  Final diagnoses:  [R06.02] SOB (shortness of breath)  [J45.909] Asthma          ED Disposition Condition    Discharge Stable          ED Prescriptions       Medication Sig Dispense Start Date End Date Auth. Provider    predniSONE (DELTASONE) 10 MG tablet Take 1 tablet (10 mg total) by mouth once daily. Take 4 tabs x 3 days, then take 2 tabs x 3 days, then take 1 tab x 3 days. 21 tablet 3/23/2025 -- Mary Ellen Andujar MD          Follow-up Information       Follow up With Specialties Details Why Contact Info    [facility] Emergency Medicine  As needed, If symptoms worsen [address] [facility] [address] [phone]    Your Primary Care Doctor  Schedule an appointment as soon as possible for a visit in 3 days                 [1]   Social History  Tobacco Use    Smoking status: Some Days     Types: Vaping with nicotine   Substance Use Topics    Alcohol use: No    Drug use: Yes     Types: Marijuana, Heroin        Mary Ellen Andujar MD  Resident  03/23/25 [id number]

## 2025-04-08 ENCOUNTER — HOSPITAL ENCOUNTER (OUTPATIENT)
Facility: HOSPITAL | Age: 30
Discharge: HOME | End: 2025-04-08
Attending: EMERGENCY MEDICINE | Admitting: INTERNAL MEDICINE

## 2025-04-08 VITALS
SYSTOLIC BLOOD PRESSURE: 149 MMHG | RESPIRATION RATE: 18 BRPM | HEART RATE: 110 BPM | TEMPERATURE: 98 F | DIASTOLIC BLOOD PRESSURE: 69 MMHG | WEIGHT: 143.75 LBS | OXYGEN SATURATION: 95 % | BODY MASS INDEX: 23.1 KG/M2 | HEIGHT: 66 IN

## 2025-04-08 DIAGNOSIS — J45.901 EXACERBATION OF ASTHMA, UNSPECIFIED ASTHMA SEVERITY, UNSPECIFIED WHETHER PERSISTENT: Primary | ICD-10-CM

## 2025-04-08 DIAGNOSIS — R07.9 CHEST PAIN: ICD-10-CM

## 2025-04-08 DIAGNOSIS — J96.01 ACUTE HYPOXEMIC RESPIRATORY FAILURE: ICD-10-CM

## 2025-04-08 LAB
ABSOLUTE EOSINOPHIL (OHS): 1.01 K/UL
ABSOLUTE MONOCYTE (OHS): 1.17 K/UL (ref 0.3–1)
ABSOLUTE NEUTROPHIL COUNT (OHS): 6.21 K/UL (ref 1.8–7.7)
ALBUMIN SERPL BCP-MCNC: 3.9 G/DL (ref 3.5–5.2)
ALLENS TEST: ABNORMAL
ALP SERPL-CCNC: 70 UNIT/L (ref 40–150)
ALT SERPL W/O P-5'-P-CCNC: 24 UNIT/L (ref 10–44)
ANION GAP (OHS): 11 MMOL/L (ref 8–16)
AST SERPL-CCNC: 20 UNIT/L (ref 11–45)
BASOPHILS # BLD AUTO: 0.05 K/UL
BASOPHILS NFR BLD AUTO: 0.4 %
BILIRUB SERPL-MCNC: 0.6 MG/DL (ref 0.1–1)
BIPAP: 0
BIPAP: 0
BUN SERPL-MCNC: 12 MG/DL (ref 6–20)
CALCIUM SERPL-MCNC: 9.3 MG/DL (ref 8.7–10.5)
CHLORIDE SERPL-SCNC: 103 MMOL/L (ref 95–110)
CO2 SERPL-SCNC: 26 MMOL/L (ref 23–29)
CORRECTED TEMPERATURE (PCO2): 49 MMHG
CORRECTED TEMPERATURE (PCO2): 56.2 MMHG
CORRECTED TEMPERATURE (PH): 7.31
CORRECTED TEMPERATURE (PH): 7.35
CORRECTED TEMPERATURE (PO2): 36 MMHG
CORRECTED TEMPERATURE (PO2): 37.9 MMHG
CREAT SERPL-MCNC: 1.1 MG/DL (ref 0.5–1.4)
ERYTHROCYTE [DISTWIDTH] IN BLOOD BY AUTOMATED COUNT: 12.3 % (ref 11.5–14.5)
FIO2: 21 %
FIO2: 21 %
GFR SERPLBLD CREATININE-BSD FMLA CKD-EPI: >60 ML/MIN/1.73/M2
GLUCOSE SERPL-MCNC: 117 MG/DL (ref 70–110)
HCO3 UR-SCNC: 28.6 MMOL/L (ref 24–28)
HCT VFR BLD AUTO: 45.5 % (ref 40–54)
HGB BLD-MCNC: 14.3 GM/DL (ref 14–18)
IMM GRANULOCYTES # BLD AUTO: 0.04 K/UL (ref 0–0.04)
IMM GRANULOCYTES NFR BLD AUTO: 0.4 % (ref 0–0.5)
LYMPHOCYTES # BLD AUTO: 2.91 K/UL (ref 1–4.8)
MCH RBC QN AUTO: 27.1 PG (ref 27–31)
MCHC RBC AUTO-ENTMCNC: 31.4 G/DL (ref 32–36)
MCV RBC AUTO: 86 FL (ref 82–98)
NUCLEATED RBC (/100WBC) (OHS): 0 /100 WBC
PCO2 BLDA: 49 MMHG
PCO2 BLDA: 56.2 MMHG
PCO2 BLDA: 57.5 MMHG (ref 35–45)
PH SMN: 7.3 [PH] (ref 7.35–7.45)
PH SMN: 7.31 [PH]
PH SMN: 7.35 [PH]
PLATELET # BLD AUTO: 337 K/UL (ref 150–450)
PMV BLD AUTO: 10.4 FL (ref 9.2–12.9)
PO2 BLDA: 34 MMHG (ref 40–60)
PO2 BLDA: 36 MMHG
PO2 BLDA: 37.9 MMHG
POC BASE DEFICIT: 0.4 MMOL/L
POC BASE DEFICIT: 0.6 MMOL/L
POC BE: 2 MMOL/L
POC HCO3: 23.8 MMOL/L
POC HCO3: 24.3 MMOL/L
POC PERFORMED BY: NORMAL
POC PERFORMED BY: NORMAL
POC SATURATED O2: 58 % (ref 95–100)
POC TCO2: 30 MMOL/L (ref 24–29)
POC TEMPERATURE: 37 C
POC TEMPERATURE: 37 C
POTASSIUM SERPL-SCNC: 3.9 MMOL/L (ref 3.5–5.1)
PROT SERPL-MCNC: 7.6 GM/DL (ref 6–8.4)
RBC # BLD AUTO: 5.27 M/UL (ref 4.6–6.2)
RELATIVE EOSINOPHIL (OHS): 8.9 %
RELATIVE LYMPHOCYTE (OHS): 25.5 % (ref 18–48)
RELATIVE MONOCYTE (OHS): 10.3 % (ref 4–15)
RELATIVE NEUTROPHIL (OHS): 54.5 % (ref 38–73)
SAMPLE: ABNORMAL
SITE: ABNORMAL
SODIUM SERPL-SCNC: 140 MMOL/L (ref 136–145)
SPECIMEN SOURCE: NORMAL
SPECIMEN SOURCE: NORMAL
WBC # BLD AUTO: 11.39 K/UL (ref 3.9–12.7)

## 2025-04-08 PROCEDURE — 25000242 PHARM REV CODE 250 ALT 637 W/ HCPCS

## 2025-04-08 PROCEDURE — 96375 TX/PRO/DX INJ NEW DRUG ADDON: CPT

## 2025-04-08 PROCEDURE — 63600175 PHARM REV CODE 636 W HCPCS

## 2025-04-08 PROCEDURE — 94761 N-INVAS EAR/PLS OXIMETRY MLT: CPT | Mod: XB

## 2025-04-08 PROCEDURE — 96365 THER/PROPH/DIAG IV INF INIT: CPT

## 2025-04-08 PROCEDURE — 99291 CRITICAL CARE FIRST HOUR: CPT

## 2025-04-08 PROCEDURE — 84460 ALANINE AMINO (ALT) (SGPT): CPT

## 2025-04-08 PROCEDURE — 94640 AIRWAY INHALATION TREATMENT: CPT | Mod: XB

## 2025-04-08 PROCEDURE — 99900035 HC TECH TIME PER 15 MIN (STAT)

## 2025-04-08 PROCEDURE — 94644 CONT INHLJ TX 1ST HOUR: CPT

## 2025-04-08 PROCEDURE — G0378 HOSPITAL OBSERVATION PER HR: HCPCS

## 2025-04-08 PROCEDURE — 27000221 HC OXYGEN, UP TO 24 HOURS

## 2025-04-08 PROCEDURE — 25000003 PHARM REV CODE 250

## 2025-04-08 PROCEDURE — 82803 BLOOD GASES ANY COMBINATION: CPT

## 2025-04-08 PROCEDURE — 85025 COMPLETE CBC W/AUTO DIFF WBC: CPT

## 2025-04-08 PROCEDURE — 96361 HYDRATE IV INFUSION ADD-ON: CPT

## 2025-04-08 PROCEDURE — 25000242 PHARM REV CODE 250 ALT 637 W/ HCPCS: Performed by: INTERNAL MEDICINE

## 2025-04-08 RX ORDER — ONDANSETRON HYDROCHLORIDE 2 MG/ML
4 INJECTION, SOLUTION INTRAVENOUS EVERY 8 HOURS PRN
Status: DISCONTINUED | OUTPATIENT
Start: 2025-04-08 | End: 2025-04-08 | Stop reason: HOSPADM

## 2025-04-08 RX ORDER — IBUPROFEN 200 MG
24 TABLET ORAL
Status: DISCONTINUED | OUTPATIENT
Start: 2025-04-08 | End: 2025-04-08 | Stop reason: HOSPADM

## 2025-04-08 RX ORDER — METHYLPREDNISOLONE SOD SUCC 125 MG
125 VIAL (EA) INJECTION
Status: COMPLETED | OUTPATIENT
Start: 2025-04-08 | End: 2025-04-08

## 2025-04-08 RX ORDER — IPRATROPIUM BROMIDE AND ALBUTEROL SULFATE 2.5; .5 MG/3ML; MG/3ML
3 SOLUTION RESPIRATORY (INHALATION) EVERY 4 HOURS
Status: DISCONTINUED | OUTPATIENT
Start: 2025-04-08 | End: 2025-04-08 | Stop reason: HOSPADM

## 2025-04-08 RX ORDER — IPRATROPIUM BROMIDE AND ALBUTEROL SULFATE 2.5; .5 MG/3ML; MG/3ML
3 SOLUTION RESPIRATORY (INHALATION)
Status: COMPLETED | OUTPATIENT
Start: 2025-04-08 | End: 2025-04-08

## 2025-04-08 RX ORDER — MAGNESIUM SULFATE HEPTAHYDRATE 40 MG/ML
2 INJECTION, SOLUTION INTRAVENOUS ONCE
Status: COMPLETED | OUTPATIENT
Start: 2025-04-08 | End: 2025-04-08

## 2025-04-08 RX ORDER — FLUTICASONE FUROATE AND VILANTEROL 100; 25 UG/1; UG/1
1 POWDER RESPIRATORY (INHALATION) DAILY
Status: DISCONTINUED | OUTPATIENT
Start: 2025-04-08 | End: 2025-04-08 | Stop reason: HOSPADM

## 2025-04-08 RX ORDER — ALBUTEROL SULFATE 2.5 MG/.5ML
10 SOLUTION RESPIRATORY (INHALATION)
Status: COMPLETED | OUTPATIENT
Start: 2025-04-08 | End: 2025-04-08

## 2025-04-08 RX ORDER — PREDNISONE 50 MG/1
50 TABLET ORAL DAILY
Qty: 5 TABLET | Refills: 0 | Status: SHIPPED | OUTPATIENT
Start: 2025-04-08 | End: 2025-04-09 | Stop reason: SDUPTHER

## 2025-04-08 RX ORDER — ACETAMINOPHEN 325 MG/1
650 TABLET ORAL EVERY 8 HOURS PRN
Status: DISCONTINUED | OUTPATIENT
Start: 2025-04-08 | End: 2025-04-08 | Stop reason: HOSPADM

## 2025-04-08 RX ORDER — ALBUTEROL SULFATE 2.5 MG/.5ML
5 SOLUTION RESPIRATORY (INHALATION)
Status: CANCELLED | OUTPATIENT
Start: 2025-04-08 | End: 2025-04-08

## 2025-04-08 RX ORDER — NALOXONE HCL 0.4 MG/ML
0.02 VIAL (ML) INJECTION
Status: DISCONTINUED | OUTPATIENT
Start: 2025-04-08 | End: 2025-04-08 | Stop reason: HOSPADM

## 2025-04-08 RX ORDER — GLUCAGON 1 MG
1 KIT INJECTION
Status: DISCONTINUED | OUTPATIENT
Start: 2025-04-08 | End: 2025-04-08 | Stop reason: HOSPADM

## 2025-04-08 RX ORDER — ALBUTEROL SULFATE 90 UG/1
1-2 INHALANT RESPIRATORY (INHALATION) EVERY 4 HOURS PRN
Qty: 8 G | Refills: 0 | Status: SHIPPED | OUTPATIENT
Start: 2025-04-08 | End: 2025-04-09 | Stop reason: SDUPTHER

## 2025-04-08 RX ORDER — ALBUTEROL SULFATE 2.5 MG/.5ML
5 SOLUTION RESPIRATORY (INHALATION)
Status: COMPLETED | OUTPATIENT
Start: 2025-04-08 | End: 2025-04-08

## 2025-04-08 RX ORDER — IBUPROFEN 200 MG
16 TABLET ORAL
Status: DISCONTINUED | OUTPATIENT
Start: 2025-04-08 | End: 2025-04-08 | Stop reason: HOSPADM

## 2025-04-08 RX ORDER — POLYETHYLENE GLYCOL 3350 17 G/17G
17 POWDER, FOR SOLUTION ORAL DAILY PRN
Status: DISCONTINUED | OUTPATIENT
Start: 2025-04-08 | End: 2025-04-08 | Stop reason: HOSPADM

## 2025-04-08 RX ORDER — PREDNISONE 50 MG/1
50 TABLET ORAL DAILY
Qty: 5 TABLET | Refills: 0 | Status: SHIPPED | OUTPATIENT
Start: 2025-04-08 | End: 2025-04-08 | Stop reason: SDUPTHER

## 2025-04-08 RX ORDER — PROCHLORPERAZINE EDISYLATE 5 MG/ML
5 INJECTION INTRAMUSCULAR; INTRAVENOUS EVERY 6 HOURS PRN
Status: DISCONTINUED | OUTPATIENT
Start: 2025-04-08 | End: 2025-04-08 | Stop reason: HOSPADM

## 2025-04-08 RX ORDER — ALBUTEROL SULFATE 90 UG/1
1-2 INHALANT RESPIRATORY (INHALATION) EVERY 4 HOURS PRN
Qty: 8 G | Refills: 0 | Status: SHIPPED | OUTPATIENT
Start: 2025-04-08 | End: 2025-04-08 | Stop reason: SDUPTHER

## 2025-04-08 RX ORDER — SODIUM CHLORIDE 0.9 % (FLUSH) 0.9 %
10 SYRINGE (ML) INJECTION EVERY 12 HOURS PRN
Status: DISCONTINUED | OUTPATIENT
Start: 2025-04-08 | End: 2025-04-08 | Stop reason: HOSPADM

## 2025-04-08 RX ORDER — PREDNISONE 20 MG/1
40 TABLET ORAL DAILY
Status: DISCONTINUED | OUTPATIENT
Start: 2025-04-09 | End: 2025-04-08 | Stop reason: HOSPADM

## 2025-04-08 RX ORDER — ALUMINUM HYDROXIDE, MAGNESIUM HYDROXIDE, AND SIMETHICONE 1200; 120; 1200 MG/30ML; MG/30ML; MG/30ML
30 SUSPENSION ORAL 4 TIMES DAILY PRN
Status: DISCONTINUED | OUTPATIENT
Start: 2025-04-08 | End: 2025-04-08 | Stop reason: HOSPADM

## 2025-04-08 RX ADMIN — ALBUTEROL SULFATE 5 MG: 2.5 SOLUTION RESPIRATORY (INHALATION) at 07:04

## 2025-04-08 RX ADMIN — IPRATROPIUM BROMIDE AND ALBUTEROL SULFATE 3 ML: 2.5; .5 SOLUTION RESPIRATORY (INHALATION) at 04:04

## 2025-04-08 RX ADMIN — SODIUM CHLORIDE 1000 ML: 9 INJECTION, SOLUTION INTRAVENOUS at 04:04

## 2025-04-08 RX ADMIN — IPRATROPIUM BROMIDE AND ALBUTEROL SULFATE 3 ML: 2.5; .5 SOLUTION RESPIRATORY (INHALATION) at 03:04

## 2025-04-08 RX ADMIN — FLUTICASONE FUROATE AND VILANTEROL TRIFENATATE 1 PUFF: 100; 25 POWDER RESPIRATORY (INHALATION) at 09:04

## 2025-04-08 RX ADMIN — MAGNESIUM SULFATE HEPTAHYDRATE 2 G: 40 INJECTION, SOLUTION INTRAVENOUS at 04:04

## 2025-04-08 RX ADMIN — METHYLPREDNISOLONE SODIUM SUCCINATE 125 MG: 125 INJECTION, POWDER, FOR SOLUTION INTRAMUSCULAR; INTRAVENOUS at 04:04

## 2025-04-08 RX ADMIN — ALBUTEROL SULFATE 10 MG: 2.5 SOLUTION RESPIRATORY (INHALATION) at 04:04

## 2025-04-08 NOTE — Clinical Note
"Tucker Roberto (Luis) was seen and treated in our emergency department on 4/8/2025.  He may return to work on 04/11/2025.       If you have any questions or concerns, please don't hesitate to call.      Jaimee BULL MD    " AVS reviewed. All questions and concerns answered. No further questions at this time.

## 2025-04-08 NOTE — Clinical Note
"Tucker Roberto (Luis) was seen and treated in our emergency department on 4/8/2025.  He may return to work on 04/09/2025.       If you have any questions or concerns, please don't hesitate to call.      Gisselle Jauregui MD"

## 2025-04-08 NOTE — Clinical Note
"Tucker Roberto (Luis) was seen and treated in our emergency department on 4/8/2025.  He may return to work on 04/11/2025.       If you have any questions or concerns, please don't hesitate to call.      Jaimee BULL MD    "

## 2025-04-08 NOTE — PROVIDER PROGRESS NOTES - EMERGENCY DEPT.
Encounter Date: 4/8/2025    ED Physician Progress Notes          Patient care assumed from Chyna Coats MD at shift change. Briefly, patient presents with asthma exacerbation. At time of handoff, we are pending reassessment on NC.     I evaluated patient at 7:00 am after handoff.     Patient found to be saturating at around a 89% on 2 L nasal cannula.  No acute respiratory distress.  Placed on 4 L nasal cannula and administered 5 mg of albuterol.  Ordered VBG.  VBG shows pH of 7.34 and CO2 of 49.  Patient admitted to Hospital Medicine for further management.

## 2025-04-08 NOTE — Clinical Note
Diagnosis: Exacerbation of asthma, unspecified asthma severity, unspecified whether persistent [[id number]]   Future Attending Provider: SANJUANITA GARCIA [[id number]]

## 2025-04-08 NOTE — ED NOTES
Assumed care of the patient. Report received from JONATHAN Snider. Pt on continuous cardiac monitoring, continuous pulse oximetry, and automatic BP cuff cycling Q30min. Pt in hospital gown, side rails up X2, bed low and locked, and call light is placed within reach. No family/visitors at bedside at this time. Pt requesting ice chips or water, MD notified. Pt expresses that he is feeling much better than when he initially arrived.

## 2025-04-08 NOTE — ED PROVIDER NOTES
Encounter Date: 4/8/2025       History     Chief Complaint   Patient presents with    Shortness of Breath     + asthma exacerbation, pt. RA sat 80%, reports ran out of inhaler, pt. Is diaphoretic, reports running out of his inhaler, does not have insurance.       HPI    Tucker Roberto is a 29 y.o. male with PMH of asthma, substance use disorder, presenting to [facility] ED for asthma exacerbation.  Patient reports shortness of breath that has acutely worsened.  Patient endorses recent cough and congestion.  Reports that he ran out of his inhaler due to insurance reasons.  Denies fever, nausea/vomiting, diarrhea.  Patient reports a history of intubation due to asthma exacerbations.      Review of patient's allergies indicates:  No Known Allergies  Past Medical History:   Diagnosis Date    Asthma     Substance use disorder 10/27/2021     History reviewed. No pertinent surgical history.  No family history on file.  Social History[1]  Review of Systems   Constitutional:  Negative for fever.   HENT:  Positive for congestion. Negative for rhinorrhea and sore throat.    Eyes:  Negative for visual disturbance.   Respiratory:  Positive for cough and shortness of breath.    Cardiovascular:  Negative for chest pain and leg swelling.   Gastrointestinal:  Negative for abdominal pain, diarrhea, nausea and vomiting.   Genitourinary:  Negative for dysuria and hematuria.   Skin:  Negative for rash.   Neurological:  Negative for weakness.       Physical Exam     Initial Vitals [04/08/25 0346]   BP Pulse Resp Temp SpO2   136/85 (!) 133 (!) 38 98.5 °F (36.9 °C) (!) 80 %      MAP       --         Physical Exam    Nursing note and vitals reviewed.  Constitutional: He is cooperative.  Non-toxic appearance. He does not appear ill. He appears distressed.   HENT:   Head: Normocephalic and atraumatic. Mouth/Throat: Mucous membranes are normal. Mucous membranes are not dry.   Eyes: Conjunctivae are normal.   Neck: Phonation normal.    Cardiovascular:  Normal rate, regular rhythm and normal heart sounds.     Exam reveals no gallop, no S3, no S4 and no friction rub.       No murmur heard.  Pulmonary/Chest: Accessory muscle usage present. Tachypnea noted. He is in respiratory distress. He has wheezes.   Abdominal: Abdomen is soft. He exhibits no distension. There is no abdominal tenderness.   Musculoskeletal:      Right lower leg: No edema.      Left lower leg: No edema.     Neurological: He is alert.   Skin: Skin is warm, dry and intact. Capillary refill takes less than 2 seconds.   Psychiatric: He has a normal mood and affect. His speech is normal.         ED Course   Procedures  Labs Reviewed   COMPREHENSIVE METABOLIC PANEL - Abnormal       Result Value    Sodium 140      Potassium 3.9      Chloride 103      CO2 26      Glucose 117 (*)     BUN 12      Creatinine 1.1      Calcium 9.3      Protein Total 7.6      Albumin 3.9      Bilirubin Total 0.6      ALP 70      AST 20      ALT 24      Anion Gap 11      eGFR >60     CBC WITH DIFFERENTIAL - Abnormal    WBC 11.39      RBC 5.27      HGB 14.3      HCT 45.5      MCV 86      MCH 27.1      MCHC 31.4 (*)     RDW 12.3      Platelet Count 337      MPV 10.4      Nucleated RBC 0      Neut % 54.5      Lymph % 25.5      Mono % 10.3      Eos % 8.9 (*)     Basophil % 0.4      Imm Grans % 0.4      Neut # 6.21      Lymph # 2.91      Mono # 1.17 (*)     Eos # 1.01 (*)     Baso # 0.05      Imm Grans # 0.04     ISTAT PROCEDURE - Abnormal    POC PH 7.304 (*)     POC PCO2 57.5 (*)     POC PO2 34 (*)     POC HCO3 28.6 (*)     POC BE 2      POC SATURATED O2 58      POC TCO2 30 (*)     Sample VENOUS      Site Other      Allens Test N/A     CBC W/ AUTO DIFFERENTIAL    Narrative:     The following orders were created for panel order CBC auto differential.  Procedure                               Abnormality         Status                     ---------                               -----------         ------                      CBC with Differential[[id number]]       Abnormal            Final result                 Please view results for these tests on the individual orders.   ISTAT CHEM8          Imaging Results              X-Ray Chest AP Portable (Preliminary result)  Result time 04/08/25 06:57:46      Preliminary result by Jameel Villatoro MD (04/08/25 06:57:46)                   Initial Result:    EXAMINATION:  XR CHEST AP PORTABLE    CLINICAL HISTORY:  Asthma;    TECHNIQUE:  Single frontal view of the chest was performed.    COMPARISON:  Chest radiograph 03/23/2025    FINDINGS:  Cardiac monitoring leads overlie the chest.  Trachea and mediastinal   structures are midline.  Cardiopericardial silhouette is normal in size.    Lungs are mildly hyperexpanded, similar to priors.  No focal consolidation   or, pleural effusion or pneumothorax.    No subdiaphragmatic free air. Visualized osseous structures are   unremarkable. Soft tissues are within normal limits.    Impression:    No acute abnormality.    Electronically signed by resident: Jameel Villatoro  Date:    04/08/2025  Time:    06:54                        Preliminary result by Jameel Villatoro MD (04/08/25 06:57:25)                   Initial Result:    EXAMINATION:  XR CHEST AP PORTABLE    CLINICAL HISTORY:  Asthma;    TECHNIQUE:  Single frontal view of the chest was performed.    COMPARISON:  Chest radiograph 03/23/2025    FINDINGS:  Cardiac monitoring leads overlie the chest.  Trachea and mediastinal   structures are midline.  Cardiopericardial silhouette is normal in size.    Lungs are mildly hyperexpanded, similar to priors.  No focal consolidation   or, pleural effusion or pneumothorax.    No subdiaphragmatic free air. Visualized osseous structures are   unremarkable. Soft tissues are within normal limits.    Impression:    No focal consolidation.    Electronically signed by resident: Jameel Villatoro  Date:    04/08/2025  Time:    06:54                                        Medications   albuterol-ipratropium 2.5 mg-0.5 mg/3 mL nebulizer solution 3 mL (3 mLs Nebulization Given 4/8/25 0405)   methylPREDNISolone sodium succinate injection 125 mg (125 mg Intravenous Given 4/8/25 0403)   sodium chloride 0.9% bolus 1,000 mL 1,000 mL (0 mLs Intravenous Stopped 4/8/25 0509)   magnesium sulfate 2g in water 50mL IVPB (premix) (0 g Intravenous Stopped 4/8/25 0428)   albuterol sulfate nebulizer solution 10 mg (10 mg Nebulization Given by Other 4/8/25 0426)     Medical Decision Making  29-year-old male with history of asthma presenting for shortness of breath.  Initially, patient is afebrile, tachycardic, tachypneic, hypoxic on room air.  Patient placed on 6 L nasal cannula and immediately started on DuoNebs x3 for asthma exacerbation.  Patient maintaining a sat of 95% and has good respiratory effort.    History and physical exam consistent with asthma exacerbation.  Will give DuoNebs x3 and plan to administer 1 hour continuous albuterol after.  Will give 1 L normal saline bolus and magnesium over 20-30 minutes.  Patient given Solu-Medrol as well.  Will obtain chest x-ray to look for pneumonia but also considered upper respiratory infection.    VBG showing hypercarbic respiratory acidosis.  Patient does not have any electrolyte disturbances.  Chest x-ray reassuring.  After 1 hour continuous neb, patient titrated onto nasal cannula, patient is tolerating well.  Discussed patient's case with Hospital Medicine who agreed to admit patient to their service for asthma exacerbation.    Amount and/or Complexity of Data Reviewed  Labs: ordered. Decision-making details documented in ED Course.  Radiology: ordered and independent interpretation performed. Decision-making details documented in ED Course.    Risk  Prescription drug management.               ED Course as of 04/08/25 0659   Tue Apr 08, 2025   0416 POC PH(!): 7.304 [ES]   0416 POC PCO2(!): 57.5 [ES]   0416 POC HCO3(!): 28.6 [ES]   0516 CBC  auto differential(!)  No anemia or leukocytosis [ES]   0516 Comprehensive metabolic panel(!)  No significant electrolyte disturbances [ES]   0516 X-Ray Chest AP Portable  Independent interpretation:  No evidence of pneumonia. [ES]   0634 Patient titrated onto nasal cannula at 6:11 a.m. a.m. [ES]      ED Course User Index  [ES] Chyna Coats MD                           Clinical Impression:  Final diagnoses:  [J45.901] Exacerbation of asthma, unspecified asthma severity, unspecified whether persistent (Primary)                   [1]   Social History  Tobacco Use    Smoking status: Some Days     Types: Vaping with nicotine   Substance Use Topics    Alcohol use: No    Drug use: Yes     Types: Marijuana, Heroin        Chyna Coats MD  Resident  04/08/25 0659

## 2025-04-08 NOTE — ED TRIAGE NOTES
Tucker Roberto, a 29 y.o. male presents to the ED w/ complaint of shortness of breath.pt says he ran out of his inhaler last week and doesn't have insurance.    Triage note:  Chief Complaint   Patient presents with    Shortness of Breath     + asthma exacerbation, pt. RA sat 80%, reports ran out of inhaler, pt. Is diaphoretic, reports running out of his inhaler, does not have insurance.       Review of patient's allergies indicates:  No Known Allergies  Past Medical History:   Diagnosis Date    Asthma     Substance use disorder 10/27/2021

## 2025-04-08 NOTE — DISCHARGE INSTRUCTIONS
Please return to the emergency department if you develop shortness of breath, chest pain, bluish discoloration of lips and/or finger-nose, or any other new or concerning symptoms.    Prescriptions have been provided for albuterol and prednisone.  Take as prescribed.    Follow up with the primary care doctor.

## 2025-04-09 RX ORDER — ALBUTEROL SULFATE 90 UG/1
1-2 INHALANT RESPIRATORY (INHALATION) EVERY 4 HOURS PRN
Qty: 8 G | Refills: 0 | Status: SHIPPED | OUTPATIENT
Start: 2025-04-09 | End: 2026-04-09

## 2025-04-09 RX ORDER — PREDNISONE 50 MG/1
50 TABLET ORAL DAILY
Qty: 5 TABLET | Refills: 0 | Status: SHIPPED | OUTPATIENT
Start: 2025-04-09 | End: 2025-04-14

## 2025-04-09 NOTE — PROVIDER PROGRESS NOTES - EMERGENCY DEPT.
Encounter Date: 4/8/2025    ED Physician Progress Notes          After admission, patient refused admission due to financial concerns. States feeling well and wanting to go home. Patient saturating well on room air. No increased work of breathing. No audible wheezing. Able to speak full sentences clearly. Able to ambulate without any difficulty nor hypoxia. Through shared decision making, patient discharged with prescriptions for albuterol and prednisone. Advised to follow up with PCP. Return precautions explained. Patient discharged.

## 2025-04-18 ENCOUNTER — HOSPITAL ENCOUNTER (INPATIENT)
Facility: HOSPITAL | Age: 30
LOS: 2 days | Discharge: HOME | DRG: 189 | End: 2025-04-20
Attending: STUDENT IN AN ORGANIZED HEALTH CARE EDUCATION/TRAINING PROGRAM | Admitting: INTERNAL MEDICINE

## 2025-04-18 DIAGNOSIS — R06.02 SOB (SHORTNESS OF BREATH): ICD-10-CM

## 2025-04-18 DIAGNOSIS — J96.02 ACUTE RESPIRATORY FAILURE WITH HYPOXIA AND HYPERCAPNIA: ICD-10-CM

## 2025-04-18 DIAGNOSIS — J96.01 ACUTE RESPIRATORY FAILURE WITH HYPOXIA AND HYPERCAPNIA: ICD-10-CM

## 2025-04-18 DIAGNOSIS — J45.901 EXACERBATION OF ASTHMA, UNSPECIFIED ASTHMA SEVERITY, UNSPECIFIED WHETHER PERSISTENT: Primary | ICD-10-CM

## 2025-04-18 PROBLEM — J96.12 ACUTE HYPOXIC ON CHRONIC HYPERCAPNIC RESPIRATORY FAILURE: Status: ACTIVE | Noted: 2022-02-19

## 2025-04-18 LAB
ABSOLUTE EOSINOPHIL (OHS): 0.98 K/UL
ABSOLUTE MONOCYTE (OHS): 1.16 K/UL (ref 0.3–1)
ABSOLUTE NEUTROPHIL COUNT (OHS): 7.06 K/UL (ref 1.8–7.7)
ALBUMIN SERPL BCP-MCNC: 4 G/DL (ref 3.5–5.2)
ALP SERPL-CCNC: 57 UNIT/L (ref 40–150)
ALT SERPL W/O P-5'-P-CCNC: 23 UNIT/L (ref 10–44)
ANION GAP (OHS): 13 MMOL/L (ref 8–16)
AST SERPL-CCNC: 18 UNIT/L (ref 11–45)
BASOPHILS # BLD AUTO: 0.05 K/UL
BASOPHILS NFR BLD AUTO: 0.4 %
BILIRUB SERPL-MCNC: 0.4 MG/DL (ref 0.1–1)
BIPAP: 0
BIPAP: 0
BUN SERPL-MCNC: 9 MG/DL (ref 6–20)
CALCIUM SERPL-MCNC: 8.9 MG/DL (ref 8.7–10.5)
CHLORIDE SERPL-SCNC: 109 MMOL/L (ref 95–110)
CO2 SERPL-SCNC: 22 MMOL/L (ref 23–29)
CORRECTED TEMPERATURE (PCO2): 57.5 MMHG
CORRECTED TEMPERATURE (PH): 7.29
CORRECTED TEMPERATURE (PO2): 37.6 MMHG
CREAT SERPL-MCNC: 1 MG/DL (ref 0.5–1.4)
ERYTHROCYTE [DISTWIDTH] IN BLOOD BY AUTOMATED COUNT: 12.7 % (ref 11.5–14.5)
FIO2: 21 %
GFR SERPLBLD CREATININE-BSD FMLA CKD-EPI: >60 ML/MIN/1.73/M2
GLUCOSE SERPL-MCNC: 134 MG/DL (ref 70–110)
HCT VFR BLD AUTO: 43.2 % (ref 40–54)
HGB BLD-MCNC: 13.9 GM/DL (ref 14–18)
IGE SERPL-ACNC: 430 IU/ML
IMM GRANULOCYTES # BLD AUTO: 0.05 K/UL (ref 0–0.04)
IMM GRANULOCYTES NFR BLD AUTO: 0.4 % (ref 0–0.5)
LYMPHOCYTES # BLD AUTO: 2.29 K/UL (ref 1–4.8)
MAGNESIUM SERPL-MCNC: 2.4 MG/DL (ref 1.6–2.6)
MCH RBC QN AUTO: 27.7 PG (ref 27–31)
MCHC RBC AUTO-ENTMCNC: 32.2 G/DL (ref 32–36)
MCV RBC AUTO: 86 FL (ref 82–98)
NUCLEATED RBC (/100WBC) (OHS): 0 /100 WBC
PCO2 BLDA: 57.5 MMHG
PCO2 BLDA: 61.5 MMHG
PH SMN: 7.27 [PH]
PH SMN: 7.29 [PH]
PHOSPHATE SERPL-MCNC: 1.4 MG/DL (ref 2.7–4.5)
PLATELET # BLD AUTO: 387 K/UL (ref 150–450)
PMV BLD AUTO: 10.1 FL (ref 9.2–12.9)
PO2 BLDA: 34.8 MMHG
PO2 BLDA: 37.6 MMHG
POC BASE DEFICIT: -0.2 MMOL/L
POC BASE DEFICIT: -0.4 MMOL/L
POC HCO3: 23.1 MMOL/L
POC HCO3: 23.4 MMOL/L
POC PERFORMED BY: NORMAL
POC PERFORMED BY: NORMAL
POC TEMPERATURE: 37 C
POTASSIUM SERPL-SCNC: 3.7 MMOL/L (ref 3.5–5.1)
PROT SERPL-MCNC: 7.2 GM/DL (ref 6–8.4)
RBC # BLD AUTO: 5.01 M/UL (ref 4.6–6.2)
RELATIVE EOSINOPHIL (OHS): 8.5 %
RELATIVE LYMPHOCYTE (OHS): 19.8 % (ref 18–48)
RELATIVE MONOCYTE (OHS): 10 % (ref 4–15)
RELATIVE NEUTROPHIL (OHS): 60.9 % (ref 38–73)
SODIUM SERPL-SCNC: 144 MMOL/L (ref 136–145)
SPECIMEN SOURCE: NORMAL
SPECIMEN SOURCE: NORMAL
WBC # BLD AUTO: 11.59 K/UL (ref 3.9–12.7)

## 2025-04-18 PROCEDURE — 63600175 PHARM REV CODE 636 W HCPCS: Performed by: STUDENT IN AN ORGANIZED HEALTH CARE EDUCATION/TRAINING PROGRAM

## 2025-04-18 PROCEDURE — 5A09357 ASSISTANCE WITH RESPIRATORY VENTILATION, LESS THAN 24 CONSECUTIVE HOURS, CONTINUOUS POSITIVE AIRWAY PRESSURE: ICD-10-PCS | Performed by: EMERGENCY MEDICINE

## 2025-04-18 PROCEDURE — 99900035 HC TECH TIME PER 15 MIN (STAT)

## 2025-04-18 PROCEDURE — 94644 CONT INHLJ TX 1ST HOUR: CPT

## 2025-04-18 PROCEDURE — 82785 ASSAY OF IGE: CPT | Performed by: STUDENT IN AN ORGANIZED HEALTH CARE EDUCATION/TRAINING PROGRAM

## 2025-04-18 PROCEDURE — 82803 BLOOD GASES ANY COMBINATION: CPT

## 2025-04-18 PROCEDURE — 96375 TX/PRO/DX INJ NEW DRUG ADDON: CPT

## 2025-04-18 PROCEDURE — 94640 AIRWAY INHALATION TREATMENT: CPT

## 2025-04-18 PROCEDURE — 80053 COMPREHEN METABOLIC PANEL: CPT | Performed by: STUDENT IN AN ORGANIZED HEALTH CARE EDUCATION/TRAINING PROGRAM

## 2025-04-18 PROCEDURE — 0202U NFCT DS 22 TRGT SARS-COV-2: CPT

## 2025-04-18 PROCEDURE — 94660 CPAP INITIATION&MGMT: CPT

## 2025-04-18 PROCEDURE — 12000002 HC ACUTE/MED SURGE SEMI-PRIVATE ROOM

## 2025-04-18 PROCEDURE — 27000221 HC OXYGEN, UP TO 24 HOURS

## 2025-04-18 PROCEDURE — 25000242 PHARM REV CODE 250 ALT 637 W/ HCPCS

## 2025-04-18 PROCEDURE — 96365 THER/PROPH/DIAG IV INF INIT: CPT

## 2025-04-18 PROCEDURE — 86003 ALLG SPEC IGE CRUDE XTRC EA: CPT | Performed by: STUDENT IN AN ORGANIZED HEALTH CARE EDUCATION/TRAINING PROGRAM

## 2025-04-18 PROCEDURE — 85025 COMPLETE CBC W/AUTO DIFF WBC: CPT | Performed by: STUDENT IN AN ORGANIZED HEALTH CARE EDUCATION/TRAINING PROGRAM

## 2025-04-18 PROCEDURE — 27000190 HC CPAP FULL FACE MASK W/VALVE

## 2025-04-18 PROCEDURE — 94761 N-INVAS EAR/PLS OXIMETRY MLT: CPT | Mod: XB

## 2025-04-18 PROCEDURE — 93010 ELECTROCARDIOGRAM REPORT: CPT | Mod: ,,, | Performed by: INTERNAL MEDICINE

## 2025-04-18 PROCEDURE — 99285 EMERGENCY DEPT VISIT HI MDM: CPT | Mod: 25

## 2025-04-18 PROCEDURE — 83516 IMMUNOASSAY NONANTIBODY: CPT | Performed by: STUDENT IN AN ORGANIZED HEALTH CARE EDUCATION/TRAINING PROGRAM

## 2025-04-18 PROCEDURE — 83735 ASSAY OF MAGNESIUM: CPT | Performed by: STUDENT IN AN ORGANIZED HEALTH CARE EDUCATION/TRAINING PROGRAM

## 2025-04-18 PROCEDURE — 25000242 PHARM REV CODE 250 ALT 637 W/ HCPCS: Performed by: STUDENT IN AN ORGANIZED HEALTH CARE EDUCATION/TRAINING PROGRAM

## 2025-04-18 PROCEDURE — 93005 ELECTROCARDIOGRAM TRACING: CPT

## 2025-04-18 PROCEDURE — 84100 ASSAY OF PHOSPHORUS: CPT | Performed by: STUDENT IN AN ORGANIZED HEALTH CARE EDUCATION/TRAINING PROGRAM

## 2025-04-18 PROCEDURE — 27100171 HC OXYGEN HIGH FLOW UP TO 24 HOURS

## 2025-04-18 RX ORDER — METHYLPREDNISOLONE SOD SUCC 125 MG
125 VIAL (EA) INJECTION EVERY 8 HOURS
Status: DISCONTINUED | OUTPATIENT
Start: 2025-04-19 | End: 2025-04-19

## 2025-04-18 RX ORDER — IPRATROPIUM BROMIDE AND ALBUTEROL SULFATE 2.5; .5 MG/3ML; MG/3ML
3 SOLUTION RESPIRATORY (INHALATION) EVERY 4 HOURS
Status: DISCONTINUED | OUTPATIENT
Start: 2025-04-19 | End: 2025-04-19

## 2025-04-18 RX ORDER — METHYLPREDNISOLONE SOD SUCC 125 MG
125 VIAL (EA) INJECTION
Status: COMPLETED | OUTPATIENT
Start: 2025-04-18 | End: 2025-04-18

## 2025-04-18 RX ORDER — ALBUTEROL SULFATE 2.5 MG/.5ML
SOLUTION RESPIRATORY (INHALATION)
Status: COMPLETED
Start: 2025-04-18 | End: 2025-04-18

## 2025-04-18 RX ORDER — SODIUM CHLORIDE 0.9 % (FLUSH) 0.9 %
10 SYRINGE (ML) INJECTION
Status: DISCONTINUED | OUTPATIENT
Start: 2025-04-18 | End: 2025-04-20 | Stop reason: HOSPADM

## 2025-04-18 RX ORDER — MAGNESIUM SULFATE HEPTAHYDRATE 40 MG/ML
2 INJECTION, SOLUTION INTRAVENOUS
Status: COMPLETED | OUTPATIENT
Start: 2025-04-18 | End: 2025-04-18

## 2025-04-18 RX ORDER — ENOXAPARIN SODIUM 100 MG/ML
40 INJECTION SUBCUTANEOUS EVERY 24 HOURS
Status: DISCONTINUED | OUTPATIENT
Start: 2025-04-18 | End: 2025-04-20 | Stop reason: HOSPADM

## 2025-04-18 RX ORDER — FLUTICASONE FUROATE AND VILANTEROL 100; 25 UG/1; UG/1
1 POWDER RESPIRATORY (INHALATION) DAILY
Status: DISCONTINUED | OUTPATIENT
Start: 2025-04-19 | End: 2025-04-20 | Stop reason: HOSPADM

## 2025-04-18 RX ORDER — BUDESONIDE AND FORMOTEROL FUMARATE DIHYDRATE 80; 4.5 UG/1; UG/1
2 AEROSOL RESPIRATORY (INHALATION) 2 TIMES DAILY
Status: DISCONTINUED | OUTPATIENT
Start: 2025-04-18 | End: 2025-04-18

## 2025-04-18 RX ORDER — IPRATROPIUM BROMIDE AND ALBUTEROL SULFATE 2.5; .5 MG/3ML; MG/3ML
9 SOLUTION RESPIRATORY (INHALATION) ONCE
Status: COMPLETED | OUTPATIENT
Start: 2025-04-18 | End: 2025-04-18

## 2025-04-18 RX ORDER — IPRATROPIUM BROMIDE AND ALBUTEROL SULFATE 2.5; .5 MG/3ML; MG/3ML
3 SOLUTION RESPIRATORY (INHALATION)
Status: DISCONTINUED | OUTPATIENT
Start: 2025-04-18 | End: 2025-04-18

## 2025-04-18 RX ORDER — ALBUTEROL SULFATE 2.5 MG/.5ML
10 SOLUTION RESPIRATORY (INHALATION)
Status: COMPLETED | OUTPATIENT
Start: 2025-04-18 | End: 2025-04-18

## 2025-04-18 RX ADMIN — ALBUTEROL SULFATE 10 MG: 2.5 SOLUTION RESPIRATORY (INHALATION) at 09:04

## 2025-04-18 RX ADMIN — IPRATROPIUM BROMIDE AND ALBUTEROL SULFATE 3 ML: 2.5; .5 SOLUTION RESPIRATORY (INHALATION) at 11:04

## 2025-04-18 RX ADMIN — ENOXAPARIN SODIUM 40 MG: 40 INJECTION SUBCUTANEOUS at 11:04

## 2025-04-18 RX ADMIN — MAGNESIUM SULFATE HEPTAHYDRATE 2 G: 40 INJECTION, SOLUTION INTRAVENOUS at 06:04

## 2025-04-18 RX ADMIN — IPRATROPIUM BROMIDE AND ALBUTEROL SULFATE 9 ML: 2.5; .5 SOLUTION RESPIRATORY (INHALATION) at 06:04

## 2025-04-18 RX ADMIN — METHYLPREDNISOLONE SODIUM SUCCINATE 125 MG: 125 INJECTION, POWDER, FOR SOLUTION INTRAMUSCULAR; INTRAVENOUS at 06:04

## 2025-04-18 NOTE — ED NOTES
LOC: The patient is awake and alert; oriented x 3 and speaking appropriately.  APPEARANCE: Patient resting uncomfortably, patient is clean and well groomed  SKIN: cool and diaphoretic,  normal skin turgor & moist mucus membranes, skin intact, no breakdown noted.  MUSCULOSKELETAL: Patient moving all extremities well, no obvious swelling or deformities noted  RESPIRATORY: Airway is open and patent, breath sounds w/ crackles throughout all lung fields; respirations are spontaneous, labored effort and increased rate  CARDIAC: Patient has a tachy rate, no peripheral edema noted, capillary refill < 3 seconds; No complaints of chest pain   ABDOMEN: Soft and non tender to palpation, no distention noted. Bowel sounds present x 4

## 2025-04-18 NOTE — ED TRIAGE NOTES
C/O sob - used all his inhalers w/ out relief .  Has a productive cough w/ yellow mucus. Denies fever/chills. Hx of intubation due to asthma exacerbation.Last 2 yrs ago. States he is exposed to chemicals at work. Using accessory muscle w/ inspiration.

## 2025-04-18 NOTE — ED PROVIDER NOTES
Encounter Date: 4/18/2025       History     Chief Complaint   Patient presents with    Shortness of Breath     Having Asthma attack, used inhaler with no relief      HPI:   Tucker Roberto is a 29-year-old man with a past medical history asthma requiring hospitalization and intubation, and substance use disorder brought in by EMS secondary to worsening shortness of breath despite using inhaler with minimal to no relief. Patient reports productive cough with yellow/white sputum. No recent muscle cramps, sore throat or sick contacts. . Denies recent fevers/chills, nausea/vomiting, shortness of breath, and cough.    Review of patient's allergies indicates:   Allergen Reactions    Shellfish containing products Swelling     Past Medical History:   Diagnosis Date    Asthma     Substance use disorder 10/27/2021     History reviewed. No pertinent surgical history.  No family history on file.  Social History[1]  Review of Systems    Physical Exam     Initial Vitals [04/18/25 1814]   BP Pulse Resp Temp SpO2   (!) 185/86 (!) 138 (!) 26 98.8 °F (37.1 °C) (!) 89 %      MAP       --         Physical Exam    Nursing note and vitals reviewed.  Constitutional: He appears well-developed and well-nourished. He is diaphoretic. He appears distressed.   HENT:   Head: Normocephalic and atraumatic.   Eyes: No scleral icterus.   Cardiovascular:      Exam reveals no gallop and no friction rub.       No murmur heard.  Tachycardia   Pulmonary/Chest: No stridor. He is in respiratory distress. He has wheezes. He has no rales.   Abdominal: Abdomen is soft. He exhibits no distension. There is no abdominal tenderness. There is no rebound and no guarding.   Musculoskeletal:         General: No edema. Normal range of motion.     Neurological: He is alert.   Answering questions appropriately   Skin: Skin is warm. No rash noted. No erythema.         ED Course   Procedures  Labs Reviewed   COMPREHENSIVE METABOLIC PANEL - Abnormal       Result Value     Sodium 144      Potassium 3.7      Chloride 109      CO2 22 (*)     Glucose 134 (*)     BUN 9      Creatinine 1.0      Calcium 8.9      Protein Total 7.2      Albumin 4.0      Bilirubin Total 0.4      ALP 57      AST 18      ALT 23      Anion Gap 13      eGFR >60     CBC WITH DIFFERENTIAL - Abnormal    WBC 11.59      RBC 5.01      HGB 13.9 (*)     HCT 43.2      MCV 86      MCH 27.7      MCHC 32.2      RDW 12.7      Platelet Count 387      MPV 10.1      Nucleated RBC 0      Neut % 60.9      Lymph % 19.8      Mono % 10.0      Eos % 8.5 (*)     Basophil % 0.4      Imm Grans % 0.4      Neut # 7.06      Lymph # 2.29      Mono # 1.16 (*)     Eos # 0.98 (*)     Baso # 0.05      Imm Grans # 0.05 (*)    PHOSPHORUS - Abnormal    Phosphorus Level 1.4 (*)    MAGNESIUM - Normal    Magnesium  2.4     RESPIRATORY INFECTION PANEL (PCR), NASOPHARYNGEAL    Respiratory Infection Panel Source Nasopharyngeal Swab      Adenovirus Not Detected      Coronavirus 229E, Common Cold Virus Not Detected      Coronavirus HKU1, Common Cold Virus Not Detected      Coronavirus NL63, Common Cold Virus Not Detected      Coronavirus OC43, Common Cold Virus Not Detected      SARS-CoV2 (COVID-19) Qualitative PCR Not Detected      Human Metapneumovirus Not Detected      Human Rhinovirus/Enterovirus Not Detected      Influenza A Not Detected      Influenza B Not Detected      Parainfluenza Virus 1 Not Detected      Parainfluenza Virus 2 Not Detected      Parainfluenza Virus 3 Not Detected      Parainfluenza Virus 4 Not Detected      Respiratory Syncytial Virus Not Detected      Bordetella Parapertussis (FD3813) Not Detected      Bordetella pertussis (ptxP) Not Detected      Chlamydia pneumoniae Not Detected      Mycoplasma pneumoniae Not Detected     CBC W/ AUTO DIFFERENTIAL    Narrative:     The following orders were created for panel order CBC auto differential.  Procedure                               Abnormality         Status                      ---------                               -----------         ------                     CBC with Differential[[id number]]       Abnormal            Final result                 Please view results for these tests on the individual orders.   IGE    Total IgE 430     EXTRA TUBES    Narrative:     The following orders were created for panel order EXTRA TUBES.  Procedure                               Abnormality         Status                     ---------                               -----------         ------                     Lavender Top Hold[[id number]]                               Final result                 Please view results for these tests on the individual orders.   LAVENDER TOP HOLD    Extra Tube Hold for add-ons.     ALLERGEN ASPERGILLUS FUMAGATUS IGE   ANCA PANEL W/MPO & PR3          Imaging Results              X-Ray Chest AP Portable (Final result)  Result time 04/18/25 21:33:24      Final result by Karlo Horvath DO (04/18/25 21:33:24)                   Impression:      Normal chest.      Electronically signed by: Karlo Horvath  Date:    04/18/2025  Time:    21:33               Narrative:    EXAMINATION:  XR CHEST AP PORTABLE    CLINICAL HISTORY:  Asthma;    TECHNIQUE:  Single frontal view of the chest was performed.    COMPARISON:  04/08/2025.    FINDINGS:  The lungs are well expanded and clear. No focal opacities are seen. The pleural spaces are clear. The cardiac silhouette is unremarkable. The visualized osseous structures are unremarkable.                                       Medications   sodium chloride 0.9% flush 10 mL (has no administration in time range)   enoxaparin injection 40 mg (40 mg Subcutaneous Given 4/18/25 1489)   methylPREDNISolone sodium succinate injection 125 mg (has no administration in time range)   albuterol-ipratropium 2.5 mg-0.5 mg/3 mL nebulizer solution 3 mL (3 mLs Nebulization Given by Other 4/18/25 7736)   fluticasone furoate-vilanteroL 100-25 mcg/dose diskus  inhaler 1 puff (has no administration in time range)   methylPREDNISolone sodium succinate injection 125 mg (125 mg Intravenous Given 4/18/25 1839)   magnesium sulfate 2g in water 50mL IVPB (premix) (0 g Intravenous Stopped 4/18/25 1905)   albuterol-ipratropium 2.5 mg-0.5 mg/3 mL nebulizer solution 9 mL (9 mLs Nebulization Given 4/18/25 1828)   albuterol sulfate nebulizer solution 10 mg (10 mg Nebulization Given by Other 4/18/25 2124)     Medical Decision Making  Tucker Roberto is a 29 y.o. male with above PMH who presents with acute hypoxic hypercapnic respiratory failure    Differential diagnoses considered include, but not limited to: asthma exacerbation, MI/ACS, pneumothorax, pericardial effusion/tamonade, pneumonia, pericarditis/myocarditis, pleural effusion, CHF exacerbation, anemia, allergy/atopy, influenza, viral URI, viral syndrome.    Patient given steroids, DuoNebs, magnesium sulfate, but continued to have increased work of breathing requiring BiPAP.  Upon reassessment, patient reports significant improvement with BiPAP.      Patient discussed with medical critical care team who agreed to admit.     Amount and/or Complexity of Data Reviewed  External Data Reviewed: radiology.  Labs: ordered.  Radiology: ordered.     Details: Echo    - Interpretation Summary -  · The left ventricle is normal in size with normal systolic function.  · The estimated ejection fraction is 65%.  · There is abnormal septal wall motion.  · Normal left ventricular diastolic function.  · Normal right ventricular size with normal right ventricular systolic function.  · Mild tricuspid regurgitation.  · Elevated central venous pressure (15 mmHg).  · The estimated PA systolic pressure is 39 mmHg.  ECG/medicine tests:  Decision-making details documented in ED Course.    Risk  Prescription drug management.  Decision regarding hospitalization.              Attending Attestation:   Physician Attestation Statement for Resident:  As the  supervising MD   Physician Attestation Statement: I have personally seen and examined this patient.   I agree with the above history.  -:   As the supervising MD I agree with the above PE.     As the supervising MD I agree with the above treatment, course, plan, and disposition.   -: Procedure: Critical Care  Please put in 75 minutes of critical care.   Critical care was necessary to treat or prevent imminent or life-threatening deterioration of the following conditions:  acute asthma exacerbation, acute hypoxemic hypercapnic respiratory failure                         ED Course as of 04/19/25 0043 Fri Apr 18, 2025 2112 EKG 12-lead  Independently interpreted shows sinus tachycardia, rate 139, no STEMI [BD]      ED Course User Index  [BD] Kaleb Jarvis MD                     Clinical Impression:  Final diagnoses:  [J45.901] Exacerbation of asthma, unspecified asthma severity, unspecified whether persistent (Primary)  [J96.01, J96.02] Acute respiratory failure with hypoxia and hypercapnia        ED Disposition Condition    Admit                   [1]   Social History  Tobacco Use    Smoking status: Some Days     Types: Vaping with nicotine   Substance Use Topics    Alcohol use: No    Drug use: Yes     Types: Marijuana, Heroin        Saul Wilson MD  Resident  04/19/25 0043

## 2025-04-18 NOTE — ED PROVIDER NOTES
"Encounter Date: 4/18/2025       History     Chief Complaint   Patient presents with    Shortness of Breath     Having Asthma attack, used inhaler with no relief      HPI  Review of patient's allergies indicates:  No Known Allergies  Past Medical History:   Diagnosis Date    Asthma     Substance use disorder 10/27/2021     No past surgical history on file.  No family history on file.  Social History[1]  Review of Systems    Physical Exam     Initial Vitals [04/18/25 1814]   BP Pulse Resp Temp SpO2   (!) 185/86 (!) 138 (!) 26 98.8 °F (37.1 °C) (!) 89 %      MAP       --         Physical Exam    ED Course   Procedures  Labs Reviewed - No data to display       Imaging Results    None          Medications - No data to display  Medical Decision Making                                    Clinical Impression:   ***Please document a Clinical Impression and click the "Refresh" button to refresh your note and automatically pull in before signing.***                [1]   Social History  Tobacco Use    Smoking status: Some Days     Types: Vaping with nicotine   Substance Use Topics    Alcohol use: No    Drug use: Yes     Types: Marijuana, Heroin     "

## 2025-04-19 LAB
ABSOLUTE EOSINOPHIL (OHS): 0 K/UL
ABSOLUTE MONOCYTE (OHS): 0.05 K/UL (ref 0.3–1)
ABSOLUTE NEUTROPHIL COUNT (OHS): 12.17 K/UL (ref 1.8–7.7)
ADENOVIRUS: NOT DETECTED
ALBUMIN SERPL BCP-MCNC: 3.7 G/DL (ref 3.5–5.2)
ALLENS TEST: ABNORMAL
ALP SERPL-CCNC: 57 UNIT/L (ref 40–150)
ALT SERPL W/O P-5'-P-CCNC: 23 UNIT/L (ref 10–44)
AMPHET UR QL SCN: NEGATIVE
ANION GAP (OHS): 16 MMOL/L (ref 8–16)
AST SERPL-CCNC: 13 UNIT/L (ref 11–45)
BARBITURATE SCN PRESENT UR: NEGATIVE
BASOPHILS # BLD AUTO: 0.02 K/UL
BASOPHILS NFR BLD AUTO: 0.2 %
BENZODIAZ UR QL SCN: NEGATIVE
BILIRUB SERPL-MCNC: 0.4 MG/DL (ref 0.1–1)
BORDETELLA PARAPERTUSSIS (IS1001): NOT DETECTED
BORDETELLA PERTUSSIS (PTXP): NOT DETECTED
BUN SERPL-MCNC: 9 MG/DL (ref 6–20)
CALCIUM SERPL-MCNC: 9.4 MG/DL (ref 8.7–10.5)
CANNABINOIDS UR QL SCN: NEGATIVE
CHLAMYDIA PNEUMONIAE: NOT DETECTED
CHLORIDE SERPL-SCNC: 106 MMOL/L (ref 95–110)
CO2 SERPL-SCNC: 16 MMOL/L (ref 23–29)
COCAINE UR QL SCN: NEGATIVE
CORONAVIRUS 229E, COMMON COLD VIRUS: NOT DETECTED
CORONAVIRUS HKU1, COMMON COLD VIRUS: NOT DETECTED
CORONAVIRUS NL63, COMMON COLD VIRUS: NOT DETECTED
CORONAVIRUS OC43, COMMON COLD VIRUS: NOT DETECTED
CREAT SERPL-MCNC: 1.1 MG/DL (ref 0.5–1.4)
CREAT UR-MCNC: 235 MG/DL (ref 23–375)
ERYTHROCYTE [DISTWIDTH] IN BLOOD BY AUTOMATED COUNT: 13.1 % (ref 11.5–14.5)
ETHANOL UR-MCNC: <10 MG/DL
FLUBV RNA NPH QL NAA+NON-PROBE: NOT DETECTED
GFR SERPLBLD CREATININE-BSD FMLA CKD-EPI: >60 ML/MIN/1.73/M2
GLUCOSE SERPL-MCNC: 234 MG/DL (ref 70–110)
HCO3 UR-SCNC: 18.7 MMOL/L (ref 24–28)
HCT VFR BLD AUTO: 43.6 % (ref 40–54)
HGB BLD-MCNC: 13.7 GM/DL (ref 14–18)
HOLD SPECIMEN: NORMAL
HPIV1 RNA NPH QL NAA+NON-PROBE: NOT DETECTED
HPIV2 RNA NPH QL NAA+NON-PROBE: NOT DETECTED
HPIV3 RNA NPH QL NAA+NON-PROBE: NOT DETECTED
HPIV4 RNA NPH QL NAA+NON-PROBE: NOT DETECTED
HUMAN METAPNEUMOVIRUS: NOT DETECTED
IMM GRANULOCYTES # BLD AUTO: 0.07 K/UL (ref 0–0.04)
IMM GRANULOCYTES NFR BLD AUTO: 0.5 % (ref 0–0.5)
INFLUENZA A: NOT DETECTED
LYMPHOCYTES # BLD AUTO: 0.49 K/UL (ref 1–4.8)
MAGNESIUM SERPL-MCNC: 2.1 MG/DL (ref 1.6–2.6)
MCH RBC QN AUTO: 27.4 PG (ref 27–31)
MCHC RBC AUTO-ENTMCNC: 31.4 G/DL (ref 32–36)
MCV RBC AUTO: 87 FL (ref 82–98)
METHADONE UR QL SCN: NEGATIVE
MYCOPLASMA PNEUMONIAE: NOT DETECTED
NUCLEATED RBC (/100WBC) (OHS): 0 /100 WBC
OHS QRS DURATION: 86 MS
OHS QTC CALCULATION: 423 MS
OPIATES UR QL SCN: ABNORMAL
PCO2 BLDA: 36.4 MMHG (ref 35–45)
PCP UR QL: NEGATIVE
PH SMN: 7.32 [PH] (ref 7.35–7.45)
PHOSPHATE SERPL-MCNC: 1.5 MG/DL (ref 2.7–4.5)
PHOSPHATE SERPL-MCNC: 2.2 MG/DL (ref 2.7–4.5)
PLATELET # BLD AUTO: 354 K/UL (ref 150–450)
PMV BLD AUTO: 10.1 FL (ref 9.2–12.9)
PO2 BLDA: 44 MMHG (ref 40–60)
POC BE: -7 MMOL/L
POC SATURATED O2: 76 % (ref 95–100)
POC TCO2: 20 MMOL/L (ref 24–29)
POCT GLUCOSE: 134 MG/DL (ref 70–110)
POCT GLUCOSE: 144 MG/DL (ref 70–110)
POCT GLUCOSE: 145 MG/DL (ref 70–110)
POTASSIUM SERPL-SCNC: 4.3 MMOL/L (ref 3.5–5.1)
PROT SERPL-MCNC: 7.2 GM/DL (ref 6–8.4)
RBC # BLD AUTO: 5 M/UL (ref 4.6–6.2)
RELATIVE EOSINOPHIL (OHS): 0 %
RELATIVE LYMPHOCYTE (OHS): 3.8 % (ref 18–48)
RELATIVE MONOCYTE (OHS): 0.4 % (ref 4–15)
RELATIVE NEUTROPHIL (OHS): 95.1 % (ref 38–73)
RESPIRATORY INFECTION PANEL SOURCE: NORMAL
RSV RNA NPH QL NAA+NON-PROBE: NOT DETECTED
RV+EV RNA NPH QL NAA+NON-PROBE: NOT DETECTED
SAMPLE: ABNORMAL
SARS-COV-2 RNA RESP QL NAA+PROBE: NOT DETECTED
SITE: ABNORMAL
SODIUM SERPL-SCNC: 138 MMOL/L (ref 136–145)
WBC # BLD AUTO: 12.8 K/UL (ref 3.9–12.7)

## 2025-04-19 PROCEDURE — 27000221 HC OXYGEN, UP TO 24 HOURS

## 2025-04-19 PROCEDURE — 25000003 PHARM REV CODE 250

## 2025-04-19 PROCEDURE — 11000001 HC ACUTE MED/SURG PRIVATE ROOM

## 2025-04-19 PROCEDURE — 94640 AIRWAY INHALATION TREATMENT: CPT

## 2025-04-19 PROCEDURE — 99900035 HC TECH TIME PER 15 MIN (STAT)

## 2025-04-19 PROCEDURE — 94761 N-INVAS EAR/PLS OXIMETRY MLT: CPT

## 2025-04-19 PROCEDURE — 25000003 PHARM REV CODE 250: Performed by: EMERGENCY MEDICINE

## 2025-04-19 PROCEDURE — 25000242 PHARM REV CODE 250 ALT 637 W/ HCPCS: Performed by: STUDENT IN AN ORGANIZED HEALTH CARE EDUCATION/TRAINING PROGRAM

## 2025-04-19 PROCEDURE — 84100 ASSAY OF PHOSPHORUS: CPT | Performed by: STUDENT IN AN ORGANIZED HEALTH CARE EDUCATION/TRAINING PROGRAM

## 2025-04-19 PROCEDURE — 63600175 PHARM REV CODE 636 W HCPCS

## 2025-04-19 PROCEDURE — 82803 BLOOD GASES ANY COMBINATION: CPT

## 2025-04-19 PROCEDURE — 63600175 PHARM REV CODE 636 W HCPCS: Performed by: STUDENT IN AN ORGANIZED HEALTH CARE EDUCATION/TRAINING PROGRAM

## 2025-04-19 PROCEDURE — 85025 COMPLETE CBC W/AUTO DIFF WBC: CPT | Performed by: STUDENT IN AN ORGANIZED HEALTH CARE EDUCATION/TRAINING PROGRAM

## 2025-04-19 PROCEDURE — 83735 ASSAY OF MAGNESIUM: CPT | Performed by: STUDENT IN AN ORGANIZED HEALTH CARE EDUCATION/TRAINING PROGRAM

## 2025-04-19 PROCEDURE — 25000242 PHARM REV CODE 250 ALT 637 W/ HCPCS

## 2025-04-19 PROCEDURE — 80307 DRUG TEST PRSMV CHEM ANLYZR: CPT

## 2025-04-19 PROCEDURE — 80053 COMPREHEN METABOLIC PANEL: CPT | Performed by: STUDENT IN AN ORGANIZED HEALTH CARE EDUCATION/TRAINING PROGRAM

## 2025-04-19 PROCEDURE — 84100 ASSAY OF PHOSPHORUS: CPT

## 2025-04-19 PROCEDURE — 99223 1ST HOSP IP/OBS HIGH 75: CPT | Mod: ,,, | Performed by: EMERGENCY MEDICINE

## 2025-04-19 RX ORDER — IPRATROPIUM BROMIDE 0.5 MG/2.5ML
SOLUTION RESPIRATORY (INHALATION)
Status: DISPENSED
Start: 2025-04-19 | End: 2025-04-19

## 2025-04-19 RX ORDER — PREDNISONE 5 MG/1
5 TABLET ORAL DAILY
Status: DISCONTINUED | OUTPATIENT
Start: 2025-05-10 | End: 2025-04-20 | Stop reason: HOSPADM

## 2025-04-19 RX ORDER — PREDNISONE 10 MG/1
10 TABLET ORAL DAILY
Status: DISCONTINUED | OUTPATIENT
Start: 2025-05-05 | End: 2025-04-20 | Stop reason: HOSPADM

## 2025-04-19 RX ORDER — PREDNISONE 20 MG/1
40 TABLET ORAL DAILY
Status: DISCONTINUED | OUTPATIENT
Start: 2025-04-20 | End: 2025-04-20 | Stop reason: HOSPADM

## 2025-04-19 RX ORDER — INSULIN ASPART 100 [IU]/ML
0-5 INJECTION, SOLUTION INTRAVENOUS; SUBCUTANEOUS
Status: DISCONTINUED | OUTPATIENT
Start: 2025-04-19 | End: 2025-04-20 | Stop reason: HOSPADM

## 2025-04-19 RX ORDER — LEVALBUTEROL 1.25 MG/.5ML
1.25 SOLUTION, CONCENTRATE RESPIRATORY (INHALATION) EVERY 4 HOURS
Status: DISCONTINUED | OUTPATIENT
Start: 2025-04-19 | End: 2025-04-20 | Stop reason: HOSPADM

## 2025-04-19 RX ORDER — PREDNISONE 20 MG/1
40 TABLET ORAL DAILY
Status: DISCONTINUED | OUTPATIENT
Start: 2025-04-19 | End: 2025-04-20

## 2025-04-19 RX ORDER — IBUPROFEN 200 MG
16 TABLET ORAL
Status: DISCONTINUED | OUTPATIENT
Start: 2025-04-19 | End: 2025-04-20 | Stop reason: HOSPADM

## 2025-04-19 RX ORDER — ACETAMINOPHEN 500 MG
1000 TABLET ORAL EVERY 8 HOURS PRN
Status: DISCONTINUED | OUTPATIENT
Start: 2025-04-19 | End: 2025-04-20 | Stop reason: HOSPADM

## 2025-04-19 RX ORDER — ACETAMINOPHEN 500 MG
1000 TABLET ORAL EVERY 6 HOURS PRN
Status: DISCONTINUED | OUTPATIENT
Start: 2025-04-19 | End: 2025-04-19

## 2025-04-19 RX ORDER — LEVALBUTEROL 1.25 MG/.5ML
SOLUTION, CONCENTRATE RESPIRATORY (INHALATION)
Status: DISPENSED
Start: 2025-04-19 | End: 2025-04-19

## 2025-04-19 RX ORDER — ACETAMINOPHEN 325 MG/1
650 TABLET ORAL EVERY 6 HOURS PRN
Status: DISCONTINUED | OUTPATIENT
Start: 2025-04-19 | End: 2025-04-19

## 2025-04-19 RX ORDER — IBUPROFEN 200 MG
24 TABLET ORAL
Status: DISCONTINUED | OUTPATIENT
Start: 2025-04-19 | End: 2025-04-20 | Stop reason: HOSPADM

## 2025-04-19 RX ORDER — SODIUM,POTASSIUM PHOSPHATES 280-250MG
2 POWDER IN PACKET (EA) ORAL
Status: DISCONTINUED | OUTPATIENT
Start: 2025-04-19 | End: 2025-04-20

## 2025-04-19 RX ORDER — PREDNISONE 20 MG/1
20 TABLET ORAL DAILY
Status: DISCONTINUED | OUTPATIENT
Start: 2025-04-30 | End: 2025-04-20 | Stop reason: HOSPADM

## 2025-04-19 RX ORDER — MUPIROCIN 20 MG/G
OINTMENT TOPICAL 2 TIMES DAILY
Status: DISCONTINUED | OUTPATIENT
Start: 2025-04-19 | End: 2025-04-20 | Stop reason: HOSPADM

## 2025-04-19 RX ORDER — IPRATROPIUM BROMIDE 0.5 MG/2.5ML
0.5 SOLUTION RESPIRATORY (INHALATION) EVERY 4 HOURS
Status: DISCONTINUED | OUTPATIENT
Start: 2025-04-19 | End: 2025-04-20 | Stop reason: HOSPADM

## 2025-04-19 RX ORDER — MONTELUKAST SODIUM 10 MG/1
10 TABLET ORAL DAILY
Status: DISCONTINUED | OUTPATIENT
Start: 2025-04-19 | End: 2025-04-20 | Stop reason: HOSPADM

## 2025-04-19 RX ORDER — GLUCAGON 1 MG
1 KIT INJECTION
Status: DISCONTINUED | OUTPATIENT
Start: 2025-04-19 | End: 2025-04-20 | Stop reason: HOSPADM

## 2025-04-19 RX ADMIN — PREDNISONE 40 MG: 20 TABLET ORAL at 10:04

## 2025-04-19 RX ADMIN — SODIUM PHOSPHATE, MONOBASIC, MONOHYDRATE AND SODIUM PHOSPHATE, DIBASIC, ANHYDROUS 30 MMOL: 142; 276 INJECTION, SOLUTION INTRAVENOUS at 06:04

## 2025-04-19 RX ADMIN — POTASSIUM & SODIUM PHOSPHATES POWDER PACK 280-160-250 MG 2 PACKET: 280-160-250 PACK at 09:04

## 2025-04-19 RX ADMIN — METHYLPREDNISOLONE SODIUM SUCCINATE 125 MG: 125 INJECTION, POWDER, FOR SOLUTION INTRAMUSCULAR; INTRAVENOUS at 02:04

## 2025-04-19 RX ADMIN — ENOXAPARIN SODIUM 40 MG: 40 INJECTION SUBCUTANEOUS at 04:04

## 2025-04-19 RX ADMIN — POTASSIUM & SODIUM PHOSPHATES POWDER PACK 280-160-250 MG 2 PACKET: 280-160-250 PACK at 04:04

## 2025-04-19 RX ADMIN — LEVALBUTEROL 1.25 MG: 1.25 SOLUTION, CONCENTRATE RESPIRATORY (INHALATION) at 04:04

## 2025-04-19 RX ADMIN — IPRATROPIUM BROMIDE 0.5 MG: 0.5 SOLUTION RESPIRATORY (INHALATION) at 07:04

## 2025-04-19 RX ADMIN — MONTELUKAST 10 MG: 10 TABLET, FILM COATED ORAL at 10:04

## 2025-04-19 RX ADMIN — ACETAMINOPHEN 1000 MG: 500 TABLET ORAL at 12:04

## 2025-04-19 RX ADMIN — POTASSIUM & SODIUM PHOSPHATES POWDER PACK 280-160-250 MG 2 PACKET: 280-160-250 PACK at 10:04

## 2025-04-19 RX ADMIN — ACETAMINOPHEN 1000 MG: 500 TABLET ORAL at 09:04

## 2025-04-19 RX ADMIN — LEVALBUTEROL 1.25 MG: 1.25 SOLUTION, CONCENTRATE RESPIRATORY (INHALATION) at 07:04

## 2025-04-19 RX ADMIN — FLUTICASONE FUROATE AND VILANTEROL TRIFENATATE 1 PUFF: 100; 25 POWDER RESPIRATORY (INHALATION) at 07:04

## 2025-04-19 RX ADMIN — IPRATROPIUM BROMIDE 0.5 MG: 0.5 SOLUTION RESPIRATORY (INHALATION) at 04:04

## 2025-04-19 RX ADMIN — LEVALBUTEROL 1.25 MG: 1.25 SOLUTION, CONCENTRATE RESPIRATORY (INHALATION) at 11:04

## 2025-04-19 RX ADMIN — MUPIROCIN 2 G: 20 OINTMENT TOPICAL at 09:04

## 2025-04-19 RX ADMIN — IPRATROPIUM BROMIDE 0.5 MG: 0.5 SOLUTION RESPIRATORY (INHALATION) at 11:04

## 2025-04-19 NOTE — PLAN OF CARE
MICU DAILY GOALS     Family/Goals of care/Code Status   Code Status: Full Code    24H Vital Sign Range  Temp:  [97.9 °F (36.6 °C)-98.9 °F (37.2 °C)]   Pulse:  []   Resp:  [12-27]   BP: ()/(52-98)   SpO2:  [89 %-100 %]      Shift Events (include procedures and significant events)   No acute events throughout shift    AWAKE RASS: Goal -    Actual - RASS (La Agitation-Sedation Scale): alert and calm    Restraint necessity: Not necessary   BREATHE SBT: Not intubated    Coordinate A & B, analgesics/sedatives Pain: managed   SAT: Not intubated   Delirium CAM-ICU:     Early(intubated/ Progressive (non-intubated) Mobility MOVE Screen (INTUBATED ONLY): Not intubated    Activity: Activity Management: Ambulated to bathroom - L4   Feeding/Nutrition Diet order: Diet/Nutrition Received: consistent carb/diabetic diet,     Thrombus DVT prophylaxis:     HOB Elevation Head of Bed (HOB) Positioning: HOB at 30-45 degrees   Ulcer Prophylaxis GI: no   Glucose control managed     Skin Skin assessment:     Sacrum intact/not altered? Yes  Heels intact/not altered? Yes  Surgical wound? No    CHECK ONE!   (no altered skin or altered skin) and sub boxes:  [] No Altered Skin Integrity Present    []Prevention Measures Documented    [] Altered Skin Integrity Present or Discovered   [] LDA already present in EPIC, daily doc completed              [] LDA added if not already in EPIC (describe/stage wound).               [] Wound Image Taken (required on admit,                   transfer/discharge and every Tuesday)    Wound Care Consulted? No   Bowel Function no issues    Indwelling Catheter Necessity            De-escalation Antibiotics N/a        VS and assessment per flow sheet, patient progressing towards goals as tolerated, plan of care reviewed with family, all concerns addressed, will continue to monitor.

## 2025-04-19 NOTE — HPI
29 year old man with recent asthma exacerbations requiring admission that presented for wheezing at home not relieved by his rescue Albuterol inhaler. Reports associated yellow sputum. He ran out of Symbicort and is only using a rescue inhaler. He last presented to the ED for asthma 10 days ago and has been unable to follow with pulmonology due to being uninsured. 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 He also lives with his girlfriend's cat that triggers his asthma. He has been intubated in the past 2 years ago for asthma. He denies nasal congestion, nasal polyps, NSAID use, or sick contacts. In the ED, he had a respiratory acidosis (pH 7.29/pCO2 58/pO2 38/HCO3 23) and was treated with Duonebs, steroids, magnesium  with no improvement. He was placed on Bipap 14/7 and work of breathing improved. CXR was clear. He was admitted to MICU for asthma exacerbation requiring Bipap.

## 2025-04-19 NOTE — EICU
Virtual ICU Quality Rounds    Admit Date: 4/18/2025  Hospital Day: 1    ICU Day: 9h    24H Vital Sign Range:  Temp:  [97.9 °F (36.6 °C)-98.9 °F (37.2 °C)]   Pulse:  []   Resp:  [13-27]   BP: ()/(52-98)   SpO2:  [89 %-100 %]     VICU Surveillance Screening                    LDA reconciliation : Yes

## 2025-04-19 NOTE — H&P
[facility] - Emergency Dept  Critical Care Medicine  History & Physical    Patient Name: Tucker Roberto  MRN: [id number]  Admission Date: 4/18/2025  Hospital Length of Stay: 0 days  Code Status: Full Code  Attending Physician: Krunal Chung MD   Primary Care Provider: No, Primary Doctor   Principal Problem: <principal problem not specified>    Subjective:     HPI:  29 year old man with recent asthma exacerbations requiring admission that presented for wheezing at home not relieved by his rescue Albuterol inhaler. Reports associated yellow sputum. He ran out of Symbicort and is only using a rescue inhaler. He last presented to the ED for asthma 10 days ago and has been unable to follow with pulmonology due to being uninsured. 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 He also lives with his girlfriend's cat that triggers his asthma. He has been intubated in the past 2 years ago for asthma. He denies nasal congestion, nasal polyps, NSAID use, or sick contacts. In the ED, he had a respiratory acidosis (pH 7.29/pCO2 58/pO2 38/HCO3 23) and was treated with Duonebs, steroids, magnesium  with no improvement. He was placed on Bipap 14/7 and work of breathing improved. CXR was clear. He was admitted to MICU for asthma exacerbation requiring Bipap.         Hospital/ICU Course:  No notes on file     Past Medical History:   Diagnosis Date    Asthma     Substance use disorder 10/27/2021       History reviewed. No pertinent surgical history.    Review of patient's allergies indicates:   Allergen Reactions    Shellfish containing products Swelling       Family History    None       Tobacco Use    Smoking status: Some Days     Types: Vaping with nicotine    Smokeless tobacco: Not on file   Substance and Sexual Activity    Alcohol use: No    Drug use: Yes     Types: Marijuana, Heroin    Sexual activity: Not Currently      Review of Systems   Constitutional:  Positive for diaphoresis. Negative for fever.   HENT:   Negative for ear pain and sore throat.    Eyes:  Negative for photophobia and redness.   Respiratory:  Positive for cough, chest tightness and shortness of breath.    Cardiovascular:  Negative for chest pain and palpitations.   Gastrointestinal:  Negative for abdominal pain.   Genitourinary:  Negative for dysuria.   Musculoskeletal:  Negative for back pain.   Skin:  Negative for rash and wound.   Neurological:  Negative for headaches.     Objective:     Vital Signs (Most Recent):  Temp: 98.9 °F (37.2 °C) (04/18/25 2030)  Pulse: (!) 124 (04/18/25 2200)  Resp: 16 (04/18/25 2124)  BP: (!) 148/85 (04/18/25 2200)  SpO2: 95 % (04/18/25 2200) Vital Signs (24h Range):  Temp:  [98.8 °F (37.1 °C)-98.9 °F (37.2 °C)] 98.9 °F (37.2 °C)  Pulse:  [] 124  Resp:  [14-27] 16  SpO2:  [89 %-99 %] 95 %  BP: (116-185)/(67-98) 148/85   Weight: 64.9 kg (143 lb)  Body mass index is 23.08 kg/m².    No intake or output data in the 24 hours ending 04/18/25 2207       Physical Exam  Constitutional:       General: He is in acute distress.      Appearance: He is not diaphoretic.   HENT:      Head: Normocephalic and atraumatic.      Mouth/Throat:      Mouth: Mucous membranes are moist.      Pharynx: No oropharyngeal exudate.   Eyes:      Extraocular Movements: Extraocular movements intact.   Cardiovascular:      Rate and Rhythm: Regular rhythm. Tachycardia present.      Heart sounds: No murmur heard.  Pulmonary:      Effort: Respiratory distress present.      Breath sounds: Wheezing present.      Comments: On BiPAP. Speaking in full sentences although with increased work of breathing   Abdominal:      General: There is no distension.      Palpations: Abdomen is soft.      Tenderness: There is no abdominal tenderness.   Musculoskeletal:         General: No swelling. Normal range of motion.      Cervical back: Normal range of motion and neck supple.   Skin:     General: Skin is warm.   Neurological:      General: No focal deficit present.       Mental Status: He is alert.            Vents:     Lines/Drains/Airways       Peripheral Intravenous Line  Duration                  Peripheral IV - Single Lumen 04/18/25 1830 20 G Right Antecubital <1 day                  Significant Labs:    CBC/Anemia Profile:  Recent Labs   Lab 04/18/25  1845   WBC 11.59   HGB 13.9*   HCT 43.2      MCV 86   RDW 12.7        Chemistries:  Recent Labs   Lab 04/18/25  1845      K 3.7      CO2 22*   BUN 9   CREATININE 1.0   CALCIUM 8.9   ALBUMIN 4.0   BILITOT 0.4   ALKPHOS 57   ALT 23   AST 18   GLUCOSE 134*       All pertinent labs within the past 24 hours have been reviewed.    Significant Imaging: I have reviewed all pertinent imaging results/findings within the past 24 hours.  Assessment/Plan:     Pulmonary  Acute hypoxic on chronic hypercapnic respiratory failure  Pt is a 29 year old male with PMHx significant for asthma with prior hospitalization and intubation in setting of asthma exacerbations who presented to the ED for shortness of breath and wheezing. Presentation consistent with severe asthma exacerbation. Treated with duonebs, steroids, and mg without significant improvement. Repiratory acidosis noted with pH 7.29/pCO2 58/pO2 38/HCO3 23 and pt placed on BiPAP and received an additional continuous albuterol treatment with improvement in wheezing and work of breathing although persistent respiratory acidosis noted.     -Admit to MICU  -duoneb q4H  -methylpred q8H  -BiPAP  -f/u RIP, aspergillus IgE, ANCA panel as pt with eosinophilia noted on CBC    Patient with Hypercapnic Respiratory failure which is Acute.  he is not on home oxygen. Supplemental oxygen was provided and noted-      .   Signs/symptoms of respiratory failure include- tachypnea, increased work of breathing, respiratory distress, and wheezing. Contributing diagnoses includes -  asthma  Labs and images were reviewed. Patient Has recent ABG, which has been reviewed. Will treat underlying  causes and adjust management of respiratory failure as follows- duoneb, steroids, BiPAP    Asthma  Patient with asthma since childhood presents for recurrent asthma exacerbation. Reports triggers including chemical exposure at work, girlfriend's cat, and stair climbing.     -methylprednisolone q8  -duoneb q4   -follow up aspergillus IgE, ANCA panel  - for assistance with insurance   -consider [facility] pulm clinic on discharge        Critical Care Daily Checklist:    A: Awake: RASS Goal/Actual Goal:    Actual:     B: Spontaneous Breathing Trial Performed?     C: SAT & SBT Coordinated?                        D: Delirium: CAM-ICU     E: Early Mobility Performed? No   F: Feeding Goal:    Status:     Current Diet Order   No orders of the defined types were placed in this encounter.      AS: Analgesia/Sedation    T: Thromboembolic Prophylaxis Lovenox    H: HOB > 300 Yes   U: Stress Ulcer Prophylaxis (if needed)    G: Glucose Control 140-180   B: Bowel Function     I: Indwelling Catheter (Lines & Baca) Necessity    D: De-escalation of Antimicrobials/Pharmacotherapies None     Plan for the day/ETD Monitor on bipap     Code Status:  Family/Goals of Care: Full Code         Critical secondary to Patient has a condition that poses threat to life and bodily function: Severe Respiratory Distress    Critical care was time spent personally by me on the following activities: development of treatment plan with patient or surrogate and bedside caregivers, discussions with consultants, evaluation of patient's response to treatment, examination of patient, ordering and performing treatments and interventions, ordering and review of laboratory studies, ordering and review of radiographic studies, pulse oximetry, re-evaluation of patient's condition. This critical care time did not overlap with that of any other provider or involve time for any procedures.     Osiris Barnett,   Critical Care Medicine  [facility] - Emergency Dept

## 2025-04-19 NOTE — SUBJECTIVE & OBJECTIVE
Interval History/Significant Events: Improved on 1 L NC.   Prednisone taper 40 mg po x 3 weeks.    Review of Systems  Objective:     Vital Signs (Most Recent):  Temp: 98.7 °F (37.1 °C) (04/19/25 0705)  Pulse: (!) 120 (04/19/25 0905)  Resp: 20 (04/19/25 0905)  BP: 123/61 (04/19/25 0905)  SpO2: (!) 94 % (04/19/25 0905) Vital Signs (24h Range):  Temp:  [97.9 °F (36.6 °C)-98.9 °F (37.2 °C)] 98.7 °F (37.1 °C)  Pulse:  [] 120  Resp:  [13-27] 20  SpO2:  [89 %-100 %] 94 %  BP: ()/(52-98) 123/61   Weight: 76.4 kg (168 lb 6.9 oz)  Body mass index is 27.19 kg/m².      Intake/Output Summary (Last 24 hours) at 4/19/2025 1107  Last data filed at 4/19/2025 1005  Gross per 24 hour   Intake 133.86 ml   Output 1050 ml   Net -916.14 ml          Physical Exam  Constitutional:       General: He is in acute distress.      Appearance: He is not diaphoretic.   HENT:      Head: Normocephalic and atraumatic.      Mouth/Throat:      Mouth: Mucous membranes are moist.      Pharynx: No oropharyngeal exudate.   Eyes:      Extraocular Movements: Extraocular movements intact.   Cardiovascular:      Rate and Rhythm: Regular rhythm. Tachycardia present.      Heart sounds: No murmur heard.  Pulmonary:      Effort: No respiratory distress.      Breath sounds: Rales present. No wheezing.      Comments: 1LNC.  Speaks full sentences  Abdominal:      General: There is no distension.      Palpations: Abdomen is soft.      Tenderness: There is no abdominal tenderness.   Musculoskeletal:         General: No swelling. Normal range of motion.      Cervical back: Normal range of motion and neck supple.   Skin:     General: Skin is warm.   Neurological:      General: No focal deficit present.      Mental Status: He is alert.            Vents:     Lines/Drains/Airways       Peripheral Intravenous Line  Duration                  Peripheral IV - Single Lumen 04/18/25 1830 20 G Right Antecubital <1 day         Peripheral IV - Single Lumen 04/19/25 0106  22 G Left Hand <1 day                  Significant Labs:    CBC/Anemia Profile:  Recent Labs   Lab 04/18/25 1845 04/19/25  0441   WBC 11.59 12.80*   HGB 13.9* 13.7*   HCT 43.2 43.6    354   MCV 86 87   RDW 12.7 13.1        Chemistries:  Recent Labs   Lab 04/18/25 1845 04/18/25  2253 04/19/25  0441     --  138   K 3.7  --  4.3     --  106   CO2 22*  --  16*   BUN 9  --  9   CREATININE 1.0  --  1.1   CALCIUM 8.9  --  9.4   ALBUMIN 4.0  --  3.7   BILITOT 0.4  --  0.4   ALKPHOS 57  --  57   ALT 23  --  23   AST 18  --  13   GLUCOSE 134*  --  234*   MG  --  2.4 2.1   PHOS  --  1.4* 1.5*       All pertinent labs within the past 24 hours have been reviewed.    Significant Imaging:  I have reviewed all pertinent imaging results/findings within the past 24 hours.

## 2025-04-19 NOTE — ASSESSMENT & PLAN NOTE
Patient with asthma since childhood presents for recurrent asthma exacerbation. Reports triggers including chemical exposure at work, girlfriend's cat, and stair climbing.     -Prednisone taper 40 mg po x 3 weeks  -fluticasone furoate-vilanteroL   -Ipratropium  -montelukast  -duoneb q4   -follow up aspergillus IgE, ANCA panel  - for assistance with insurance   -consider [facility] pulm clinic on discharge

## 2025-04-19 NOTE — PROGRESS NOTES
[facility] - Medical ICU  Critical Care Medicine  Progress Note    Patient Name: Tucker Roberto  MRN: [id number]  Admission Date: 4/18/2025  Hospital Length of Stay: 1 days  Code Status: Full Code  Attending Provider: Krunal Chung MD  Primary Care Provider: No, Primary Doctor   Principal Problem: <principal problem not specified>    Subjective:     HPI:  29 year old man with recent asthma exacerbations requiring admission that presented for wheezing at home not relieved by his rescue Albuterol inhaler. Reports associated yellow sputum. He ran out of Symbicort and is only using a rescue inhaler. He last presented to the ED for asthma 10 days ago and has been unable to follow with pulmonology due to being uninsured. 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 He also lives with his girlfriend's cat that triggers his asthma. He has been intubated in the past 2 years ago for asthma. He denies nasal congestion, nasal polyps, NSAID use, or sick contacts. In the ED, he had a respiratory acidosis (pH 7.29/pCO2 58/pO2 38/HCO3 23) and was treated with Duonebs, steroids, magnesium  with no improvement. He was placed on Bipap 14/7 and work of breathing improved. CXR was clear. He was admitted to MICU for asthma exacerbation requiring Bipap.         Hospital/ICU Course:  Admitted for acute asthma exacerbation with hypoxic and hypercapnic RF. Eosinophilia. Improved with Bipap. Also on methylpred, ICS, ANGE,JOEL. Will step-down to Hospital Medicine.  ANCA, IgE studies ordered. Coordianting with  for Pulm clinic appt.        Interval History/Significant Events: Improved on 1 L NC.   Prednisone taper 40 mg po x 3 weeks.    Review of Systems  Objective:     Vital Signs (Most Recent):  Temp: 98.7 °F (37.1 °C) (04/19/25 0705)  Pulse: (!) 120 (04/19/25 0905)  Resp: 20 (04/19/25 0905)  BP: 123/61 (04/19/25 0905)  SpO2: (!) 94 % (04/19/25 0905) Vital Signs (24h Range):  Temp:  [97.9 °F (36.6 °C)-98.9 °F (37.2 °C)] 98.7  °F (37.1 °C)  Pulse:  [] 120  Resp:  [13-27] 20  SpO2:  [89 %-100 %] 94 %  BP: ()/(52-98) 123/61   Weight: 76.4 kg (168 lb 6.9 oz)  Body mass index is 27.19 kg/m².      Intake/Output Summary (Last 24 hours) at 4/19/2025 1107  Last data filed at 4/19/2025 1005  Gross per 24 hour   Intake 133.86 ml   Output 1050 ml   Net -916.14 ml          Physical Exam  Constitutional:       General: He isresting comfortably     Appearance: He is not diaphoretic.   HENT:      Head: Normocephalic and atraumatic.      Mouth/Throat:      Mouth: Mucous membranes are moist.      Pharynx: No oropharyngeal exudate.   Eyes:      Extraocular Movements: Extraocular movements intact.   Cardiovascular:      Rate and Rhythm: Regular rhythm. Tachycardia present.      Heart sounds: No murmur heard.  Pulmonary:      Effort: No respiratory distress.      Breath sounds: Rales present. No wheezing.      Comments: 1LNC.  Speaks full sentences  Abdominal:      General: There is no distension.      Palpations: Abdomen is soft.      Tenderness: There is no abdominal tenderness.   Musculoskeletal:         General: No swelling. Normal range of motion.      Cervical back: Normal range of motion and neck supple.   Skin:     General: Skin is warm.   Neurological:      General: No focal deficit present.      Mental Status: He is alert.            Vents:     Lines/Drains/Airways       Peripheral Intravenous Line  Duration                  Peripheral IV - Single Lumen 04/18/25 1830 20 G Right Antecubital <1 day         Peripheral IV - Single Lumen 04/19/25 0106 22 G Left Hand <1 day                  Significant Labs:    CBC/Anemia Profile:  Recent Labs   Lab 04/18/25  1845 04/19/25  0441   WBC 11.59 12.80*   HGB 13.9* 13.7*   HCT 43.2 43.6    354   MCV 86 87   RDW 12.7 13.1        Chemistries:  Recent Labs   Lab 04/18/25 1845 04/18/25  2253 04/19/25  0441     --  138   K 3.7  --  4.3     --  106   CO2 22*  --  16*   BUN 9  --  9    CREATININE 1.0  --  1.1   CALCIUM 8.9  --  9.4   ALBUMIN 4.0  --  3.7   BILITOT 0.4  --  0.4   ALKPHOS 57  --  57   ALT 23  --  23   AST 18  --  13   GLUCOSE 134*  --  234*   MG  --  2.4 2.1   PHOS  --  1.4* 1.5*       All pertinent labs within the past 24 hours have been reviewed.    Significant Imaging:  I have reviewed all pertinent imaging results/findings within the past 24 hours.    ABG  Recent Labs   Lab 04/19/25  0636   PH 7.318*   PO2 44   PCO2 36.4   HCO3 18.7*   BE -7*     Assessment/Plan:     Pulmonary  Acute hypoxic on chronic hypercapnic respiratory failure  Pt is a 29 year old male with PMHx significant for asthma with prior hospitalization and intubation in setting of asthma exacerbations who presented to the ED for shortness of breath and wheezing. Presentation consistent with severe asthma exacerbation. Treated with duonebs, steroids, and mg without significant improvement. Repiratory acidosis noted with pH 7.29/pCO2 58/pO2 38/HCO3 23 and pt placed on BiPAP and received an additional continuous albuterol treatment with improvement in wheezing and work of breathing although persistent respiratory acidosis noted.     Improved with BIPAP.  -Step down to Medicine.  -duoneb q4H  -Pred 40 mg po x 3 wk.  -1L NC  -f/u RIP, aspergillus IgE, ANCA panel as pt with eosinophilia noted on CBC    Patient with Hypercapnic Respiratory failure which is Acute.  he is not on home oxygen. Supplemental oxygen was provided and noted-      .   Signs/symptoms of respiratory failure include- tachypnea, increased work of breathing, respiratory distress, and wheezing. Contributing diagnoses includes -  asthma  Labs and images were reviewed. Patient Has recent ABG, which has been reviewed. Will treat underlying causes and adjust management of respiratory failure as follows- duoneb, steroids, BiPAP    Asthma  Patient with asthma since childhood presents for recurrent asthma exacerbation. Reports triggers including chemical  exposure at work, girlfriend's cat, and stair climbing.     -Prednisone taper 40 mg po x 3 weeks  -fluticasone furoate-vilanteroL   -Ipratropium  -montelukast  -duoneb q4   -follow up aspergillus IgE, ANCA panel  - for assistance with insurance   -consider [facility] pulm clinic on discharge       Critical Care Daily Checklist:    A: Awake: RASS Goal/Actual Goal:    Actual:     B: Spontaneous Breathing Trial Performed?     C: SAT & SBT Coordinated?  no                      D: Delirium: CAM-ICU     E: Early Mobility Performed? Yes   F: Feeding Goal:    Status:     Current Diet Order   Procedures    Diet Consistent Carbohydrate 2000 Calories (up to 75 gm per meal)     Total calories / carbs::   2000 Calories (up to 75 gm per meal)      AS: Analgesia/Sedation N/a   T: Thromboembolic Prophylaxis Enoxa    H: HOB > 300 Yes   U: Stress Ulcer Prophylaxis (if needed) pantoprazole   G: Glucose Control 230s - SSI   B: Bowel Function  none   I: Indwelling Catheter (Lines & Baca) Necessity Right antecubital and lef thand   D: De-escalation of Antimicrobials/Pharmacotherapies No    Plan for the day/ETD Step-down to floors.    Code Status:  Family/Goals of Care: Full Code         Critical secondary to Patient has a condition that poses threat to life and bodily function: severe asthma required BIPAP      Critical care was time spent personally by me on the following activities: development of treatment plan with patient or surrogate and bedside caregivers, discussions with consultants, evaluation of patient's response to treatment, examination of patient, ordering and performing treatments and interventions, ordering and review of laboratory studies, ordering and review of radiographic studies, pulse oximetry, re-evaluation of patient's condition. This critical care time did not overlap with that of any other provider or involve time for any procedures.     Power Humphrey MD  Critical Care Medicine  [facility] - Medical ICU

## 2025-04-19 NOTE — CONSULTS
.Patient seen and examined, admission ordered placed to ICU for Severe Asthma Exacerbation requiring NIV.    Full H and P to follow.      Pradip Rivera MD  Pulmonary/Critical Care

## 2025-04-19 NOTE — RESIDENT HANDOFF
Handoff     Primary Team: Networked reference to record [facility]  Room Number: 7078/7078 A     Patient Name: Tucker Roberto MRN: [id number]     Date of Birth: [id number] Allergies: Shellfish containing products     Age: 30 y.o. Admit Date: 4/18/2025     Sex: male  BMI: Body mass index is 27.19 kg/m².     Code Status: Full Code        Illness Level (current clinical status): Watcher - No    Reason for Admission: Acute asthma exacerbation    Brief HPI (pertinent PMH and diagnosis or differential diagnosis): 30 year-old male with PMH of 2 asthma hospitalizations requiring intubation, substance use disorder (heroin) who comes in for asthma exacerbation with hypoxic and hypercapnic respiratory failure requiring Bipap. Last 4 ED visits due to asthma exacerbation iso 2/2 poor access to symbicort.     Procedure Date: NA    Hospital Course (updated, brief assessment by system or problem, significant events): Admitted for acute asthma exacerbation with hypoxic and hypercapnic RF. Eosinophilia. Improved with Bipap. Also on methylpred, ICS, ANGE,JOEL.     Tasks (specific, using if-then statements):   -Contact social work to make sure he has pulm clinic appointment at [facility] before discharge.  -Follow-up Aspergillus IgE and ANCA studies.   -Can follow-up outpatient with Pulm for  biologic therapies for eosinophilic asthma   -Prednisone taper 40 mg PO x 3 weeks.    Contingency Plan (special circumstances anticipated and plan):   - Monitor respiratory status.    Estimated Discharge Date: TBD    Discharge Disposition: Home or Self Care    Mentored By: Dr. Chung

## 2025-04-19 NOTE — HOSPITAL COURSE
Admitted for acute asthma exacerbation with hypoxic and hypercapnic RF. Eosinophilia. Improved with Bipap. Also on methylpred, ICS, ANGE,JOEL. Will step-down to Hospital Medicine.  ANCA, IgE studies ordered. Coordianting with [facility] for Pulm clinic appt. At the time of discharge on 1 L NC, doing well, without respiratory distress or cough. Discharged with 1 month supply of advair, prednsione, montelukast. Will follow-up with Pulmonology clinic at [facility].        Physical Exam  Constitutional:       General: He isresting comfortably     Appearance: He is not diaphoretic.   HENT:      Head: Normocephalic and atraumatic.      Mouth/Throat:      Mouth: Mucous membranes are moist.      Pharynx: No oropharyngeal exudate.   Eyes:      Extraocular Movements: Extraocular movements intact.   Cardiovascular:      Rate and Rhythm: Regular rhythm. Tachycardia present.      Heart sounds: No murmur heard.  Pulmonary:      Effort: No respiratory distress.      Breath sounds:. Adequate, No wheezing.      Comments: 1LNC.  Speaks full sentences  Abdominal:      General: There is no distension.      Palpations: Abdomen is soft.      Tenderness: There is no abdominal tenderness.   Musculoskeletal:         General: No swelling. Normal range of motion.      Cervical back: Normal range of motion and neck supple.   Skin:     General: Skin is warm.   Neurological:      General: No focal deficit present.      Mental Status: He is alert.

## 2025-04-19 NOTE — ASSESSMENT & PLAN NOTE
Pt is a 29 year old male with PMHx significant for asthma with prior hospitalization and intubation in setting of asthma exacerbations who presented to the ED for shortness of breath and wheezing. Presentation consistent with severe asthma exacerbation. Treated with duonebs, steroids, and mg without significant improvement. Repiratory acidosis noted with pH 7.29/pCO2 58/pO2 38/HCO3 23 and pt placed on BiPAP and received an additional continuous albuterol treatment with improvement in wheezing and work of breathing although persistent respiratory acidosis noted.     -Admit to MICU  -duoneb q4H  -methylpred q8H  -BiPAP  -f/u RIP, aspergillus IgE, ANCA panel as pt with eosinophilia noted on CBC    Patient with Hypercapnic Respiratory failure which is Acute.  he is not on home oxygen. Supplemental oxygen was provided and noted-      .   Signs/symptoms of respiratory failure include- tachypnea, increased work of breathing, respiratory distress, and wheezing. Contributing diagnoses includes -  asthma  Labs and images were reviewed. Patient Has recent ABG, which has been reviewed. Will treat underlying causes and adjust management of respiratory failure as follows- duoneb, steroids, BiPAP

## 2025-04-19 NOTE — SUBJECTIVE & OBJECTIVE
Past Medical History:   Diagnosis Date    Asthma     Substance use disorder 10/27/2021       History reviewed. No pertinent surgical history.    Review of patient's allergies indicates:   Allergen Reactions    Shellfish containing products Swelling       Family History    None       Tobacco Use    Smoking status: Some Days     Types: Vaping with nicotine    Smokeless tobacco: Not on file   Substance and Sexual Activity    Alcohol use: No    Drug use: Yes     Types: Marijuana, Heroin    Sexual activity: Not Currently      Review of Systems   Constitutional:  Positive for diaphoresis. Negative for fever.   HENT:  Negative for ear pain and sore throat.    Eyes:  Negative for photophobia and redness.   Respiratory:  Positive for cough, chest tightness and shortness of breath.    Cardiovascular:  Negative for chest pain and palpitations.   Gastrointestinal:  Negative for abdominal pain.   Genitourinary:  Negative for dysuria.   Musculoskeletal:  Negative for back pain.   Skin:  Negative for rash and wound.   Neurological:  Negative for headaches.     Objective:     Vital Signs (Most Recent):  Temp: 98.9 °F (37.2 °C) (04/18/25 2030)  Pulse: (!) 124 (04/18/25 2200)  Resp: 16 (04/18/25 2124)  BP: (!) 148/85 (04/18/25 2200)  SpO2: 95 % (04/18/25 2200) Vital Signs (24h Range):  Temp:  [98.8 °F (37.1 °C)-98.9 °F (37.2 °C)] 98.9 °F (37.2 °C)  Pulse:  [] 124  Resp:  [14-27] 16  SpO2:  [89 %-99 %] 95 %  BP: (116-185)/(67-98) 148/85   Weight: 64.9 kg (143 lb)  Body mass index is 23.08 kg/m².    No intake or output data in the 24 hours ending 04/18/25 2207       Physical Exam  Constitutional:       General: He is in acute distress.      Appearance: He is not diaphoretic.   HENT:      Head: Normocephalic and atraumatic.      Mouth/Throat:      Mouth: Mucous membranes are moist.      Pharynx: No oropharyngeal exudate.   Eyes:      Extraocular Movements: Extraocular movements intact.   Cardiovascular:      Rate and Rhythm:  Regular rhythm. Tachycardia present.      Heart sounds: No murmur heard.  Pulmonary:      Effort: Respiratory distress present.      Breath sounds: Wheezing present.      Comments: On BiPAP. Speaking in full sentences although with increased work of breathing   Abdominal:      General: There is no distension.      Palpations: Abdomen is soft.      Tenderness: There is no abdominal tenderness.   Musculoskeletal:         General: No swelling. Normal range of motion.      Cervical back: Normal range of motion and neck supple.   Skin:     General: Skin is warm.   Neurological:      General: No focal deficit present.      Mental Status: He is alert.            Vents:     Lines/Drains/Airways       Peripheral Intravenous Line  Duration                  Peripheral IV - Single Lumen 04/18/25 1830 20 G Right Antecubital <1 day                  Significant Labs:    CBC/Anemia Profile:  Recent Labs   Lab 04/18/25  1845   WBC 11.59   HGB 13.9*   HCT 43.2      MCV 86   RDW 12.7        Chemistries:  Recent Labs   Lab 04/18/25  1845      K 3.7      CO2 22*   BUN 9   CREATININE 1.0   CALCIUM 8.9   ALBUMIN 4.0   BILITOT 0.4   ALKPHOS 57   ALT 23   AST 18   GLUCOSE 134*       All pertinent labs within the past 24 hours have been reviewed.    Significant Imaging: I have reviewed all pertinent imaging results/findings within the past 24 hours.

## 2025-04-19 NOTE — PLAN OF CARE
MICU DAILY GOALS     Family/Goals of care/Code Status   Code Status: Full Code    24H Vital Sign Range  Temp:  [97.9 °F (36.6 °C)-98.9 °F (37.2 °C)]   Pulse:  []   Resp:  [14-27]   BP: (113-185)/(56-98)   SpO2:  [89 %-100 %]      Shift Events (include procedures and significant events)    Pt admitted to MICU for asthma exacerbation. Currently on 2L NC. VSS    AWAKE RASS: Goal -    Actual - RASS (La Agitation-Sedation Scale): alert and calm    Restraint necessity: Not necessary   BREATHE SBT: Not intubated    Coordinate A & B, analgesics/sedatives Pain: managed   SAT: Not intubated   Delirium CAM-ICU:     Early(intubated/ Progressive (non-intubated) Mobility MOVE Screen (INTUBATED ONLY): Not intubated    Activity: Activity Management: Rolling - L1   Feeding/Nutrition Diet order: Diet/Nutrition Received: regular,     Thrombus DVT prophylaxis:     HOB Elevation Head of Bed (HOB) Positioning: HOB elevated   Ulcer Prophylaxis GI: yes   Glucose control managed     Skin Skin assessment:     Sacrum intact/not altered? Yes  Heels intact/not altered? Yes  Surgical wound? No    CHECK ONE!   (no altered skin or altered skin) and sub boxes:  [x] No Altered Skin Integrity Present    [x]Prevention Measures Documented    [] Altered Skin Integrity Present or Discovered   [] LDA already present in EPIC, daily doc completed              [] LDA added if not already in EPIC (describe/stage wound).               [] Wound Image Taken (required on admit,                   transfer/discharge and every Tuesday)    Wound Care Consulted? No   Bowel Function no issues    Indwelling Catheter Necessity            De-escalation Antibiotics No        Problem: Adult Inpatient Plan of Care  Goal: Plan of Care Review  Outcome: Progressing  Goal: Absence of Hospital-Acquired Illness or Injury  Outcome: Progressing  Goal: Optimal Comfort and Wellbeing  Outcome: Progressing  Goal: Readiness for Transition of Care  Outcome: Progressing

## 2025-04-19 NOTE — ASSESSMENT & PLAN NOTE
Pt is a 29 year old male with PMHx significant for asthma with prior hospitalization and intubation in setting of asthma exacerbations who presented to the ED for shortness of breath and wheezing. Presentation consistent with severe asthma exacerbation. Treated with duonebs, steroids, and mg without significant improvement. Repiratory acidosis noted with pH 7.29/pCO2 58/pO2 38/HCO3 23 and pt placed on BiPAP and received an additional continuous albuterol treatment with improvement in wheezing and work of breathing although persistent respiratory acidosis noted.     Improved with BIPAP.  -Step down to Medicine.  -duoneb q4H  -Pred 40 mg po x 3 wk.  -1L NC  -f/u RIP, aspergillus IgE, ANCA panel as pt with eosinophilia noted on CBC    Patient with Hypercapnic Respiratory failure which is Acute.  he is not on home oxygen. Supplemental oxygen was provided and noted-      .   Signs/symptoms of respiratory failure include- tachypnea, increased work of breathing, respiratory distress, and wheezing. Contributing diagnoses includes -  asthma  Labs and images were reviewed. Patient Has recent ABG, which has been reviewed. Will treat underlying causes and adjust management of respiratory failure as follows- duoneb, steroids, BiPAP

## 2025-04-19 NOTE — EICU
"Virtual ICU Admission    Admit Date: 2025  LOS: 1  Code Status: Full Code   : 1995  Bed: 7078/7078 A:     Diagnosis: <principal problem not specified>    Patient  has a past medical history of Asthma and Substance use disorder.    Last VS: BP (!) 140/65   Pulse 109   Temp 98.2 °F (36.8 °C) (Oral)   Resp 16   Ht 5' 6" (1.676 m)   Wt 76.4 kg (168 lb 6.9 oz)   SpO2 95%   BMI 27.19 kg/m²       VICU Review    VICU nurse assessment :  Native completed, LDA documentation reconciliation completed, and VTE prophylaxis review                   "

## 2025-04-19 NOTE — ASSESSMENT & PLAN NOTE
Patient with asthma since childhood presents for recurrent asthma exacerbation. Reports triggers including chemical exposure at work, girlfriend's cat, and stair climbing.     -methylprednisolone q8  -duoneb q4   -follow up aspergillus IgE, ANCA panel  - for assistance with insurance   -consider [facility] pulm clinic on discharge

## 2025-04-20 VITALS
SYSTOLIC BLOOD PRESSURE: 110 MMHG | OXYGEN SATURATION: 98 % | WEIGHT: 168.44 LBS | BODY MASS INDEX: 27.07 KG/M2 | TEMPERATURE: 99 F | HEART RATE: 107 BPM | HEIGHT: 66 IN | DIASTOLIC BLOOD PRESSURE: 62 MMHG | RESPIRATION RATE: 19 BRPM

## 2025-04-20 LAB
ABSOLUTE EOSINOPHIL (OHS): 0 K/UL
ABSOLUTE MONOCYTE (OHS): 1.7 K/UL (ref 0.3–1)
ABSOLUTE NEUTROPHIL COUNT (OHS): 22.39 K/UL (ref 1.8–7.7)
ALBUMIN SERPL BCP-MCNC: 3.9 G/DL (ref 3.5–5.2)
ALP SERPL-CCNC: 56 UNIT/L (ref 40–150)
ALT SERPL W/O P-5'-P-CCNC: 27 UNIT/L (ref 10–44)
ANION GAP (OHS): 9 MMOL/L (ref 8–16)
AST SERPL-CCNC: 21 UNIT/L (ref 11–45)
BASOPHILS # BLD AUTO: 0.04 K/UL
BASOPHILS NFR BLD AUTO: 0.2 %
BILIRUB SERPL-MCNC: 0.3 MG/DL (ref 0.1–1)
BUN SERPL-MCNC: 11 MG/DL (ref 6–20)
CALCIUM SERPL-MCNC: 9.6 MG/DL (ref 8.7–10.5)
CHLORIDE SERPL-SCNC: 109 MMOL/L (ref 95–110)
CO2 SERPL-SCNC: 22 MMOL/L (ref 23–29)
CREAT SERPL-MCNC: 1 MG/DL (ref 0.5–1.4)
ERYTHROCYTE [DISTWIDTH] IN BLOOD BY AUTOMATED COUNT: 13.2 % (ref 11.5–14.5)
GFR SERPLBLD CREATININE-BSD FMLA CKD-EPI: >60 ML/MIN/1.73/M2
GLUCOSE SERPL-MCNC: 112 MG/DL (ref 70–110)
HCT VFR BLD AUTO: 40.5 % (ref 40–54)
HGB BLD-MCNC: 13.3 GM/DL (ref 14–18)
IMM GRANULOCYTES # BLD AUTO: 0.19 K/UL (ref 0–0.04)
IMM GRANULOCYTES NFR BLD AUTO: 0.7 % (ref 0–0.5)
LYMPHOCYTES # BLD AUTO: 1.3 K/UL (ref 1–4.8)
MAGNESIUM SERPL-MCNC: 2.3 MG/DL (ref 1.6–2.6)
MCH RBC QN AUTO: 27.5 PG (ref 27–31)
MCHC RBC AUTO-ENTMCNC: 32.8 G/DL (ref 32–36)
MCV RBC AUTO: 84 FL (ref 82–98)
NUCLEATED RBC (/100WBC) (OHS): 0 /100 WBC
PHOSPHATE SERPL-MCNC: 5.3 MG/DL (ref 2.7–4.5)
PLATELET # BLD AUTO: 372 K/UL (ref 150–450)
PMV BLD AUTO: 10.2 FL (ref 9.2–12.9)
POCT GLUCOSE: 107 MG/DL (ref 70–110)
POCT GLUCOSE: 109 MG/DL (ref 70–110)
POTASSIUM SERPL-SCNC: 4.4 MMOL/L (ref 3.5–5.1)
PROT SERPL-MCNC: 7.3 GM/DL (ref 6–8.4)
RBC # BLD AUTO: 4.84 M/UL (ref 4.6–6.2)
RELATIVE EOSINOPHIL (OHS): 0 %
RELATIVE LYMPHOCYTE (OHS): 5.1 % (ref 18–48)
RELATIVE MONOCYTE (OHS): 6.6 % (ref 4–15)
RELATIVE NEUTROPHIL (OHS): 87.4 % (ref 38–73)
SODIUM SERPL-SCNC: 140 MMOL/L (ref 136–145)
WBC # BLD AUTO: 25.62 K/UL (ref 3.9–12.7)

## 2025-04-20 PROCEDURE — 80053 COMPREHEN METABOLIC PANEL: CPT | Performed by: STUDENT IN AN ORGANIZED HEALTH CARE EDUCATION/TRAINING PROGRAM

## 2025-04-20 PROCEDURE — 83516 IMMUNOASSAY NONANTIBODY: CPT | Performed by: STUDENT IN AN ORGANIZED HEALTH CARE EDUCATION/TRAINING PROGRAM

## 2025-04-20 PROCEDURE — 25000003 PHARM REV CODE 250

## 2025-04-20 PROCEDURE — 99900035 HC TECH TIME PER 15 MIN (STAT)

## 2025-04-20 PROCEDURE — 85025 COMPLETE CBC W/AUTO DIFF WBC: CPT | Performed by: STUDENT IN AN ORGANIZED HEALTH CARE EDUCATION/TRAINING PROGRAM

## 2025-04-20 PROCEDURE — 86036 ANCA SCREEN EACH ANTIBODY: CPT

## 2025-04-20 PROCEDURE — 83735 ASSAY OF MAGNESIUM: CPT | Performed by: STUDENT IN AN ORGANIZED HEALTH CARE EDUCATION/TRAINING PROGRAM

## 2025-04-20 PROCEDURE — 25000242 PHARM REV CODE 250 ALT 637 W/ HCPCS

## 2025-04-20 PROCEDURE — 94640 AIRWAY INHALATION TREATMENT: CPT

## 2025-04-20 PROCEDURE — 84100 ASSAY OF PHOSPHORUS: CPT | Performed by: STUDENT IN AN ORGANIZED HEALTH CARE EDUCATION/TRAINING PROGRAM

## 2025-04-20 PROCEDURE — 94761 N-INVAS EAR/PLS OXIMETRY MLT: CPT

## 2025-04-20 PROCEDURE — 63600175 PHARM REV CODE 636 W HCPCS

## 2025-04-20 PROCEDURE — 99238 HOSP IP/OBS DSCHRG MGMT 30/<: CPT | Mod: ,,, | Performed by: EMERGENCY MEDICINE

## 2025-04-20 PROCEDURE — 27000221 HC OXYGEN, UP TO 24 HOURS

## 2025-04-20 RX ORDER — PANTOPRAZOLE SODIUM 40 MG/1
40 TABLET, DELAYED RELEASE ORAL DAILY
Status: DISCONTINUED | OUTPATIENT
Start: 2025-04-20 | End: 2025-04-20 | Stop reason: HOSPADM

## 2025-04-20 RX ORDER — PANTOPRAZOLE SODIUM 40 MG/1
40 TABLET, DELAYED RELEASE ORAL DAILY
Qty: 90 TABLET | Refills: 3 | Status: SHIPPED | OUTPATIENT
Start: 2025-04-21 | End: 2026-04-21

## 2025-04-20 RX ORDER — FLUTICASONE PROPIONATE AND SALMETEROL 250; 50 UG/1; UG/1
1 POWDER RESPIRATORY (INHALATION) 2 TIMES DAILY
Qty: 60 EACH | Refills: 11 | Status: SHIPPED | OUTPATIENT
Start: 2025-04-20 | End: 2026-04-20

## 2025-04-20 RX ORDER — FLUTICASONE FUROATE AND VILANTEROL 100; 25 UG/1; UG/1
1 POWDER RESPIRATORY (INHALATION) DAILY
Qty: 60 EACH | Refills: 2 | Status: SHIPPED | OUTPATIENT
Start: 2025-04-20 | End: 2025-04-20 | Stop reason: HOSPADM

## 2025-04-20 RX ORDER — PREDNISONE 5 MG/1
TABLET ORAL
Qty: 105 TABLET | Refills: 0 | Status: SHIPPED | OUTPATIENT
Start: 2025-04-21 | End: 2025-05-16

## 2025-04-20 RX ORDER — MONTELUKAST SODIUM 10 MG/1
10 TABLET ORAL DAILY
Qty: 30 TABLET | Refills: 0 | Status: SHIPPED | OUTPATIENT
Start: 2025-04-21 | End: 2025-05-21

## 2025-04-20 RX ADMIN — MONTELUKAST 10 MG: 10 TABLET, FILM COATED ORAL at 08:04

## 2025-04-20 RX ADMIN — IPRATROPIUM BROMIDE 0.5 MG: 0.5 SOLUTION RESPIRATORY (INHALATION) at 11:04

## 2025-04-20 RX ADMIN — PREDNISONE 40 MG: 20 TABLET ORAL at 08:04

## 2025-04-20 RX ADMIN — MUPIROCIN: 20 OINTMENT TOPICAL at 08:04

## 2025-04-20 RX ADMIN — LEVALBUTEROL 1.25 MG: 1.25 SOLUTION, CONCENTRATE RESPIRATORY (INHALATION) at 11:04

## 2025-04-20 RX ADMIN — LEVALBUTEROL 1.25 MG: 1.25 SOLUTION, CONCENTRATE RESPIRATORY (INHALATION) at 07:04

## 2025-04-20 RX ADMIN — IPRATROPIUM BROMIDE 0.5 MG: 0.5 SOLUTION RESPIRATORY (INHALATION) at 07:04

## 2025-04-20 RX ADMIN — IPRATROPIUM BROMIDE 0.5 MG: 0.5 SOLUTION RESPIRATORY (INHALATION) at 03:04

## 2025-04-20 RX ADMIN — PANTOPRAZOLE SODIUM 40 MG: 40 TABLET, DELAYED RELEASE ORAL at 08:04

## 2025-04-20 RX ADMIN — LEVALBUTEROL 1.25 MG: 1.25 SOLUTION, CONCENTRATE RESPIRATORY (INHALATION) at 12:04

## 2025-04-20 RX ADMIN — IPRATROPIUM BROMIDE 0.5 MG: 0.5 SOLUTION RESPIRATORY (INHALATION) at 12:04

## 2025-04-20 RX ADMIN — LEVALBUTEROL 1.25 MG: 1.25 SOLUTION, CONCENTRATE RESPIRATORY (INHALATION) at 03:04

## 2025-04-20 NOTE — EICU
[facility] Night Rounds Checklist  24H Vital Sign Range:  Temp:  [97.9 °F (36.6 °C)-99.3 °F (37.4 °C)]   Pulse:  []   Resp:  [12-34]   BP: ()/(52-77)   SpO2:  [92 %-100 %]     Video rounds

## 2025-04-20 NOTE — EICU
Virtual ICU Quality Rounds    Admit Date: 4/18/2025  Hospital Day: 2    ICU Day: 1d 7h    24H Vital Sign Range:  Temp:  [98 °F (36.7 °C)-99.3 °F (37.4 °C)]   Pulse:  []   Resp:  [12-34]   BP: ()/(55-77)   SpO2:  [92 %-100 %]     VICU Surveillance Screening                    LDA reconciliation : Yes

## 2025-04-20 NOTE — NURSING
Discharge orders received for PT at 1104 A.M. Pt notified of orders and discharge procedures. Pt aware that newly prescribed medications were to be delivered via pharmacy bedside delivery. Discharge instructions, after care instructions, medication changes, follow ups, and resources reviewed with patient, pt verbalized understanding, AVS/printouts provided for pt reference. Pt girlfriend/ s.o. arrived to unit at approximately 1250 for assistance with transport post discharge, discharge information reviewed with pt's s.o. at this time with pt permission. At this time pt opted to pick medications up from pharmacy, pharmacy contacted and confirmed that medications were filled and available for pickup. Ivs removed, iv catheters intact, bleeding controlled. Pt assisted into wheel chair with standby assist and transported by nursing staff to pharmacy on 1st floor, medications picked up from pharmacy. Pt then transported to s.o.'s vehicle and assisted into vehicle with standby assist at 1316 with pt belongings/belongings bag, and medications from pharmacy. Pt tolerated activity and transport well.

## 2025-04-20 NOTE — DISCHARGE SUMMARY
[facility] - Medical [facility]  Critical Care Medicine  Discharge Summary      Patient Name: Tucker Roberto  MRN: [id number]  Admission Date: 4/18/2025  Hospital Length of Stay: 2 days  Discharge Date and Time: 04/20/2025 11:07 AM  Attending Physician: Krunal Chung MD   Discharging Provider: Power Humphrey MD  Primary Care Provider: No, Primary Doctor  Reason for Admission: Acute hypoxemic respiratory failure and acute hypercapnic respiratory failure in the setting of severe asthma exacerbation    HPI:   29 year old man with recent asthma exacerbations requiring admission that presented for wheezing at home not relieved by his rescue Albuterol inhaler. Reports associated yellow sputum. He ran out of Symbicort and is only using a rescue inhaler. He last presented to the ED for asthma 10 days ago and has been unable to follow with pulmonology due to being uninsured. 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 He also lives with his girlfriend's cat that triggers his asthma. He has been intubated in the past 2 years ago for asthma. He denies nasal congestion, nasal polyps, NSAID use, or sick contacts. In the ED, he had a respiratory acidosis (pH 7.29/pCO2 58/pO2 38/HCO3 23) and was treated with Duonebs, steroids, magnesium  with no improvement. He was placed on Bipap 14/7 and work of breathing improved. CXR was clear. He was admitted to MICU for asthma exacerbation requiring Bipap.         * No surgery found *    Indwelling Lines/Drains at Time of Discharge:   Lines/Drains/Airways       None                 Hospital Course:   Admitted for acute asthma exacerbation with hypoxic and hypercapnic RF. Eosinophilia. Improved with Bipap. Also on methylpred, ICS, ANGE,JOEL. Will step-down to Hospital Medicine.  ANCA, IgE studies ordered. Coordianting with  for Pulm clinic appt. At the time of discharge on 1 L NC, doing well, without respiratory distress or cough. Discharged with 1 month supply of advair,  prednsione, montelukast. Will follow-up with Pulmonology clinic at [facility].        Physical Exam  Constitutional:       General: He isresting comfortably     Appearance: He is not diaphoretic.   HENT:      Head: Normocephalic and atraumatic.      Mouth/Throat:      Mouth: Mucous membranes are moist.      Pharynx: No oropharyngeal exudate.   Eyes:      Extraocular Movements: Extraocular movements intact.   Cardiovascular:      Rate and Rhythm: Regular rhythm. Tachycardia present.      Heart sounds: No murmur heard.  Pulmonary:      Effort: No respiratory distress.      Breath sounds:. Adequate, No wheezing.      Comments: 1LNC.  Speaks full sentences  Abdominal:      General: There is no distension.      Palpations: Abdomen is soft.      Tenderness: There is no abdominal tenderness.   Musculoskeletal:         General: No swelling. Normal range of motion.      Cervical back: Normal range of motion and neck supple.   Skin:     General: Skin is warm.   Neurological:      General: No focal deficit present.      Mental Status: He is alert.     Consults (From admission, onward)          Status Ordering Provider     Inpatient consult to Critical Care Medicine  Once        Provider:  (Not yet assigned)    LAUREN Zavala          Significant Labs:  Recent Lab Results  (Last 5 results in the past 24 hours)        04/20/25  1101   04/20/25  0806   04/20/25  0228   04/19/25  2135   04/19/25  1633        Albumin     3.9           ALP     56           ALT     27           Anion Gap     9           AST     21           Baso #     0.04           Basophil %     0.2           BILIRUBIN TOTAL     0.3  Comment: For infants and newborns, interpretation of results should be based   on gestational age, weight and in agreement with clinical   observations.    Premature Infant recommended reference ranges:   0-24 hours:  <8.0 mg/dL   24-48 hours: <12.0 mg/dL   3-5 days:    <15.0 mg/dL   6-29 days:   <15.0 mg/dL           BUN      11           Calcium     9.6           Chloride     109           CO2     22           Creatinine     1.0           eGFR     >60  Comment: Estimated GFR calculated using the CKD-EPI creatinine (2021) equation.           Eos #     0.00           Eos %     0.0           Glucose     112           Gran # (ANC)     22.39           Hematocrit     40.5           Hemoglobin     13.3           Immature Grans (Abs)     0.19  Comment: Mild elevation in immature granulocytes is non specific and can be seen in a variety of conditions including stress response, acute inflammation, trauma and pregnancy. Correlation with other laboratory and clinical findings is essential.           Immature Granulocytes     0.7           Lymph #     1.30           Lymph %     5.1           Magnesium      2.3           MCH     27.5           MCHC     32.8           MCV     84           Mono #     1.70           Mono %     6.6           MPV     10.2           Neut %     87.4           nRBC     0           Phosphorus Level     5.3           Platelet Count     372           POCT Glucose 107   109     144   134       Potassium     4.4           PROTEIN TOTAL     7.3           RBC     4.84           RDW     13.2           Sodium     140           WBC     25.62                                  Significant Imaging:  I have reviewed all pertinent imaging results/findings within the past 24 hours.    Pending Diagnostic Studies:       Procedure Component Value Units Date/Time    ANCA Panel With MPO and PR3 [[id number]] Collected: 04/18/25 2253    Order Status: Sent Lab Status: In process Updated: 04/18/25 2257    Specimen: Blood     Aspergillus fumagatus IgE [[id number]] Collected: 04/18/25 2253    Order Status: Sent Lab Status: In process Updated: 04/18/25 2257    Specimen: Blood           Final Active Diagnoses:    Diagnosis Date Noted POA    PRINCIPAL PROBLEM:  Acute hypoxic on chronic hypercapnic respiratory failure [J96.01, J96.12] 02/19/2022 Yes     Steroid-induced hyperglycemia [R73.9, T38.0X5A] 08/03/2022 Yes    Asthma [J45.909] 10/26/2021 Yes      Problems Resolved During this Admission:     Pulmonary  * Acute hypoxic on chronic hypercapnic respiratory failure  Pt is a 29 year old male with PMHx significant for asthma with prior hospitalization and intubation in setting of asthma exacerbations who presented to the ED for shortness of breath and wheezing. Presentation consistent with severe asthma exacerbation. Treated with duonebs, steroids, and mg without significant improvement. Repiratory acidosis noted with pH 7.29/pCO2 58/pO2 38/HCO3 23 and pt placed on BiPAP and received an additional continuous albuterol treatment with improvement in wheezing and work of breathing although persistent respiratory acidosis noted.     Improved with BIPAP. On 1 L NC currently  -Discharged.  -duoneb q4H  -Pred 40 mg po x 3 wk.  -RIP Neg  - F/up aspergillus IgE, ANCA panel as pt with eosinophilia noted on CBC    Patient with Hypercapnic Respiratory failure which is Acute.  he is not on home oxygen. Supplemental oxygen was provided and noted- Oxygen Concentration (%):  [24] 24    .   Signs/symptoms of respiratory failure include- tachypnea, increased work of breathing, respiratory distress, and wheezing. Contributing diagnoses includes -  asthma  Labs and images were reviewed. Patient Has recent ABG, which has been reviewed. Will treat underlying causes and adjust management of respiratory failure as follows- duoneb, steroids, BiPAP    Asthma  Patient with asthma since childhood presents for recurrent asthma exacerbation. Reports triggers including chemical exposure at work, girlfriend's cat, and stair climbing.     -Prednisone taper 40 mg po x 3 weeks  -Advair  -Ipratropium  -montelukast  -duoneb q4   -follow up aspergillus IgE, ANCA panel  - for assistance with insurance   -follow-up with [facility] pulm clinic on discharge    Endocrine  Steroid-induced hyperglycemia  -glucose  110  -[facility] ordered.        Discharged Condition: good    Disposition:       Patient Instructions:   No discharge procedures on file.  Medications:  Reconciled Home Medications:      Medication List        START taking these medications      fluticasone-salmeterol 250-50 mcg/dose 250-50 mcg/dose diskus inhaler  Commonly known as: ADVAIR  Inhale 1 puff into the lungs 2 (two) times daily. Controller     montelukast 10 mg tablet  Commonly known as: SINGULAIR  Take 1 tablet (10 mg total) by mouth once daily.  Start taking on: April 21, 2025     pantoprazole 40 MG tablet  Commonly known as: PROTONIX  Take 1 tablet (40 mg total) by mouth once daily.  Start taking on: April 21, 2025     predniSONE 5 MG tablet  Commonly known as: DELTASONE  Take 8 tablets (40 mg total) by mouth once daily for 5 days, THEN 6 tablets (30 mg total) once daily for 5 days, THEN 4 tablets (20 mg total) once daily for 5 days, THEN 2 tablets (10 mg total) once daily for 5 days, THEN 1 tablet (5 mg total) once daily for 5 days.  Start taking on: April 21, 2025            CONTINUE taking these medications      * albuterol 2.5 mg /3 mL (0.083 %) nebulizer solution  Commonly known as: PROVENTIL  Take 3 mLs (2.5 mg total) by nebulization every 4 (four) hours as needed.     * albuterol 90 mcg/actuation inhaler  Commonly known as: PROVENTIL/VENTOLIN HFA  Inhale 1-2 puffs into the lungs every 4 (four) hours as needed for Wheezing or Shortness of Breath. Rescue     mirtazapine 15 MG disintegrating tablet  Commonly known as: REMERON SOL-TAB  Dissolve 1 tablet (15 mg total) by mouth nightly as needed (Sleep).           * This list has 2 medication(s) that are the same as other medications prescribed for you. Read the directions carefully, and ask your doctor or other care provider to review them with you.                STOP taking these medications      SYMBICORT 80-4.5 mcg/actuation Hfaa  Generic drug: budesonide-formoterol 80-4.5 mcg               Power  Dain Humphrey MD  Critical Care Medicine  [facility] - Medical ICU

## 2025-04-20 NOTE — PLAN OF CARE
MICU DAILY GOALS     Family/Goals of care/Code Status   Code Status: Full Code    24H Vital Sign Range  Temp:  [98 °F (36.7 °C)-99.3 °F (37.4 °C)]   Pulse:  []   Resp:  [12-34]   BP: ()/(52-77)   SpO2:  [92 %-100 %]      Shift Events (include procedures and significant events)   No acute events throughout shift    AWAKE RASS: Goal -    Actual - RASS (La Agitation-Sedation Scale): alert and calm    Restraint necessity: Not necessary   BREATHE SBT: Not intubated    Coordinate A & B, analgesics/sedatives Pain: managed   SAT: Not intubated   Delirium CAM-ICU:     Early(intubated/ Progressive (non-intubated) Mobility MOVE Screen (INTUBATED ONLY): Not intubated    Activity: Activity Management: Ambulated -L4   Feeding/Nutrition Diet order: Diet/Nutrition Received: consistent carb/diabetic diet,     Thrombus DVT prophylaxis:     HOB Elevation Head of Bed (HOB) Positioning: HOB elevated   Ulcer Prophylaxis GI: yes   Glucose control managed     Skin Skin assessment:     Sacrum intact/not altered? Yes  Heels intact/not altered? Yes  Surgical wound? No    CHECK ONE!   (no altered skin or altered skin) and sub boxes:  [x] No Altered Skin Integrity Present    [x]Prevention Measures Documented    [] Altered Skin Integrity Present or Discovered   [] LDA already present in EPIC, daily doc completed              [] LDA added if not already in EPIC (describe/stage wound).               [] Wound Image Taken (required on admit,                   transfer/discharge and every Tuesday)    Wound Care Consulted? No   Bowel Function no issues    Indwelling Catheter Necessity            De-escalation Antibiotics Yes        VS and assessment per flow sheet, patient progressing towards goals as tolerated, plan of care reviewed with  patient , all concerns addressed, will continue to monitor.

## 2025-04-20 NOTE — PLAN OF CARE
Team messaged CM requesting assistance with 1 month of discharge medications. Patient does not have insurance coverage and can not afford prescriptions. Concerned patient keeps coming in for multiple visits and want to prevent. Providers are setting patient up with [facility] and social work. Patient needs enough meds until follow-up arranged. Prescribed meds: breo ellipte 460.48, singular 3.00, protonix 2.00, prednisone 14.00 = $479.48. Coordinated lower cost inhaler with pharmacist and providers. Physicians agreed to change inhaler to advair generic (250-50), cost 123.50. New total - $142.50. CM completed cost transfer for new total amount and emailed to pharmacist. CM leadership added to secure chat to make aware.      Naz Chin RN  Weekend  - [facility] [place] [facility]  [phone]

## 2025-04-20 NOTE — ASSESSMENT & PLAN NOTE
Patient with asthma since childhood presents for recurrent asthma exacerbation. Reports triggers including chemical exposure at work, girlfriend's cat, and stair climbing.     -Prednisone taper 40 mg po x 3 weeks  -Advair  -Ipratropium  -montelukast  -duoneb q4   -follow up aspergillus IgE, ANCA panel  - for assistance with insurance   -follow-up with [facility] pulm clinic on discharge

## 2025-04-20 NOTE — PLAN OF CARE
Problem: Adult Inpatient Plan of Care  Goal: Plan of Care Review  Outcome: Met  Goal: Patient-Specific Goal (Individualized)  Outcome: Met  Goal: Absence of Hospital-Acquired Illness or Injury  Outcome: Met  Goal: Optimal Comfort and Wellbeing  Outcome: Met  Goal: Readiness for Transition of Care  Outcome: Met     Adequate for Discharge

## 2025-04-20 NOTE — ASSESSMENT & PLAN NOTE
Pt is a 29 year old male with PMHx significant for asthma with prior hospitalization and intubation in setting of asthma exacerbations who presented to the ED for shortness of breath and wheezing. Presentation consistent with severe asthma exacerbation. Treated with duonebs, steroids, and mg without significant improvement. Repiratory acidosis noted with pH 7.29/pCO2 58/pO2 38/HCO3 23 and pt placed on BiPAP and received an additional continuous albuterol treatment with improvement in wheezing and work of breathing although persistent respiratory acidosis noted.     Improved with BIPAP. On 1 L NC currently  -Discharged.  -duoneb q4H  -Pred 40 mg po x 3 wk.  -RIP Neg  - F/up aspergillus IgE, ANCA panel as pt with eosinophilia noted on CBC    Patient with Hypercapnic Respiratory failure which is Acute.  he is not on home oxygen. Supplemental oxygen was provided and noted- Oxygen Concentration (%):  [24] 24    .   Signs/symptoms of respiratory failure include- tachypnea, increased work of breathing, respiratory distress, and wheezing. Contributing diagnoses includes -  asthma  Labs and images were reviewed. Patient Has recent ABG, which has been reviewed. Will treat underlying causes and adjust management of respiratory failure as follows- duoneb, steroids, BiPAP

## 2025-04-20 NOTE — PLAN OF CARE
[facility] - Medical ICU  Discharge Final Note    Primary Care Provider: No, Primary Doctor    Expected Discharge Date: 4/20/2025    Patient cleared for discharge from case management standpoint.    Final Discharge Note (most recent)       Final Note - 04/20/25 1350          Final Note    Assessment Type Final Discharge Note     Anticipated Discharge Disposition Home or Self Care     What phone number can be called within the next 1-3 days to see how you are doing after discharge? [phone]     Hospital Resources/Appts/Education Provided Provided patient/caregiver with written discharge plan information        Post-Acute Status    Discharge Delays None known at this time                   Naz hCin RN  Weekend  - [facility] [place]  [phone]

## 2025-04-23 ENCOUNTER — RESULTS FOLLOW-UP (OUTPATIENT)
Dept: HEPATOLOGY | Facility: CLINIC | Age: 30
End: 2025-04-23

## 2025-04-23 LAB
W ASPERGILLUS FUMIGATUS, CLASS: NORMAL
W ASPERGILLUS FUMIGATUS, IGE: <0.1 KU/L
W C-ANCA: NORMAL TITER
W P-ANCA: NORMAL TITER

## 2025-07-22 ENCOUNTER — HOSPITAL ENCOUNTER (EMERGENCY)
Facility: HOSPITAL | Age: 30
Discharge: HOME | End: 2025-07-23
Attending: STUDENT IN AN ORGANIZED HEALTH CARE EDUCATION/TRAINING PROGRAM
Payer: MEDICAID

## 2025-07-22 DIAGNOSIS — R06.00 DYSPNEA: ICD-10-CM

## 2025-07-22 DIAGNOSIS — J45.901 MODERATE ASTHMA WITH EXACERBATION, UNSPECIFIED WHETHER PERSISTENT: Primary | ICD-10-CM

## 2025-07-22 LAB
ABSOLUTE EOSINOPHIL (OHS): 1.65 K/UL
ABSOLUTE MONOCYTE (OHS): 0.84 K/UL (ref 0.3–1)
ABSOLUTE NEUTROPHIL COUNT (OHS): 5.41 K/UL (ref 1.8–7.7)
ANION GAP (OHS): 11 MMOL/L (ref 8–16)
BASOPHILS # BLD AUTO: 0.05 K/UL
BASOPHILS NFR BLD AUTO: 0.5 %
BUN SERPL-MCNC: 10 MG/DL (ref 6–20)
CALCIUM SERPL-MCNC: 9.7 MG/DL (ref 8.7–10.5)
CHLORIDE SERPL-SCNC: 106 MMOL/L (ref 95–110)
CO2 SERPL-SCNC: 26 MMOL/L (ref 23–29)
CREAT SERPL-MCNC: 1.2 MG/DL (ref 0.5–1.4)
ERYTHROCYTE [DISTWIDTH] IN BLOOD BY AUTOMATED COUNT: 13.8 % (ref 11.5–14.5)
GFR SERPLBLD CREATININE-BSD FMLA CKD-EPI: >60 ML/MIN/1.73/M2
GLUCOSE SERPL-MCNC: 97 MG/DL (ref 70–110)
HCT VFR BLD AUTO: 45 % (ref 40–54)
HGB BLD-MCNC: 14.6 GM/DL (ref 14–18)
IMM GRANULOCYTES # BLD AUTO: 0.03 K/UL (ref 0–0.04)
IMM GRANULOCYTES NFR BLD AUTO: 0.3 % (ref 0–0.5)
LYMPHOCYTES # BLD AUTO: 2.79 K/UL (ref 1–4.8)
MCH RBC QN AUTO: 27.1 PG (ref 27–31)
MCHC RBC AUTO-ENTMCNC: 32.4 G/DL (ref 32–36)
MCV RBC AUTO: 84 FL (ref 82–98)
NUCLEATED RBC (/100WBC) (OHS): 0 /100 WBC
PLATELET # BLD AUTO: 309 K/UL (ref 150–450)
PMV BLD AUTO: 11.3 FL (ref 9.2–12.9)
POTASSIUM SERPL-SCNC: 4.1 MMOL/L (ref 3.5–5.1)
RBC # BLD AUTO: 5.39 M/UL (ref 4.6–6.2)
RELATIVE EOSINOPHIL (OHS): 15.3 %
RELATIVE LYMPHOCYTE (OHS): 25.9 % (ref 18–48)
RELATIVE MONOCYTE (OHS): 7.8 % (ref 4–15)
RELATIVE NEUTROPHIL (OHS): 50.2 % (ref 38–73)
SODIUM SERPL-SCNC: 143 MMOL/L (ref 136–145)
WBC # BLD AUTO: 10.77 K/UL (ref 3.9–12.7)

## 2025-07-22 PROCEDURE — 96365 THER/PROPH/DIAG IV INF INIT: CPT

## 2025-07-22 PROCEDURE — 96375 TX/PRO/DX INJ NEW DRUG ADDON: CPT

## 2025-07-22 PROCEDURE — 94640 AIRWAY INHALATION TREATMENT: CPT

## 2025-07-22 PROCEDURE — 85025 COMPLETE CBC W/AUTO DIFF WBC: CPT | Performed by: STUDENT IN AN ORGANIZED HEALTH CARE EDUCATION/TRAINING PROGRAM

## 2025-07-22 PROCEDURE — 99900035 HC TECH TIME PER 15 MIN (STAT)

## 2025-07-22 PROCEDURE — 94761 N-INVAS EAR/PLS OXIMETRY MLT: CPT

## 2025-07-22 PROCEDURE — 80048 BASIC METABOLIC PNL TOTAL CA: CPT | Performed by: STUDENT IN AN ORGANIZED HEALTH CARE EDUCATION/TRAINING PROGRAM

## 2025-07-22 PROCEDURE — 99284 EMERGENCY DEPT VISIT MOD MDM: CPT | Mod: 25

## 2025-07-22 PROCEDURE — 63600175 PHARM REV CODE 636 W HCPCS: Performed by: STUDENT IN AN ORGANIZED HEALTH CARE EDUCATION/TRAINING PROGRAM

## 2025-07-22 PROCEDURE — 27000221 HC OXYGEN, UP TO 24 HOURS

## 2025-07-22 PROCEDURE — 25000242 PHARM REV CODE 250 ALT 637 W/ HCPCS: Performed by: STUDENT IN AN ORGANIZED HEALTH CARE EDUCATION/TRAINING PROGRAM

## 2025-07-22 RX ORDER — IPRATROPIUM BROMIDE AND ALBUTEROL SULFATE 2.5; .5 MG/3ML; MG/3ML
9 SOLUTION RESPIRATORY (INHALATION) ONCE
Status: COMPLETED | OUTPATIENT
Start: 2025-07-22 | End: 2025-07-22

## 2025-07-22 RX ORDER — MAGNESIUM SULFATE HEPTAHYDRATE 40 MG/ML
2 INJECTION, SOLUTION INTRAVENOUS ONCE
Status: COMPLETED | OUTPATIENT
Start: 2025-07-22 | End: 2025-07-23

## 2025-07-22 RX ORDER — METHYLPREDNISOLONE SOD SUCC 125 MG
125 VIAL (EA) INJECTION
Status: COMPLETED | OUTPATIENT
Start: 2025-07-22 | End: 2025-07-22

## 2025-07-22 RX ADMIN — IPRATROPIUM BROMIDE AND ALBUTEROL SULFATE 9 ML: .5; 3 SOLUTION RESPIRATORY (INHALATION) at 10:07

## 2025-07-22 RX ADMIN — METHYLPREDNISOLONE SODIUM SUCCINATE 125 MG: 125 INJECTION, POWDER, FOR SOLUTION INTRAMUSCULAR; INTRAVENOUS at 10:07

## 2025-07-22 RX ADMIN — MAGNESIUM SULFATE HEPTAHYDRATE 2 G: 40 INJECTION, SOLUTION INTRAVENOUS at 11:07

## 2025-07-23 VITALS
TEMPERATURE: 98 F | BODY MASS INDEX: 24.11 KG/M2 | DIASTOLIC BLOOD PRESSURE: 63 MMHG | SYSTOLIC BLOOD PRESSURE: 122 MMHG | RESPIRATION RATE: 20 BRPM | OXYGEN SATURATION: 93 % | HEART RATE: 83 BPM | WEIGHT: 150 LBS | HEIGHT: 66 IN

## 2025-07-23 LAB
HOLD SPECIMEN: NORMAL
OHS QRS DURATION: 78 MS
OHS QTC CALCULATION: 423 MS

## 2025-07-23 PROCEDURE — 96366 THER/PROPH/DIAG IV INF ADDON: CPT

## 2025-07-23 RX ORDER — ALBUTEROL SULFATE 90 UG/1
1-2 INHALANT RESPIRATORY (INHALATION) EVERY 6 HOURS PRN
Qty: 18 G | Refills: 1 | Status: SHIPPED | OUTPATIENT
Start: 2025-07-23 | End: 2026-07-23

## 2025-07-23 RX ORDER — PREDNISONE 20 MG/1
40 TABLET ORAL DAILY
Qty: 10 TABLET | Refills: 0 | Status: SHIPPED | OUTPATIENT
Start: 2025-07-23 | End: 2025-07-28

## 2025-07-23 RX ORDER — IPRATROPIUM BROMIDE AND ALBUTEROL SULFATE 2.5; .5 MG/3ML; MG/3ML
3 SOLUTION RESPIRATORY (INHALATION) EVERY 6 HOURS PRN
Qty: 75 ML | Refills: 0 | Status: SHIPPED | OUTPATIENT
Start: 2025-07-23 | End: 2026-07-23

## 2025-07-23 NOTE — ED PROVIDER NOTES
Encounter Date: 7/22/2025       History     Chief Complaint   Patient presents with    Shortness of Breath     States ran out of inhalers about 2 days ago; states having asthma exacerbation that also started 2 days ago. Has not gone away; came in r/t same. Tripoding in triage     HPI  30-year-old male no significant medical history other than asthma, actively [place].  followed by pulmonology and primary care presents brought in by self through triage for 2 days of progressively worsening shortness of breath after running out of his inhalers 2 days ago.  Denies chest pain, reports that is new characteristic of his typical asthma exacerbations.  Review of patient's allergies indicates:   Allergen Reactions    Shellfish containing products Swelling     Past Medical History:   Diagnosis Date    Asthma     Substance use disorder 10/27/2021     History reviewed. No pertinent surgical history.  No family history on file.  Social History[1]  Medical surgical family social history reviewed  Review of Systems  Complete review of systems was conducted and was negative except as per HPI and as marked  Physical Exam     Initial Vitals [07/22/25 2208]   BP Pulse Resp Temp SpO2   130/70 92 (!) 22 98.3 °F (36.8 °C) (!) 92 %      MAP       --         Physical Exam  Physical  General: Awake, alert, no acute distress  Head: Normocephalic, atraumatic  Neck: Trachea midline, full range of motion, no meningismus  ENT: Atraumatic, Airway Patent  Cardio: Regular Rate, Regular Rhythm, Heart Sounds Normal, Capillary refill normal  Chest:  Tachypnea with mild use of accessory muscles to breathe, diffuse wheezing heard in all lung fields with decreased air movement.    Abdomen: Soft, Nontender, no involuntary guarding, rigidity, or rebound.  Upper Ext: Atraumatic, Inspection normal, no swelling  Lower Ext: Atraumatic, Inspection normal, no swelling  Neuro: No gross cranial nerve abnormality, no lateralization, no gross sensory or motor  deficits  ED Course   Procedures  Labs Reviewed   CBC WITH DIFFERENTIAL - Abnormal       Result Value    WBC 10.77      RBC 5.39      HGB 14.6      HCT 45.0      MCV 84      MCH 27.1      MCHC 32.4      RDW 13.8      Platelet Count 309      MPV 11.3      Nucleated RBC 0      Neut % 50.2      Lymph % 25.9      Mono % 7.8      Eos % 15.3 (*)     Basophil % 0.5      Imm Grans % 0.3      Neut # 5.41      Lymph # 2.79      Mono # 0.84      Eos # 1.65 (*)     Baso # 0.05      Imm Grans # 0.03     BASIC METABOLIC PANEL - Normal    Sodium 143      Potassium 4.1      Chloride 106      CO2 26      Glucose 97      BUN 10      Creatinine 1.2      Calcium 9.7      Anion Gap 11      eGFR >60     CBC W/ AUTO DIFFERENTIAL    Narrative:     The following orders were created for panel order CBC auto differential.  Procedure                               Abnormality         Status                     ---------                               -----------         ------                     CBC with Differential[[id number]]       Abnormal            Final result                 Please view results for these tests on the individual orders.   EXTRA TUBES    Narrative:     The following orders were created for panel order EXTRA TUBES.  Procedure                               Abnormality         Status                     ---------                               -----------         ------                     Light Green Top Hold[[id number]]                            Final result                 Please view results for these tests on the individual orders.   LIGHT GREEN TOP HOLD    Extra Tube Hold for add-ons.            Imaging Results              X-Ray Chest AP Portable (Final result)  Result time 07/23/25 00:55:59      Final result by Karlo Horvath, DO (07/23/25 00:55:59)                   Impression:      No acute abnormality.      Electronically signed by: Karlo Horvath  Date:    07/23/2025  Time:    00:55               Narrative:     EXAMINATION:  XR CHEST AP PORTABLE    CLINICAL HISTORY:  Dyspnea, unspecified    TECHNIQUE:  Single frontal view of the chest was performed.    COMPARISON:  04/18/2025.    FINDINGS:  The lungs are well expanded and clear. No focal opacities are seen. The pleural spaces are clear. The cardiac silhouette is unremarkable. The visualized osseous structures are unremarkable.                                       Medications   albuterol-ipratropium 2.5 mg-0.5 mg/3 mL nebulizer solution 9 mL (9 mLs Nebulization Given by Other 7/22/25 2251)   methylPREDNISolone sodium succinate injection 125 mg (125 mg Intravenous Given 7/22/25 2255)   magnesium sulfate 2g in water 50mL IVPB (premix) (0 g Intravenous Stopped 7/23/25 0106)     Medical Decision Making  Amount and/or Complexity of Data Reviewed  Labs: ordered.  Radiology: ordered.    Risk  Prescription drug management.               ED Course as of 07/23/25 2121 Wed Jul 23, 2025   0039 Cxr no acute process   [DS]      ED Course User Index  [DS] Javid Velazquez MD              Will evaluate for acute pathology requiring intervention with appropriate studies, treat symptomatically, and reassess.    All studies reviewed, negative for acute pathology requiring intervention.  After treatments given he feels dramatically improved and wants to go home.  Satting 94% on room air throughout discharge conversation.     Diagnosis, use of prescription medications, need for follow-up regarding visit today, need to call for follow-up, expected course, and return precautions were all discussed at length in my traditional manner to which patient verbalized understanding and agrees and all questions were answered. Patient is well appearing and ambulatory at time of discharge.     Counseled on smoking cessation, it is relevance to his presentation was explicitly stated.     Has a nebulizer machine but is out of fluid.         Clinical Impression:  Final diagnoses:  [R06.00] Dyspnea  [J45.901]  Moderate asthma with exacerbation, unspecified whether persistent (Primary)          ED Disposition Condition    Discharge Stable          ED Prescriptions       Medication Sig Dispense Start Date End Date Auth. Provider    albuterol (PROVENTIL/VENTOLIN HFA) 90 mcg/actuation inhaler Inhale 1-2 puffs into the lungs every 6 (six) hours as needed for Wheezing. Rescue 18 g 7/23/2025 7/23/2026 Javid Velazquez MD    albuterol-ipratropium (DUO-NEB) 2.5 mg-0.5 mg/3 mL nebulizer solution Take 3 mLs by nebulization every 6 (six) hours as needed for Wheezing. Rescue 75 mL 7/23/2025 7/23/2026 Javid Velazquez MD    predniSONE (DELTASONE) 20 MG tablet Take 2 tablets (40 mg total) by mouth once daily. for 5 days 10 tablet 7/23/2025 7/28/2025 Javid Velazquez MD          Follow-up Information    None                [1]   Social History  Tobacco Use    Smoking status: Some Days     Types: Vaping with nicotine   Substance Use Topics    Alcohol use: No    Drug use: Yes     Types: Marijuana, Heroin        Javid Velazquez MD  07/23/25 7379